# Patient Record
Sex: MALE | Race: WHITE | Employment: OTHER | URBAN - METROPOLITAN AREA
[De-identification: names, ages, dates, MRNs, and addresses within clinical notes are randomized per-mention and may not be internally consistent; named-entity substitution may affect disease eponyms.]

---

## 2022-09-06 ENCOUNTER — HOSPITAL ENCOUNTER (INPATIENT)
Age: 62
LOS: 17 days | Discharge: HOME HEALTH | DRG: 444 | End: 2022-09-23
Attending: STUDENT IN AN ORGANIZED HEALTH CARE EDUCATION/TRAINING PROGRAM | Admitting: HOSPITALIST
Payer: MEDICAID

## 2022-09-06 ENCOUNTER — APPOINTMENT (OUTPATIENT)
Dept: CT IMAGING | Age: 62
DRG: 444 | End: 2022-09-06
Attending: STUDENT IN AN ORGANIZED HEALTH CARE EDUCATION/TRAINING PROGRAM
Payer: MEDICAID

## 2022-09-06 ENCOUNTER — APPOINTMENT (OUTPATIENT)
Dept: GENERAL RADIOLOGY | Age: 62
DRG: 444 | End: 2022-09-06
Attending: RADIOLOGY
Payer: MEDICAID

## 2022-09-06 ENCOUNTER — APPOINTMENT (OUTPATIENT)
Dept: GENERAL RADIOLOGY | Age: 62
DRG: 444 | End: 2022-09-06
Attending: HOSPITALIST
Payer: MEDICAID

## 2022-09-06 ENCOUNTER — APPOINTMENT (OUTPATIENT)
Dept: GENERAL RADIOLOGY | Age: 62
DRG: 444 | End: 2022-09-06
Attending: INTERNAL MEDICINE
Payer: MEDICAID

## 2022-09-06 ENCOUNTER — APPOINTMENT (OUTPATIENT)
Dept: GENERAL RADIOLOGY | Age: 62
DRG: 444 | End: 2022-09-06
Attending: STUDENT IN AN ORGANIZED HEALTH CARE EDUCATION/TRAINING PROGRAM
Payer: MEDICAID

## 2022-09-06 ENCOUNTER — APPOINTMENT (OUTPATIENT)
Dept: INTERVENTIONAL RADIOLOGY/VASCULAR | Age: 62
DRG: 444 | End: 2022-09-06
Attending: INTERNAL MEDICINE
Payer: MEDICAID

## 2022-09-06 DIAGNOSIS — N17.9 ACUTE RENAL FAILURE, UNSPECIFIED ACUTE RENAL FAILURE TYPE (HCC): ICD-10-CM

## 2022-09-06 DIAGNOSIS — A41.9 SEVERE SEPSIS (HCC): Primary | ICD-10-CM

## 2022-09-06 DIAGNOSIS — I48.0 PAF (PAROXYSMAL ATRIAL FIBRILLATION) (HCC): ICD-10-CM

## 2022-09-06 DIAGNOSIS — R56.9 SEIZURE-LIKE ACTIVITY (HCC): ICD-10-CM

## 2022-09-06 DIAGNOSIS — E87.5 ACUTE HYPERKALEMIA: ICD-10-CM

## 2022-09-06 DIAGNOSIS — E11.65 TYPE 2 DIABETES MELLITUS WITH HYPERGLYCEMIA, WITHOUT LONG-TERM CURRENT USE OF INSULIN (HCC): ICD-10-CM

## 2022-09-06 DIAGNOSIS — I48.91 ATRIAL FIBRILLATION, RAPID (HCC): ICD-10-CM

## 2022-09-06 DIAGNOSIS — R65.20 SEVERE SEPSIS (HCC): Primary | ICD-10-CM

## 2022-09-06 PROBLEM — C43.9 MELANOMA (HCC): Status: ACTIVE | Noted: 2022-09-06

## 2022-09-06 PROBLEM — E78.5 HYPERLIPIDEMIA: Status: ACTIVE | Noted: 2022-09-06

## 2022-09-06 PROBLEM — I10 HTN (HYPERTENSION): Status: ACTIVE | Noted: 2022-09-06

## 2022-09-06 PROBLEM — E11.9 DM (DIABETES MELLITUS) (HCC): Status: ACTIVE | Noted: 2022-09-06

## 2022-09-06 LAB
ALBUMIN SERPL-MCNC: 2.8 G/DL (ref 3.5–5)
ALBUMIN SERPL-MCNC: 4.5 G/DL (ref 3.5–5.2)
ALBUMIN/GLOB SERPL: 0.8 {RATIO} (ref 1.1–2.2)
ALBUMIN/GLOB SERPL: 1.4 {RATIO} (ref 1.1–2.2)
ALP SERPL-CCNC: 68 U/L (ref 45–117)
ALP SERPL-CCNC: 85 U/L (ref 40–129)
ALT SERPL-CCNC: 17 U/L (ref 12–78)
ALT SERPL-CCNC: 7 U/L (ref 10–50)
ANION GAP SERPL CALC-SCNC: 46 MMOL/L (ref 5–15)
ANION GAP SERPL CALC-SCNC: ABNORMAL MMOL/L (ref 5–15)
APAP SERPL-MCNC: <5 UG/ML (ref 10–30)
APPEARANCE UR: ABNORMAL
ARTERIAL PATENCY WRIST A: NO
ARTERIAL PATENCY WRIST A: POSITIVE
ARTERIAL PATENCY WRIST A: YES
AST SERPL-CCNC: 13 U/L (ref 10–50)
AST SERPL-CCNC: 18 U/L (ref 15–37)
B-OH-BUTYR SERPL-SCNC: >4.42 MMOL/L
BACTERIA URNS QL MICRO: NEGATIVE /HPF
BASE DEFICIT BLDA-SCNC: 23.9 MMOL/L
BASE DEFICIT BLDA-SCNC: 29.9 MMOL/L
BASOPHILS # BLD: 0 K/UL
BASOPHILS # BLD: 0 K/UL (ref 0–0.1)
BASOPHILS NFR BLD: 0 %
BASOPHILS NFR BLD: 0 % (ref 0–1)
BDY SITE: ABNORMAL
BILIRUB SERPL-MCNC: 0.5 MG/DL (ref 0.2–1)
BILIRUB SERPL-MCNC: 0.6 MG/DL (ref 0.2–1)
BILIRUB UR QL CFM: NEGATIVE
BUN SERPL-MCNC: 105 MG/DL (ref 6–20)
BUN SERPL-MCNC: 105 MG/DL (ref 8–23)
BUN/CREAT SERPL: 8 (ref 12–20)
BUN/CREAT SERPL: 9 (ref 12–20)
CALCIUM SERPL-MCNC: 8.5 MG/DL (ref 8.5–10.1)
CALCIUM SERPL-MCNC: 9.7 MG/DL (ref 8.8–10.2)
CHLORIDE SERPL-SCNC: 89 MMOL/L (ref 98–107)
CHLORIDE SERPL-SCNC: 96 MMOL/L (ref 97–108)
CK SERPL-CCNC: 75 U/L (ref 39–308)
CO2 SERPL-SCNC: 4 MMOL/L (ref 22–29)
CO2 SERPL-SCNC: <5 MMOL/L (ref 21–32)
COLOR UR: ABNORMAL
COMMENT, HOLDF: NORMAL
COVID-19 RAPID TEST, COVR: NOT DETECTED
CREAT SERPL-MCNC: 11.8 MG/DL (ref 0.7–1.3)
CREAT SERPL-MCNC: 12.94 MG/DL (ref 0.7–1.2)
DIFFERENTIAL METHOD BLD: ABNORMAL
DIFFERENTIAL METHOD BLD: ABNORMAL
EOSINOPHIL # BLD: 0 K/UL
EOSINOPHIL # BLD: 0 K/UL (ref 0–0.4)
EOSINOPHIL NFR BLD: 0 %
EOSINOPHIL NFR BLD: 0 % (ref 0–7)
EPITH CASTS URNS QL MICRO: ABNORMAL /LPF
ERYTHROCYTE [DISTWIDTH] IN BLOOD BY AUTOMATED COUNT: 13 % (ref 11.5–14.5)
ERYTHROCYTE [DISTWIDTH] IN BLOOD BY AUTOMATED COUNT: 13 % (ref 11.5–14.5)
ETHANOL SERPL-MCNC: <10 MG/DL (ref 0–10)
FIO2 ON VENT: 50 %
FIO2 ON VENT: 60 %
FLUAV AG NPH QL IA: NEGATIVE
FLUBV AG NOSE QL IA: NEGATIVE
GAS FLOW.O2 O2 DELIVERY SYS: ABNORMAL L/MIN
GAS FLOW.O2 SETTING OXYMISER: 20 L/MIN
GAS FLOW.O2 SETTING OXYMISER: 20 L/MIN
GLOBULIN SER CALC-MCNC: 3.2 G/DL (ref 2–4)
GLOBULIN SER CALC-MCNC: 3.4 G/DL (ref 2–4)
GLUCOSE BLD STRIP.AUTO-MCNC: 140 MG/DL (ref 65–117)
GLUCOSE BLD STRIP.AUTO-MCNC: 149 MG/DL (ref 65–117)
GLUCOSE SERPL-MCNC: 160 MG/DL (ref 65–100)
GLUCOSE SERPL-MCNC: 281 MG/DL (ref 65–100)
GLUCOSE UR STRIP.AUTO-MCNC: NEGATIVE MG/DL
HCO3 BLD-SCNC: 2.1 MMOL/L (ref 22–26)
HCO3 BLDA-SCNC: 5 MMOL/L (ref 22–26)
HCO3 BLDA-SCNC: 8 MMOL/L (ref 22–26)
HCT VFR BLD AUTO: 35.3 % (ref 36.6–50.3)
HCT VFR BLD AUTO: 40.2 % (ref 36.6–50.3)
HGB BLD-MCNC: 10.8 G/DL (ref 12.1–17)
HGB BLD-MCNC: 12.7 G/DL (ref 12.1–17)
HGB UR QL STRIP: ABNORMAL
IMM GRANULOCYTES # BLD AUTO: 0 K/UL
IMM GRANULOCYTES # BLD AUTO: 0 K/UL
IMM GRANULOCYTES NFR BLD AUTO: 0 %
IMM GRANULOCYTES NFR BLD AUTO: 0 %
KETONES UR QL STRIP.AUTO: ABNORMAL MG/DL
LACTATE BLD-SCNC: 11.89 MMOL/L (ref 0.4–2)
LACTATE BLD-SCNC: 14.7 MMOL/L (ref 0.4–2)
LACTATE SERPL-SCNC: 12.6 MMOL/L (ref 0.4–2)
LACTATE SERPL-SCNC: 17.1 MMOL/L (ref 0.4–2)
LACTATE SERPL-SCNC: 18.9 MMOL/L (ref 0.4–2)
LACTATE SERPL-SCNC: 19.3 MMOL/L (ref 0.4–2)
LEUKOCYTE ESTERASE UR QL STRIP.AUTO: NEGATIVE
LYMPHOCYTES # BLD: 2.3 K/UL
LYMPHOCYTES # BLD: 4 K/UL (ref 0.8–3.5)
LYMPHOCYTES NFR BLD: 11 %
LYMPHOCYTES NFR BLD: 19 % (ref 12–49)
MAGNESIUM SERPL-MCNC: 2.7 MG/DL (ref 1.6–2.4)
MCH RBC QN AUTO: 32.5 PG (ref 26–34)
MCH RBC QN AUTO: 32.7 PG (ref 26–34)
MCHC RBC AUTO-ENTMCNC: 30.6 G/DL (ref 30–36.5)
MCHC RBC AUTO-ENTMCNC: 31.6 G/DL (ref 30–36.5)
MCV RBC AUTO: 103.6 FL (ref 80–99)
MCV RBC AUTO: 106.3 FL (ref 80–99)
METAMYELOCYTES NFR BLD MANUAL: 2 %
METAMYELOCYTES NFR BLD MANUAL: 4 %
MONOCYTES # BLD: 0.8 K/UL
MONOCYTES # BLD: 1.3 K/UL (ref 0–1)
MONOCYTES NFR BLD: 4 %
MONOCYTES NFR BLD: 6 % (ref 5–13)
MYELOCYTES NFR BLD MANUAL: 1 %
NEUTS BAND NFR BLD MANUAL: 11 % (ref 0–6)
NEUTS SEG # BLD: 15 K/UL (ref 1.8–8)
NEUTS SEG # BLD: 17.2 K/UL
NEUTS SEG NFR BLD: 60 % (ref 32–75)
NEUTS SEG NFR BLD: 82 %
NITRITE UR QL STRIP.AUTO: NEGATIVE
NRBC # BLD: 0 K/UL (ref 0–0.01)
NRBC # BLD: 0 K/UL (ref 0–0.01)
NRBC BLD-RTO: 0 PER 100 WBC
NRBC BLD-RTO: 0 PER 100 WBC
PCO2 BLD: 14.9 MMHG (ref 35–45)
PCO2 BLDA: 32 MMHG (ref 35–45)
PCO2 BLDA: 37 MMHG (ref 35–45)
PEEP RESPIRATORY: 5 CM[H2O]
PEEP RESPIRATORY: 5 CM[H2O]
PH BLD: 6.77 [PH] (ref 7.35–7.45)
PH BLDA: 6.79 [PH] (ref 7.35–7.45)
PH BLDA: 6.94 [PH] (ref 7.35–7.45)
PH UR STRIP: 5.5 [PH] (ref 5–8)
PLATELET # BLD AUTO: 316 K/UL (ref 150–400)
PLATELET # BLD AUTO: 337 K/UL (ref 150–400)
PLATELET COMMENTS,PCOM: ABNORMAL
PLATELET COMMENTS,PCOM: ABNORMAL
PMV BLD AUTO: 10.7 FL (ref 8.9–12.9)
PMV BLD AUTO: 9.9 FL (ref 8.9–12.9)
PO2 BLD: 146 MMHG (ref 80–100)
PO2 BLDA: 154 MMHG (ref 80–100)
PO2 BLDA: 87 MMHG (ref 80–100)
POTASSIUM SERPL-SCNC: 5.5 MMOL/L (ref 3.5–5.1)
POTASSIUM SERPL-SCNC: 7.6 MMOL/L (ref 3.5–5.1)
PROT SERPL-MCNC: 6.2 G/DL (ref 6.4–8.2)
PROT SERPL-MCNC: 7.7 G/DL (ref 6.4–8.3)
PROT UR STRIP-MCNC: 100 MG/DL
RBC # BLD AUTO: 3.32 M/UL (ref 4.1–5.7)
RBC # BLD AUTO: 3.88 M/UL (ref 4.1–5.7)
RBC #/AREA URNS HPF: ABNORMAL /HPF (ref 0–5)
RBC MORPH BLD: ABNORMAL
RBC MORPH BLD: ABNORMAL
SALICYLATES SERPL-MCNC: 0.4 MG/DL (ref 3–10)
SAMPLES BEING HELD,HOLD: NORMAL
SAO2 % BLD: 89 % (ref 92–97)
SAO2 % BLD: 95.3 % (ref 92–97)
SAO2 % BLD: 97 % (ref 92–97)
SAO2% DEVICE SAO2% SENSOR NAME: ABNORMAL
SAO2% DEVICE SAO2% SENSOR NAME: ABNORMAL
SERVICE CMNT-IMP: ABNORMAL
SODIUM SERPL-SCNC: 139 MMOL/L (ref 136–145)
SODIUM SERPL-SCNC: 139 MMOL/L (ref 136–145)
SOURCE, COVRS: NORMAL
SP GR UR REFRACTOMETRY: 1.02 (ref 1–1.03)
SPECIMEN SITE: ABNORMAL
SPECIMEN SITE: ABNORMAL
SPECIMEN TYPE: ABNORMAL
TROPONIN I BLD-MCNC: <0.04 NG/ML (ref 0–0.08)
UA: UC IF INDICATED,UAUC: ABNORMAL
UROBILINOGEN UR QL STRIP.AUTO: 0.2 EU/DL (ref 0.2–1)
VENTILATION MODE VENT: ABNORMAL
VT SETTING VENT: 400 ML
VT SETTING VENT: 400 ML
WBC # BLD AUTO: 21 K/UL (ref 4.1–11.1)
WBC # BLD AUTO: 21.1 K/UL (ref 4.1–11.1)
WBC URNS QL MICRO: ABNORMAL /HPF (ref 0–4)

## 2022-09-06 PROCEDURE — 74011250636 HC RX REV CODE- 250/636: Performed by: INTERNAL MEDICINE

## 2022-09-06 PROCEDURE — 87040 BLOOD CULTURE FOR BACTERIA: CPT

## 2022-09-06 PROCEDURE — 74011250636 HC RX REV CODE- 250/636: Performed by: STUDENT IN AN ORGANIZED HEALTH CARE EDUCATION/TRAINING PROGRAM

## 2022-09-06 PROCEDURE — 83605 ASSAY OF LACTIC ACID: CPT

## 2022-09-06 PROCEDURE — 74011000250 HC RX REV CODE- 250: Performed by: STUDENT IN AN ORGANIZED HEALTH CARE EDUCATION/TRAINING PROGRAM

## 2022-09-06 PROCEDURE — 74011000258 HC RX REV CODE- 258: Performed by: INTERNAL MEDICINE

## 2022-09-06 PROCEDURE — 3E033XZ INTRODUCTION OF VASOPRESSOR INTO PERIPHERAL VEIN, PERCUTANEOUS APPROACH: ICD-10-PCS | Performed by: HOSPITALIST

## 2022-09-06 PROCEDURE — 71045 X-RAY EXAM CHEST 1 VIEW: CPT

## 2022-09-06 PROCEDURE — 94640 AIRWAY INHALATION TREATMENT: CPT

## 2022-09-06 PROCEDURE — 80143 DRUG ASSAY ACETAMINOPHEN: CPT

## 2022-09-06 PROCEDURE — 74176 CT ABD & PELVIS W/O CONTRAST: CPT

## 2022-09-06 PROCEDURE — 85025 COMPLETE CBC W/AUTO DIFF WBC: CPT

## 2022-09-06 PROCEDURE — 77002 NEEDLE LOCALIZATION BY XRAY: CPT

## 2022-09-06 PROCEDURE — 96365 THER/PROPH/DIAG IV INF INIT: CPT

## 2022-09-06 PROCEDURE — 2709999900 HC NON-CHARGEABLE SUPPLY

## 2022-09-06 PROCEDURE — 82077 ASSAY SPEC XCP UR&BREATH IA: CPT

## 2022-09-06 PROCEDURE — 82010 KETONE BODYS QUAN: CPT

## 2022-09-06 PROCEDURE — 77030040922 HC BLNKT HYPOTHRM STRY -A

## 2022-09-06 PROCEDURE — 81001 URINALYSIS AUTO W/SCOPE: CPT

## 2022-09-06 PROCEDURE — 82803 BLOOD GASES ANY COMBINATION: CPT

## 2022-09-06 PROCEDURE — 36600 WITHDRAWAL OF ARTERIAL BLOOD: CPT

## 2022-09-06 PROCEDURE — 94002 VENT MGMT INPAT INIT DAY: CPT

## 2022-09-06 PROCEDURE — 5A1D90Z PERFORMANCE OF URINARY FILTRATION, CONTINUOUS, GREATER THAN 18 HOURS PER DAY: ICD-10-PCS | Performed by: INTERNAL MEDICINE

## 2022-09-06 PROCEDURE — 71250 CT THORAX DX C-: CPT

## 2022-09-06 PROCEDURE — 80179 DRUG ASSAY SALICYLATE: CPT

## 2022-09-06 PROCEDURE — 31500 INSERT EMERGENCY AIRWAY: CPT

## 2022-09-06 PROCEDURE — 80053 COMPREHEN METABOLIC PANEL: CPT

## 2022-09-06 PROCEDURE — 36415 COLL VENOUS BLD VENIPUNCTURE: CPT

## 2022-09-06 PROCEDURE — 80320 DRUG SCREEN QUANTALCOHOLS: CPT

## 2022-09-06 PROCEDURE — 96374 THER/PROPH/DIAG INJ IV PUSH: CPT

## 2022-09-06 PROCEDURE — 87804 INFLUENZA ASSAY W/OPTIC: CPT

## 2022-09-06 PROCEDURE — 96375 TX/PRO/DX INJ NEW DRUG ADDON: CPT

## 2022-09-06 PROCEDURE — 5A1955Z RESPIRATORY VENTILATION, GREATER THAN 96 CONSECUTIVE HOURS: ICD-10-PCS | Performed by: STUDENT IN AN ORGANIZED HEALTH CARE EDUCATION/TRAINING PROGRAM

## 2022-09-06 PROCEDURE — C1751 CATH, INF, PER/CENT/MIDLINE: HCPCS

## 2022-09-06 PROCEDURE — 74018 RADEX ABDOMEN 1 VIEW: CPT

## 2022-09-06 PROCEDURE — 02HV33Z INSERTION OF INFUSION DEVICE INTO SUPERIOR VENA CAVA, PERCUTANEOUS APPROACH: ICD-10-PCS | Performed by: INTERNAL MEDICINE

## 2022-09-06 PROCEDURE — 0BH17EZ INSERTION OF ENDOTRACHEAL AIRWAY INTO TRACHEA, VIA NATURAL OR ARTIFICIAL OPENING: ICD-10-PCS | Performed by: STUDENT IN AN ORGANIZED HEALTH CARE EDUCATION/TRAINING PROGRAM

## 2022-09-06 PROCEDURE — C1752 CATH,HEMODIALYSIS,SHORT-TERM: HCPCS

## 2022-09-06 PROCEDURE — 74011000250 HC RX REV CODE- 250: Performed by: HOSPITALIST

## 2022-09-06 PROCEDURE — 93005 ELECTROCARDIOGRAM TRACING: CPT

## 2022-09-06 PROCEDURE — 83735 ASSAY OF MAGNESIUM: CPT

## 2022-09-06 PROCEDURE — 74011000250 HC RX REV CODE- 250: Performed by: RADIOLOGY

## 2022-09-06 PROCEDURE — C1894 INTRO/SHEATH, NON-LASER: HCPCS

## 2022-09-06 PROCEDURE — 87635 SARS-COV-2 COVID-19 AMP PRB: CPT

## 2022-09-06 PROCEDURE — 06HY33Z INSERTION OF INFUSION DEVICE INTO LOWER VEIN, PERCUTANEOUS APPROACH: ICD-10-PCS | Performed by: ANESTHESIOLOGY

## 2022-09-06 PROCEDURE — 74011000250 HC RX REV CODE- 250: Performed by: INTERNAL MEDICINE

## 2022-09-06 PROCEDURE — 65610000006 HC RM INTENSIVE CARE

## 2022-09-06 PROCEDURE — 74011250636 HC RX REV CODE- 250/636: Performed by: RADIOLOGY

## 2022-09-06 PROCEDURE — 74011636637 HC RX REV CODE- 636/637: Performed by: STUDENT IN AN ORGANIZED HEALTH CARE EDUCATION/TRAINING PROGRAM

## 2022-09-06 PROCEDURE — 74011250636 HC RX REV CODE- 250/636: Performed by: HOSPITALIST

## 2022-09-06 PROCEDURE — 82962 GLUCOSE BLOOD TEST: CPT

## 2022-09-06 PROCEDURE — 74011000258 HC RX REV CODE- 258: Performed by: EMERGENCY MEDICINE

## 2022-09-06 PROCEDURE — 70450 CT HEAD/BRAIN W/O DYE: CPT

## 2022-09-06 PROCEDURE — 02HV33Z INSERTION OF INFUSION DEVICE INTO SUPERIOR VENA CAVA, PERCUTANEOUS APPROACH: ICD-10-PCS | Performed by: ANESTHESIOLOGY

## 2022-09-06 PROCEDURE — 84484 ASSAY OF TROPONIN QUANT: CPT

## 2022-09-06 PROCEDURE — 99285 EMERGENCY DEPT VISIT HI MDM: CPT

## 2022-09-06 PROCEDURE — 51702 INSERT TEMP BLADDER CATH: CPT

## 2022-09-06 PROCEDURE — 74011250636 HC RX REV CODE- 250/636: Performed by: EMERGENCY MEDICINE

## 2022-09-06 PROCEDURE — 82550 ASSAY OF CK (CPK): CPT

## 2022-09-06 PROCEDURE — 74011000250 HC RX REV CODE- 250

## 2022-09-06 RX ORDER — SODIUM BICARBONATE 1 MEQ/ML
SYRINGE (ML) INTRAVENOUS
Status: COMPLETED
Start: 2022-09-06 | End: 2022-09-06

## 2022-09-06 RX ORDER — LEVOFLOXACIN 5 MG/ML
750 INJECTION, SOLUTION INTRAVENOUS
Status: COMPLETED | OUTPATIENT
Start: 2022-09-06 | End: 2022-09-06

## 2022-09-06 RX ORDER — LEVOFLOXACIN 5 MG/ML
750 INJECTION, SOLUTION INTRAVENOUS EVERY 24 HOURS
Status: DISCONTINUED | OUTPATIENT
Start: 2022-09-06 | End: 2022-09-06

## 2022-09-06 RX ORDER — CALCIUM GLUCONATE 20 MG/ML
1 INJECTION, SOLUTION INTRAVENOUS ONCE
Status: COMPLETED | OUTPATIENT
Start: 2022-09-06 | End: 2022-09-06

## 2022-09-06 RX ORDER — SODIUM BICARBONATE 84 MG/ML
100 INJECTION, SOLUTION INTRAVENOUS
Status: COMPLETED | OUTPATIENT
Start: 2022-09-06 | End: 2022-09-06

## 2022-09-06 RX ORDER — HEPARIN SODIUM 1000 [USP'U]/ML
1000-5000 INJECTION, SOLUTION INTRAVENOUS; SUBCUTANEOUS ONCE
Status: COMPLETED | OUTPATIENT
Start: 2022-09-06 | End: 2022-09-06

## 2022-09-06 RX ORDER — SODIUM BICARBONATE 1 MEQ/ML
150 SYRINGE (ML) INTRAVENOUS ONCE
Status: COMPLETED | OUTPATIENT
Start: 2022-09-06 | End: 2022-09-06

## 2022-09-06 RX ORDER — PROPOFOL 10 MG/ML
200 INJECTION, EMULSION INTRAVENOUS
Status: COMPLETED | OUTPATIENT
Start: 2022-09-06 | End: 2022-09-06

## 2022-09-06 RX ORDER — GABAPENTIN 300 MG/1
300 CAPSULE ORAL SEE ADMIN INSTRUCTIONS
COMMUNITY
End: 2022-09-23

## 2022-09-06 RX ORDER — SODIUM BICARBONATE 1 MEQ/ML
50 SYRINGE (ML) INTRAVENOUS ONCE
Status: COMPLETED | OUTPATIENT
Start: 2022-09-06 | End: 2022-09-06

## 2022-09-06 RX ORDER — ALBUTEROL SULFATE 0.83 MG/ML
10 SOLUTION RESPIRATORY (INHALATION) ONCE
Status: COMPLETED | OUTPATIENT
Start: 2022-09-06 | End: 2022-09-06

## 2022-09-06 RX ORDER — IPRATROPIUM BROMIDE AND ALBUTEROL SULFATE 2.5; .5 MG/3ML; MG/3ML
3 SOLUTION RESPIRATORY (INHALATION)
Status: COMPLETED | OUTPATIENT
Start: 2022-09-06 | End: 2022-09-06

## 2022-09-06 RX ORDER — NOREPINEPHRINE BITARTRATE/D5W 8 MG/250ML
.5-5 PLASTIC BAG, INJECTION (ML) INTRAVENOUS
Status: DISCONTINUED | OUTPATIENT
Start: 2022-09-06 | End: 2022-09-07 | Stop reason: SDUPTHER

## 2022-09-06 RX ORDER — LIDOCAINE HYDROCHLORIDE 10 MG/ML
1-20 INJECTION INFILTRATION; PERINEURAL
Status: DISCONTINUED | OUTPATIENT
Start: 2022-09-06 | End: 2022-09-09

## 2022-09-06 RX ORDER — PROPOFOL 10 MG/ML
0-50 VIAL (ML) INTRAVENOUS
Status: DISCONTINUED | OUTPATIENT
Start: 2022-09-06 | End: 2022-09-09

## 2022-09-06 RX ORDER — ROCURONIUM BROMIDE 10 MG/ML
50 INJECTION, SOLUTION INTRAVENOUS
Status: COMPLETED | OUTPATIENT
Start: 2022-09-06 | End: 2022-09-06

## 2022-09-06 RX ADMIN — SODIUM CHLORIDE, SODIUM LACTATE, POTASSIUM CHLORIDE, AND CALCIUM CHLORIDE 2151 ML: 600; 310; 30; 20 INJECTION, SOLUTION INTRAVENOUS at 10:41

## 2022-09-06 RX ADMIN — SODIUM BICARBONATE 150 MEQ: 84 INJECTION, SOLUTION INTRAVENOUS at 18:56

## 2022-09-06 RX ADMIN — SODIUM BICARBONATE: 84 INJECTION, SOLUTION INTRAVENOUS at 13:59

## 2022-09-06 RX ADMIN — PIPERACILLIN AND TAZOBACTAM 4.5 G: 4; .5 INJECTION, POWDER, LYOPHILIZED, FOR SOLUTION INTRAVENOUS at 11:06

## 2022-09-06 RX ADMIN — LEVOFLOXACIN 750 MG: 5 INJECTION, SOLUTION INTRAVENOUS at 11:10

## 2022-09-06 RX ADMIN — VANCOMYCIN HYDROCHLORIDE 750 MG: 750 INJECTION, POWDER, LYOPHILIZED, FOR SOLUTION INTRAVENOUS at 11:13

## 2022-09-06 RX ADMIN — SODIUM BICARBONATE 150 MEQ: 84 INJECTION INTRAVENOUS at 18:21

## 2022-09-06 RX ADMIN — ALBUTEROL SULFATE 10 MG: 2.5 SOLUTION RESPIRATORY (INHALATION) at 12:10

## 2022-09-06 RX ADMIN — SODIUM CHLORIDE, POTASSIUM CHLORIDE, SODIUM LACTATE AND CALCIUM CHLORIDE 1000 ML: 600; 310; 30; 20 INJECTION, SOLUTION INTRAVENOUS at 14:59

## 2022-09-06 RX ADMIN — IPRATROPIUM BROMIDE AND ALBUTEROL SULFATE 3 ML: .5; 3 SOLUTION RESPIRATORY (INHALATION) at 13:56

## 2022-09-06 RX ADMIN — SODIUM BICARBONATE 150 MEQ: 84 INJECTION, SOLUTION INTRAVENOUS at 14:25

## 2022-09-06 RX ADMIN — CALCIUM GLUCONATE 1000 MG: 20 INJECTION, SOLUTION INTRAVENOUS at 11:57

## 2022-09-06 RX ADMIN — PIPERACILLIN AND TAZOBACTAM 3.38 G: 3; .375 INJECTION, POWDER, FOR SOLUTION INTRAVENOUS at 21:05

## 2022-09-06 RX ADMIN — SODIUM BICARBONATE 100 MEQ: 84 INJECTION, SOLUTION INTRAVENOUS at 10:41

## 2022-09-06 RX ADMIN — Medication 150 MEQ: at 14:25

## 2022-09-06 RX ADMIN — ROCURONIUM BROMIDE 50 MG: 50 INJECTION INTRAVENOUS at 13:49

## 2022-09-06 RX ADMIN — SODIUM CHLORIDE, POTASSIUM CHLORIDE, SODIUM LACTATE AND CALCIUM CHLORIDE 1000 ML: 600; 310; 30; 20 INJECTION, SOLUTION INTRAVENOUS at 14:00

## 2022-09-06 RX ADMIN — PHENYLEPHRINE HYDROCHLORIDE 30 MCG/MIN: 10 INJECTION INTRAVENOUS at 19:05

## 2022-09-06 RX ADMIN — SODIUM BICARBONATE 50 MEQ: 84 INJECTION, SOLUTION INTRAVENOUS at 14:00

## 2022-09-06 RX ADMIN — FENTANYL CITRATE 50 MCG/HR: 50 INJECTION, SOLUTION INTRAMUSCULAR; INTRAVENOUS at 14:42

## 2022-09-06 RX ADMIN — Medication 5 UNITS: at 12:02

## 2022-09-06 RX ADMIN — SODIUM BICARBONATE: 84 INJECTION, SOLUTION INTRAVENOUS at 14:38

## 2022-09-06 RX ADMIN — VANCOMYCIN HYDROCHLORIDE 1000 MG: 1 INJECTION, POWDER, LYOPHILIZED, FOR SOLUTION INTRAVENOUS at 11:16

## 2022-09-06 RX ADMIN — PROPOFOL 200 MG: 10 INJECTION, EMULSION INTRAVENOUS at 13:49

## 2022-09-06 RX ADMIN — PROPOFOL 25 MCG/KG/MIN: 10 INJECTION, EMULSION INTRAVENOUS at 14:20

## 2022-09-06 RX ADMIN — NOREPINEPHRINE BITARTRATE 4 MCG/MIN: 1 SOLUTION INTRAVENOUS at 14:56

## 2022-09-06 RX ADMIN — DEXTROSE MONOHYDRATE 250 ML: 100 INJECTION, SOLUTION INTRAVENOUS at 12:04

## 2022-09-06 RX ADMIN — VASOPRESSIN 0.03 UNITS/MIN: 20 INJECTION INTRAVENOUS at 21:08

## 2022-09-06 RX ADMIN — HEPARIN SODIUM 2600 UNITS: 1000 INJECTION INTRAVENOUS; SUBCUTANEOUS at 15:50

## 2022-09-06 RX ADMIN — LIDOCAINE HYDROCHLORIDE 2 ML: 10 INJECTION, SOLUTION INFILTRATION; PERINEURAL at 15:40

## 2022-09-06 NOTE — CONSULTS
Consult Date: 9/6/2022    Consults Consulted by Dr. Jayna Arriola for assistance with ICU mgmt of LAVINIA, acidosis. Subjective   (Pt intubated at  time of eval.  Minimal hx available at this time). Pt is 57 y/ow ith pmh HLD, HTN, DM presented to outside ER with dizziness, faintness. Reportedly also endorsed N/V and SOB, but no recent fevers and no report of abd pain. Pt intubated on arrival to [facility] due to work of breathing. At time of CCM consult, pt intubated. ABG available at time of consult shows pH of 6.77 and pCO2 of 14.9. Bicarb 2.1. Pt on gtt and had gotten 1 amp. [facility] emergently ordered 3 additional amps of bicarb. HM calling IR for urgent HD access to help manage pH. Non-contrast CT of C/A/P done at outside facility unremarkable for acute findings. Past Medical History:   Diagnosis Date    Hypercholesteremia     Hypertension     Melanoma ([address]     skin    Thyroid activity decreased       Past Surgical History:   Procedure Laterality Date    HX ORTHOPAEDIC       History reviewed. No pertinent family history.    Social History     Tobacco Use    Smoking status: Never    Smokeless tobacco: Never   Substance Use Topics    Alcohol use: Not Currently       Current Facility-Administered Medications   Medication Dose Route Frequency Provider Last Rate Last Admin    dextrose 10 % infusion 125-250 mL  125-250 mL IntraVENous PRN Argentina Mackey DO        sodium bicarbonate (8.4%) 150 mEq in dextrose 5% 1,000 mL infusion   IntraVENous CONTINUOUS John Payne  mL/hr at 09/06/22 1438 New Bag at 09/06/22 1438    piperacillin-tazobactam (ZOSYN) 3.375 g in 0.9% sodium chloride (MBP/ADV) 100 mL MBP  3.375 g IntraVENous Q12H John Prasad MD        propofol (DIPRIVAN) 10 mg/mL infusion  0-50 mcg/kg/min IntraVENous TITRATE Saleem Wilson DO 10.8 mL/hr at 09/06/22 1420 25 mcg/kg/min at 09/06/22 1420    fentaNYL (PF) 1,500 mcg/30 mL (50 mcg/mL) infusion  0-200 mcg/hr IntraVENous TITRATE Celina Sánchez,             Review of Systems  Unable to obtain  Objective   Gen: intubated, paralyzed  HEENT: ETT in place  Chest: symmetrical chest rise  CV: r/r/r  Abd: non-distended  Ext: no c/c/e  Neuro: NMB at time of my eval.  Per report, pt talking and moving on arrival to ER. Vital signs for last 24 hours:  Visit Vitals  /77   Pulse (!) 101   Temp (!) 93.1 °F (33.9 °C)   Resp 20   Ht 5' 6\" (1.676 m)   Wt 71.7 kg (158 lb)   SpO2 100%   BMI 25.50 kg/m²       Intake/Output this shift:  Current Shift: 09/06 0701 - 09/06 1900  In: 1901 [I.V.:1901]  Out: 140 [Urine:140]  Last 3 Shifts: No intake/output data recorded. Data Review:   Recent Results (from the past 24 hour(s))   SAMPLES BEING HELD    Collection Time: 09/06/22 10:10 AM   Result Value Ref Range    SAMPLES BEING HELD 1 BLUE,1 GOLD     COMMENT        Add-on orders for these samples will be processed based on acceptable specimen integrity and analyte stability, which may vary by analyte. COVID-19 RAPID TEST    Collection Time: 09/06/22 10:10 AM   Result Value Ref Range    Specimen source Nasopharyngeal      COVID-19 rapid test Not detected NOTD     INFLUENZA A+B VIRAL AGS    Collection Time: 09/06/22 10:10 AM   Result Value Ref Range    Influenza A Antigen Negative NEG      Influenza B Antigen Negative NEG     CBC WITH AUTOMATED DIFF    Collection Time: 09/06/22 10:10 AM   Result Value Ref Range    WBC 21.0 (H) 4.1 - 11.1 K/uL    RBC 3.88 (L) 4.10 - 5.70 M/uL    HGB 12.7 12.1 - 17.0 g/dL    HCT 40.2 36.6 - 50.3 %    .6 (H) 80.0 - 99.0 FL    MCH 32.7 26.0 - 34.0 PG    MCHC 31.6 30.0 - 36.5 g/dL    RDW 13.0 11.5 - 14.5 %    PLATELET 176 441 - 666 K/uL    MPV 9.9 8.9 - 12.9 FL    NRBC 0.0 0  WBC    ABSOLUTE NRBC 0.00 0.00 - 0.01 K/uL    NEUTROPHILS 82 %    LYMPHOCYTES 11 %    MONOCYTES 4 %    EOSINOPHILS 0 %    BASOPHILS 0 %    METAMYELOCYTES 2 (H) 0 %    MYELOCYTES 1 (H) 0 %    IMMATURE GRANULOCYTES 0 %    ABS. NEUTROPHILS 17.2 K/UL    ABS. LYMPHOCYTES 2.3 K/UL    ABS. MONOCYTES 0.8 K/UL    ABS. EOSINOPHILS 0.0 K/UL    ABS. BASOPHILS 0.0 K/UL    ABS. IMM. GRANS. 0.0 K/UL    DF MANUAL      PLATELET COMMENTS Large Platelets      RBC COMMENTS MACROCYTOSIS  1+       METABOLIC PANEL, COMPREHENSIVE    Collection Time: 09/06/22 10:10 AM   Result Value Ref Range    Sodium 139 136 - 145 mmol/L    Potassium 7.6 (HH) 3.5 - 5.1 mmol/L    Chloride 89 (L) 98 - 107 mmol/L    CO2 4 (LL) 22 - 29 mmol/L    Anion gap 46 (H) 5 - 15 mmol/L    Glucose 160 (H) 65 - 100 mg/dL     (H) 8 - 23 MG/DL    Creatinine 12.94 (H) 0.70 - 1.20 MG/DL    BUN/Creatinine ratio 8 (L) 12 - 20      GFR est AA 5 (L) >60 ml/min/1.73m2    GFR est non-AA 4 (L) >60 ml/min/1.73m2    Calcium 9.7 8.8 - 10.2 MG/DL    Bilirubin, total 0.5 0.2 - 1.0 MG/DL    ALT (SGPT) 7 (L) 10 - 50 U/L    AST (SGOT) 13 10 - 50 U/L    Alk.  phosphatase 85 40 - 129 U/L    Protein, total 7.7 6.4 - 8.3 g/dL    Albumin 4.5 3.5 - 5.2 g/dL    Globulin 3.2 2.0 - 4.0 g/dL    A-G Ratio 1.4 1.1 - 2.2     MAGNESIUM    Collection Time: 09/06/22 10:10 AM   Result Value Ref Range    Magnesium 2.7 (H) 1.6 - 2.4 mg/dL   LACTIC ACID, PLASMA    Collection Time: 09/06/22 10:13 AM   Result Value Ref Range    Lactic acid, plasma 12.60 (HH) 0.4 - 2.0 mmol/L   POC TROPONIN-I    Collection Time: 09/06/22 10:14 AM   Result Value Ref Range    Troponin-I (POC) <0.04 0.00 - 0.08 ng/mL   GLUCOSE, POC    Collection Time: 09/06/22 10:20 AM   Result Value Ref Range    Glucose (POC) 149 (H) 65 - 117 mg/dL    Performed by Aaron YAO    POC LACTIC ACID    Collection Time: 09/06/22 10:23 AM   Result Value Ref Range    Lactic Acid (POC) 11.89 (HH) 0.40 - 2.00 mmol/L   URINALYSIS W/ REFLEX CULTURE    Collection Time: 09/06/22 11:04 AM    Specimen: Miscellaneous sample; Urine    Urine specimen   Result Value Ref Range    Color YELLOW/STRAW      Appearance HAZY (A) CLEAR      Specific gravity 1.020 1.003 - 1.030      pH (UA) 5.5 5.0 - 8.0      Protein 100 (A) NEG mg/dL    Glucose Negative NEG mg/dL    Ketone TRACE (A) NEG mg/dL    Blood TRACE (A) NEG      Urobilinogen 0.2 0.2 - 1.0 EU/dL    Nitrites Negative NEG      Leukocyte Esterase Negative NEG      WBC 0-4 0 - 4 /hpf    RBC 0-5 0 - 5 /hpf    Epithelial cells FEW FEW /lpf    Bacteria Negative NEG /hpf    UA:UC IF INDICATED CULTURE NOT INDICATED BY UA RESULT     BILIRUBIN, CONFIRM    Collection Time: 09/06/22 11:04 AM   Result Value Ref Range    Bilirubin UA, confirm Negative NEG     SALICYLATE    Collection Time: 09/06/22 11:05 AM   Result Value Ref Range    Salicylate level 0.4 (L) 3.0 - 10.0 MG/DL   ETHYL ALCOHOL    Collection Time: 09/06/22 11:05 AM   Result Value Ref Range    ALCOHOL(ETHYL),SERUM <10 0 - 10 MG/DL   ACETAMINOPHEN    Collection Time: 09/06/22 11:05 AM   Result Value Ref Range    Acetaminophen level <5 (L) 10 - 30 ug/mL   LACTIC ACID, PLASMA    Collection Time: 09/06/22 11:52 AM   Result Value Ref Range    Lactic acid, plasma 17.10 (HH) 0.4 - 2.0 mmol/L   POC LACTIC ACID    Collection Time: 09/06/22 12:02 PM   Result Value Ref Range    Lactic Acid (POC) 14.70 (HH) 0.40 - 2.00 mmol/L   GLUCOSE, POC    Collection Time: 09/06/22 12:16 PM   Result Value Ref Range    Glucose (POC) 140 (H) 65 - 117 mg/dL    Performed by Jose Juan Bloodgood    POC G3 - PUL    Collection Time: 09/06/22  1:30 PM   Result Value Ref Range    pH (POC) 6.77 (LL) 7.35 - 7.45      pCO2 (POC) 14.9 (L) 35.0 - 45.0 MMHG    pO2 (POC) 146 (H) 80 - 100 MMHG    HCO3 (POC) 2.1 (L) 22 - 26 MMOL/L    sO2 (POC) 95.3 92 - 97 %    Site LEFT RADIAL      Device: ROOM AIR      Allens test (POC) Positive      Specimen type (POC) ARTERIAL      Critical value read back LIND    CBC WITH AUTOMATED DIFF    Collection Time: 09/06/22  2:16 PM   Result Value Ref Range    WBC 21.1 (H) 4.1 - 11.1 K/uL    RBC 3.32 (L) 4.10 - 5.70 M/uL    HGB 10.8 (L) 12.1 - 17.0 g/dL    HCT 35.3 (L) 36.6 - 50.3 %    .3 (H) 80.0 - 99.0 FL    MCH 32.5 26.0 - 34.0 PG    MCHC 30.6 30.0 - 36.5 g/dL    RDW 13.0 11.5 - 14.5 %    PLATELET 682 173 - 880 K/uL    MPV 10.7 8.9 - 12.9 FL    NRBC 0.0 0  WBC    ABSOLUTE NRBC 0.00 0.00 - 0.01 K/uL    NEUTROPHILS PENDING %    LYMPHOCYTES PENDING %    MONOCYTES PENDING %    EOSINOPHILS PENDING %    BASOPHILS PENDING %    IMMATURE GRANULOCYTES PENDING %    ABS. NEUTROPHILS PENDING K/UL    ABS. LYMPHOCYTES PENDING K/UL    ABS. MONOCYTES PENDING K/UL    ABS. EOSINOPHILS PENDING K/UL    ABS. BASOPHILS PENDING K/UL    ABS. IMM. GRANS. PENDING K/UL    DF PENDING      58 y/o with pmh HLD, HTN, DM admitted after transfer from outside ER for mgmt of dizziness, LAVINIA, acidosis. Acidosis:  -- profound acidosis on initial eval by CCM  -- pCO2 only 14, but pH 6.77 and lactate >15.  -- etiology likely multifactorial with renal failure and elevated lactate. -- pt likely compensated with hyperventilation, but etiology of elevated lactate still unclear  -- no clinical hx that fits toxic alcohols and pt reportedly interactive on arrival to ER, which would be unusual with toxic alcohols. Labs sent however. -- has DM, but blood sugar not all that elevated. Ketones minimal in UA. Beta-hydroxy sent. -- treating with amps of bicarb and is getting set up for dialysis. Hypocapnia:  -- severe hypocapnia  -- concern that this will lead to decreased cerebral blood flow and strokes. -- pH too low to allow to drift up so  getting amps of bicarb in effort to allow pCO2 to get back into 20 range and decrease vasoconstriction. Elevated Lactate:  -- etiology unclear. -- pt normotensive in ER and post intubation  -- no clear source of hypoperfusion  -- will continue abx, rewarming    Acute Renal Failure:  -- appears very pre-renal  -- no indication of infection  -- getting volume  -- nephrology consulted and getting prep'd for dialysis.       Sepsis:  -- suspected but no source identified  -- CT C/A/P unrevealing, but was non-con due to creatinine. -- continue empiric abx. Hypothermia:  -- likely related to low pH, possible sepsis, possible ingestion  -- rewarm  -- anticipate hypotension with rewarming. Acute Resp Failure:  -- intubated for overall clinical decline  -- SBT when meeting criteria. CCM time 70 min. Pt at risk of life threatening deterioration from acidosis. Pt seen and evaluated via tele interaction. Audio and video used for this encounter.

## 2022-09-06 NOTE — PROGRESS NOTES
Pharmacy Dosing Services: Zosyn    Zosyn 3.375 gm IV Q6H automatically adjusted per protocol to Zosyn 3.375 gm IV Q12H (extended-infusion over 4 hours) for CrCl < 20 mL/min, HD, or PD  Patient already received initial loading dose of 4.5 gm IV x 1 at [facility]  Maintenance dose scheduled to begin 6 hours after load for CrCl >/= 20 mL/min and 8 hours after load for CrCl < 20 mL/min    Thank you,  Eyal DICKERSON [facility]

## 2022-09-06 NOTE — ED NOTES
KIAH from Sadia Mcdonald MD to stop LR infusion at this time due to MD concern for fluid overload, ~400ml LR remaining to be infused. LR stopped. Boy Rivers RN to obtain repeat POC lactic acid after return from CT.

## 2022-09-06 NOTE — ED NOTES
SBAR provided to [address] [place] [address] Road Transport Team: Maddison Bishop Torsten RT, [employer].

## 2022-09-06 NOTE — ED NOTES
This RN contacted [facility] and spoke to Carolyn Wayne who will page Dr. Freddie Akhtar in regards to this patient.

## 2022-09-06 NOTE — MANAGEMENT PLAN
Increasing pressor requirements. Have reached out to anesthesiologist to request CVC. If no call back will need to try again and confirm correct person. Repeated ABG and have ordered additional bicarb. Etiology still unclear. Kane added for second pressor. Once we have CVC would add vaso.

## 2022-09-06 NOTE — PROGRESS NOTES
1330: TRANSFER - IN REPORT:    Verbal report received from Kendy Bolanos RN(name) on Andrey Knowles  being received from [place] ED(unit) for routine progression of care      Report consisted of patients Situation, Background, Assessment and   Recommendations(SBAR). Information from the following report(s) SBAR, Intake/Output, MAR, Recent Results, and Cardiac Rhythm NSR  was reviewed with the receiving nurse. Opportunity for questions and clarification was provided. Assessment completed upon patients arrival to unit and care assumed. Patient arrived to unit on room air with increased WOB. ABG obtained and Dr Joe Ignacio at bedside. 1L LR bolus and amp bicarb ordered. pH 6.7 and pCO2 14.9, orders to intubate. 1349: Anesthesia at bedside intubating patient. 1420: Dr Truman Parisi at bedside. Orders for 2nd LR bolus and 2 amps of bicarb. Prop and Fent started. NGT placed. 1452: Labs drawn and sent. Repeat ABG pH 6.79 and pCO2 32.    1515: Dr Doris Aldridge at bedside, orders for CRRT placed. Labs drawn and sent. 1530: IR at bedside placing Otoniel. 1600: Reassessment completed. Endotracheal and mouth care provided. Patient turned and repositioned. Restraints assessed, see flow sheet. 1800: Endotracheal and mouth care provided. Patient turned and repositioned. Restraints assessed, see flow sheet. Labs drawn and sent. BP dropping, Dr Truman Parisi notified for orders for central line. 1900: Bedside and Verbal shift change report given to Anson Arias RN (oncoming nurse) by Kev Palacio RN (offgoing nurse). Report included the following information SBAR, Intake/Output, MAR, Recent Results, and Cardiac Rhythm Sinus Tach .      1905: Kane gtt started at 27

## 2022-09-06 NOTE — ED PROVIDER NOTES
HPI     Date of Service:  9/6/2022    Patient:  Santo Angelucci    Chief Complaint:  Dizziness       HPI:  Santo Angelucci is a 57 y.o.  male with a past medical history of hypertension, hyperlipidemia, non-insulin-dependent diabetes, melanoma s/p resection who presents for evaluation of dizziness. Patient was started on gabapentin on Friday, took 3 doses and started to feel dizzy therefore discontinued the medication. He denies vertigo sensation, states he feels lightheaded and faint. He has not passed out. He does endorse shortness of breath, nausea and vomiting. No known fevers. Past Medical History:   Diagnosis Date    Hypercholesteremia     Hypertension     Melanoma ([address]     skin    Thyroid activity decreased        Past Surgical History:   Procedure Laterality Date    HX ORTHOPAEDIC           History reviewed. No pertinent family history. Social History     Socioeconomic History    Marital status: Not on file     Spouse name: Not on file    Number of children: Not on file    Years of education: Not on file    Highest education level: Not on file   Occupational History    Not on file   Tobacco Use    Smoking status: Never    Smokeless tobacco: Never   Substance and Sexual Activity    Alcohol use: Not Currently    Drug use: Yes     Types: Marijuana    Sexual activity: Not Currently   Other Topics Concern    Not on file   Social History Narrative    Not on file     Social Determinants of Health     Financial Resource Strain: Not on file   Food Insecurity: Not on file   Transportation Needs: Not on file   Physical Activity: Not on file   Stress: Not on file   Social Connections: Not on file   Intimate Partner Violence: Not on file   Housing Stability: Not on file         ALLERGIES: Patient has no known allergies.     Review of Systems   Unable to perform ROS: Mental status change     Vitals:    09/06/22 1002 09/06/22 1013   BP: (!) 152/85    Pulse: 94    Resp: 23    SpO2: 100% 99%   Weight: 71.7 kg (158 lb)    Height: 5' 6\" (1.676 m)             Physical Exam  Vitals and nursing note reviewed. Constitutional:       General: He is in acute distress. Appearance: He is ill-appearing and toxic-appearing. HENT:      Head: Normocephalic and atraumatic. Mouth/Throat:      Mouth: Mucous membranes are dry. Pharynx: No posterior oropharyngeal erythema. Eyes:      General: No scleral icterus. Conjunctiva/sclera: Conjunctivae normal.      Pupils: Pupils are equal, round, and reactive to light. Cardiovascular:      Rate and Rhythm: Normal rate and regular rhythm. Pulses: Normal pulses. Pulmonary:      Effort: Tachypnea present. Breath sounds: Rhonchi present. Abdominal:      General: Abdomen is protuberant. Palpations: Abdomen is soft. Tenderness: There is no abdominal tenderness. There is no guarding or rebound. Musculoskeletal:         General: Normal range of motion. Right lower leg: No edema. Left lower leg: No edema. Skin:     General: Skin is warm and dry. Capillary Refill: Capillary refill takes less than 2 seconds. Neurological:      General: No focal deficit present. Mental Status: He is alert. He is disoriented. Cranial Nerves: No cranial nerve deficit. Sensory: No sensory deficit. Motor: No weakness. MDM  Number of Diagnoses or Management Options  Acute hyperkalemia  Acute renal failure, unspecified acute renal failure type ([address]  Severe sepsis ([address]  Diagnosis management comments:     DECISION MAKING:  Aurea Aiken is a 57 y.o. male who comes in as above. On arrival, patient is Return tachypneic with a respiratory rate up to the 30s. Blood pressure is stable. On examination he does appear ill, is rapidly breathing and kussmaul breathing. A blood sugar was obtained and not significantly elevated. A rectal temperature was subsequently obtained and he was hypothermic at 34.8 Celsius.   Bear hugger applied. Concern for severe sepsis at this time, empiric antibiotics and blood cultures ordered. 30cc/kg IVF bolus started. Patient subsequently found to be very acidotic with pH on VBG of 6.8, lactate of 11. WBC of 21. At this time given the severity of his illness he needs emergent transfer to ICU at [facility]. Perfect Serve Consult for Admission  10:56 AM    ED Room Number: T46/B61  Patient Name and age:  Ashlee Hernandez 57 y.o.  male  Working Diagnosis: Severe sepsis ([address]  (primary encounter diagnosis)    COVID-19 Suspicion:  no  Sepsis present:  yes  Reassessment needed: yes  Code Status:  Full Code  Readmission: no  Isolation Requirements:  no  Recommended Level of Care:  ICU  Department: Sharan Bales [phone]  Other:  57 y.o.  male with a past medical history of hypertension, hyperlipidemia, non-insulin-dependent diabetes, melanoma s/p resection presented for fatigue and lightheadedness. He is alert and oriented x 2 (baseline is x3). He is tachypneic and hypothermic on arrival at 34.8C. BP stable. WBC is 21, lactate is 12. VBG with ph of 6.8. 30cc/kg IVF bolus going and empiric antibiotics started. Bicarb IVP ordered. Work-up still in process (UA, CMP, CT chest/abd/pelvis), but wanted to get the ball rolling getting him transferred via critical care to the ICU       Metabolic panel with new kidney failure,  , creat 12.94, K 7.6. On review of labs, creat was 1.44 in April 2022. Hyperkalemic orders placed with calcium gluconate, sodium bicarb 50meq x 2, albuterol, insulin and dextrose. I spoke with nephrology, Dr. Omayra Vizcarra, who recommends CT for r/o obstruction as patient does not have known significant CKD. Given last renal failure I am concerned that 30cc/kg IVF bolus may cause fluid overload, therefore fluids were held (received approximately 1700cc). On multiple re-evaluations, patient remained ill and in guarded condition, BP stable but remained tachypneic.  He is pale but with 2+ peripheral pulses and warm to touch with samantha hugger in place. Repeat lactate uptrending, but feel further fluid administration is not appropriate given his kidney failure and will need dialysis. Critical care transport subsequently arrived for transport. Procedures    Total critical care time (not including time spent performing separately reportable procedures): 75 minutes. Critical care time involved in lab review, consultations with specialist, family decision-making, and documentation. During this entire length of time I was immediately available to the patient. Critical Care: The reason for providing this level of medical care for this critically ill patient was due a critical illness that impaired one or more vital organ systems such that there was a high probability of imminent or life threatening deterioration in the patients condition. This care involved high complexity decision making to assess, manipulate, and support vital system functions, to treat this degreee vital organ system failure and to prevent further life threatening deterioration of the patients condition. EKG at 1002: Interpreted by me  Normal sinus rhythm, rate 95 beats minute. Normal axis. Normal intervals. No ST elevations. LABS:  Recent Results (from the past 6 hour(s))   SAMPLES BEING HELD    Collection Time: 09/06/22 10:10 AM   Result Value Ref Range    SAMPLES BEING HELD 1 BLUE,1 GOLD     COMMENT        Add-on orders for these samples will be processed based on acceptable specimen integrity and analyte stability, which may vary by analyte.    COVID-19 RAPID TEST    Collection Time: 09/06/22 10:10 AM   Result Value Ref Range    Specimen source Nasopharyngeal      COVID-19 rapid test Not detected NOTD     INFLUENZA A+B VIRAL AGS    Collection Time: 09/06/22 10:10 AM   Result Value Ref Range    Influenza A Antigen Negative NEG      Influenza B Antigen Negative NEG     CBC WITH AUTOMATED DIFF Collection Time: 09/06/22 10:10 AM   Result Value Ref Range    WBC 21.0 (H) 4.1 - 11.1 K/uL    RBC 3.88 (L) 4.10 - 5.70 M/uL    HGB 12.7 12.1 - 17.0 g/dL    HCT 40.2 36.6 - 50.3 %    .6 (H) 80.0 - 99.0 FL    MCH 32.7 26.0 - 34.0 PG    MCHC 31.6 30.0 - 36.5 g/dL    RDW 13.0 11.5 - 14.5 %    PLATELET 319 359 - 878 K/uL    MPV 9.9 8.9 - 12.9 FL    NRBC 0.0 0  WBC    ABSOLUTE NRBC 0.00 0.00 - 0.01 K/uL    NEUTROPHILS 82 %    LYMPHOCYTES 11 %    MONOCYTES 4 %    EOSINOPHILS 0 %    BASOPHILS 0 %    METAMYELOCYTES 2 (H) 0 %    MYELOCYTES 1 (H) 0 %    IMMATURE GRANULOCYTES 0 %    ABS. NEUTROPHILS 17.2 K/UL    ABS. LYMPHOCYTES 2.3 K/UL    ABS. MONOCYTES 0.8 K/UL    ABS. EOSINOPHILS 0.0 K/UL    ABS. BASOPHILS 0.0 K/UL    ABS. IMM. GRANS. 0.0 K/UL    DF MANUAL      PLATELET COMMENTS Large Platelets      RBC COMMENTS MACROCYTOSIS  1+       METABOLIC PANEL, COMPREHENSIVE    Collection Time: 09/06/22 10:10 AM   Result Value Ref Range    Sodium 139 136 - 145 mmol/L    Potassium 7.6 (HH) 3.5 - 5.1 mmol/L    Chloride 89 (L) 98 - 107 mmol/L    CO2 4 (LL) 22 - 29 mmol/L    Anion gap 46 (H) 5 - 15 mmol/L    Glucose 160 (H) 65 - 100 mg/dL     (H) 8 - 23 MG/DL    Creatinine 12.94 (H) 0.70 - 1.20 MG/DL    BUN/Creatinine ratio 8 (L) 12 - 20      GFR est AA 5 (L) >60 ml/min/1.73m2    GFR est non-AA 4 (L) >60 ml/min/1.73m2    Calcium 9.7 8.8 - 10.2 MG/DL    Bilirubin, total 0.5 0.2 - 1.0 MG/DL    ALT (SGPT) 7 (L) 10 - 50 U/L    AST (SGOT) 13 10 - 50 U/L    Alk.  phosphatase 85 40 - 129 U/L    Protein, total 7.7 6.4 - 8.3 g/dL    Albumin 4.5 3.5 - 5.2 g/dL    Globulin 3.2 2.0 - 4.0 g/dL    A-G Ratio 1.4 1.1 - 2.2     MAGNESIUM    Collection Time: 09/06/22 10:10 AM   Result Value Ref Range    Magnesium 2.7 (H) 1.6 - 2.4 mg/dL   LACTIC ACID, PLASMA    Collection Time: 09/06/22 10:13 AM   Result Value Ref Range    Lactic acid, plasma 12.60 (HH) 0.4 - 2.0 mmol/L   POC TROPONIN-I    Collection Time: 09/06/22 10:14 AM   Result Value Ref Range    Troponin-I (POC) <0.04 0.00 - 0.08 ng/mL   GLUCOSE, POC    Collection Time: 09/06/22 10:20 AM   Result Value Ref Range    Glucose (POC) 149 (H) 65 - 117 mg/dL    Performed by Emily YAO    POC LACTIC ACID    Collection Time: 09/06/22 10:23 AM   Result Value Ref Range    Lactic Acid (POC) 11.89 (HH) 0.40 - 2.00 mmol/L   URINALYSIS W/ REFLEX CULTURE    Collection Time: 09/06/22 11:04 AM    Specimen: Miscellaneous sample; Urine    Urine specimen   Result Value Ref Range    Color YELLOW/STRAW      Appearance HAZY (A) CLEAR      Specific gravity 1.020 1.003 - 1.030      pH (UA) 5.5 5.0 - 8.0      Protein 100 (A) NEG mg/dL    Glucose Negative NEG mg/dL    Ketone TRACE (A) NEG mg/dL    Blood TRACE (A) NEG      Urobilinogen 0.2 0.2 - 1.0 EU/dL    Nitrites Negative NEG      Leukocyte Esterase Negative NEG      WBC 0-4 0 - 4 /hpf    RBC 0-5 0 - 5 /hpf    Epithelial cells FEW FEW /lpf    Bacteria Negative NEG /hpf    UA:UC IF INDICATED CULTURE NOT INDICATED BY UA RESULT     BILIRUBIN, CONFIRM    Collection Time: 09/06/22 11:04 AM   Result Value Ref Range    Bilirubin UA, confirm Negative NEG     SALICYLATE    Collection Time: 09/06/22 11:05 AM   Result Value Ref Range    Salicylate level 0.4 (L) 3.0 - 10.0 MG/DL   ETHYL ALCOHOL    Collection Time: 09/06/22 11:05 AM   Result Value Ref Range    ALCOHOL(ETHYL),SERUM <10 0 - 10 MG/DL   ACETAMINOPHEN    Collection Time: 09/06/22 11:05 AM   Result Value Ref Range    Acetaminophen level <5 (L) 10 - 30 ug/mL   LACTIC ACID, PLASMA    Collection Time: 09/06/22 11:52 AM   Result Value Ref Range    Lactic acid, plasma 17.10 (HH) 0.4 - 2.0 mmol/L   POC LACTIC ACID    Collection Time: 09/06/22 12:02 PM   Result Value Ref Range    Lactic Acid (POC) 14.70 (HH) 0.40 - 2.00 mmol/L   GLUCOSE, POC    Collection Time: 09/06/22 12:16 PM   Result Value Ref Range    Glucose (POC) 140 (H) 65 - 117 mg/dL    Performed by Jaime Gay    POC G3 - PUL    Collection Time: 09/06/22 1:30 PM   Result Value Ref Range    pH (POC) 6.77 (LL) 7.35 - 7.45      pCO2 (POC) 14.9 (L) 35.0 - 45.0 MMHG    pO2 (POC) 146 (H) 80 - 100 MMHG    HCO3 (POC) 2.1 (L) 22 - 26 MMOL/L    sO2 (POC) 95.3 92 - 97 %    Site LEFT RADIAL      Device: ROOM AIR      Allens test (POC) Positive      Specimen type (POC) ARTERIAL      Critical value read back [place]         IMAGING:  XR CHEST PORT   Final Result   1. Diffuse mild interstitial opacities may represent pulmonary edema. CT HEAD WO CONT   Final Result   1. No acute intracranial abnormality. 2.  Right sphenoid sinus fluid, correlate for acute sinusitis. CT CHEST WO CONT   Final Result   No acute pathology in the chest, abdomen, or pelvis. Incidental   findings as above         CT ABD PELV WO CONT   Final Result   No acute pathology in the chest, abdomen, or pelvis.  Incidental   findings as above               Medications During Visit:  Medications   dextrose 10 % infusion 125-250 mL (has no administration in time range)   sodium bicarbonate (8.4%) 150 mEq in dextrose 5% 1,000 mL infusion ( IntraVENous Continued On External Transport 9/6/22 1236)   albuterol-ipratropium (DUO-NEB) 2.5 MG-0.5 MG/3 ML (has no administration in time range)   sodium bicarbonate 8.4 % (1 mEq/mL) injection (has no administration in time range)   lactated ringers bolus infusion 2,151 mL (0 mL/kg × 71.7 kg IntraVENous IV Completed 9/6/22 1127)   sodium bicarbonate (8.4%) injection 100 mEq (100 mEq IntraVENous Given 9/6/22 1041)   levoFLOXacin (LEVAQUIN) 750 mg in D5W IVPB (750 mg IntraVENous New Bag 9/6/22 1110)   vancomycin (VANCOCIN) 750 mg in 0.9% sodium chloride 250 mL (VIAL-MATE) (750 mg IntraVENous New Bag 9/6/22 1113)     And   vancomycin (VANCOCIN) 1,000 mg in 0.9% sodium chloride 250 mL (VIAL-MATE) (1,000 mg IntraVENous New Bag 9/6/22 1116)   piperacillin-tazobactam (ZOSYN) 4.5 g in 0.9% sodium chloride (MBP/ADV) 100 mL MBP (0 g IntraVENous IV Completed 9/6/22 8317) calcium gluconate 1 gram in sodium chloride (ISO-OSM) 50 mL infusion (0 g IntraVENous IV Completed 9/6/22 1224)   dextrose 10 % infusion 250 mL (250 mL IntraVENous New Bag 9/6/22 1204)   insulin regular (NOVOLIN R, HUMULIN R) injection 5 Units (5 Units IntraVENous Given 9/6/22 1202)   albuterol (PROVENTIL VENTOLIN) nebulizer solution 10 mg (10 mg Nebulization Given 9/6/22 1210)         IMPRESSION:  1. Severe sepsis ([address]    2. Acute renal failure, unspecified acute renal failure type ([address]    3.  Acute hyperkalemia        DISPOSITION:  Admitted

## 2022-09-06 NOTE — ED TRIAGE NOTES
Pt arrived on stretched via EMS with cc of starting Gabapentin on Friday and has been feeling dizziness since starting. Reports dizziness is all the time and some photosensitivity. Reports gabapentin scheduled for three times a day but only taking 3 capsules in total since Friday. Pt is alert and oriented times three but slow to follow commands and slow to answer questions. Patient has audible  breathing heard that EMS reports family reported this was patients normal breathing. Rhonchi heard throughout lungs. 100% on RA. No numbness or tingling reported. No facial droop or neuro deficits. Michela Livingston

## 2022-09-06 NOTE — ED NOTES
Verbal report given to Ori Loco RN (name) on Hanna Oviedo being transferred to [facility] ICU (unit) for urgent transfer    Report consisted of patient's Situation, Background, Assessment and Recommendations (SBAR)    Information from the following report(s)  SBAR, ED Summary, Intake/Output, MAR, Recent Results and Cardiac Rhythm NSR was reviewed with the receiving nurse. Opportunity for questions and clarification was provided.     Patient transported with:  Monitor, [address] [place] [address] Road Transport    Last Filed Values:  Temp: (!) 93.8 °F (34.3 °C) (09/06/22 1126)  Pulse (Heart Rate): 87 (09/06/22 1154)  Resp Rate: 26 (09/06/22 1154)  O2 Sat (%): 100 % (09/06/22 1154)  BP: 111/65 (09/06/22 1149)  MAP (Monitor): 80 (09/06/22 1149)  MAP (Calculated): 80 (09/06/22 1149)  Level of Consciousness: Alert (0) (09/06/22 1002)      Lab Results   Component Value Date/Time    WBC 21.0 (H) 09/06/2022 10:10 AM       Repeat LA:  Time Due IN PROCESS AT TIME OF TRANSPORT    Blood Cultures Drawn:  yes    Fluid Resuscitation:  Total needed 2157, Status completed, amount 1751    All Antibiotics Started:  yes, Dose Due N/A    VS x 2 post-fluid resuscitation:   yes    Vasopressor Infusion:  no     Provider Reassessment needed and notified: , PENDING TRANSPORT TO [facility]

## 2022-09-06 NOTE — ED NOTES
TRANSFER - OUT REPORT:    Verbal report given to [address] and Shyam RN orientee (name) on Marilee Cheatham  being transferred to [facility] ICU (unit) for urgent transfer       Report consisted of patients Situation, Background, Assessment and   Recommendations(SBAR). Information from the following report(s) SBAR, ED Summary, Intake/Output, MAR, Recent Results and Cardiac Rhythm NSR was reviewed with the receiving nurse. Lines:   Peripheral IV 09/06/22 Right; Anterior Forearm (Active)   Site Assessment Clean, dry, & intact 09/06/22 1011   Phlebitis Assessment 0 09/06/22 1011   Infiltration Assessment 0 09/06/22 1011   Dressing Status Clean, dry, & intact 09/06/22 1011   Dressing Type Tape;Transparent 09/06/22 1011   Hub Color/Line Status Pink 09/06/22 1011       Peripheral IV 09/06/22 Left Antecubital (Active)   Site Assessment Clean, dry, & intact 09/06/22 1104   Phlebitis Assessment 0 09/06/22 1104   Infiltration Assessment 0 09/06/22 1104   Dressing Status Clean, dry, & intact 09/06/22 1104   Hub Color/Line Status Blue 09/06/22 1104   Action Taken Blood drawn 09/06/22 1104        Opportunity for questions and clarification was provided.       Patient transported with:   Monitor, [address] ALS Transport

## 2022-09-06 NOTE — CONSULTS
[address]     Name:  Josh Rodriguez  MR#:  [id number]  :  1960  ACCOUNT #:  [de-identified]  DATE OF SERVICE:  2022      NEPHROLOGY CONSULTATION    REQUESTING PHYSICIAN:  Dr. Jean Lopez. CHIEF COMPLAINT:  Acidosis and elevated creatinine. HISTORY OF PRESENT ILLNESS:  The patient is 75-year-old white male, who has a history of diabetes and limited records available at this institution. In 2022, his creatinine was 1.54. He has a history of malignant melanoma followed at [facility]. He was on metformin for his diabetes. He came to [facility] ER complaining of dizziness and was found to have severe renal failure and acidosis. The pH is 6.8 with a bicarbonate of less than 5 and lactic acid is reportedly markedly elevated. He has required intubation on mechanical ventilation. He is receiving pressors for hypotension. A Vega catheter is in place in place with minimal urine output. PAST MEDICAL HISTORY:  1. Type 2 diabetes mellitus. 2.  Hypotension. 3.  Malignant melanoma. 4.  Hyperlipidemia. SOCIAL HISTORY:  Per the available records, he is a former smoker and is retired. FAMILY HISTORY:  No relevant family history of renal disease. REVIEW OF SYSTEMS:  Unable to obtain due to patient condition. PHYSICAL EXAMINATION:  GENERAL:  Critically ill white male, who is intubated and sedated. VITAL SIGNS:  Temperature 93.1, pulse 100, blood pressure 93/55. EYES:  Anicteric. ENMT:  There is an ET tube in place. NECK:  Nontender without JVD. RESPIRATORY:  There are ventilated breath sounds bilaterally with symmetrical expansion. HEART:  Tachycardic. There is trace edema. GI:  Abdomen is soft with no guarding or rebound. SKIN:  Cool without rash. :  There is a Vega catheter in place with minimal urine. LABORATORY DATA:  Sodium 139, potassium 5.5, chloride 96, bicarb less than 5, , creatinine 11.8.   White count 21.1, hemoglobin 10.8, platelets 853. Urinalysis shows 100 mg/dL trace blood. [place] microscopic exam.  ABG; pH 6.8, pCO2 of 32, pO2 of 154, bicarbonate of 5. ASSESSMENT:  1. Severe anuric acute kidney injury in the setting of shock. 2.  Shock, etiology uncertain. 3.  Severe lactic acidosis perhaps in part related to metformin, question other causes. 4.  Respiratory failure. PLAN:  1. Continue volume expansion, pressors, and empiric antibiotics. 2.  A line has been placed for emergent dialysis. 3.  We will proceed with CRT. I have discussed with the family and recommended initiation of dialysis immediately. It is not clear to me whether he would survive this hospitalization, but in my judgment there is very little chance of survival without dialysis. Discussed the risks of dialysis with emphasis on bleeding, infection, and hypotension. Both sisters and mother gave consent for dialysis on behalf of the patient. 4.  Avoid nephrotoxins if possible. 5.  We will follow with you to assist in his management during this hospitalization.       Александр Frankel MD      [id number]  D:  09/06/2022 15:48  T:  09/06/2022 16:32  JOB #:  [id number]

## 2022-09-06 NOTE — ED NOTES
Patient remains in [place] ER at this time, [address] Team at bedside preparing patient for transport. Bicarb drip prepared by Xiomara Soni RN and this RN. All necessary paperwork provided to team by this RN. 12:46 PM  Pt transported out of ER by Critical Care Team via stretcher with cardiac monitor.

## 2022-09-06 NOTE — PROCEDURES
Intubation Note    Name:  Nohemy Messer  Age:  57 y.o. MRN:  [id number]  CSN:  [id number]  :  1960    Referring physician: Alan Anna, *     Called to bedside secondary to  impending respiratory failure. Patient pre-oxygenated with 100% oxygen. Smooth RSI with Propofol 200 mg + Rocuronium 50 mg IV. Glidescope 3x Blade    6.5 ETT taped and secured at 22 cm at the teeth.    + Bilateral BS, + Chest rise, + ETCO2    VSS. CXR pending.     Care turned over to covering Attending MD.    Ailyn Garcia DO

## 2022-09-06 NOTE — CONSULTS
Consult dict    Severe anuric óscar  Severe lactic acidosis  Resp failure  Shock    Rec:  Place line  Start CRRT ASAP  D/w family. Uncertain if he will survive even with dialysis but no chance of survival without dialysis in my opinion.

## 2022-09-06 NOTE — PROGRESS NOTES
TRANSFER - IN REPORT:    Verbal report received from Primary RN(name) on Angelica Johnson  being received from [address](unit) for ordered procedure      Report consisted of patients Situation, Background, Assessment and   Recommendations(SBAR). Information from the following report(s) SBAR was reviewed with the receiving nurse. Opportunity for questions and clarification was provided. Assessment completed upon patients arrival to unit and care assumed.

## 2022-09-06 NOTE — PROGRESS NOTES
Reason for Admission:  sepsis,dizziness                     RUR Score:     12%                Plan for utilizing home health:      to be determined    PCP: First and Last name:  None     Name of Practice:    Are you a current patient: Yes/No:    Approximate date of last visit:    Can you participate in a virtual visit with your PCP:                     Current Advanced Directive/Advance Care Plan: Full Code      Healthcare Decision Maker:   Pt is     Son-Wil Whitlock is emergency contact @ [phone]                             Transition of Care Plan:                      Pt is critically ill. Per discussion with charge nurse,pt was intubated and will be starting emergent dialysis. I will meet with family tomorrow.     Ila Guzman

## 2022-09-06 NOTE — ED NOTES
Code sepsis called for the following criteria:    Criteria 1: infection of unknown source    Criteria 2:tachypneic     Criteria 3:hypothermic/heart rate greater than 90    At this time, initial lactic acid has been drawn. Paired blood cultures to be drawn. This RN to speak with MD about fluids and ABX. Pt to be started on [employer]. Son and sister at bedside.

## 2022-09-06 NOTE — PROGRESS NOTES
Bedside and Verbal shift change report given to [address] [place] [address] (oncoming nurse) by Panchito Giang (offgoing nurse). Report included the following information SBAR, Intake/Output, MAR, Recent Results, Cardiac Rhythm: NSR and Alarm Parameters . Primary Nurse Kye Sanderson, KVNG and KVNG [place] performed a dual skin assessment on this patient No impairment noted  David score is see flowsheets    See flowsheets for all assessments, see MAR for all medication administrations. 2330: Yoshi CALDERON yet to arrive, called Yoshi to get an ETA, Yoshi CALDERON stated they were unaware that this pt needed to be set up. Jose F RN placed orders and stated that Yoshi had been notified. Yoshi RN en route to [facility] now. Bedside and Verbal shift change report given to Narayan CALDERON (oncoming nurse) by Kathleen Ramesh RN (offgoing nurse). Report included the following information SBAR, Intake/Output, MAR, Recent Results, Cardiac Rhythm: and Alarm Parameters .

## 2022-09-06 NOTE — PROGRESS NOTES
TRANSFER - OUT REPORT:    Verbal report given to Catarina(jeffrey) on Ashlee Hernandez being transferred to bedside procedure(unit) for routine progression of care       Report consisted of patient's Situation, Background, Assessment and   Recommendations(SBAR). Information from the following report(s) Procedure Summary was reviewed with the receiving nurse. Opportunity for questions and clarification was provided.

## 2022-09-06 NOTE — PROGRESS NOTES
[address] [address] RX Pharmacy Progress Note: Antimicrobial Stewardship    Consult for antibiotic dosing of vancomycin by Dr. Jazz Carmen  Indication: sepsis  Day of Therapy: 1    Plan:  Vancomycin therapy:  Start with loading dose of vancomycin 1750 mg IV (25 mg/kg, max 2.5 gm) x 1 (given 9/6 ~1100 at [facility])  Pharmacy to dose by level d/t apparent LAVINIA. SCr 12.94 (SCr was 1.44 April 2022 per ED progress notes). It does not appear that patient is on any sort of dialysis however history is limited at this time. Nephrology has been consulted. Dose calculated to approximate a   Target AUC/JANNET of 400-600  Trough of 15-20 mcg/mL. Plan:  Random level ordered ~24 hours post-dose to evaluate clearance. Re-dose with 15 mg/kg once level is anticipated to be <15 mcg/mL. Daily SCr ordered for now given apparent LAVINIA. Pharmacy to follow daily and will make changes to dose and/or frequency based on clinical status. Other Antimicrobial  (not dosed by pharmacist)   Zosyn 3.375 gm IV Q12H (adjusted per protocol)  LVQ x 1 at [facility] 9/6   Cultures     9/6 Blood x 2 - (pending)   Serum Creatinine     Lab Results   Component Value Date/Time    Creatinine 12.94 (H) 09/06/2022 10:10 AM       Creatinine Clearance Estimated Creatinine Clearance: 5.2 mL/min (A) (based on SCr of 12.94 mg/dL (H)). Procalcitonin  No results found for: PCT     Temp   (!) 93.1 °F (33.9 °C)    WBC   Lab Results   Component Value Date/Time    WBC 21.1 (H) 09/06/2022 02:16 PM       For Antifungals, Metronidazole and Nafcillin: Lab Results   Component Value Date/Time    ALT (SGPT) 7 (L) 09/06/2022 10:10 AM    AST (SGOT) 13 09/06/2022 10:10 AM    Alk.  phosphatase 85 09/06/2022 10:10 AM    Bilirubin, total 0.5 09/06/2022 10:10 AM         Pharmacist: Signed Eyal Wang

## 2022-09-06 NOTE — H&P
[facility]  [address] [facility] [address]  [phone]    Hospitalist Admission Note      NAME:  Nohemy Messer   :   1960   MRN:  [id number]     PCP:  None     Date/Time of service:  2022 11:44 AM          Subjective:     CHIEF COMPLAINT: dizziness     HISTORY OF PRESENT ILLNESS:     Mr. Sebastian Brock is a 57 y.o.  male who presented to the Emergency Department complaining of dizziness, hypothermia, decreased appetite and photosensitivity. Patient is currently intubated and on ventilator. History was given by the ER physician and the sister. Patient has a history of melanoma and he had a melanoma surgery done on his back in May. Since that time he has been complaining of some tightness in his back. He was not started on any chemotherapy yet. Patient was thought to have a neuropathy and was started on gabapentin on Friday and he started feeling dizzy over the weekend. He took almost 3 capsules of gabapentin since Friday. He was having some respiratory issues yesterday as per the sister and he got really worse today and came to the ER in [facility]. He was found to be in acute renal failure, hyperkalemia, hypothermia and elevated lactic acid levels. He did not complain of any abdominal pain. He did complain of some nausea and vomiting though. He denied any fever or chills. No chest pain. His pulse was 94 respiratory rate was 23 pulse ox was 100% in the chest or ER. His white blood cell count was 21.0, potassium of 7.6, bicarb of 4, glucose of 160, creatinine of 12.9, lactic acid of 12.6. He was transferred to [facility] quickly and when he arrived to ICU he was not responding and was in respiratory distress. He was intubated quickly.       Past Medical History:   Diagnosis Date    Hypercholesteremia     Hypertension     Melanoma ([address]     skin    Thyroid activity decreased         Past Surgical History:   Procedure Laterality Date    HX ORTHOPAEDIC         Social History     Tobacco Use    Smoking status: Never    Smokeless tobacco: Never   Substance Use Topics    Alcohol use: Not Currently        History reviewed. No pertinent family history. Family hx cannot be fully assessed, since the patient cannot provide information    No Known Allergies     Prior to Admission medications    Medication Sig Start Date End Date Taking? Authorizing Provider   gabapentin (NEURONTIN) 300 mg capsule Take 300 mg by mouth three (3) times daily. Yes Other, MD Deandre       Review of Systems:  A comprehensive review of system cannot be done because patient is intubated on ventilator. Objective:      VITALS:    Vital signs reviewed; most recent are:    Visit Vitals  BP (!) 152/95   Pulse 94   Temp (!) 93.8 °F (34.3 °C)   Resp (!) 31   Ht 5' 6\" (1.676 m)   Wt 71.7 kg (158 lb)   SpO2 100%   BMI 25.50 kg/m²     SpO2 Readings from Last 6 Encounters:   09/06/22 100%          Intake/Output Summary (Last 24 hours) at 9/6/2022 1144  Last data filed at 9/6/2022 1126  Gross per 24 hour   Intake --   Output 140 ml   Net -140 ml        Exam:     Physical Exam:    Gen:  Well-developed, well-nourished, in acute distress  HEENT:  Pink conjunctivae, PERRL, hearing intact to voice, moist mucous membranes  Neck:  Supple, without masses, thyroid non-tender  Resp:  No accessory muscle use, clear breath sounds without wheezes rales or rhonchi  Card:  No murmurs, normal S1, S2 without thrills, bruits or peripheral edema  Abd:  Soft, non-tender, non-distended, normoactive bowel sounds are present, no mass  Lymph:  No cervical or inguinal adenopathy  Musc:  No cyanosis or clubbing  Skin:  No rashes or ulcers, skin turgor is good  Neuro: Patient is intubated on vent. Psych: Cannot be assessed.      Labs:    Recent Labs     09/06/22  1010   WBC 21.0*   HGB 12.7   HCT 40.2        Recent Labs     09/06/22  1010      K 7.6*   CL 89*   CO2 4*   *   * CREA 12.94*   CA 9.7   MG 2.7*   ALB 4.5   TBILI 0.5   ALT 7*     Lab Results   Component Value Date/Time    Glucose (POC) 149 (H) 09/06/2022 10:20 AM     No results for input(s): PH, PCO2, PO2, HCO3, FIO2 in the last 72 hours. No results for input(s): INR, INREXT in the last 72 hours. All Micro Results       Procedure Component Value Units Date/Time    COVID-19 RAPID TEST [[id number]] Collected: 09/06/22 1010    Order Status: Completed Specimen: Nasopharyngeal Updated: 09/06/22 1043     Specimen source Nasopharyngeal        COVID-19 rapid test Not detected        Comment: Rapid Abbott ID Now       Rapid NAAT:  The specimen is NEGATIVE for SARS-CoV-2, the novel coronavirus associated with COVID-19. Negative results should be treated as presumptive and, if inconsistent with clinical signs and symptoms or necessary for patient management, should be tested with an alternative molecular assay. Negative results do not preclude SARS-CoV-2 infection and should not be used as the sole basis for patient management decisions. This test has been authorized by the FDA under an Emergency Use Authorization (EUA) for use by authorized laboratories.    Fact sheet for Healthcare Providers: ConventionUpdate.co.nz  Fact sheet for Patients: ConventionUpdate.co.nz       Methodology: Isothermal Nucleic Acid Amplification         CULTURE, BLOOD [[id number]] Collected: 09/06/22 1036    Order Status: Sent Specimen: Blood Updated: 09/06/22 1042    CULTURE, BLOOD [[id number]] Collected: 09/06/22 1032    Order Status: Sent Specimen: Blood Updated: 09/06/22 1041            I have reviewed previous records       Assessment and Plan:      Principal Problem:    Severe sepsis ([facility] (9/6/2022)    Active Problems:    Acute renal failure (ARF) ([facility] (9/6/2022)      Hyperkalemia (9/6/2022)      Melanoma ([facility] (9/6/2022)      HTN (hypertension) (9/6/2022)      DM (diabetes mellitus) ([facility] (9/6/2022) Hyperlipidemia (9/6/2022)       Plan:    1. Severe sepsis-patient has a severe sepsis from unknown source. He will be started empirically on IV vancomycin and Zosyn. I will call pharmacy consult for antibiotics dosing to his renal function. We will measure lactic acid levels frequently. 2.  Acute renal failure with hyperkalemia and metabolic acidosis metabolic acidosis-patient was given multiple amps of bicarb and he is started on bicarb drip at this time. Nephrology is consulted and we are planning doing urgent dialysis for this patient. Interventional radiology is consulted for placing a temporary dialysis catheter. I discussed with the sister and she will give the consent. 3.  Hyperkalemia-due to metabolic acidosis and renal failure. Patient was given insulin dextrose and calcium gluconate. Patient is on bicarb drip at this time. We will watch potassium levels closely. 3.  Acute respiratory failure with hypoxia-patient is currently intubated on ventilator. Intensivist on board. 4.  Malignant melanoma of upper back-patient had a back surgery done in May but he did not get any chemo done at this point. 5.  Hypothyroidism-patient takes Synthroid 100 mcg at home. 6.  History of hypertension-on atenolol, chlorthalidone, clonidine, enalapril at home. I will hold all these medications at this time. 7.  Diabetes-patient takes metformin at home. Obviously I am going to hold it as well. 8.  Hyperlipidemia-patient on pravastatin 10 mg at home. 9.  DVT prophylaxis-heparin subcu. I have discussed the CODE STATUS with the patient's sister and he will be a full code at this time. She was explained about the critical condition and poor prognosis of the patient and she understands that.           Telemetry reviewed:   normal sinus rhythm    Risk of deterioration: high      Total time spent with patient: 79 Minutes I personally reviewed chart, notes, data and current medications in the medical record. I have personally examined and treated the patient at bedside during this period. To assist coordination of care and communication with nursing and staff, this note may be preliminary early in the day, but finalized by end of the day.                  Care Plan discussed with: Family    Discussed:  Code Status and Care Plan       ___________________________________________________    Attending Physician: Feli Bains MD

## 2022-09-07 LAB
ACETONE,ACETX: 20 MG/L
ADMINISTERED INITIALS, ADMINIT: NORMAL
ALBUMIN SERPL-MCNC: 2.9 G/DL (ref 3.5–5)
ALBUMIN/GLOB SERPL: 1.1 {RATIO} (ref 1.1–2.2)
ALP SERPL-CCNC: 60 U/L (ref 45–117)
ALT SERPL-CCNC: 17 U/L (ref 12–78)
ANION GAP SERPL CALC-SCNC: 19 MMOL/L (ref 5–15)
ANION GAP SERPL CALC-SCNC: 20 MMOL/L (ref 5–15)
ANION GAP SERPL CALC-SCNC: 24 MMOL/L (ref 5–15)
ANION GAP SERPL CALC-SCNC: 31 MMOL/L (ref 5–15)
ANION GAP SERPL CALC-SCNC: 35 MMOL/L (ref 5–15)
ARTERIAL PATENCY WRIST A: POSITIVE
AST SERPL-CCNC: 42 U/L (ref 15–37)
B-OH-BUTYR SERPL-SCNC: >4.42 MMOL/L
BASE DEFICIT BLD-SCNC: 10.4 MMOL/L
BASOPHILS # BLD: 0 K/UL (ref 0–0.1)
BASOPHILS NFR BLD: 0 % (ref 0–1)
BDY SITE: ABNORMAL
BILIRUB SERPL-MCNC: 0.6 MG/DL (ref 0.2–1)
BUN SERPL-MCNC: 56 MG/DL (ref 6–20)
BUN SERPL-MCNC: 64 MG/DL (ref 6–20)
BUN SERPL-MCNC: 71 MG/DL (ref 6–20)
BUN SERPL-MCNC: 81 MG/DL (ref 6–20)
BUN SERPL-MCNC: 93 MG/DL (ref 6–20)
BUN/CREAT SERPL: 10 (ref 12–20)
BUN/CREAT SERPL: 9 (ref 12–20)
BUN/CREAT SERPL: 9 (ref 12–20)
CALCIUM SERPL-MCNC: 7.5 MG/DL (ref 8.5–10.1)
CALCIUM SERPL-MCNC: 7.7 MG/DL (ref 8.5–10.1)
CALCIUM SERPL-MCNC: 7.7 MG/DL (ref 8.5–10.1)
CALCIUM SERPL-MCNC: 7.8 MG/DL (ref 8.5–10.1)
CALCIUM SERPL-MCNC: 8 MG/DL (ref 8.5–10.1)
CHAIN OF CUSTODY,CHC: NO
CHLORIDE SERPL-SCNC: 93 MMOL/L (ref 97–108)
CHLORIDE SERPL-SCNC: 94 MMOL/L (ref 97–108)
CHLORIDE SERPL-SCNC: 97 MMOL/L (ref 97–108)
CHLORIDE SERPL-SCNC: 98 MMOL/L (ref 97–108)
CHLORIDE SERPL-SCNC: 99 MMOL/L (ref 97–108)
CO2 SERPL-SCNC: 13 MMOL/L (ref 21–32)
CO2 SERPL-SCNC: 15 MMOL/L (ref 21–32)
CO2 SERPL-SCNC: 19 MMOL/L (ref 21–32)
CO2 SERPL-SCNC: 21 MMOL/L (ref 21–32)
CO2 SERPL-SCNC: 22 MMOL/L (ref 21–32)
CREAT SERPL-MCNC: 5.68 MG/DL (ref 0.7–1.3)
CREAT SERPL-MCNC: 6.54 MG/DL (ref 0.7–1.3)
CREAT SERPL-MCNC: 7.57 MG/DL (ref 0.7–1.3)
CREAT SERPL-MCNC: 8.6 MG/DL (ref 0.7–1.3)
CREAT SERPL-MCNC: 9.78 MG/DL (ref 0.7–1.3)
D50 ADMINISTERED, D50ADM: 0 ML
D50 ORDER, D50ORD: 0 ML
DIFFERENTIAL METHOD BLD: ABNORMAL
EOSINOPHIL # BLD: 0.3 K/UL (ref 0–0.4)
EOSINOPHIL NFR BLD: 2 % (ref 0–7)
ERYTHROCYTE [DISTWIDTH] IN BLOOD BY AUTOMATED COUNT: 13.1 % (ref 11.5–14.5)
EST. AVERAGE GLUCOSE BLD GHB EST-MCNC: 143 MG/DL
ETHANOL,ETHX: <10 MG/L
ETHYLENE GLYCOL,XEGLYT: ABNORMAL MG/L
GAS FLOW.O2 O2 DELIVERY SYS: ABNORMAL L/MIN
GAS FLOW.O2 SETTING OXYMISER: 20 BPM
GLOBULIN SER CALC-MCNC: 2.7 G/DL (ref 2–4)
GLUCOSE BLD STRIP.AUTO-MCNC: 101 MG/DL (ref 65–117)
GLUCOSE BLD STRIP.AUTO-MCNC: 118 MG/DL (ref 65–117)
GLUCOSE BLD STRIP.AUTO-MCNC: 119 MG/DL (ref 65–117)
GLUCOSE BLD STRIP.AUTO-MCNC: 125 MG/DL (ref 65–117)
GLUCOSE BLD STRIP.AUTO-MCNC: 135 MG/DL (ref 65–117)
GLUCOSE BLD STRIP.AUTO-MCNC: 151 MG/DL (ref 65–117)
GLUCOSE BLD STRIP.AUTO-MCNC: 161 MG/DL (ref 65–117)
GLUCOSE BLD STRIP.AUTO-MCNC: 177 MG/DL (ref 65–117)
GLUCOSE BLD STRIP.AUTO-MCNC: 184 MG/DL (ref 65–117)
GLUCOSE BLD STRIP.AUTO-MCNC: 184 MG/DL (ref 65–117)
GLUCOSE BLD STRIP.AUTO-MCNC: 197 MG/DL (ref 65–117)
GLUCOSE BLD STRIP.AUTO-MCNC: 200 MG/DL (ref 65–117)
GLUCOSE BLD STRIP.AUTO-MCNC: 219 MG/DL (ref 65–117)
GLUCOSE BLD STRIP.AUTO-MCNC: 219 MG/DL (ref 65–117)
GLUCOSE BLD STRIP.AUTO-MCNC: 228 MG/DL (ref 65–117)
GLUCOSE BLD STRIP.AUTO-MCNC: 241 MG/DL (ref 65–117)
GLUCOSE BLD STRIP.AUTO-MCNC: 242 MG/DL (ref 65–117)
GLUCOSE SERPL-MCNC: 140 MG/DL (ref 65–100)
GLUCOSE SERPL-MCNC: 178 MG/DL (ref 65–100)
GLUCOSE SERPL-MCNC: 212 MG/DL (ref 65–100)
GLUCOSE SERPL-MCNC: 262 MG/DL (ref 65–100)
GLUCOSE SERPL-MCNC: 322 MG/DL (ref 65–100)
GLUCOSE, GLC: 101 MG/DL
GLUCOSE, GLC: 118 MG/DL
GLUCOSE, GLC: 119 MG/DL
GLUCOSE, GLC: 125 MG/DL
GLUCOSE, GLC: 135 MG/DL
GLUCOSE, GLC: 151 MG/DL
GLUCOSE, GLC: 161 MG/DL
GLUCOSE, GLC: 184 MG/DL
GLUCOSE, GLC: 184 MG/DL
GLUCOSE, GLC: 197 MG/DL
GLUCOSE, GLC: 200 MG/DL
GLUCOSE, GLC: 219 MG/DL
GLUCOSE, GLC: 219 MG/DL
GLUCOSE, GLC: 228 MG/DL
GLUCOSE, GLC: 241 MG/DL
HBA1C MFR BLD: 6.6 % (ref 4–5.6)
HCO3 BLD-SCNC: 14.9 MMOL/L (ref 22–26)
HCT VFR BLD AUTO: 31.9 % (ref 36.6–50.3)
HGB BLD-MCNC: 10.9 G/DL (ref 12.1–17)
HIGH TARGET, HITG: 200 MG/DL
IMM GRANULOCYTES # BLD AUTO: 0.3 K/UL (ref 0–0.04)
IMM GRANULOCYTES NFR BLD AUTO: 2 % (ref 0–0.5)
INSULIN ADMINSTERED, INSADM: 0 UNITS/HOUR
INSULIN ADMINSTERED, INSADM: 0.1 UNITS/HOUR
INSULIN ADMINSTERED, INSADM: 0.4 UNITS/HOUR
INSULIN ADMINSTERED, INSADM: 1.5 UNITS/HOUR
INSULIN ADMINSTERED, INSADM: 1.6 UNITS/HOUR
INSULIN ADMINSTERED, INSADM: 2.7 UNITS/HOUR
INSULIN ADMINSTERED, INSADM: 3 UNITS/HOUR
INSULIN ADMINSTERED, INSADM: 3.2 UNITS/HOUR
INSULIN ADMINSTERED, INSADM: 3.6 UNITS/HOUR
INSULIN ADMINSTERED, INSADM: 3.7 UNITS/HOUR
INSULIN ADMINSTERED, INSADM: 5 UNITS/HOUR
INSULIN ORDER, INSORD: 0 UNITS/HOUR
INSULIN ORDER, INSORD: 0.1 UNITS/HOUR
INSULIN ORDER, INSORD: 0.4 UNITS/HOUR
INSULIN ORDER, INSORD: 1.5 UNITS/HOUR
INSULIN ORDER, INSORD: 1.6 UNITS/HOUR
INSULIN ORDER, INSORD: 2.7 UNITS/HOUR
INSULIN ORDER, INSORD: 3 UNITS/HOUR
INSULIN ORDER, INSORD: 3.2 UNITS/HOUR
INSULIN ORDER, INSORD: 3.6 UNITS/HOUR
INSULIN ORDER, INSORD: 3.7 UNITS/HOUR
INSULIN ORDER, INSORD: 5 UNITS/HOUR
ISOPROPANOL,ISOPX: NEGATIVE MG/L
LACTATE SERPL-SCNC: 11.4 MMOL/L (ref 0.4–2)
LACTATE SERPL-SCNC: 14.1 MMOL/L (ref 0.4–2)
LACTATE SERPL-SCNC: 15.9 MMOL/L (ref 0.4–2)
LACTATE SERPL-SCNC: 18.3 MMOL/L (ref 0.4–2)
LACTATE SERPL-SCNC: 19 MMOL/L (ref 0.4–2)
LOW TARGET, LOT: 150 MG/DL
LYMPHOCYTES # BLD: 0.9 K/UL (ref 0.8–3.5)
LYMPHOCYTES NFR BLD: 6 % (ref 12–49)
MAGNESIUM SERPL-MCNC: 1.9 MG/DL (ref 1.6–2.4)
MAGNESIUM SERPL-MCNC: 1.9 MG/DL (ref 1.6–2.4)
MAGNESIUM SERPL-MCNC: 2 MG/DL (ref 1.6–2.4)
MAGNESIUM SERPL-MCNC: 2.1 MG/DL (ref 1.6–2.4)
MCH RBC QN AUTO: 32.7 PG (ref 26–34)
MCHC RBC AUTO-ENTMCNC: 34.2 G/DL (ref 30–36.5)
MCV RBC AUTO: 95.8 FL (ref 80–99)
METHANOL,METHX: NEGATIVE MG/L
MINUTES UNTIL NEXT BG, NBG: 60 MIN
MONOCYTES # BLD: 2.5 K/UL (ref 0–1)
MONOCYTES NFR BLD: 17 % (ref 5–13)
MULTIPLIER, MUL: 0
MULTIPLIER, MUL: 0.01
MULTIPLIER, MUL: 0.01
MULTIPLIER, MUL: 0.02
MULTIPLIER, MUL: 0.03
NEUTS SEG # BLD: 10.5 K/UL (ref 1.8–8)
NEUTS SEG NFR BLD: 73 % (ref 32–75)
NRBC # BLD: 0.03 K/UL (ref 0–0.01)
NRBC BLD-RTO: 0.2 PER 100 WBC
O2/TOTAL GAS SETTING VFR VENT: 50 %
ORDER INITIALS, ORDINIT: NORMAL
PCO2 BLD: 30.3 MMHG (ref 35–45)
PEEP RESPIRATORY: 5 CMH2O
PH BLD: 7.3 [PH] (ref 7.35–7.45)
PHOSPHATE SERPL-MCNC: 6.9 MG/DL (ref 2.6–4.7)
PLATELET # BLD AUTO: 290 K/UL (ref 150–400)
PMV BLD AUTO: 10 FL (ref 8.9–12.9)
PO2 BLD: 92 MMHG (ref 80–100)
POTASSIUM SERPL-SCNC: 3.9 MMOL/L (ref 3.5–5.1)
POTASSIUM SERPL-SCNC: 4.2 MMOL/L (ref 3.5–5.1)
POTASSIUM SERPL-SCNC: 4.2 MMOL/L (ref 3.5–5.1)
POTASSIUM SERPL-SCNC: 4.3 MMOL/L (ref 3.5–5.1)
POTASSIUM SERPL-SCNC: 4.4 MMOL/L (ref 3.5–5.1)
PROT SERPL-MCNC: 5.6 G/DL (ref 6.4–8.2)
RBC # BLD AUTO: 3.33 M/UL (ref 4.1–5.7)
RBC MORPH BLD: ABNORMAL
SAO2 % BLD: 96.4 % (ref 92–97)
SERVICE CMNT-IMP: ABNORMAL
SERVICE CMNT-IMP: NORMAL
SODIUM SERPL-SCNC: 139 MMOL/L (ref 136–145)
SODIUM SERPL-SCNC: 140 MMOL/L (ref 136–145)
SODIUM SERPL-SCNC: 141 MMOL/L (ref 136–145)
SPECIMEN SOURCE: ABNORMAL
SPECIMEN TYPE: ABNORMAL
TSH SERPL DL<=0.05 MIU/L-ACNC: 0.14 UIU/ML (ref 0.36–3.74)
VENTILATION MODE VENT: ABNORMAL
VT SETTING VENT: 400 ML
WBC # BLD AUTO: 14.5 K/UL (ref 4.1–11.1)

## 2022-09-07 PROCEDURE — 80053 COMPREHEN METABOLIC PANEL: CPT

## 2022-09-07 PROCEDURE — 74011250636 HC RX REV CODE- 250/636: Performed by: INTERNAL MEDICINE

## 2022-09-07 PROCEDURE — 90945 DIALYSIS ONE EVALUATION: CPT

## 2022-09-07 PROCEDURE — 84443 ASSAY THYROID STIM HORMONE: CPT

## 2022-09-07 PROCEDURE — 95816 EEG AWAKE AND DROWSY: CPT | Performed by: INTERNAL MEDICINE

## 2022-09-07 PROCEDURE — 83036 HEMOGLOBIN GLYCOSYLATED A1C: CPT

## 2022-09-07 PROCEDURE — 74011000250 HC RX REV CODE- 250: Performed by: INTERNAL MEDICINE

## 2022-09-07 PROCEDURE — 80048 BASIC METABOLIC PNL TOTAL CA: CPT

## 2022-09-07 PROCEDURE — 83605 ASSAY OF LACTIC ACID: CPT

## 2022-09-07 PROCEDURE — 82803 BLOOD GASES ANY COMBINATION: CPT

## 2022-09-07 PROCEDURE — 84100 ASSAY OF PHOSPHORUS: CPT

## 2022-09-07 PROCEDURE — 74011000258 HC RX REV CODE- 258: Performed by: INTERNAL MEDICINE

## 2022-09-07 PROCEDURE — 83735 ASSAY OF MAGNESIUM: CPT

## 2022-09-07 PROCEDURE — 36415 COLL VENOUS BLD VENIPUNCTURE: CPT

## 2022-09-07 PROCEDURE — 74011250636 HC RX REV CODE- 250/636

## 2022-09-07 PROCEDURE — 94003 VENT MGMT INPAT SUBQ DAY: CPT

## 2022-09-07 PROCEDURE — 74011000250 HC RX REV CODE- 250: Performed by: HOSPITALIST

## 2022-09-07 PROCEDURE — 36600 WITHDRAWAL OF ARTERIAL BLOOD: CPT

## 2022-09-07 PROCEDURE — 74011250636 HC RX REV CODE- 250/636: Performed by: HOSPITALIST

## 2022-09-07 PROCEDURE — C9113 INJ PANTOPRAZOLE SODIUM, VIA: HCPCS | Performed by: INTERNAL MEDICINE

## 2022-09-07 PROCEDURE — 65610000006 HC RM INTENSIVE CARE

## 2022-09-07 PROCEDURE — 95816 EEG AWAKE AND DROWSY: CPT | Performed by: PSYCHIATRY & NEUROLOGY

## 2022-09-07 PROCEDURE — 85025 COMPLETE CBC W/AUTO DIFF WBC: CPT

## 2022-09-07 PROCEDURE — P9047 ALBUMIN (HUMAN), 25%, 50ML: HCPCS | Performed by: INTERNAL MEDICINE

## 2022-09-07 PROCEDURE — 82010 KETONE BODYS QUAN: CPT

## 2022-09-07 PROCEDURE — 82962 GLUCOSE BLOOD TEST: CPT

## 2022-09-07 PROCEDURE — 74011636637 HC RX REV CODE- 636/637: Performed by: INTERNAL MEDICINE

## 2022-09-07 RX ORDER — DEXTROSE MONOHYDRATE 100 MG/ML
0-250 INJECTION, SOLUTION INTRAVENOUS AS NEEDED
Status: DISCONTINUED | OUTPATIENT
Start: 2022-09-07 | End: 2022-09-09 | Stop reason: SDUPTHER

## 2022-09-07 RX ORDER — ENALAPRIL MALEATE 10 MG/1
5 TABLET ORAL DAILY
COMMUNITY
End: 2022-09-23

## 2022-09-07 RX ORDER — HEPARIN SODIUM 5000 [USP'U]/ML
5000 INJECTION, SOLUTION INTRAVENOUS; SUBCUTANEOUS EVERY 12 HOURS
Status: DISPENSED | OUTPATIENT
Start: 2022-09-07 | End: 2022-09-22

## 2022-09-07 RX ORDER — INSULIN LISPRO 100 [IU]/ML
INJECTION, SOLUTION INTRAVENOUS; SUBCUTANEOUS EVERY 6 HOURS
Status: DISCONTINUED | OUTPATIENT
Start: 2022-09-07 | End: 2022-09-07

## 2022-09-07 RX ORDER — CLONIDINE HYDROCHLORIDE 0.1 MG/1
0.1 TABLET ORAL 2 TIMES DAILY
COMMUNITY
End: 2022-09-23

## 2022-09-07 RX ORDER — CHLORTHALIDONE 25 MG/1
25 TABLET ORAL DAILY
COMMUNITY
End: 2022-09-23

## 2022-09-07 RX ORDER — LEVOTHYROXINE SODIUM 100 UG/1
100 TABLET ORAL
Status: ON HOLD | COMMUNITY
End: 2022-10-10

## 2022-09-07 RX ORDER — MAGNESIUM SULFATE 100 %
4 CRYSTALS MISCELLANEOUS AS NEEDED
Status: DISCONTINUED | OUTPATIENT
Start: 2022-09-07 | End: 2022-09-23 | Stop reason: HOSPADM

## 2022-09-07 RX ORDER — ATENOLOL 50 MG/1
25 TABLET ORAL DAILY
COMMUNITY
End: 2022-09-23

## 2022-09-07 RX ORDER — METFORMIN HYDROCHLORIDE 500 MG/1
1000 TABLET, FILM COATED, EXTENDED RELEASE ORAL 2 TIMES DAILY
COMMUNITY
End: 2022-09-23

## 2022-09-07 RX ORDER — CYCLOBENZAPRINE HCL 10 MG
10 TABLET ORAL
COMMUNITY
End: 2022-09-23

## 2022-09-07 RX ORDER — LORAZEPAM 2 MG/ML
INJECTION INTRAMUSCULAR
Status: COMPLETED
Start: 2022-09-07 | End: 2022-09-07

## 2022-09-07 RX ORDER — PRAVASTATIN SODIUM 10 MG/1
10 TABLET ORAL DAILY
COMMUNITY

## 2022-09-07 RX ORDER — DEXTROSE MONOHYDRATE 100 MG/ML
0-250 INJECTION, SOLUTION INTRAVENOUS AS NEEDED
Status: DISCONTINUED | OUTPATIENT
Start: 2022-09-07 | End: 2022-09-09

## 2022-09-07 RX ORDER — ALBUMIN HUMAN 250 G/1000ML
25 SOLUTION INTRAVENOUS ONCE
Status: COMPLETED | OUTPATIENT
Start: 2022-09-07 | End: 2022-09-07

## 2022-09-07 RX ORDER — INSULIN GLARGINE 100 [IU]/ML
0.2 INJECTION, SOLUTION SUBCUTANEOUS DAILY
Status: DISCONTINUED | OUTPATIENT
Start: 2022-09-07 | End: 2022-09-07

## 2022-09-07 RX ADMIN — CALCIUM CHLORIDE, MAGNESIUM CHLORIDE, DEXTROSE MONOHYDRATE, LACTIC ACID, SODIUM CHLORIDE, SODIUM BICARBONATE AND POTASSIUM CHLORIDE: 5.15; 2.03; 22; 5.4; 6.46; 3.09; .157 INJECTION INTRAVENOUS at 06:49

## 2022-09-07 RX ADMIN — NOREPINEPHRINE BITARTRATE 30 MCG/MIN: 1 SOLUTION INTRAVENOUS at 01:22

## 2022-09-07 RX ADMIN — SODIUM CHLORIDE 3.6 UNITS/HR: 9 INJECTION, SOLUTION INTRAVENOUS at 09:06

## 2022-09-07 RX ADMIN — SODIUM BICARBONATE: 84 INJECTION, SOLUTION INTRAVENOUS at 23:04

## 2022-09-07 RX ADMIN — ALBUMIN (HUMAN) 25 G: 0.25 INJECTION, SOLUTION INTRAVENOUS at 16:58

## 2022-09-07 RX ADMIN — CALCIUM CHLORIDE, MAGNESIUM CHLORIDE, DEXTROSE MONOHYDRATE, LACTIC ACID, SODIUM CHLORIDE, SODIUM BICARBONATE AND POTASSIUM CHLORIDE: 5.15; 2.03; 22; 5.4; 6.46; 3.09; .157 INJECTION INTRAVENOUS at 00:48

## 2022-09-07 RX ADMIN — LORAZEPAM 2 MG: 2 INJECTION INTRAMUSCULAR; INTRAVENOUS at 15:46

## 2022-09-07 RX ADMIN — CALCIUM CHLORIDE, MAGNESIUM CHLORIDE, DEXTROSE MONOHYDRATE, LACTIC ACID, SODIUM CHLORIDE, SODIUM BICARBONATE AND POTASSIUM CHLORIDE: 3.68; 3.05; 22; 5.4; 6.46; 3.09; .314 INJECTION INTRAVENOUS at 12:22

## 2022-09-07 RX ADMIN — PROPOFOL 50 MCG/KG/MIN: 10 INJECTION, EMULSION INTRAVENOUS at 10:59

## 2022-09-07 RX ADMIN — PIPERACILLIN AND TAZOBACTAM 3.38 G: 3; .375 INJECTION, POWDER, FOR SOLUTION INTRAVENOUS at 11:13

## 2022-09-07 RX ADMIN — NOREPINEPHRINE BITARTRATE 27 MCG/MIN: 1 SOLUTION INTRAVENOUS at 08:55

## 2022-09-07 RX ADMIN — NOREPINEPHRINE BITARTRATE 27 MCG/MIN: 1 SOLUTION INTRAVENOUS at 07:18

## 2022-09-07 RX ADMIN — CALCIUM CHLORIDE, MAGNESIUM CHLORIDE, DEXTROSE MONOHYDRATE, LACTIC ACID, SODIUM CHLORIDE, SODIUM BICARBONATE AND POTASSIUM CHLORIDE: 3.68; 3.05; 22; 5.4; 6.46; 3.09; .314 INJECTION INTRAVENOUS at 12:23

## 2022-09-07 RX ADMIN — PROPOFOL 45 MCG/KG/MIN: 10 INJECTION, EMULSION INTRAVENOUS at 02:01

## 2022-09-07 RX ADMIN — PROPOFOL 50 MCG/KG/MIN: 10 INJECTION, EMULSION INTRAVENOUS at 21:00

## 2022-09-07 RX ADMIN — VASOPRESSIN 0.03 UNITS/MIN: 20 INJECTION INTRAVENOUS at 05:39

## 2022-09-07 RX ADMIN — SODIUM BICARBONATE: 84 INJECTION, SOLUTION INTRAVENOUS at 02:01

## 2022-09-07 RX ADMIN — SODIUM BICARBONATE: 84 INJECTION, SOLUTION INTRAVENOUS at 12:07

## 2022-09-07 RX ADMIN — HEPARIN SODIUM 5000 UNITS: 5000 INJECTION INTRAVENOUS; SUBCUTANEOUS at 20:08

## 2022-09-07 RX ADMIN — NOREPINEPHRINE BITARTRATE 27 MCG/MIN: 1 SOLUTION INTRAVENOUS at 07:00

## 2022-09-07 RX ADMIN — SODIUM CHLORIDE 5 UNITS/HR: 9 INJECTION, SOLUTION INTRAVENOUS at 10:05

## 2022-09-07 RX ADMIN — LORAZEPAM 2 MG: 2 INJECTION INTRAMUSCULAR; INTRAVENOUS at 15:49

## 2022-09-07 RX ADMIN — FENTANYL CITRATE 75 MCG/HR: 50 INJECTION, SOLUTION INTRAMUSCULAR; INTRAVENOUS at 10:14

## 2022-09-07 RX ADMIN — PIPERACILLIN AND TAZOBACTAM 3.38 G: 3; .375 INJECTION, POWDER, FOR SOLUTION INTRAVENOUS at 04:36

## 2022-09-07 RX ADMIN — SODIUM CHLORIDE 3 UNITS/HR: 9 INJECTION, SOLUTION INTRAVENOUS at 12:03

## 2022-09-07 RX ADMIN — CALCIUM CHLORIDE, MAGNESIUM CHLORIDE, DEXTROSE MONOHYDRATE, LACTIC ACID, SODIUM CHLORIDE, SODIUM BICARBONATE AND POTASSIUM CHLORIDE: 3.68; 3.05; 22; 5.4; 6.46; 3.09; .314 INJECTION INTRAVENOUS at 12:21

## 2022-09-07 RX ADMIN — CALCIUM CHLORIDE, MAGNESIUM CHLORIDE, DEXTROSE MONOHYDRATE, LACTIC ACID, SODIUM CHLORIDE, SODIUM BICARBONATE AND POTASSIUM CHLORIDE: 5.15; 2.03; 22; 5.4; 6.46; 3.09; .157 INJECTION INTRAVENOUS at 00:49

## 2022-09-07 RX ADMIN — PROPOFOL 50 MCG/KG/MIN: 10 INJECTION, EMULSION INTRAVENOUS at 15:42

## 2022-09-07 RX ADMIN — CALCIUM CHLORIDE, MAGNESIUM CHLORIDE, DEXTROSE MONOHYDRATE, LACTIC ACID, SODIUM CHLORIDE, SODIUM BICARBONATE AND POTASSIUM CHLORIDE: 3.68; 3.05; 22; 5.4; 6.46; 3.09; .314 INJECTION INTRAVENOUS at 17:59

## 2022-09-07 RX ADMIN — SODIUM CHLORIDE 40 MG: 9 INJECTION, SOLUTION INTRAMUSCULAR; INTRAVENOUS; SUBCUTANEOUS at 12:22

## 2022-09-07 RX ADMIN — CALCIUM CHLORIDE, MAGNESIUM CHLORIDE, DEXTROSE MONOHYDRATE, LACTIC ACID, SODIUM CHLORIDE, SODIUM BICARBONATE AND POTASSIUM CHLORIDE: 5.15; 2.03; 22; 5.4; 6.46; 3.09; .157 INJECTION INTRAVENOUS at 00:47

## 2022-09-07 RX ADMIN — HEPARIN SODIUM 5000 UNITS: 5000 INJECTION INTRAVENOUS; SUBCUTANEOUS at 12:22

## 2022-09-07 RX ADMIN — PIPERACILLIN AND TAZOBACTAM 3.38 G: 3; .375 INJECTION, POWDER, FOR SOLUTION INTRAVENOUS at 20:08

## 2022-09-07 RX ADMIN — VASOPRESSIN 0.03 UNITS/MIN: 20 INJECTION INTRAVENOUS at 05:55

## 2022-09-07 RX ADMIN — VANCOMYCIN HYDROCHLORIDE 1000 MG: 1 INJECTION, POWDER, LYOPHILIZED, FOR SOLUTION INTRAVENOUS at 11:14

## 2022-09-07 NOTE — PROGRESS NOTES
[address] [address] RX Pharmacy Progress Note: Antimicrobial Stewardship    Consult for antibiotic dosing of vancomycin by Dr. Mellissa Mcneal  Indication: sepsis  Day of Therapy: 2    Plan:  Vancomycin therapy:  Loading dose of vancomycin 1750 mg IV (25 mg/kg, max 2.5 gm) x 1 (given 9/6 ~1100 at [facility])   Maintenance dose: 1000mg IV every 24 hours to start 9/7 at 1200  (CVVH started 9/6)    Dose calculated to approximate a   Target AUC/JANNET of: N/A  Target trough of: 15mg/L   Assess/Plan: Leukocytosis improving (WBC 21.1-->14.5 today); Temp curve improving; Blood cultures pending; Obtain trough level prior to 3rd total dose; Ordered 9/8 at 1100. Pharmacy to follow daily and will make changes to dose and/or frequency based on clinical status. Date Dose & Interval Measured (mcg/mL) Extrapolated (mcg/mL)   ?9/6 ?1750 mg IV x 1     (given 9/6 ~1100), followed by 1g IV Q24hr ? ?   ? ? ? ?   ? ? ? ? Other Antimicrobial  (not dosed by pharmacist)   Zosyn 3.375 gm IV Q12H --> Changed to 3.375g IV Q8H (CVVH dosing)  LVQ x 1 at [facility] 9/6   Cultures     9/6 Blood x 2 - (pending)   Serum Creatinine     Lab Results   Component Value Date/Time    Creatinine 8.60 (H) 09/07/2022 08:11 AM       Creatinine Clearance Estimated Creatinine Clearance: 7.8 mL/min (A) (based on SCr of 8.6 mg/dL (H)). Procalcitonin  No results found for: PCT     Temp   98.1 °F (36.7 °C)    WBC   Lab Results   Component Value Date/Time    WBC 14.5 (H) 09/07/2022 04:36 AM       For Antifungals, Metronidazole and Nafcillin: Lab Results   Component Value Date/Time    ALT (SGPT) 17 09/07/2022 04:36 AM    AST (SGOT) 42 (H) 09/07/2022 04:36 AM    Alk.  phosphatase 60 09/07/2022 04:36 AM    Bilirubin, total 0.6 09/07/2022 04:36 AM         Pharmacist: Vijay Lang

## 2022-09-07 NOTE — PROCEDURES
[facility] [address]  EEG    Name:  Regi Quevedo  MR#:  [id number]  :  1960  ACCOUNT #:  [de-identified]  DATE OF SERVICE:  2022      REQUESTING PROVIDER:  Nadia Tipton DO    CLINICAL HISTORY:  An EEG is requested on this 13-year-old man to evaluate for epileptiform abnormality. MEDICATIONS:  Erby Viktoria to include propofol. EEG REPORT:  This tracing is obtained portably in the intensive care unit. The patient is noted to be intubated and on propofol. During this state, the background consists of diffuse low-voltage delta activity. Activating procedures are not performed. Normal sleep architecture is not seen. INTERPRETATION:  This EEG recorded portably while the patient is noted to be intubated and sedated is abnormal secondary to diffuse slowing and disorganization of the background rhythms indicative of an encephalopathy which may have contributions from toxic, metabolic, diffuse structural, and/or pharmacologic effects and clinical correlation is recommended. No epileptiform abnormalities are seen. No evidence for subclinical ictus.       Kelsey Hernandez MD      [id number]  D:  2022 14:57  T:  2022 18:13  JOB #:  [id number]

## 2022-09-07 NOTE — PROGRESS NOTES
CRRT / [phone]    Orders   Mode: CVVH restarted @ 1240  11 hr runtime   Blood Flow Rate: 200ml/min   Prismasol Dose: 25ml/kg/hr X 71.6ke=2907rt (rounded to 1800ml)  Pre: 900ml/hr  Repl: 900ml/hr   Prismasol Concentrate: 4K/2.5Ca   Blood Warmer Temp: 37*C   Net Fluid Removal: 0ml/hr     Metrics   BP: 87/55   HR: 92   Access Pressure: -37   Filter Pressure: 100   Return Pressure: 60   TMP: 49   Pressure Drop: 32     Access   Type & Location: RI CVC non tunneled CVC CDI dated 09/06/22   Comments:  Lines disinfected as per policy, +aspiration/flush X2. Labs   HBsAg (Antigen) / date:        Pending                                       HBsAb (Antibody) / date: Pending   Source: Epic     Safety:   Time Out Done:   (Time) 1200   Consent obtained/signed: Verified   Education: Unable/ pt intubated/sedated   Primary Nurse Rpt Pre: M. Aleatha Closs, RN   Primary Nurse Rpt Post: PIOTR Robles RN     Comments / Plan:      Pt orders, notes, labs, code status and consent reviewed. Time out complete. CVVH restarted due to increased pd, all possible blood returned. [id number] primed and tested. Lines are visible and secure with blood warmer attached to return line and set at 37*C. Education pre/post with primary RNS.

## 2022-09-07 NOTE — PROGRESS NOTES
Comprehensive Nutrition Assessment    Type and Reason for Visit: Initial (new vent)    Nutrition Recommendations/Plan:   Continue NPO -on three pressors and NGT to suction     Malnutrition Assessment:  Malnutrition Status:  Insufficient data (22 0955)    Context:  Acute illness     Findings of the 6 clinical characteristics of malnutrition:   Energy Intake:  Unable to assess  Weight Loss:  Unable to assess     Body Fat Loss:  No significant body fat loss,     Muscle Mass Loss:  No significant muscle mass loss,    Fluid Accumulation:  No significant fluid accumulation,     Strength:  Not performed     Nutrition Assessment:     Pt is a 57year old male admitted with Severe sepsis ([facility] [A41.9, R65.20]. He  has a past medical history of Hypercholesteremia, Hypertension, Melanoma ([facility] and Thyroid activity decreased. . Newly intubated. NGT to suction. Propofol at 50 mcg, providing 576 kcal/day. Plan for EEG per IDR discussion. ABD has been improving today. Currently on levo, vaso, and unruly with plans to titrate unruly and vaso. NKFA. On insulin drip at this time. No plans to begin TF yet. Lack of wt history, no family at bedside - unable to determine whether patient has had clinically significant weight changes. Patient on CRRT. Wt Readings from Last 10 Encounters:   22 71.7 kg (158 lb)     Vent: 8.4 l/min    Temp (24hrs), Av.4 °F (35.8 °C), Min:93.1 °F (33.9 °C), Max:100.2 °F (37.9 °C)      Nutrition Related Findings:       Abdominal Assessment: Intact, Soft  Appetite: NPO  Bowel Sounds: Hypoactive   Edema:No data recorded    Nutr.  Labs:    Lab Results   Component Value Date/Time    GFR est AA 8 (L) 2022 08:11 AM    GFR est non-AA 6 (L) 2022 08:11 AM    Creatinine 8.60 (H) 2022 08:11 AM    BUN 81 (H) 2022 08:11 AM    Sodium 140 2022 08:11 AM    Potassium 4.2 2022 08:11 AM    Chloride 94 (L) 2022 08:11 AM    CO2 15 (LL) 2022 08:11 AM       Lab Results Component Value Date/Time    Glucose 262 (H) 09/07/2022 08:11 AM    Glucose (POC) 228 (H) 09/07/2022 09:58 AM       No results found for: HBA1C, JAB1GBKG, SJE0NEBH, WLI5BEPG      Lab Results   Component Value Date/Time    Calcium 8.0 (L) 09/07/2022 08:11 AM    Phosphorus 6.9 (H) 09/07/2022 08:11 AM     Magnesium   Date Value Ref Range Status   09/07/2022 1.9 1.6 - 2.4 mg/dL Final   09/06/2022 2.7 (H) 1.6 - 2.4 mg/dL Final       Nutr. Meds:  Humulin R, levophed*, neosyn*, zosyn, propofol*, vasopressin*    Current Nutrition Intake & Therapies:  Average Meal Intake: NPO  Average Supplement Intake: NPO  DIET NPO    Anthropometric Measures:  Height: 5' 6\" (167.6 cm)  Ideal Body Weight (IBW): 142 lbs (65 kg)     Current Body Wt:  71.7 kg (158 lb 1.1 oz), 111.3 % IBW. Stated  Current BMI (kg/m2): 25.5        Weight Adjustment: No adjustment                 BMI Category: Overweight (BMI 25.0-29. 9)    Estimated Daily Nutrient Needs:  Energy Requirements Based On: Formula  Weight Used for Energy Requirements: Current  Energy (kcal/day): 4702 ([employer] 2010)  Weight Used for Protein Requirements: Current  Protein (g/day): 104-143 (1.5-2.0 g/kg, CRRT)  Method Used for Fluid Requirements: 1 ml/kcal  Fluid (ml/day): 1449    Nutrition Diagnosis:   Inadequate oral intake related to impaired respiratory function as evidenced by NPO or clear liquid status due to medical condition, intubation  Increased nutrient needs related to renal dysfunction as evidenced by CRRT    Nutrition Interventions:   Food and/or Nutrient Delivery: Continue NPO (while NGT to suction)  Nutrition Education/Counseling: No recommendations at this time  Coordination of Nutrition Care: Continue to monitor while inpatient, Interdisciplinary rounds  Plan of Care discussed with: IDR team    Goals:     Goals: Initiate nutrition support, within 2 days       Nutrition Monitoring and Evaluation:   Behavioral-Environmental Outcomes: None identified  Food/Nutrient Intake Outcomes: Enteral nutrition intake/tolerance  Physical Signs/Symptoms Outcomes: Biochemical data, Weight, Hemodynamic status    Discharge Planning:     Too soon to determine    Bessie Manning MS, RD  Contact: Ext: [phone], or via [employer]

## 2022-09-07 NOTE — PROGRESS NOTES
Central Line Procedure Note    Name:  Guillermo Zamarripa  Age:  57 y.o. MRN:  [id number]  CSN:  [id number]  :  1960    Referring physician: Jess Mason, *     Indication: Need for vasopressors    Patient critically ill, on vent, sedated, family gives consent. Patient positioned in Trendelenburg. 7-Step Sterility Protocol followed. (cap, mask sterile gown, sterile gloves, large sterile sheet, hand hygiene, 2% chlorhexidine for cutaneous antisepsis)       Left internal jugular cannulated x 2 attempt(s) using ultrasound guidance. Wire placement encountered resistance on each attempt approximately 16 cm, catheter inserted 15 cm, all ports aspirate heme and flush easily. Catheter secured & Biopatch applied. Sterile Tegaderm placed. CXR pending.       Norah Bennett MD

## 2022-09-07 NOTE — PROGRESS NOTES
Spiritual Care Assessment/Progress Note  [address] [place] [address] Dr      NAME: Wes Tineo      MRN: [id number]  AGE: 57 y.o. SEX: male  Church Affiliation: No preference   Language: English     9/7/2022     Total Time (in minutes): 19     Spiritual Assessment begun in [facility] INTENSIVE CARE through conversation with:         []Patient        [] Family    [] Friend(s)        Reason for Consult: Initial/Spiritual assessment, critical care, No future visits requested     Spiritual beliefs: (Please include comment if needed)     [] Identifies with a sowmya tradition:         [] Supported by a sowmya community:            [] Claims no spiritual orientation:           [] Seeking spiritual identity:                [] Adheres to an individual form of spirituality:           [x] Not able to assess:                           Identified resources for coping:      [] Prayer                               [] Music                  [] Guided Imagery     [] Family/friends                 [] Pet visits     [] Devotional reading                         [] Unknown     [] Other:                                               Interventions offered during this visit: (See comments for more details)          Family/Friend(s): Initial Assessment     Plan of Care:     [] Support spiritual and/or cultural needs    [] Support AMD and/or advance care planning process      [] Support grieving process   [] Coordinate Rites and/or Rituals    [] Coordination with community clergy   [] No spiritual needs identified at this time   [] Detailed Plan of Care below (See Comments)  [] Make referral to Music Therapy  [] Make referral to Pet Therapy     [] Make referral to Addiction services  [] Make referral to [facility]  [] Make referral to Spiritual Care Partner  [x] No future visits requested        [] Contact Spiritual Care for further referrals     Comments: Spiritual assessment conducted with sister Kristofer Xavier via phone.  Yessenia Russ is intubated and unable to communicate at this time. Wendy Lambert shared they are doing fine and declined spiritual care services at this time. Chart reviewed. Provided empathic listening. Visited by: Mariam Driver.  Sarah Wooten, [address] [place] [address] paging Service [phone] ([phone])

## 2022-09-07 NOTE — PROGRESS NOTES
[id number]  YOB: 1960          Assessment & Plan:     Severe oliguric LAVINIA on CVVH  Severe lactic acidosis  Ketoacidosis  Resp failure  Shock on multiple pressors  DM2    Rec:  Continue CVVH, factor 0  Wean pressors  Empiric abx  Change to 4 K RF  Avoid nephrotoxins if possible  ICU support       Subjective:   CC: LAVINIA  HPI: Oliguric, on CVVH. Factor 0, 2K RF. Labs better. Persistent severe lactic acidosis. On vent and multiple pressors.     ROS: unable to obtain due to pt condition  Current Facility-Administered Medications   Medication Dose Route Frequency    glucose chewable tablet 16 g  4 Tablet Oral PRN    glucagon (GLUCAGEN) injection 1 mg  1 mg IntraMUSCular PRN    dextrose 10% infusion 0-250 mL  0-250 mL IntraVENous PRN    insulin regular (NOVOLIN R, HUMULIN R) 100 Units in 0.9% sodium chloride 100 mL infusion  0-50 Units/hr IntraVENous TITRATE    dextrose 10% infusion 0-250 mL  0-250 mL IntraVENous PRN    NOREPINephrine (LEVOPHED) 32,000 mcg in dextrose 5% 250 mL (128 mcg/mL) infusion  1-50 mcg/min IntraVENous TITRATE    bicarbonate dialysis (PRISMASOL) BG K 4/Ca 2.5 5000 ml solution   Extracorporeal DIALYSIS CONTINUOUS    sodium bicarbonate (8.4%) 150 mEq in dextrose 5% 1,000 mL infusion   IntraVENous CONTINUOUS    propofol (DIPRIVAN) 10 mg/mL infusion  0-50 mcg/kg/min IntraVENous TITRATE    fentaNYL (PF) 1,500 mcg/30 mL (50 mcg/mL) infusion  0-200 mcg/hr IntraVENous TITRATE    bicarbonate dialysis (PRISMASOL) BG K 2/Ca 3.5 5000 ml solution   Extracorporeal DIALYSIS CONTINUOUS    piperacillin-tazobactam (ZOSYN) 3.375 g in 0.9% sodium chloride (MBP/ADV) 100 mL MBP  3.375 g IntraVENous Q8H    lidocaine (XYLOCAINE) 10 mg/mL (1 %) injection 1-20 mL  1-20 mL SubCUTAneous Multiple    vancomycin (VANCOCIN) 1,000 mg in 0.9% sodium chloride 250 mL ([id number])  1,000 mg IntraVENous Q24H    PHENYLephrine (SINAN-SYNEPHRINE) 30 mg in 0.9% sodium chloride 250 mL infusion   mcg/min IntraVENous TITRATE    vasopressin (VASOSTRICT) 20 Units in 0.9% sodium chloride 100 mL infusion  0-0.03 Units/min IntraVENous TITRATE    EPINEPHrine (ADRENALIN) 5 mg in 0.9% sodium chloride 250 mL infusion  0-10 mcg/min IntraVENous TITRATE          Objective:     Vitals:  Blood pressure 133/73, pulse 87, temperature 98.1 °F (36.7 °C), resp. rate 20, height 5' 6\" (1.676 m), weight 71.7 kg (158 lb), SpO2 97 %. Temp (24hrs), Av.4 °F (35.8 °C), Min:93.1 °F (33.9 °C), Max:100.2 °F (37.9 °C)      Intake and Output:  701 - 1900  In: 493.6 [I.V.:493.6]  Out: 479 [Urine:17]  1901 -  0700  In: 7776.1 [I.V.:7776.1]  Out: 6379 [Urine:302]    Physical Exam:               GENERAL ASSESSMENT: Sedated on vent  HEENT: ETT  CHEST: Vent BS  HEART: reg  ABDOMEN: Soft,NT  : +Vega:   EXTREMITY: no EDEMA        ECG/rhythm:    Data Review      Recent Labs     22  1014   TNIPOC <0.04      Recent Labs     22  1521   CPK 75     Recent Labs     22  0436 22  1416 22  1010    139 139   K 4.3 5.5* 7.6*   CL 93* 96* 89*   CO2 13* <5* 4*   BUN 93* 105* 105*   CREA 9.78* 11.80* 12.94*   * 281* 160*   MG  --   --  2.7*   CA 7.7* 8.5 9.7   ALB 2.9* 2.8* 4.5   WBC 14.5* 21.1* 21.0*   HGB 10.9* 10.8* 12.7   HCT 31.9* 35.3* 40.2    316 337      No results for input(s): INR, PTP, APTT, INREXT in the last 72 hours. Needs: urine analysis, urine sodium, protein and creatinine  No results found for: AMINA ZAMARRIPA        : Alia Quiroz MD  2022        [facility]:  [email or web address]  Bebeto Pena office:  [address] [place] [address], [address] [place] [address],[address]  [place], [address] [place] [address]  Phone: [phone]  Fax :     [phone]    [place] office:  [address] [facility], [address] [place] [address]  Phone - [phone]  Fax - [phone]

## 2022-09-07 NOTE — PROGRESS NOTES
Central Line Procedure Note    Name:  Angleica Correa  Age:  57 y.o. MRN:  [id number]  CSN:  [id number]  :  1960    Referring physician: Neela Larsen, *     Indication: Need for vasopressor    Patient had prior LIJ line placement with cephalad position. Nursing staff informed that new access needed to be placed. Given difficulty of LIJ placement, and in situ RIJ HD catheter,decided to proceed with femoral access. 7-Step Sterility Protocol followed. (cap, mask sterile gown, sterile gloves, large sterile sheet, hand hygiene, 2% chlorhexidine for cutaneous antisepsis)       Right femoral cannulated x 2 attempt(s) using ultrasound guidance. Wire removed, all ports aspirate and flush easily. Catheter secured & Biopatch applied. Sterile Tegaderm placed. CXR pending.       Velma Junior MD

## 2022-09-07 NOTE — PROGRESS NOTES
*Patient resting comfortably no changes made at this time. ETT marquez and suction dated for 09/11/2022. Will change once that date approaches. 09/07/22 1556   Patient Observations   Pulse (Heart Rate) 93   Resp Rate 22   O2 Sat (%) 98 %   Airway - Endotracheal Tube 09/06/22 Oral   Placement Date/Time: 09/06/22 1346   Number of Attempts: 1  Inserted By: Nat Louise  Present on Admission/Arrival: No  Location: Oral  Placement Verified: Auscultation;Fiberoptic;Placement not verified (comment);BBS;Chest x-ray;EtCO2  Airway Types: Endotra. .. Insertion Depth (cm) 21 cm   Line Jesse Lips   Side Secured Centered;Device   Cuff Pressure 35 cmH20   Site Assessment Clean, dry, & intact   Respiratory   Respiratory (WDL) X   Patient on Vent Yes - If patient is on vent, add Doc Flowsheet Ventilator ().    Respiratory Pattern Regular   Upper Airway Sounds Other (comment)  (No abnormalites)   Chest/Tracheal Assessment Chest expansion, symmetrical   Breath Sounds Bilateral Clear;Diminished   Breath Sounds Left Clear;Diminished   Breath Sounds Right Clear;Diminished   Cough Cough with suction   Airway Clearance   Suction ET Tube   Suction Device Inline suction catheter   Sputum Method Obtained Endotracheal   Sputum Amount Small   Sputum Color/Odor Clear   Sputum Consistency Thin   Ventilator Initiate/Discontinue   Bio-Med ID # [id number]   Vent Settings   FIO2 (%) 50 %   SpO2/FIO2 Ratio 196   CMV Rate Set 20   Back-Up Rate 20   Vt Set (ml) 400 ml   PEEP/VENT (cm H2O) 5 cm H20   I:E Ratio 1:3.2   Insp Flow (l/min) 60 l/min   Flow Trigger 3   Ventilator Measurements   Resp Rate Observed 22   Vt Exhaled (Machine Breath) (ml) 420 ml   Ve Observed (l/min) 9.94 l/min   PIP Observed (cm H2O) 21 cm H2O   Plateau Pressure (cm H2O) 14 cm H2O   Driving Pressure 9 FVS2L   MAP (cm H2O) 9.7   I:E Ratio Actual 1:3.2   Auto PEEP Observed (cm H2O) 0.1 cm H2O   Dynamic Compliance (ml/cm H20) 83 ml/cm H20   Static Compliance (ml/cm H20) 47 ml/cm H20   Raw (cmH2O/L/s) 15 (cmH2O/L/s)   Safety & Alarms   Circuit Temperature 100 °F (37.8 °C)   Backup Mode Checked/Apnea Yes   Pressure Max 50 cm H2O   Ve Min 2   Ve Max 22   Vt Min 200 ml   Vt Max 800 ml   RR Min 30   RR Max 45   Ambu Bag Yes   Ambu Mask Yes   ABCDEF Bundle   SBT Safety Screen Passed No   SBT Screen Reason for Failure FiO2 > 50%;PEEP > 7.5   Weaning Parameters   Spontaneous Breathing Trial Complete No (Comments)   Age Specific Ventilator Associated Pneumonia Bundle   Patient Age Group Adult   Oxygen Therapy   Skin Assessment Clean, dry, & intact   Skin Protection for O2 Device Yes   Orientation Bilateral   Location Cheek   Interventions Skin Barrier   Vent Method/Mode   Ventilation Method Conventional   Ventilator Mode Assist control   Ventilator Mode ID [id number]   $$ Ventilator Subsequent Subsequent Vent Day   Pulmonary Toilet   Pulmonary Toilet H. O.B elevated

## 2022-09-07 NOTE — PROGRESS NOTES
Admission Medication Reconciliation:     Information obtained from:  3 medication lists (two hand written lists one typed lists). Lists were given to the unit sec to place on the paper chart. Pill bottles. The nurse will ask family to take pill bottles home on next entry into the room. If family does not arrive she will call security to lock up the medications. RxQuery data available¹:  No    Comments/Recommendations: The patient is intubated and sedated. It is reported that the patient manages his own medications at home. Pharmacist reviewed each medication list and all pill bottles. Enalapril and atenolol are listed as a dosage of one half tablet on the typed medication lists. The pill bottles states one pill daily for each. The PTA list was updated for a dosage of one half tablet for each medication as the pharmacist could see half tablets of these medications in the pill bottles. Gabapentin was a new medication started 9/1. It is noted from ER notes prior to intubation the patient took a total of three doses prior to admission. ¹RxQuery pharmacy benefit data reflects medications filled and processed through the patient's insurance, however   this data does NOT capture whether the medication was picked up or is currently being taken by the patient. Prior to Admission Medications   Prescriptions Last Dose Informant Taking?   atenoloL (TENORMIN) 50 mg tablet  Other Yes   Sig: Take 25 mg by mouth daily. List from family states one half tablet   chlorthalidone (HYGROTON) 25 mg tablet  Other Yes   Sig: Take 25 mg by mouth daily. cloNIDine HCL (CATAPRES) 0.1 mg tablet  Other Yes   Sig: Take 0.1 mg by mouth two (2) times a day. cyclobenzaprine (FLEXERIL) 10 mg tablet  Other Yes   Sig: Take 10 mg by mouth nightly as needed for Muscle Spasm(s). enalapril (VASOTEC) 10 mg tablet  Other Yes   Sig: Take 5 mg by mouth daily.  List provided by family [facility] one half tablet   gabapentin (NEURONTIN) 300 mg capsule 9/5/2022 Other Yes   Sig: Take 300 mg by mouth See Admin Instructions. New medication filled 9/1/22 for 45 pills - Directions on the bottle: Start with one pill at bedtime then one pill twice daily then one pill three times daily. levothyroxine (SYNTHROID) 100 mcg tablet  Other Yes   Sig: Take 100 mcg by mouth Daily (before breakfast). metFORMIN (GLUMETZA ER) 500 mg TG24 24 hour tablet  Other Yes   Sig: Take 1,000 mg by mouth two (2) times a day. pravastatin (PRAVACHOL) 10 mg tablet  Other Yes   Sig: Take 10 mg by mouth daily. Facility-Administered Medications: None         Please contact the main inpatient pharmacy with any questions or concerns at [phone] and we will direct you to the clinical pharmacist covering this patient's care while in-house.    Patricia Childers, RaduD, BCPS

## 2022-09-07 NOTE — PROGRESS NOTES
0700 Bedside and Verbal shift change report given to Kanwal Colon RN (oncoming nurse) by Paula Andrea RN (offgoing nurse). Report included the following information SBAR, Kardex, ED Summary, Procedure Summary, Intake/Output, MAR, Recent Results, Med Rec Status, Cardiac Rhythm Nsr sinus tach, Alarm Parameters , Quality Measures, and Dual Neuro Assessment. Primary Nurse Randy Toussaint RN and Paula Andrea RN performed a dual skin assessment on this patient No impairment noted  David score, see flowsheet. 0800 Dr. Chuck Reyes and Dr Hiwot Khan at bedside speaking with Pts Sister. Physicians and family updated regarding pt assessment, labs, VS, medications and I&Os. Per Dr. Zulay Gay wean vasopressor in order of: kane, vasso, levo. Currently weaning Kane - See MAR for titrations and all medication administration. 0830 Dr. Jc Bell at bedside. Updated regarding pt assessment, labs, VS, medications and I&Os. Order to change dialysate bags to 4K when run dry. 0930 IDR. GI and DVT prophylaxis added. Notified Of tremor assessed with weaning of propofol isolated to mouth and shoulder. EEG ordered to check for seizure activity and instructed to wean prop to inflict tremor when EEG is started. 46 Notified Dr. Chuck Reyes of BG less than 200. Pt currently on a bicarb drip with D5 in it. Clarification needed regarding additional fluids or if we are okay with the Bicarb drip alone. 200 Notified Dr. Zulay Gay of BG less than 200. Pt currently on a bicarb drip with D5 in it. Clarification needed regarding additional fluids or if we are okay with the Bicarb drip alone. 1137 Per Dr. Zulay Gay and pharmacy, bicarb drip has enough glucose in it for the insulin drip. Samy Landa, Dialysis RN at bedside changing CRRT set. 1252 EEG tech at bedside setting up EEG. 26 Notified Dr. Dylan Vences of completed EEG.   5321 Notified Dr. Chuck Reyes of increased in tremors present throughout whole body. Verbal orders for ceribell.    1525 Due to tremors, automatic BP incorrect reading SBP greater than 200. BP 98/62 with manual BP cuff  1546 Dr. Huan Kauffman at bedside. Pt tremorous throughout entire body. Verbal orders to override 2mg IV ativan. Given. 1547 Less tremor  noted. 1548 Increase in tremor . R Angela Livea 23 Dr. Huan Kauffman at bedside. Verbal orders to override 2mg IV ativan. Given. [address] Dr. Huan Kauffman and Paul Corcoran Neurology NP at bedside to assess patient. Dossie Faith still monitoring patient. Paul Corcoran down to speak with Dr. Gracie Price about results and whether to order 24Hr EEG. 1600 Dr. Huan Kauffman made aware of 0 urine output the past three hours. Will order albumin. 3533 Per Dr. Huan Kauffman, no seizure activity (MD spoke with neurology). Instructed to increase fentanyl and give PRN ativan pushes for agitation/tremors. [address] Per Dr. Huan Kauffman okay to remove ceribell.   65 Notified Dr. Cayla Ventura Lactic 11.4 - okay to stop trending. Will draw another in the morning if pt condition changed. 1900 Bedside and Verbal shift change report given to Ines Covarrubias RN (oncoming nurse) by Radha Bran RN (offgoing nurse). Report included the following information SBAR, Kardex, ED Summary, Procedure Summary, Intake/Output, MAR, Recent Results, Med Rec Status, Cardiac Rhythm NSR, Alarm Parameters , Quality Measures, and Dual Neuro Assessment.

## 2022-09-07 NOTE — PROGRESS NOTES
Progress Note  Date:2022       Room:[address] [place] [address]  Patient Chuck Santoyo     YOB: 1960     Age:62 y.o. Subjective    CC: intubated and unable to obtain    24 HOUR: remains on vent 70% and 5 of PEEP. NE 27.  Knae 70 and vaso 0.03. Started on CRRT. Objective         Vitals Last 24 Hours:  TEMPERATURE:  Temp  Av.6 °F (35.9 °C)  Min: 93.1 °F (33.9 °C)  Max: 100.2 °F (37.9 °C)  RESPIRATIONS RANGE: Resp  Av.6  Min: 15  Max: 36  PULSE OXIMETRY RANGE: SpO2  Av.2 %  Min: 78 %  Max: 100 %  PULSE RANGE: Pulse  Av.3  Min: 87  Max: 114  BLOOD PRESSURE RANGE: Systolic (82HUL), INK:967 , Min:65 , BUTLER:332   ; Diastolic (79UWM), ZTA:76, Min:43, Max:86    I/O (24Hr): Intake/Output Summary (Last 24 hours) at 2022 1106  Last data filed at 2022 1000  Gross per 24 hour   Intake 8658.41 ml   Output 2192 ml   Net 6466.41 ml     Objective  Gen: intubated, sedated  HEENT: ETT in place. Chest: symmetrical chest rise  CV: r/r/r  Abd: non-distended  Ext: no c/c/e  Neuro: not moving spontaneously or following commands at time of my eval.    Labs/Imaging/Diagnostics    Labs:  CBC:  Recent Labs     22  04322  14122  1010   WBC 14.5* 21.1* 21.0*   RBC 3.33* 3.32* 3.88*   HGB 10.9* 10.8* 12.7   HCT 31.9* 35.3* 40.2   MCV 95.8 106.3* 103.6*   RDW 13.1 13.0 13.0    316 337     CHEMISTRIES:  Recent Labs     22  0811 22  0436 22  1416 22  1010    141 139 139   K 4.2 4.3 5.5* 7.6*   CL 94* 93* 96* 89*   CO2 15* 13* <5* 4*   BUN 81* 93* 105* 105*   CA 8.0* 7.7* 8.5 9.7   PHOS 6.9*  --   --   --    MG 1.9  --   --  2.7*   PT/INR:No results for input(s): INR, INREXT in the last 72 hours. No lab exists for component: PROTIME  APTT:No results for input(s): APTT in the last 72 hours.   LIVER PROFILE:  Recent Labs     22  0436 22  1416 22  1010   AST 42* 18 13   ALT 17 17 7*     Lab Results Component Value Date/Time    ALT (SGPT) 17 09/07/2022 04:36 AM    AST (SGOT) 42 (H) 09/07/2022 04:36 AM    Alk. phosphatase 60 09/07/2022 04:36 AM    Bilirubin, total 0.6 09/07/2022 04:36 AM       Imaging Last 24 Hours:  CT HEAD WO CONT    Result Date: 9/6/2022  EXAM: CT HEAD WO CONT INDICATION: AMS COMPARISON: None. CONTRAST: None. TECHNIQUE: Unenhanced CT of the head was performed using 5 mm images. Brain and bone windows were generated. Coronal and sagittal reformats. CT dose reduction was achieved through use of a standardized protocol tailored for this examination and automatic exposure control for dose modulation. FINDINGS: Generalized volume loss. Several scattered white matter hypodensities may reflect chronic microangiopathic change. There is no intracranial hemorrhage, extra-axial collection, or mass effect. The basilar cisterns are open. No CT evidence of acute infarct. The bone windows demonstrate no abnormalities. Right maxillary sinus mucous retention cyst. Right sphenoid sinus mucosal thickening and fluid. Mastoid air cells are clear. 1.  No acute intracranial abnormality. 2.  Right sphenoid sinus fluid, correlate for acute sinusitis. CT CHEST WO CONT    Result Date: 9/6/2022  EXAM: CT CHEST WO CONT, CT ABD PELV WO CONT INDICATION: Shortness of breath COMPARISON: None. TECHNIQUE: Helical CT of the chest, abdomen, and pelvis were obtainied without contrast. Oral contrast was not utilized. Coronal and sagittal reformats were generated. CT dose reduction was achieved through use of a standardized protocol tailored for this examination and automatic exposure control for dose modulation. FINDINGS: Clips in the left axilla. No axillary or supraclavicular adenopathy. THYROID: No nodule. MEDIASTINUM: No mass or lymphadenopathy. DESTINEY: No mass or lymphadenopathy. THORACIC AORTA: No aneurysm. MAIN PULMONARY ARTERY: Normal in caliber.  HEART: Coronary atherosclerotic disease ESOPHAGUS: No wall thickening or dilatation. TRACHEA/BRONCHI: Patent. PLEURA: No effusion or pneumothorax. LUNGS: No nodule, mass, or airspace disease. Liver: No mass or biliary dilatation. Gallbladder: Unremarkable. Spleen: No mass. Pancreas: No mass or ductal dilatation. Adrenals: Bilateral adrenal adenomas. Kidneys: Perinephric stranding around the bilateral kidneys. No evidence of hydronephrosis. No stones Stomach: Unremarkable. Small bowel: No dilatation or wall thickening. Colon: No dilatation or wall thickening. Appendix: Unremarkable. Peritoneum: No ascites or pneumoperitoneum. Retroperitoneum: Atherosclerotic disease. No evidence of aneurysm or lymphadenopathy. Reproductive organs: Unremarkable Urinary bladder: Urinary bladder is decompressed with Vega catheter. Bones: Degenerative changes in the lumbar spine. Degenerative changes in the thoracic spine. Additional comments: N/A     No acute pathology in the chest, abdomen, or pelvis. Incidental findings as above     CT ABD PELV WO CONT    Result Date: 9/6/2022  EXAM: CT CHEST WO CONT, CT ABD PELV WO CONT INDICATION: Shortness of breath COMPARISON: None. TECHNIQUE: Helical CT of the chest, abdomen, and pelvis were obtainied without contrast. Oral contrast was not utilized. Coronal and sagittal reformats were generated. CT dose reduction was achieved through use of a standardized protocol tailored for this examination and automatic exposure control for dose modulation. FINDINGS: Clips in the left axilla. No axillary or supraclavicular adenopathy. THYROID: No nodule. MEDIASTINUM: No mass or lymphadenopathy. DESTINEY: No mass or lymphadenopathy. THORACIC AORTA: No aneurysm. MAIN PULMONARY ARTERY: Normal in caliber. HEART: Coronary atherosclerotic disease ESOPHAGUS: No wall thickening or dilatation. TRACHEA/BRONCHI: Patent. PLEURA: No effusion or pneumothorax. LUNGS: No nodule, mass, or airspace disease. Liver: No mass or biliary dilatation. Gallbladder: Unremarkable.  Spleen: No mass. Pancreas: No mass or ductal dilatation. Adrenals: Bilateral adrenal adenomas. Kidneys: Perinephric stranding around the bilateral kidneys. No evidence of hydronephrosis. No stones Stomach: Unremarkable. Small bowel: No dilatation or wall thickening. Colon: No dilatation or wall thickening. Appendix: Unremarkable. Peritoneum: No ascites or pneumoperitoneum. Retroperitoneum: Atherosclerotic disease. No evidence of aneurysm or lymphadenopathy. Reproductive organs: Unremarkable Urinary bladder: Urinary bladder is decompressed with Vega catheter. Bones: Degenerative changes in the lumbar spine. Degenerative changes in the thoracic spine. Additional comments: N/A     No acute pathology in the chest, abdomen, or pelvis. Incidental findings as above     XR CHEST PORT    Addendum Date: 9/6/2022    Addendum: Addendum: The left IJ line is visualized and appears to be directed in a cephalad direction. Result Date: 9/6/2022  EXAM: XR CHEST PORT INDICATION: central line placement verification COMPARISON: 9/6/2022 FINDINGS: A portable AP radiograph of the chest was obtained at 2022 hours. The patient is on a cardiac monitor. The endotracheal tube terminates at the thoracic inlet. NG tube extends below the hemidiaphragm. The right central line terminates at the caval atrial junction. There is a tiny left pleural effusion. . The cardiac and mediastinal contours and pulmonary vascularity are normal.  The bones and soft tissues are grossly within normal limits. Right central line tip in right atrium. No pneumothorax. XR CHEST PORT    Result Date: 9/6/2022  INDICATION:  New HD line Placement EXAM: Chest single view. COMPARISON: Earlier same day. FINDINGS: A single frontal view of the chest at 1623 hours shows double lumen catheter from the right with its tip projecting over the right atrium, new since the prior study with no pneumothorax. ET tube stable. Gastric tube stable.  Stable interstitial prominence with patchy infiltrate or atelectasis in the left lung base. .  The heart, mediastinum and pulmonary vasculature are stable . The bony thorax is unremarkable for age. Clenton Reas No pneumothorax status post new double lumen catheter on the right. .  .     XR CHEST PORT    Result Date: 9/6/2022  EXAM:  XR CHEST PORT INDICATION:   eval ett COMPARISON: Chest radiograph 9/6/2022. FINDINGS: AP radiograph of the chest was obtained. Endotracheal tube terminates 5.0 cm above the kian. Gastric tube extends into the abdomen out of field of view. There is mild bibasilar atelectasis. No significant pleural effusion. No pneumothorax. Heart size is within normal limits. Surgical clips in the left axilla. No acute osseous abnormality. 1. Satisfactory positioning of endotracheal tube. No pneumothorax. Mild bibasilar atelectasis. XR CHEST PORT    Result Date: 9/6/2022  EXAM:  XR CHEST PORT INDICATION: SOB COMPARISON: Same day chest CT. TECHNIQUE: Single frontal view of the chest. FINDINGS: Lungs are hypoventilatory. Mild interstitial opacities may represent edema. No lobar consolidation, pleural effusion, or pneumothorax. The cardiomediastinal silhouette is not enlarged. No acute or aggressive osseous lesion. 1.  Diffuse mild interstitial opacities may represent pulmonary edema. XR ABD PORT  1 V    Result Date: 9/6/2022  EXAM:  XR ABD PORT  1 V INDICATION:   eval OGT COMPARISON: CT chest abdomen pelvis 9/6/2022. FINDINGS: Single view the abdomen was obtained. Gastric tube terminates in the body of the stomach with proximal sidehole in the stomach. Visualized bowel gas pattern is nonobstructive. Mild degenerative changes in the lumbar spine. 1. Gastric tube terminates in the body of the stomach.      IR INSERT NON TUNL CVC OVER 5 YRS    Result Date: 9/6/2022  PROCEDURE: Temporary dialysis catheter placement PRE PROCEDURE DIAGNOSIS: Hemodialysis POST PROCEDURE DIAGNOSIS: SAME OPERATING PHYSICIAN: SAURABH Burgess BLOOD LOSS: None SPECIMENS REMOVED: None FLUOROSCOPY DOSE (air kerma): 0 mGy COMPLICATIONS: None immediate. Procedure and findings: After informed consent was obtained, The right neck was then prepped and draped in utilizing maximal sterile barrier technique. Local anesthesia was obtained with 1% lidocaine injected subcutaneously. Sonographic images of the right  neck demonstrated a patent and compressible right  internal jugular vein. The right internal jugular vein was accessed under direct sonographic guidance using a 21 gauge micropuncture set. A 0.018 wire was advanced into the right  internal jugular vein and a 4 Czech sheath was placed. An ultrasonographic image was saved in the record. A 0.035 J wire was then advanced through the 4 Czech sheath into the superior vena cava. The tract was then serially dilated to 14 [place] Kim. A 20 cm non tunneled catheter was then advanced over the wire. The wire was removed, and each port flushed with heparin solution. The catheter was secured to the patient's skin using 2-0 silk suture. A dry sterile dressing was applied. The catheter works well and is ready for immediate use. Successful placement of right internal jugular 14 Czech, 20 cm non tunneled dialysis catheter. Catheter is ready for immediate use. Assessment//Plan     56 y/o with pmh HLD, HTN, DM admitted after transfer from outside ER for mgmt of dizziness, LAVINIA, acidosis. Acidosis:  -- profound acidosis on initial eval by [facility]  ---- etiology likely multifactorial with renal failure and elevated lactate. -- pt likely compensated with hyperventilation, but etiology of elevated lactate still unclear  -- toxic alcohols neg  -- has DM, but blood sugar not all that elevated. Ketones minimal in UA. Beta-hydroxy elevated and started on insulin gtt. Might be HHS, but per report, blood sugars were all below 200.   Would be unusually low for HHS and also unusual to have such high lactate in the absence of low BP and hypoperfusion. Euglycemic DKA considered, but uncommon and not on SGL2. However in retrospect, this is seeming a more likely dx. -- got vol resuscitation and ultimately started on insulin. Shaking:  -- per RN, pt with shaking when propofol decreased  -- unlikely seizures, but had very low pCO2 and at risk for cerebral complications  -- will get EEG     Hypocapnia:  -- severe hypocapnia  -- concern that this led decreased cerebral blood flow  -- improved with bicarb as allowed pCO2 to climb while still maintaining decent pH. Elevated Lactate:  -- etiology unclear. -- pt normotensive in ER and post intubation  -- no clear source of hypoperfusion  -- will continue abx     Acute Renal Failure:  -- appears very pre-renal  -- no indication of infection  -- getting volume  -- nephrology consulted and getting prep'd for dialysis. -- unusual to have such profound pre-renal picture without some hypotension. +6L currently. Sepsis:  -- suspected but no source identified  -- CT C/A/P unrevealing, but was non-con due to creatinine. -- continue empiric abx. Hypothermia:  -- likely related to low pH, possible sepsis,   -- rewarm     Acute Resp Failure:  -- intubated for overall clinical decline  -- SBT when meeting criteria. CCM time 35 min. Pt at risk of life threatening deterioration from acidosis. Pt seen and evaluated via tele interaction. Audio and video used for this encounter.               Electronically signed by Enoch Freeman DO on 9/7/2022 at 11:06 AM

## 2022-09-07 NOTE — PROGRESS NOTES
Problem: Ventilator Management  Goal: *Adequate oxygenation and ventilation  Outcome: Progressing Towards Goal  Goal: *Patient maintains clear airway/free of aspiration  Outcome: Progressing Towards Goal  Goal: *Absence of infection signs and symptoms  Outcome: Progressing Towards Goal  Goal: *Normal spontaneous ventilation  Outcome: Progressing Towards Goal     Problem: Patient Education: Go to Patient Education Activity  Goal: Patient/Family Education  Outcome: Progressing Towards Goal     Problem: Non-Violent Restraints  Goal: Removal from restraints as soon as assessed to be safe  Outcome: Progressing Towards Goal  Goal: No harm/injury to patient while restraints in use  Outcome: Progressing Towards Goal  Goal: Patient's dignity will be maintained  Outcome: Progressing Towards Goal  Goal: Patient Interventions  Outcome: Progressing Towards Goal     Problem: Falls - Risk of  Goal: *Absence of Falls  Description: Document Yassine Worthy Fall Risk and appropriate interventions in the flowsheet.   Outcome: Progressing Towards Goal  Note: Fall Risk Interventions:            Medication Interventions: Bed/chair exit alarm    Elimination Interventions: Bed/chair exit alarm, Call light in reach              Problem: Patient Education: Go to Patient Education Activity  Goal: Patient/Family Education  Outcome: Progressing Towards Goal

## 2022-09-07 NOTE — DIABETES MGMT
This is a 49-year-old gentleman with a history of type 2 diabetes mellitus on metformin therapy with an A1c of 6.6%, chronic kidney disease with a baseline creatinine of 1.44 April of 2022, recent diagnosis of melanoma not on chemotherapy, diabetic neuropathy treated with gabapentin who presented on September 6, 2022 with dizziness after taking several doses of gabapentin. He was found to have altered mental status, hypothermia, and a venous pH of 6.8 with a lactate of 11. He was transferred to [facility] where he was intubated. Admission labs were remarkable for glucose of 160, creatinine 12.94, sodium 139, potassium 7.6, bicarb 4, lactate 18.9, anion gap 46, beta hydroxybutyrate greater than 4.4. Arterial blood gas remarkable for a pH of 6.77 PCO2 14.9, Bicarb 2.1. He is currently on an insulin drip via glucostabilizer, pressors and sedation, bicarb drip and receiving CRRT. Examination this is a obtunded male  Blood pressure 124/75  Pulse 93  Afebrile  98% on 50% FiO2    Laboratory data  CO2 19  Anion gap 24  Glucose 178  Creatinine 7.57  Calcium 7.8  Lactate 14.1   Toxic screen negative    Impression  1. Profound metabolic acidosis, predominantly lactic acidosis but with ketoacidosis and renal failure contributing  2. History of type 2 diabetes mellitus on metformin which may be contributing to the persistent lactic acidosis  3. Acute renal failure of unclear etiology    Recommendation  1. We will continue to follow peripherally with you and will comment further as appropriate    Total time 35 minutes, more than 50% of which was in direct patient care and/or care coordination. Please note that this dictation was completed with [employer], the [employer] voice recognition software. Quite often unanticipated grammatical, syntax, homophones, and other interpretive errors are inadvertently transcribed by the computer software. Please disregard these errors.   Please excuse any errors that have escaped final proofreading.

## 2022-09-07 NOTE — PROCEDURES
EEG reviewed  Diffuse low voltage delta in keeping with an encephalopathy which may have contributions from toxic, metabolic, diffuse structural, and or pharmacologic effects. No epileptiform abnormalities are seen. No suggestion of subclinical ictus.     Pearson Peabody, MD

## 2022-09-07 NOTE — PROGRESS NOTES
3897- Received critical Lactic Acid 15.9- endorsed to primary RN Marianna Holloway RN who immediately made Dr. Missy Cooney aware of results. RN to follow protocol to redraw. 1700- Primary RN alerted that Lactic Acid was 11.4- Yamile Hernandez. RN to alert appropriate physician and continue trending lactic.

## 2022-09-07 NOTE — PROGRESS NOTES
Hospitalist Progress Note      NAME: Miguel Ángel Centeno   :  1960  MRM:  [id number]    Date/Time: 2022  6:57 AM           Assessment / Plan:     #Refractory Acidemia / June Charla / lactic acidosis: Likely due to renal failure, though query DAK, role of metformin. No obvious infection or ischemic event, though high risk for that. ABG this morning improved to 7.3, pCO2 30   - Bicarb gtt, CRRT, renal and intensivist following   - Start insulin gtt to ICU goals; bOHb up but seems less likely given initial Bgs not significantly elevated. SGLT2 could cause a similar presentation    - med rec    - Insulin gtt    #Acute Renal Failure: Likely 2/2 profound volume depletion in s/o DKA vs viral GI illness. No source of infection yet identified including CT C/A/P   - Renal following for CRRT    #Shock, potentially Septic: No clear source of infx including CT C/A/P. Anuric. No diarrhea. Doubt menignitis/encephalitis. - Broadly antibiosed, no clear source elucidated as yet    #Respiratory Failure: Intubated for airway protection, severe met acidosis. Presented with very low pCO2, unable to maintain. Presently on 6/60% satting well. Met acidosis has improved significantly so may actually be able to sufficiently compensate if extubated   - Wean sedation, consider SBT later vs tmr    #DM2:    - As above, start insulin gtt     #Metabolic Encephalopathy: 2/2 profound metabolic acidosis   - Tx as above        Total CC time exclusive of procedures: 45 min                 Care Plan discussed with: Family, Care Manager, Nursing Staff, and Consultant/Specialist    Discussed:  Care Plan    Prophylaxis:  Hep SQ    Disposition:  SNF/LTC           ___________________________________________________    Attending Physician: Sadia Wetzel MD        Subjective:     Chief Complaint:  No acute events overnight. Pressor requirements improving. Still no UOP. Acidosis improving.  Sister at bedside, who notes that he had been at the family [place] over the weekend and had nausea, vomiting, dizziness, poor po intake. No EtOH. Hx Dm but not on insulin    ROS:  Intubated, sedated          Objective:       Vitals:          Last 24hrs VS reviewed since prior progress note. Most recent are:    Visit Vitals  /63   Pulse 88   Temp 97.4 °F (36.3 °C)   Resp 17   Ht 5' 6\" (1.676 m)   Wt 71.7 kg (158 lb)   SpO2 100%   BMI 25.50 kg/m²     SpO2 Readings from Last 6 Encounters:   09/07/22 100%    O2 Flow Rate (L/min): 60 l/min     Intake/Output Summary (Last 24 hours) at 9/7/2022 0657  Last data filed at 9/7/2022 0600  Gross per 24 hour   Intake 7776.1 ml   Output 1356 ml   Net 6420.1 ml          Exam:     Physical Exam:    Gen:  Intubated, sedated  HEENT:  Pink conjunctivae, PERRL, moist mucous membranes  Neck:  Supple, without masses, thyroid non-tender  Resp:  No accessory muscle use, clear breath sounds without wheezes rales or rhonchi  Card:  No murmurs, normal S1, S2 without thrills, bruits or peripheral edema  Abd:  Soft, non-tender, non-distended, normoactive bowel sounds are present  Musc:  No cyanosis or clubbing  Skin:  No rashes or ulcers, skin turgor is good  Neuro:  Intubated, sedated  Psych:   Intubated, sedated       Medications Reviewed: (see below)    Lab Data Reviewed: (see below)    ______________________________________________________________________    Medications:     Current Facility-Administered Medications   Medication Dose Route Frequency    dextrose 10 % infusion 125-250 mL  125-250 mL IntraVENous PRN    sodium bicarbonate (8.4%) 150 mEq in dextrose 5% 1,000 mL infusion   IntraVENous CONTINUOUS    propofol (DIPRIVAN) 10 mg/mL infusion  0-50 mcg/kg/min IntraVENous TITRATE    fentaNYL (PF) 1,500 mcg/30 mL (50 mcg/mL) infusion  0-200 mcg/hr IntraVENous TITRATE    NOREPINephrine (LEVOPHED) 8 mg in 5% dextrose 250mL (32 mcg/mL) infusion  0.5-50 mcg/min IntraVENous TITRATE    bicarbonate dialysis (PRISMASOL) BG K 2/Ca 3.5 5000 ml solution   Extracorporeal DIALYSIS CONTINUOUS    piperacillin-tazobactam (ZOSYN) 3.375 g in 0.9% sodium chloride (MBP/ADV) 100 mL MBP  3.375 g IntraVENous Q8H    lidocaine (XYLOCAINE) 10 mg/mL (1 %) injection 1-20 mL  1-20 mL SubCUTAneous Multiple    vancomycin (VANCOCIN) 1,000 mg in 0.9% sodium chloride 250 mL ([id number])  1,000 mg IntraVENous Q24H    PHENYLephrine (SINAN-SYNEPHRINE) 30 mg in 0.9% sodium chloride 250 mL infusion   mcg/min IntraVENous TITRATE    vasopressin (VASOSTRICT) 20 Units in 0.9% sodium chloride 100 mL infusion  0-0.03 Units/min IntraVENous TITRATE    EPINEPHrine (ADRENALIN) 5 mg in 0.9% sodium chloride 250 mL infusion  0-10 mcg/min IntraVENous TITRATE            Lab Review:     Recent Labs     09/07/22  0436 09/06/22  1416 09/06/22  1010   WBC 14.5* 21.1* 21.0*   HGB 10.9* 10.8* 12.7   HCT 31.9* 35.3* 40.2    316 337     Recent Labs     09/07/22  0436 09/06/22  1416 09/06/22  1010    139 139   K 4.3 5.5* 7.6*   CL 93* 96* 89*   CO2 13* <5* 4*   * 281* 160*   BUN 93* 105* 105*   CREA 9.78* 11.80* 12.94*   CA 7.7* 8.5 9.7   MG  --   --  2.7*   ALB 2.9* 2.8* 4.5   ALT 17 17 7*     No components found for: [place]

## 2022-09-08 LAB
ADMINISTERED INITIALS, ADMINIT: AC
ADMINISTERED INITIALS, ADMINIT: NORMAL
ANION GAP SERPL CALC-SCNC: 12 MMOL/L (ref 5–15)
ANION GAP SERPL CALC-SCNC: 12 MMOL/L (ref 5–15)
ANION GAP SERPL CALC-SCNC: 16 MMOL/L (ref 5–15)
ATRIAL RATE: 95 BPM
BUN SERPL-MCNC: 41 MG/DL (ref 6–20)
BUN SERPL-MCNC: 46 MG/DL (ref 6–20)
BUN SERPL-MCNC: 51 MG/DL (ref 6–20)
BUN/CREAT SERPL: 10 (ref 12–20)
BUN/CREAT SERPL: 10 (ref 12–20)
BUN/CREAT SERPL: 9 (ref 12–20)
CALCIUM SERPL-MCNC: 7.3 MG/DL (ref 8.5–10.1)
CALCULATED P AXIS, ECG09: 80 DEGREES
CALCULATED R AXIS, ECG10: 59 DEGREES
CALCULATED T AXIS, ECG11: 137 DEGREES
CHLORIDE SERPL-SCNC: 100 MMOL/L (ref 97–108)
CHLORIDE SERPL-SCNC: 100 MMOL/L (ref 97–108)
CHLORIDE SERPL-SCNC: 98 MMOL/L (ref 97–108)
CO2 SERPL-SCNC: 24 MMOL/L (ref 21–32)
CO2 SERPL-SCNC: 26 MMOL/L (ref 21–32)
CO2 SERPL-SCNC: 27 MMOL/L (ref 21–32)
COMMENT, HOLDF: NORMAL
CREAT SERPL-MCNC: 4.3 MG/DL (ref 0.7–1.3)
CREAT SERPL-MCNC: 4.78 MG/DL (ref 0.7–1.3)
CREAT SERPL-MCNC: 5.39 MG/DL (ref 0.7–1.3)
D50 ADMINISTERED, D50ADM: 0 ML
D50 ORDER, D50ORD: 0 ML
DATE LAST DOSE: ABNORMAL
DIAGNOSIS, 93000: NORMAL
ERYTHROCYTE [DISTWIDTH] IN BLOOD BY AUTOMATED COUNT: 13 % (ref 11.5–14.5)
GLUCOSE BLD STRIP.AUTO-MCNC: 141 MG/DL (ref 65–117)
GLUCOSE BLD STRIP.AUTO-MCNC: 145 MG/DL (ref 65–117)
GLUCOSE BLD STRIP.AUTO-MCNC: 163 MG/DL (ref 65–117)
GLUCOSE BLD STRIP.AUTO-MCNC: 167 MG/DL (ref 65–117)
GLUCOSE BLD STRIP.AUTO-MCNC: 169 MG/DL (ref 65–117)
GLUCOSE BLD STRIP.AUTO-MCNC: 170 MG/DL (ref 65–117)
GLUCOSE BLD STRIP.AUTO-MCNC: 171 MG/DL (ref 65–117)
GLUCOSE BLD STRIP.AUTO-MCNC: 172 MG/DL (ref 65–117)
GLUCOSE BLD STRIP.AUTO-MCNC: 174 MG/DL (ref 65–117)
GLUCOSE BLD STRIP.AUTO-MCNC: 179 MG/DL (ref 65–117)
GLUCOSE BLD STRIP.AUTO-MCNC: 181 MG/DL (ref 65–117)
GLUCOSE BLD STRIP.AUTO-MCNC: 188 MG/DL (ref 65–117)
GLUCOSE BLD STRIP.AUTO-MCNC: 191 MG/DL (ref 65–117)
GLUCOSE SERPL-MCNC: 161 MG/DL (ref 65–100)
GLUCOSE SERPL-MCNC: 188 MG/DL (ref 65–100)
GLUCOSE SERPL-MCNC: 199 MG/DL (ref 65–100)
GLUCOSE, GLC: 141 MG/DL
GLUCOSE, GLC: 145 MG/DL
GLUCOSE, GLC: 163 MG/DL
GLUCOSE, GLC: 167 MG/DL
GLUCOSE, GLC: 169 MG/DL
GLUCOSE, GLC: 171 MG/DL
GLUCOSE, GLC: 172 MG/DL
GLUCOSE, GLC: 174 MG/DL
GLUCOSE, GLC: 177 MG/DL
GLUCOSE, GLC: 179 MG/DL
GLUCOSE, GLC: 181 MG/DL
GLUCOSE, GLC: 191 MG/DL
HBV SURFACE AB SER QL: NONREACTIVE
HBV SURFACE AB SER-ACNC: <3.1 MIU/ML
HBV SURFACE AG SER QL: <0.1 INDEX
HBV SURFACE AG SER QL: NEGATIVE
HCT VFR BLD AUTO: 26.6 % (ref 36.6–50.3)
HGB BLD-MCNC: 9.6 G/DL (ref 12.1–17)
HIGH TARGET, HITG: 200 MG/DL
INSULIN ADMINSTERED, INSADM: 0 UNITS/HOUR
INSULIN ADMINSTERED, INSADM: 0.1 UNITS/HOUR
INSULIN ADMINSTERED, INSADM: 0.9 UNITS/HOUR
INSULIN ADMINSTERED, INSADM: 2.1 UNITS/HOUR
INSULIN ADMINSTERED, INSADM: 2.2 UNITS/HOUR
INSULIN ADMINSTERED, INSADM: 2.3 UNITS/HOUR
INSULIN ADMINSTERED, INSADM: 2.3 UNITS/HOUR
INSULIN ORDER, INSORD: 0 UNITS/HOUR
INSULIN ORDER, INSORD: 0.1 UNITS/HOUR
INSULIN ORDER, INSORD: 0.9 UNITS/HOUR
INSULIN ORDER, INSORD: 2.1 UNITS/HOUR
INSULIN ORDER, INSORD: 2.2 UNITS/HOUR
INSULIN ORDER, INSORD: 2.3 UNITS/HOUR
INSULIN ORDER, INSORD: 2.3 UNITS/HOUR
LOW TARGET, LOT: 150 MG/DL
MAGNESIUM SERPL-MCNC: 1.9 MG/DL (ref 1.6–2.4)
MCH RBC QN AUTO: 32.4 PG (ref 26–34)
MCHC RBC AUTO-ENTMCNC: 36.1 G/DL (ref 30–36.5)
MCV RBC AUTO: 89.9 FL (ref 80–99)
MINUTES UNTIL NEXT BG, NBG: 120 MIN
MINUTES UNTIL NEXT BG, NBG: 120 MIN
MINUTES UNTIL NEXT BG, NBG: 60 MIN
MULTIPLIER, MUL: 0
MULTIPLIER, MUL: 0.01
MULTIPLIER, MUL: 0.02
NRBC # BLD: 0.02 K/UL (ref 0–0.01)
NRBC BLD-RTO: 0.3 PER 100 WBC
ORDER INITIALS, ORDINIT: AC
ORDER INITIALS, ORDINIT: NORMAL
P-R INTERVAL, ECG05: 194 MS
PLATELET # BLD AUTO: 171 K/UL (ref 150–400)
PMV BLD AUTO: 9.8 FL (ref 8.9–12.9)
POTASSIUM SERPL-SCNC: 4.1 MMOL/L (ref 3.5–5.1)
POTASSIUM SERPL-SCNC: 4.2 MMOL/L (ref 3.5–5.1)
POTASSIUM SERPL-SCNC: 4.2 MMOL/L (ref 3.5–5.1)
Q-T INTERVAL, ECG07: 374 MS
QRS DURATION, ECG06: 82 MS
QTC CALCULATION (BEZET), ECG08: 469 MS
RBC # BLD AUTO: 2.96 M/UL (ref 4.1–5.7)
REPORTED DOSE,DOSE: ABNORMAL UNITS
REPORTED DOSE/TIME,TMG: ABNORMAL
SAMPLES BEING HELD,HOLD: NORMAL
SERVICE CMNT-IMP: ABNORMAL
SODIUM SERPL-SCNC: 138 MMOL/L (ref 136–145)
SODIUM SERPL-SCNC: 138 MMOL/L (ref 136–145)
SODIUM SERPL-SCNC: 139 MMOL/L (ref 136–145)
VANCOMYCIN TROUGH SERPL-MCNC: 15.6 UG/ML (ref 5–10)
VENTRICULAR RATE, ECG03: 95 BPM
WBC # BLD AUTO: 7.4 K/UL (ref 4.1–11.1)

## 2022-09-08 PROCEDURE — 2709999900 HC NON-CHARGEABLE SUPPLY

## 2022-09-08 PROCEDURE — 36415 COLL VENOUS BLD VENIPUNCTURE: CPT

## 2022-09-08 PROCEDURE — 77030037877 HC DRSG MEPILEX >48IN BORD MOLN -A

## 2022-09-08 PROCEDURE — 74011250636 HC RX REV CODE- 250/636: Performed by: INTERNAL MEDICINE

## 2022-09-08 PROCEDURE — 83735 ASSAY OF MAGNESIUM: CPT

## 2022-09-08 PROCEDURE — 85027 COMPLETE CBC AUTOMATED: CPT

## 2022-09-08 PROCEDURE — 90945 DIALYSIS ONE EVALUATION: CPT

## 2022-09-08 PROCEDURE — 74011000258 HC RX REV CODE- 258: Performed by: INTERNAL MEDICINE

## 2022-09-08 PROCEDURE — 82962 GLUCOSE BLOOD TEST: CPT

## 2022-09-08 PROCEDURE — 94003 VENT MGMT INPAT SUBQ DAY: CPT

## 2022-09-08 PROCEDURE — 74011000250 HC RX REV CODE- 250: Performed by: INTERNAL MEDICINE

## 2022-09-08 PROCEDURE — 65610000006 HC RM INTENSIVE CARE

## 2022-09-08 PROCEDURE — 74011636637 HC RX REV CODE- 636/637: Performed by: INTERNAL MEDICINE

## 2022-09-08 PROCEDURE — 80048 BASIC METABOLIC PNL TOTAL CA: CPT

## 2022-09-08 PROCEDURE — 86706 HEP B SURFACE ANTIBODY: CPT

## 2022-09-08 PROCEDURE — 99233 SBSQ HOSP IP/OBS HIGH 50: CPT | Performed by: CLINICAL NURSE SPECIALIST

## 2022-09-08 PROCEDURE — 77030018798 HC PMP KT ENTRL FED COVD -A

## 2022-09-08 PROCEDURE — 74011250636 HC RX REV CODE- 250/636: Performed by: HOSPITALIST

## 2022-09-08 PROCEDURE — 80202 ASSAY OF VANCOMYCIN: CPT

## 2022-09-08 PROCEDURE — C9113 INJ PANTOPRAZOLE SODIUM, VIA: HCPCS | Performed by: INTERNAL MEDICINE

## 2022-09-08 PROCEDURE — 77030040361 HC SLV COMPR DVT MDII -B

## 2022-09-08 PROCEDURE — 87340 HEPATITIS B SURFACE AG IA: CPT

## 2022-09-08 RX ORDER — FENTANYL CITRATE 50 UG/ML
100 INJECTION, SOLUTION INTRAMUSCULAR; INTRAVENOUS
Status: DISCONTINUED | OUTPATIENT
Start: 2022-09-08 | End: 2022-09-12

## 2022-09-08 RX ORDER — DEXMEDETOMIDINE HYDROCHLORIDE 4 UG/ML
.1-1.5 INJECTION, SOLUTION INTRAVENOUS
Status: DISCONTINUED | OUTPATIENT
Start: 2022-09-08 | End: 2022-09-17

## 2022-09-08 RX ORDER — NOREPINEPHRINE BITARTRATE/D5W 8 MG/250ML
.5-5 PLASTIC BAG, INJECTION (ML) INTRAVENOUS
Status: DISCONTINUED | OUTPATIENT
Start: 2022-09-08 | End: 2022-09-09

## 2022-09-08 RX ORDER — INSULIN LISPRO 100 [IU]/ML
INJECTION, SOLUTION INTRAVENOUS; SUBCUTANEOUS EVERY 6 HOURS
Status: DISCONTINUED | OUTPATIENT
Start: 2022-09-08 | End: 2022-09-17

## 2022-09-08 RX ORDER — INSULIN GLARGINE 100 [IU]/ML
10 INJECTION, SOLUTION SUBCUTANEOUS DAILY
Status: DISCONTINUED | OUTPATIENT
Start: 2022-09-08 | End: 2022-09-08

## 2022-09-08 RX ORDER — DEXTROSE MONOHYDRATE 100 MG/ML
0-250 INJECTION, SOLUTION INTRAVENOUS AS NEEDED
Status: DISCONTINUED | OUTPATIENT
Start: 2022-09-08 | End: 2022-09-23 | Stop reason: HOSPADM

## 2022-09-08 RX ORDER — MAGNESIUM SULFATE 100 %
4 CRYSTALS MISCELLANEOUS AS NEEDED
Status: DISCONTINUED | OUTPATIENT
Start: 2022-09-08 | End: 2022-09-09 | Stop reason: SDUPTHER

## 2022-09-08 RX ORDER — LEVOTHYROXINE SODIUM 100 UG/1
100 TABLET ORAL
Status: DISCONTINUED | OUTPATIENT
Start: 2022-09-09 | End: 2022-09-23 | Stop reason: HOSPADM

## 2022-09-08 RX ORDER — INSULIN GLARGINE 100 [IU]/ML
14 INJECTION, SOLUTION SUBCUTANEOUS DAILY
Status: DISCONTINUED | OUTPATIENT
Start: 2022-09-09 | End: 2022-09-11

## 2022-09-08 RX ADMIN — INSULIN GLARGINE 10 UNITS: 100 INJECTION, SOLUTION SUBCUTANEOUS at 09:18

## 2022-09-08 RX ADMIN — CALCIUM CHLORIDE, MAGNESIUM CHLORIDE, DEXTROSE MONOHYDRATE, LACTIC ACID, SODIUM CHLORIDE, SODIUM BICARBONATE AND POTASSIUM CHLORIDE: 3.68; 3.05; 22; 5.4; 6.46; 3.09; .314 INJECTION INTRAVENOUS at 11:50

## 2022-09-08 RX ADMIN — NOREPINEPHRINE BITARTRATE 11 MCG/MIN: 1 SOLUTION INTRAVENOUS at 21:00

## 2022-09-08 RX ADMIN — CALCIUM CHLORIDE, MAGNESIUM CHLORIDE, DEXTROSE MONOHYDRATE, LACTIC ACID, SODIUM CHLORIDE, SODIUM BICARBONATE AND POTASSIUM CHLORIDE: 3.68; 3.05; 22; 5.4; 6.46; 3.09; .314 INJECTION INTRAVENOUS at 11:41

## 2022-09-08 RX ADMIN — PROPOFOL 50 MCG/KG/MIN: 10 INJECTION, EMULSION INTRAVENOUS at 04:59

## 2022-09-08 RX ADMIN — NOREPINEPHRINE BITARTRATE 16 MCG/MIN: 1 SOLUTION INTRAVENOUS at 09:16

## 2022-09-08 RX ADMIN — CALCIUM CHLORIDE, MAGNESIUM CHLORIDE, DEXTROSE MONOHYDRATE, LACTIC ACID, SODIUM CHLORIDE, SODIUM BICARBONATE AND POTASSIUM CHLORIDE: 3.68; 3.05; 22; 5.4; 6.46; 3.09; .314 INJECTION INTRAVENOUS at 23:01

## 2022-09-08 RX ADMIN — PROPOFOL 40 MCG/KG/MIN: 10 INJECTION, EMULSION INTRAVENOUS at 10:52

## 2022-09-08 RX ADMIN — CALCIUM CHLORIDE, MAGNESIUM CHLORIDE, DEXTROSE MONOHYDRATE, LACTIC ACID, SODIUM CHLORIDE, SODIUM BICARBONATE AND POTASSIUM CHLORIDE: 3.68; 3.05; 22; 5.4; 6.46; 3.09; .314 INJECTION INTRAVENOUS at 23:00

## 2022-09-08 RX ADMIN — PIPERACILLIN AND TAZOBACTAM 3.38 G: 3; .375 INJECTION, POWDER, FOR SOLUTION INTRAVENOUS at 04:12

## 2022-09-08 RX ADMIN — DEXMEDETOMIDINE HYDROCHLORIDE 0.4 MCG/KG/HR: 400 INJECTION INTRAVENOUS at 21:09

## 2022-09-08 RX ADMIN — CALCIUM CHLORIDE, MAGNESIUM CHLORIDE, DEXTROSE MONOHYDRATE, LACTIC ACID, SODIUM CHLORIDE, SODIUM BICARBONATE AND POTASSIUM CHLORIDE: 3.68; 3.05; 22; 5.4; 6.46; 3.09; .314 INJECTION INTRAVENOUS at 22:58

## 2022-09-08 RX ADMIN — CALCIUM CHLORIDE, MAGNESIUM CHLORIDE, DEXTROSE MONOHYDRATE, LACTIC ACID, SODIUM CHLORIDE, SODIUM BICARBONATE AND POTASSIUM CHLORIDE: 3.68; 3.05; 22; 5.4; 6.46; 3.09; .314 INJECTION INTRAVENOUS at 17:30

## 2022-09-08 RX ADMIN — HEPARIN SODIUM 5000 UNITS: 5000 INJECTION INTRAVENOUS; SUBCUTANEOUS at 08:19

## 2022-09-08 RX ADMIN — CALCIUM CHLORIDE, MAGNESIUM CHLORIDE, DEXTROSE MONOHYDRATE, LACTIC ACID, SODIUM CHLORIDE, SODIUM BICARBONATE AND POTASSIUM CHLORIDE: 3.68; 3.05; 22; 5.4; 6.46; 3.09; .314 INJECTION INTRAVENOUS at 05:57

## 2022-09-08 RX ADMIN — FENTANYL CITRATE 100 MCG/HR: 50 INJECTION, SOLUTION INTRAMUSCULAR; INTRAVENOUS at 16:25

## 2022-09-08 RX ADMIN — FENTANYL CITRATE 100 MCG: 50 INJECTION INTRAMUSCULAR; INTRAVENOUS at 09:34

## 2022-09-08 RX ADMIN — PROPOFOL 50 MCG/KG/MIN: 10 INJECTION, EMULSION INTRAVENOUS at 00:36

## 2022-09-08 RX ADMIN — CALCIUM CHLORIDE, MAGNESIUM CHLORIDE, DEXTROSE MONOHYDRATE, LACTIC ACID, SODIUM CHLORIDE, SODIUM BICARBONATE AND POTASSIUM CHLORIDE: 3.68; 3.05; 22; 5.4; 6.46; 3.09; .314 INJECTION INTRAVENOUS at 11:46

## 2022-09-08 RX ADMIN — VANCOMYCIN HYDROCHLORIDE 1000 MG: 1 INJECTION, POWDER, LYOPHILIZED, FOR SOLUTION INTRAVENOUS at 11:17

## 2022-09-08 RX ADMIN — DEXMEDETOMIDINE HYDROCHLORIDE 0.4 MCG/KG/HR: 400 INJECTION INTRAVENOUS at 09:34

## 2022-09-08 RX ADMIN — INSULIN LISPRO 2 UNITS: 100 INJECTION, SOLUTION INTRAVENOUS; SUBCUTANEOUS at 13:08

## 2022-09-08 RX ADMIN — PIPERACILLIN AND TAZOBACTAM 3.38 G: 3; .375 INJECTION, POWDER, FOR SOLUTION INTRAVENOUS at 19:57

## 2022-09-08 RX ADMIN — FENTANYL CITRATE 100 MCG/HR: 50 INJECTION, SOLUTION INTRAMUSCULAR; INTRAVENOUS at 01:29

## 2022-09-08 RX ADMIN — SODIUM CHLORIDE 40 MG: 9 INJECTION, SOLUTION INTRAMUSCULAR; INTRAVENOUS; SUBCUTANEOUS at 08:19

## 2022-09-08 RX ADMIN — PIPERACILLIN AND TAZOBACTAM 3.38 G: 3; .375 INJECTION, POWDER, FOR SOLUTION INTRAVENOUS at 11:16

## 2022-09-08 NOTE — PROGRESS NOTES
Bedside and Verbal shift change report given to [address] [place] [address] (oncoming nurse) by Narayan  RN (offgoing nurse). Report included the following information SBAR, Intake/Output, MAR, Recent Results, Cardiac Rhythm: NSR and Alarm Parameters . Primary Nurse Tanesha Pendleton, [facility] and [facility], RN performed a dual skin assessment on this patient No impairment noted  David score is see flowsheets    See flowsheets for all assessments, see MAR for all medication administrations. 2000: , no change to insulin rate at this time. Bedside and Verbal shift change report given to RN (oncoming nurse) by Virgilio Kamara RN (offgoing nurse). Report included the following information SBAR, Intake/Output, MAR, Recent Results, Cardiac Rhythm: and Alarm Parameters .

## 2022-09-08 NOTE — PROGRESS NOTES
Transition of care note:    RUR 12%    Per IDR discussion:  Continuation of CVVH  Gap is closed  Weaning sedation  Intubated (Peep 5,Fio2 40%)  SBT  Sawyer nair]start TFs today  Septic shock  Acute respiratory failure with hypoxia    Alma Pod

## 2022-09-08 NOTE — PROGRESS NOTES
Hospitalist Progress Note      NAME: Gardenia Pabon   :  1960  MRM:  [id number]    Date/Time: 2022         Assessment / Plan:       62M hx DM w/ CKD3 p/w dyspnea, nausea, vomiting. #Refractory Acidemia / Kothari Panning / lactic acidosis: Presented with pH 6.77, profoundly metabolic w/ lactate > 18. Euglycemic but bOHb quite elevated so seems more likely this was an atypical presentation of DKA confounded by acute renal failure and possible metformin toxicity. As yet no infection identified, no ischemia identified. Has improved with insulin gtt, CRRT, bicarb gtt. Gap 46 on arrival, now closed this morning. Lactate downtrending   - dc bicarb gtt today   - Cont insulin gtt for now with plan to transition to basal/bolus soon    #Acute Renal Failure: Presented with Cr 12.94 (recently 1.3), now improved to 4.78 today but on CRRT. Likely 2/2 profound volume depletion in s/o DKA vs viral GI illness. No source of infection yet identified including CT C/A/P. Minimal UOP   - Renal following for CRRT   - Anticipate he will need iHD     #Shock, potentially Septic: No clear source of infx including CT C/A/P. Presented with leukocytosis, bandemia. Anuric. No diarrhea. Doubt menignitis/encephalitis. - Broadly antibiosed, no clear source elucidated as yet   - Fu Bcx, can potentially de-escalate at 48 hrs   - NE @ 20, hopeful to wean today if can wean sedation    #Respiratory Failure: Intubated for airway protection, severe met acidosis. Presented with very low pCO2, unable to maintain. Presently on 5/50% satting well. Met acidosis has improved significantly so may actually be able to sufficiently compensate if extubated   - Wean sedation, consider SBT today    #DM2: A1c only 6.6%   - As above, cont insulin gtt     #Metabolic Encephalopathy: 2/2 profound metabolic acidosis   - Tx as above   - SAT today; can use precedex    #Seizure like activity: Noted on exam yesterday but EEG negative.  Potentially insufficient analgosedation, pain   - Continue to monitor   - Hopeful to wean sedation today          Total CC time exclusive of procedures: 35 min                 Care Plan discussed with: Family, Care Manager, Nursing Staff, and Consultant/Specialist    Discussed:  Care Plan    Prophylaxis:  Hep SQ    Disposition:  SNF/LTC           ___________________________________________________    Attending Physician: Polo Dennison MD        Subjective:     Chief Complaint:  No acute events overnight. Pressor requirements improving. Gap closed. No further shaking movements, synching on vent    ROS:  Intubated, sedated          Objective:       Vitals:          Last 24hrs VS reviewed since prior progress note. Most recent are:    Visit Vitals  /68 (BP 1 Location: Right upper arm, BP Patient Position: At rest)   Pulse 91   Temp 97.6 °F (36.4 °C)   Resp 20   Ht 5' 6\" (1.676 m)   Wt 71.7 kg (158 lb)   SpO2 100%   BMI 25.50 kg/m²     SpO2 Readings from Last 6 Encounters:   09/08/22 100%    O2 Flow Rate (L/min): 60 l/min     Intake/Output Summary (Last 24 hours) at 9/8/2022 0800  Last data filed at 9/8/2022 0730  Gross per 24 hour   Intake 3983.47 ml   Output 3174 ml   Net 809.47 ml            Exam:     Physical Exam:    Gen:  Intubated, sedated  HEENT:  Pink conjunctivae, PERRL, moist mucous membranes  Neck:  Supple, without masses, thyroid non-tender  Resp:  No accessory muscle use, clear breath sounds without wheezes rales or rhonchi  Card:  No murmurs, normal S1, S2 without thrills, bruits or peripheral edema  Abd:  Soft, non-tender, non-distended, normoactive bowel sounds are present  Musc:  No cyanosis or clubbing  Skin:  No rashes or ulcers, skin turgor is good  Neuro:  Intubated, sedated  Psych:   Intubated, sedated       Medications Reviewed: (see below)    Lab Data Reviewed: (see below)    ______________________________________________________________________    Medications:     Current Facility-Administered Medications Medication Dose Route Frequency    glucose chewable tablet 16 g  4 Tablet Oral PRN    glucagon (GLUCAGEN) injection 1 mg  1 mg IntraMUSCular PRN    dextrose 10% infusion 0-250 mL  0-250 mL IntraVENous PRN    insulin regular (NOVOLIN R, HUMULIN R) 100 Units in 0.9% sodium chloride 100 mL infusion  0-50 Units/hr IntraVENous TITRATE    dextrose 10% infusion 0-250 mL  0-250 mL IntraVENous PRN    NOREPINephrine (LEVOPHED) 32,000 mcg in dextrose 5% 250 mL (128 mcg/mL) infusion  1-50 mcg/min IntraVENous TITRATE    bicarbonate dialysis (PRISMASOL) BG K 4/Ca 2.5 5000 ml solution   Extracorporeal DIALYSIS CONTINUOUS    Vancomycin - Please draw TROUGH LEVEL at 1100 (9/8).  Thx, Pharmacy    Other ONCE    pantoprazole (PROTONIX) 40 mg in 0.9% sodium chloride 10 mL injection  40 mg IntraVENous DAILY    heparin (porcine) injection 5,000 Units  5,000 Units SubCUTAneous Q12H    sodium bicarbonate (8.4%) 150 mEq in dextrose 5% 1,000 mL infusion   IntraVENous CONTINUOUS    propofol (DIPRIVAN) 10 mg/mL infusion  0-50 mcg/kg/min IntraVENous TITRATE    fentaNYL (PF) 1,500 mcg/30 mL (50 mcg/mL) infusion  0-200 mcg/hr IntraVENous TITRATE    piperacillin-tazobactam (ZOSYN) 3.375 g in 0.9% sodium chloride (MBP/ADV) 100 mL MBP  3.375 g IntraVENous Q8H    lidocaine (XYLOCAINE) 10 mg/mL (1 %) injection 1-20 mL  1-20 mL SubCUTAneous Multiple    vancomycin (VANCOCIN) 1,000 mg in 0.9% sodium chloride 250 mL ([id number])  1,000 mg IntraVENous Q24H    PHENYLephrine (SINAN-SYNEPHRINE) 30 mg in 0.9% sodium chloride 250 mL infusion   mcg/min IntraVENous TITRATE    vasopressin (VASOSTRICT) 20 Units in 0.9% sodium chloride 100 mL infusion  0-0.03 Units/min IntraVENous TITRATE    EPINEPHrine (ADRENALIN) 5 mg in 0.9% sodium chloride 250 mL infusion  0-10 mcg/min IntraVENous TITRATE            Lab Review:     Recent Labs     09/08/22  0010 09/07/22  0436 09/06/22  1416   WBC 7.4 14.5* 21.1*   HGB 9.6* 10.9* 10.8*   HCT 26.6* 31.9* 35.3*    712 674 Recent Labs     09/08/22  0423 09/08/22  0010 09/07/22  2019 09/07/22  1601 09/07/22  1153 09/07/22  0811 09/07/22  0436 09/06/22  1416 09/06/22  1010    138 139 140   < > 140 141 139 139   K 4.1 4.2 4.4 4.2   < > 4.2 4.3 5.5* 7.6*    98 99 98   < > 94* 93* 96* 89*   CO2 27 24 21 22   < > 15* 13* <5* 4*   * 188* 212* 140*   < > 262* 322* 281* 160*   BUN 46* 51* 56* 64*   < > 81* 93* 105* 105*   CREA 4.78* 5.39* 5.68* 6.54*   < > 8.60* 9.78* 11.80* 12.94*   CA 7.3* 7.3* 7.5* 7.7*   < > 8.0* 7.7* 8.5 9.7   MG  --  1.9 2.1 2.0   < > 1.9  --   --  2.7*   PHOS  --   --   --   --   --  6.9*  --   --   --    ALB  --   --   --   --   --   --  2.9* 2.8* 4.5   ALT  --   --   --   --   --   --  17 17 7*    < > = values in this interval not displayed.        No components found for: [place]

## 2022-09-08 NOTE — CONSULTS
Brief ICU Nutrition Assessment    Type and Reason for Visit: Reassess    Nutrition Recommendations/Plan:   Brief follow up. Consulted for TF orders and management. Per IDR, plan to stop NGT suction and start trickle feeds. Patient only one one pressor now, and weaning levo. Stopping insulin drip in 2 hours. Propofol running at 45 mcg/hour providing 506 kcal/day. Trickle Feeds Nepro @ 20 mL/hour  Provide 7 Prosource/day, flush with 15 mL before and after   mL q 2 hours    Trickle feeds at goal providing 778 kcal (+ Propofol, + Prosource, 1564 kcal, 98% needs); 71 g carbs; 35 g protein (+7 Prosource, 112g, 100% needs). TF + FWF provides 1519 mL H2O/day.     Vent: 8.15 l/min    Temp (24hrs), Av.4 °F (36.3 °C), Min:96.9 °F (36.1 °C), Max:97.7 °F (36.5 °C)    Lab Results   Component Value Date/Time    GFR est AA 17 (L) 2022 08:31 AM    GFR est non-AA 14 (L) 2022 08:31 AM    Creatinine 4.30 (H) 2022 08:31 AM    BUN 41 (H) 2022 08:31 AM    Sodium 138 2022 08:31 AM    Potassium 4.2 2022 08:31 AM    Chloride 100 2022 08:31 AM    CO2 26 2022 08:31 AM     Magnesium   Date Value Ref Range Status   2022 1.9 1.6 - 2.4 mg/dL Final   2022 2.1 1.6 - 2.4 mg/dL Final   2022 2.0 1.6 - 2.4 mg/dL Final   2022 1.9 1.6 - 2.4 mg/dL Final   2022 1.9 1.6 - 2.4 mg/dL Final     Lab Results   Component Value Date/Time    Calcium 7.3 (L) 2022 08:31 AM    Phosphorus 6.9 (H) 2022 08:11 AM       Estimated Nutrition Needs:   Energy: 4206 ([employer] )  Wt used: Current  Protein: 104-143 (1.5-2.0 g/kg CRRT)  Wt used: Current   Fluid: 1602       Electronically signed by Mitch Venegas MS, RD   Contact: [phone] or via [employer]

## 2022-09-08 NOTE — PROCEDURES
ELECTROENCEPHALOGRAM REPORT    Test Date: 9/7/2022    History: An EEG is requested in this 25-year-old man with abnormal movements suspicious for seizure to evaluate for ictus/status epilepticus. Medications: Not listed    Patient consent: Correct patient identified    Description of procedure: This EEG was obtained using a 10 lead, 8 channel system positioned circumferentially without any parasagittal coverage (rapid EEG). Computer selected EEG is reviewed as well as background features and all clinically significant events. Clarity algorithm utilized and implemented to provide analysis of underlying activity and seizure detection used to facilitate reading. Description of recording: This tracing demonstrates diffuse mixed frequency delta range activity. There is a good amount of muscle artifact intermittently throughout the tracing. Impression: This EEG is abnormal secondary to diffuse slowing and disorganization of the background rhythms indicative of a severe degree of bihemispheric dysfunction as is commonly seen with an encephalopathy which may have contributions from toxic, metabolic, diffuse structural, and or pharmacologic effect and clinical correlation is recommended. No epileptiform abnormalities are seen. No evidence for subclinical ictus or status epilepticus. Comment: This EEG does not rule out the diagnosis of a seizure disorder; clinical  correlation is advised. If there is still persistent suspicion for continued seizure-like  activity, would advise obtaining an electroencephalogram with the 10-20 international  system for improved spatial resolution and parasagittal coverage.       Yudith Jasso MD

## 2022-09-08 NOTE — PROGRESS NOTES
[facility] Progress Note: Antimicrobial Stewardship    Consult for antibiotic dosing of vancomycin by Dr. Jazz Carmen  Indication: sepsis  Day of Therapy: 2    Plan:  Vancomycin therapy:  Loading dose of vancomycin 1750 mg IV (25 mg/kg, max 2.5 gm) x 1 (given 9/6 ~1100 at [facility])  Maintenance dose: 1000mg IV every 24 hours starting 9/7 at 1200  (CVVH started 9/6)   Dose calculated to approximate a   Target AUC/JANNET of: N/A  Target trough of: 15mg/L   Assess/Plan: Leukocytosis improving (WBC 14.5-->7.4 today); Afeb; Blood cultures pending; Continuing CVVH; No reports of clotted lines; Trough level prior to 3rd total dose today = 15.6 mcg/ml; At target goal of ~15mcg/ml. Continue current dose. Pharmacy to follow daily and will make changes to dose and/or frequency based on clinical status. Date Dose & Interval Measured (mcg/mL) Extrapolated (mcg/mL)   ?9/6 ?1750 mg IV x 1     (given 9/6 ~1100), followed by 1g IV Q24hr ? ?   ? ? ? ?   ? ? ? ? Other Antimicrobial  (not dosed by pharmacist)   Zosyn 3.375 gm IV Q12H --> Changed to 3.375g IV Q8H (CVVH dosing)  LVQ x 1 at [facility] 9/6   Cultures     9/6 Blood x 2 - (pending)   Serum Creatinine     Lab Results   Component Value Date/Time    Creatinine 4.78 (H) 09/08/2022 04:23 AM       Creatinine Clearance Estimated Creatinine Clearance: 14.1 mL/min (A) (based on SCr of 4.78 mg/dL (H)). Procalcitonin  No results found for: PCT     Temp   97.6 °F (36.4 °C)    WBC   Lab Results   Component Value Date/Time    WBC 7.4 09/08/2022 12:10 AM       For Antifungals, Metronidazole and Nafcillin: Lab Results   Component Value Date/Time    ALT (SGPT) 17 09/07/2022 04:36 AM    AST (SGOT) 42 (H) 09/07/2022 04:36 AM    Alk.  phosphatase 60 09/07/2022 04:36 AM    Bilirubin, total 0.6 09/07/2022 04:36 AM         Pharmacist: Vijay Ely

## 2022-09-08 NOTE — DIABETES MGMT
[facility]    CLINICAL NURSE SPECIALIST CONSULT     Initial Presentation   Sharon Bradley is a 57 y.o. male admitted 9/6/22 from the ER after experiencing dizziness. Started taking gabapentin X3 doses prior to symptom. Hypothermic. Tachypneic. Hypertensive. O2 sats 100%. ER exam:    LAB: WBC 21. K 7.6. /AG 46. Creatinine 12.94. GFR 4. Alcohol <10. Lactic acid 17.1. CXR: Diffuse mild interstitial opacities may represent pulmonary edema. CT: No acute pathology    HX:   Past Medical History:   Diagnosis Date    Hypercholesteremia     Hypertension     Melanoma ([facility] [address]     skin    Thyroid activity decreased       INITIAL DX:   Severe sepsis ([facility] [address] [A41.9, R65.20]     Current Treatment     TX: Dialysis. Abx. Insulin. Albumin    Consulted by Provider for advanced diabetes nursing assessment and care for:   [] Transitioning off Kasie Saris   [x] Inpatient management strategy  [] Home management assessment  [] Survival skill education    Hospital Course   Clinical progress has been complicated by need for ICU level of care. 9/6/22 Nephrology: Severe anuric óscar => CRRT  9/7/22 EEG: Diffuse low voltage delta in keeping with an encephalopathy which may have contributions from toxic, metabolic, diffuse structural, and or pharmacologic effects. No epileptiform abnormalities are seen. No suggestion of subclinical ictus. 9/8/22 Intubated & sedated on Propofol & Precedex. C vent support FiO2 40%/Peep 5. Levo for BP management. Fentanyl for pain. CRRT. Diabetes History   Type 2 diabetes on metformin therapy  A1c 6.6%    Diabetes-related Medical History  Microvascular disease  Nephropathy    Diabetes Medication History  Key Antihyperglycemic Medications               metFORMIN (GLUMETZA ER) 500 mg TG24 24 hour tablet (Taking) Take 1,000 mg by mouth two (2) times a day.            Diabetes self-management practices: Deferred  Overall evaluation:    [x] Achieving A1c target with drug therapy & self-care practices    Subjective   NA     Objective   Physical exam  General Normal weight male who is ill-appearing  Neuro  Sedated  Vital Signs Visit Vitals  BP (!) 89/53   Pulse 79   Temp (!) 96.4 °F (35.8 °C)   Resp 20   Ht 5' 6\" (1.676 m)   Wt 71.7 kg (158 lb)   SpO2 99%   BMI 25.50 kg/m²     Laboratory  Recent Labs     09/08/22  0831 09/08/22  0423 09/08/22  0010 09/07/22  0811 09/07/22  0436 09/06/22  1416 09/06/22  1010   * 161* 188*   < > 322* 281* 160*   AGAP 12 12 16*   < > 35* Cannot be calculated 46*   WBC  --   --  7.4  --  14.5* 21.1* 21.0*   CREA 4.30* 4.78* 5.39*   < > 9.78* 11.80* 12.94*   GFRNA 14* 12* 11*   < > 5* 4* 4*   AST  --   --   --   --  42* 18 13   ALT  --   --   --   --  17 17 7*    < > = values in this interval not displayed. Factors impacting BG management  Factor Dose Comments   Nutrition:  NPO       Drugs:  Vasopressor load   Levo infusion   Affects insulin delivery   Pain Fentanyl infusion    Infection Zosyn Q8 hrs  Vanc Q24 hrs Hypothermic on [place]. WBC normalized   Other:   Kidney function  Liver function   LAVINIA  AST 42      Blood glucose pattern      Significant diabetes-related events over the past 24-72 hours  Admission . A1c 6.6%  Glucostabilizer until 9am today when transitioned off with Lantus insulin     Assessment and Plan   Nursing Diagnosis Risk for unstable blood glucose pattern   Nursing Intervention Domain 7198 Decision-making Support   Nursing Interventions Examined current inpatient diabetes/blood glucose control   Explored factors facilitating and impeding inpatient management     Evaluation   This unfortunate 57year old  male was admitted in sepsis. Bgs easily controlled on GS. Using less than 3 units/hr. Based on his weight & BMI (and insulin naivete), may require 14 units of Lantus to maintain BG control.     Recommendations     [x] Use of Subcutaneous Insulin Order set (3479)  Insulin Dosing Specific recommendation Basal                                      (Based on weight, BMI & GFR) [x]        0.2 units/kg/D  [] 0.3 units/kg/D  [] 0.4 units/kg/D Lantus insulin 14 units D   Nutritional                                      (Based on CHO/dextrose load) [] Normal sensitivity  [] Insulin-resistant sensitivity    Corrective                                       (Useful in adjusting insulin dosing) [] Normal sensitivity  [x] HIGH sensitivity  [] Insulin-resistant sensitivity      Billing Code(s)   [x] 23987 IP subsequent hospital care - 35 minutes [] 32917 Prolonged Services - 65 minutes [] 23792 Prolonged Services - 110 minutes  [] 82737 IP subsequent hospital care - 25 minutes [] 39684 Prolonged Services - 55 minutes [] 80210 Prolonged Services - 100 minutes  [] 07749 IP subsequent hospital care - 15 minutes [] 55985 Prolonged Services - 45 minutes [] 99787 Prolonged Services - 90 minutes    Before making these care recommendations, I personally reviewed the hospitalization record, including notes, laboratory & diagnostic data and current medications, and examined the patient at the bedside (circumstances permitting) before making care recommendations. More than fifty (50) percent of the time was spent in patient counseling and/or care coordination.   Total minutes: Leopoldo Heaton, CNS  Diabetes Clinical Nurse Specialist  [facility]  Access via [address] [place] [address]

## 2022-09-08 NOTE — PROGRESS NOTES
Problem: Ventilator Management  Goal: *Adequate oxygenation and ventilation  9/8/2022 0041 by Isaiah Ruby RN  Outcome: Progressing Towards Goal  9/8/2022 0041 by Isaiah Ruby RN  Outcome: Progressing Towards Goal  Goal: *Patient maintains clear airway/free of aspiration  9/8/2022 0041 by Isaiah Ruby RN  Outcome: Progressing Towards Goal  9/8/2022 0041 by Isaiah Ruby RN  Outcome: Progressing Towards Goal  Goal: *Absence of infection signs and symptoms  9/8/2022 0041 by Isaiah Ruby RN  Outcome: Progressing Towards Goal  9/8/2022 0041 by Isaiah Ruby RN  Outcome: Progressing Towards Goal  Goal: *Normal spontaneous ventilation  9/8/2022 0041 by Isaiah Ruby RN  Outcome: Progressing Towards Goal  9/8/2022 0041 by Isaiah Ruby RN  Outcome: Progressing Towards Goal     Problem: Patient Education: Go to Patient Education Activity  Goal: Patient/Family Education  9/8/2022 0041 by Isaiah Ruby RN  Outcome: Progressing Towards Goal  9/8/2022 0041 by Isaiah Ruby RN  Outcome: Progressing Towards Goal     Problem: Non-Violent Restraints  Goal: Removal from restraints as soon as assessed to be safe  9/8/2022 0041 by Isaiah Ruby RN  Outcome: Progressing Towards Goal  9/8/2022 0041 by Isaiah Ruby RN  Outcome: Progressing Towards Goal  Goal: No harm/injury to patient while restraints in use  9/8/2022 0041 by Isaiah Ruby RN  Outcome: Progressing Towards Goal  9/8/2022 0041 by Isaiah Ruby RN  Outcome: Progressing Towards Goal  Goal: Patient's dignity will be maintained  9/8/2022 0041 by Isaiah Ruby RN  Outcome: Progressing Towards Goal  9/8/2022 0041 by Isaiah Ruby RN  Outcome: Progressing Towards Goal  Goal: Patient Interventions  9/8/2022 0041 by Isaiah Ruby RN  Outcome: Progressing Towards Goal  9/8/2022 0041 by Isaiah Ruby RN  Outcome: Progressing Towards Goal     Problem: Falls - Risk of  Goal: *Absence of Falls  Description: Document Eleuterio Sol Fall Risk and appropriate interventions in the flowsheet.   9/8/2022 0041 by Jorge Muniz RN  Outcome: Progressing Towards Goal  Note: Fall Risk Interventions:            Medication Interventions: Evaluate medications/consider consulting pharmacy, Bed/chair exit alarm, Patient to call before getting OOB, Teach patient to arise slowly    Elimination Interventions: Bed/chair exit alarm, Call light in reach, Toileting schedule/hourly rounds           9/8/2022 0041 by Jorge Muniz RN  Outcome: Progressing Towards Goal  Note: Fall Risk Interventions:            Medication Interventions: Evaluate medications/consider consulting pharmacy, Bed/chair exit alarm, Patient to call before getting OOB, Teach patient to arise slowly    Elimination Interventions: Bed/chair exit alarm, Call light in reach, Toileting schedule/hourly rounds              Problem: Patient Education: Go to Patient Education Activity  Goal: Patient/Family Education  9/8/2022 0041 by Jorge Muniz RN  Outcome: Progressing Towards Goal  9/8/2022 0041 by Jorge Muniz RN  Outcome: Progressing Towards Goal     Problem: Pressure Injury - Risk of  Goal: *Prevention of pressure injury  Description: Document David Scale and appropriate interventions in the flowsheet. 9/8/2022 0041 by Jorge Muniz RN  Outcome: Progressing Towards Goal  Note: Pressure Injury Interventions:  Sensory Interventions: Assess changes in LOC, Avoid rigorous massage over bony prominences, Check visual cues for pain, Discuss PT/OT consult with provider, Float heels, Keep linens dry and wrinkle-free, Maintain/enhance activity level, Minimize linen layers, Monitor skin under medical devices, Pad between skin to skin, Pressure redistribution bed/mattress (bed type), Turn and reposition approx.  every two hours (pillows and wedges if needed), Suspension boots         Activity Interventions: Increase time out of bed, Pressure redistribution bed/mattress(bed type), PT/OT evaluation    Mobility Interventions: Float heels, HOB 30 degrees or less, Pressure redistribution bed/mattress (bed type), Suspension boots, Turn and reposition approx. every two hours(pillow and wedges)    Nutrition Interventions: Document food/fluid/supplement intake, Discuss nutritional consult with provider    Friction and Shear Interventions: Feet elevated on foot rest, Foam dressings/transparent film/skin sealants, HOB 30 degrees or less, Lift sheet, Lift team/patient mobility team, Minimize layers, Transferring/repositioning devices             9/8/2022 0041 by Charlotte Rodgers RN  Outcome: Progressing Towards Goal  Note: Pressure Injury Interventions:  Sensory Interventions: Assess changes in LOC, Avoid rigorous massage over bony prominences, Check visual cues for pain, Discuss PT/OT consult with provider, Float heels, Keep linens dry and wrinkle-free, Maintain/enhance activity level, Minimize linen layers, Monitor skin under medical devices, Pad between skin to skin, Pressure redistribution bed/mattress (bed type), Turn and reposition approx. every two hours (pillows and wedges if needed), Suspension boots         Activity Interventions: Increase time out of bed, Pressure redistribution bed/mattress(bed type), PT/OT evaluation    Mobility Interventions: Float heels, HOB 30 degrees or less, Pressure redistribution bed/mattress (bed type), Suspension boots, Turn and reposition approx.  every two hours(pillow and wedges)    Nutrition Interventions: Document food/fluid/supplement intake, Discuss nutritional consult with provider    Friction and Shear Interventions: Feet elevated on foot rest, Foam dressings/transparent film/skin sealants, HOB 30 degrees or less, Lift sheet, Lift team/patient mobility team, Minimize layers, Transferring/repositioning devices                Problem: Patient Education: Go to Patient Education Activity  Goal: Patient/Family Education  9/8/2022 0041 by Charlotte Rodgers RN  Outcome: Progressing Towards Goal  9/8/2022 0041 by Charlotte Rodgers RN  Outcome: Progressing Towards Goal     Problem: Nutrition Deficit  Goal: *Optimize nutritional status  9/8/2022 0041 by Jorge Muniz RN  Outcome: Progressing Towards Goal  9/8/2022 0041 by Jorge Muniz RN  Outcome: Progressing Towards Goal     Problem: Diabetes Self-Management  Goal: *Disease process and treatment process  Description: Define diabetes and identify own type of diabetes; list 3 options for treating diabetes. 9/8/2022 0041 by Jorge Muniz RN  Outcome: Progressing Towards Goal  9/8/2022 0041 by Jorge Muniz RN  Outcome: Progressing Towards Goal  Goal: *Incorporating nutritional management into lifestyle  Description: Describe effect of type, amount and timing of food on blood glucose; list 3 methods for planning meals. 9/8/2022 0041 by Jorge Muniz RN  Outcome: Progressing Towards Goal  9/8/2022 0041 by Jorge Muniz RN  Outcome: Progressing Towards Goal  Goal: *Incorporating physical activity into lifestyle  Description: State effect of exercise on blood glucose levels. 9/8/2022 0041 by Jorge Muniz RN  Outcome: Progressing Towards Goal  9/8/2022 0041 by Jorge Muniz RN  Outcome: Progressing Towards Goal  Goal: *Developing strategies to promote health/change behavior  Description: Define the ABC's of diabetes; identify appropriate screenings, schedule and personal plan for screenings. 9/8/2022 0041 by Jorge Muniz RN  Outcome: Progressing Towards Goal  9/8/2022 0041 by Jorge Muniz RN  Outcome: Progressing Towards Goal  Goal: *Using medications safely  Description: State effect of diabetes medications on diabetes; name diabetes medication taking, action and side effects. 9/8/2022 0041 by Jorge Muniz RN  Outcome: Progressing Towards Goal  9/8/2022 0041 by Jorge Muniz RN  Outcome: Progressing Towards Goal  Goal: *Monitoring blood glucose, interpreting and using results  Description: Identify recommended blood glucose targets  and personal targets.   9/8/2022 0041 by Jorge Muniz RN  Outcome: Progressing Towards Goal  9/8/2022 0041 by Jorge Muniz RN  Outcome: Progressing Towards Goal  Goal: *Prevention, detection, treatment of acute complications  Description: List symptoms of hyper- and hypoglycemia; describe how to treat low blood sugar and actions for lowering  high blood glucose level. 9/8/2022 0041 by Rain Williamson RN  Outcome: Progressing Towards Goal  9/8/2022 0041 by Rain Williamson RN  Outcome: Progressing Towards Goal  Goal: *Prevention, detection and treatment of chronic complications  Description: Define the natural course of diabetes and describe the relationship of blood glucose levels to long term complications of diabetes.   9/8/2022 0041 by Rain Williamson RN  Outcome: Progressing Towards Goal  9/8/2022 0041 by Rain Williamson RN  Outcome: Progressing Towards Goal  Goal: *Developing strategies to address psychosocial issues  Description: Describe feelings about living with diabetes; identify support needed and support network  9/8/2022 0041 by Rain Williamson RN  Outcome: Progressing Towards Goal  9/8/2022 0041 by Rain Williamson RN  Outcome: Progressing Towards Goal  Goal: *Insulin pump training  9/8/2022 0041 by Rain Williamson RN  Outcome: Progressing Towards Goal  9/8/2022 0041 by Rain Williamson RN  Outcome: Progressing Towards Goal  Goal: *Sick day guidelines  9/8/2022 0041 by Rain Williamson RN  Outcome: Progressing Towards Goal  9/8/2022 0041 by Rain Wililamson RN  Outcome: Progressing Towards Goal  Goal: *Patient Specific Goal (EDIT GOAL, INSERT TEXT)  9/8/2022 0041 by Rain Williamson RN  Outcome: Progressing Towards Goal  9/8/2022 0041 by Rain Williamson RN  Outcome: Progressing Towards Goal     Problem: Patient Education: Go to Patient Education Activity  Goal: Patient/Family Education  9/8/2022 0041 by Rain Williamson RN  Outcome: Progressing Towards Goal  9/8/2022 0041 by Rain Williamson RN  Outcome: Progressing Towards Goal

## 2022-09-08 NOTE — PROGRESS NOTES
[address]  YOB: 1960          Assessment & Plan:     Severe oliguric LAVINIA on CVVH  Severe lactic acidosis  Ketoacidosis  Resp failure  Shock on multiple pressors  DM2    Rec:  Continue CVVH, increase factor 25 ml/hr  Wean pressors  Empiric abx  Avoid nephrotoxins if possible  ICU support       Subjective:   CC: LAVINIA  HPI: Oliguric, on CVVH. Factor 0, 4K RF. Labs better. Persistent severe lactic acidosis. On vent and one pressor    ROS: unable to obtain due to pt condition  Current Facility-Administered Medications   Medication Dose Route Frequency    NOREPINephrine (LEVOPHED) 8 mg in 5% dextrose 250mL (32 mcg/mL) infusion  0.5-50 mcg/min IntraVENous TITRATE    dexmedeTOMidine in 0.9 % NaCl (PRECEDEX) 400 mcg/100 mL (4 mcg/mL) infusion soln  0.1-1.5 mcg/kg/hr IntraVENous TITRATE    insulin glargine (LANTUS) injection 10 Units  10 Units SubCUTAneous DAILY    fentaNYL citrate (PF) injection 100 mcg  100 mcg IntraVENous Q4H PRN    glucose chewable tablet 16 g  4 Tablet Oral PRN    glucagon (GLUCAGEN) injection 1 mg  1 mg IntraMUSCular PRN    dextrose 10% infusion 0-250 mL  0-250 mL IntraVENous PRN    insulin regular (NOVOLIN R, HUMULIN R) 100 Units in 0.9% sodium chloride 100 mL infusion  0-50 Units/hr IntraVENous TITRATE    dextrose 10% infusion 0-250 mL  0-250 mL IntraVENous PRN    bicarbonate dialysis (PRISMASOL) BG K 4/Ca 2.5 5000 ml solution   Extracorporeal DIALYSIS CONTINUOUS    Vancomycin - Please draw TROUGH LEVEL at 1100 (9/8).  Thx, Pharmacy    Other ONCE    pantoprazole (PROTONIX) 40 mg in 0.9% sodium chloride 10 mL injection  40 mg IntraVENous DAILY    [Held by provider] heparin (porcine) injection 5,000 Units  5,000 Units SubCUTAneous Q12H    propofol (DIPRIVAN) 10 mg/mL infusion  0-50 mcg/kg/min IntraVENous TITRATE    fentaNYL (PF) 1,500 mcg/30 mL (50 mcg/mL) infusion  0-200 mcg/hr IntraVENous TITRATE piperacillin-tazobactam (ZOSYN) 3.375 g in 0.9% sodium chloride (MBP/ADV) 100 mL MBP  3.375 g IntraVENous Q8H    lidocaine (XYLOCAINE) 10 mg/mL (1 %) injection 1-20 mL  1-20 mL SubCUTAneous Multiple    vancomycin (VANCOCIN) 1,000 mg in 0.9% sodium chloride 250 mL ([id number])  1,000 mg IntraVENous Q24H          Objective:     Vitals:  Blood pressure 116/76, pulse 76, temperature 97.6 °F (36.4 °C), resp. rate 20, height 5' 6\" (1.676 m), weight 71.7 kg (158 lb), SpO2 99 %.   Temp (24hrs), Av.4 °F (36.3 °C), Min:96.9 °F (36.1 °C), Max:97.7 °F (36.5 °C)      Intake and Output:   07 -  1900  In: 395.7 [I.V.:395.7]  Out: 289    190 -  0700  In: 6893.3 [I.V.:6893.3]  Out: 3916 [Urine:331]    Physical Exam:               GENERAL ASSESSMENT: Sedated on vent  HEENT: ETT  CHEST: Vent BS  HEART: reg  ABDOMEN: Soft,NT  : +Vega:   EXTREMITY: no EDEMA        ECG/rhythm:    Data Review      Recent Labs     22  1014   TNIPOC <0.04      Recent Labs     22  1521   CPK 75     Recent Labs     22  0831 22  0423 22  0010 22  2019 22  1601 22  1153 22  0811 22  0436 22  1416 22  1010    139 138 139 140   < > 140 141 139 139   K 4.2 4.1 4.2 4.4 4.2   < > 4.2 4.3 5.5* 7.6*    100 98 99 98   < > 94* 93* 96* 89*   CO2 26 27 24 21 22   < > 15* 13* <5* 4*   BUN 41* 46* 51* 56* 64*   < > 81* 93* 105* 105*   CREA 4.30* 4.78* 5.39* 5.68* 6.54*   < > 8.60* 9.78* 11.80* 12.94*   * 161* 188* 212* 140*   < > 262* 322* 281* 160*   PHOS  --   --   --   --   --   --  6.9*  --   --   --    MG  --   --  1.9 2.1 2.0   < > 1.9  --   --  2.7*   CA 7.3* 7.3* 7.3* 7.5* 7.7*   < > 8.0* 7.7* 8.5 9.7   ALB  --   --   --   --   --   --   --  2.9* 2.8* 4.5   WBC  --   --  7.4  --   --   --   --  14.5* 21.1* 21.0*   HGB  --   --  9.6*  --   --   --   --  10.9* 10.8* 12.7   HCT  --   --  26.6*  --   --   --   --  31.9* 35.3* 40.2   PLT  --   --  171  -- --   --   --  290 316 337    < > = values in this interval not displayed. No results for input(s): INR, PTP, APTT, INREXT, INREXT in the last 72 hours. Needs: urine analysis, urine sodium, protein and creatinine  No results found for: Wonda Meredith        : Tyrone Sánchez MD  9/8/2022        [facility]:  [email or web address]  [place] office:  [address] [address],[address]  [address]  Phone: [phone]  Fax :     [phone]    [place] office:  [address]  Phone - [phone]  Fax - [phone]

## 2022-09-08 NOTE — PROGRESS NOTES
0700: Bedside, Verbal, and Written shift change report given to Catarina CALDERON and Wandy CALDERON (oncoming nurse) by Fatou Gallegos RN (offgoing nurse). Report included the following information Intake/Output, MAR, Recent Results, Cardiac Rhythm NSR, Alarm Parameters , and Quality Measures. Primary Nurse Modesto Mittal RN and [facility]more performed a dual skin assessment on this patient No impairment noted  David score is see flow sheet    0730 Assessment completed. Endotracheal and mouth care completed and pt also turned. There were no pressure injuries present but there was a dime sized blister on the sacrum/coccyx area. I put a foam dressing on the sacrum/coccyx to prevent any further irritation. Washed patients hair. 0900 Reassessed pt completed. Pt has non-pitting edema in both hands. Dr Bess Bang and Dr. Ashley Roach at bedside. Updated about patient and overnight status. Decreased FiO2 from 50% and 40%. Titrated propofol from 50 to 45. Patient began to have tremors again. Dr. Bess Bang notified orders for fentanyl push and precedex drip placed. 1200 reassessed     1400 Performed skin assessment under restraints, repositioned, turned on right, completed CHF mouth care. Scant amount of bleeding while performing mouth care from gums. Patient's son and sister visiting now at beside. [address] dressing changed. Site is now clean, dry, and intact    1600 reassessed patient. No changes from previous assessment. Repositioned pt on left side, completed mouth care, and rechecked restraints    1700 Changed full effluent bag and documented hourly numbers for dialysis. Changed rate to 10 because patient blood pressure dropped at the rate of 9.    1749 Titrated Levo from 10 to 11 because patient blood pressure is still low 76/52 with a map of 61    1728 Propofol gtt stopped     1800 completed mouth care, repositioned to supine, checked restraints. No abnormalities.      1900 Bedside and Verbal shift change report given to [facility] RN (oncoming nurse) by Raymond Joy RN (offgoing nurse). Report included the following information SBAR, Intake/Output, MAR, Recent Results, Cardiac Rhythm NSR, and Quality Measures.

## 2022-09-08 NOTE — PROGRESS NOTES
[facility]    ICU TEAM Progress Note    Name: Guillermo Zamarripa   : 1960   MRN: [id number]   Date: 2022           ICU Assessment     Septic shock  Acute respiratory failure with hypoxia  ARF on CRRT  DM2         ICU Comprehensive Plan of Care:   -D/C insulin gtt and start lantus 10U qday and SSI  -SAT/SBT  -Change propofol to precedex  -Decrease FiO2 to 40%  -MRSA cx  -Continue empiric antibiotics and follow cultures  -nutrition consult for TF    Discussed Care Plan with Bedside RN    Documentation of Current Medications    ICU Issues:  D- Delirium assessement CAM-ICU: Assessments ordered  E- Early Mobility/ PT: Will order when appropriate  F- Feeding: NPO  Peptic Ulcer Disease Prophylaxis: PPI  DVT Prophylaxis: [facility]  Glycemic Control (140-180 mg/dL): insulin gtt  Catheter Discontinuation (CVC, arterial, urinary, gastric, rectal): Keep all  Antibiotics: vanco and zosyn  Steroids: None  MAR Review (pain, anxiety, constipation . Tee Meneses ): Completed  Code Status: FULL CODE    Subjective:   Progress Note: 2022      Reason for ICU Admission: shock     HPI:   57 y/ow ith pmh HLD, HTN, DM presented to outside ER with dizziness, faintness. Reportedly also endorsed N/V and SOB, but no recent fevers and no report of abd pain. Pt intubated on arrival to [facility] due to work of breathing. At time of [facility] consult, pt intubated. ABG available at time of consult shows pH of 6.77 and pCO2 of 14.9. Bicarb 2.1. Pt on gtt and had gotten 1 amp. [facility] emergently ordered 3 additional amps of bicarb. HM calling IR for urgent HD access to help manage pH. Overnight Events:   2022  GLEN overnight. Has tremors when propofol is decreased. [facility] EEG overnight negative for seizures. On fentanyl, propofol, and levophed. CRRT ongoing.     POD:  * No surgery found *    S/P:       Active Problem List:     Problem List  Date Reviewed: 2022            Codes Class    * (Principal) Severe sepsis ([facility] [address] ICD-10-CM: A41.9, R65.20  ICD-9-CM: 038.9, 995.92         Acute renal failure (ARF) (HCC) ICD-10-CM: N17.9  ICD-9-CM: 584.9         Hyperkalemia ICD-10-CM: E87.5  ICD-9-CM: 276.7         Melanoma ([facility] ICD-10-CM: C43.9  ICD-9-CM: 172.9         HTN (hypertension) ICD-10-CM: I10  ICD-9-CM: 401.9         DM (diabetes mellitus) ([facility] ICD-10-CM: E11.9  ICD-9-CM: 250.00         Hyperlipidemia ICD-10-CM: E78.5  ICD-9-CM: 272.4            Past Medical History:      has a past medical history of Hypercholesteremia, Hypertension, Melanoma ([facility] and Thyroid activity decreased. Past Surgical History:      has a past surgical history that includes hx orthopaedic and ir insert non tunl cvc over 5 yrs (9/6/2022). Home Medications:     Prior to Admission medications    Medication Sig Start Date End Date Taking? Authorizing Provider   pravastatin (PRAVACHOL) 10 mg tablet Take 10 mg by mouth daily. Yes Provider, Historical   metFORMIN (GLUMETZA ER) 500 mg TG24 24 hour tablet Take 1,000 mg by mouth two (2) times a day. Yes Provider, Historical   enalapril (VASOTEC) 10 mg tablet Take 5 mg by mouth daily. List provided by family states one half tablet   Yes Provider, Historical   chlorthalidone (HYGROTON) 25 mg tablet Take 25 mg by mouth daily. Yes Provider, Historical   cloNIDine HCL (CATAPRES) 0.1 mg tablet Take 0.1 mg by mouth two (2) times a day. Yes Provider, Historical   atenoloL (TENORMIN) 50 mg tablet Take 25 mg by mouth daily. List from [facility] one half tablet   Yes Provider, Historical   cyclobenzaprine (FLEXERIL) 10 mg tablet Take 10 mg by mouth nightly as needed for Muscle Spasm(s). Yes Provider, Historical   levothyroxine (SYNTHROID) 100 mcg tablet Take 100 mcg by mouth Daily (before breakfast). Yes Provider, Historical   gabapentin (NEURONTIN) 300 mg capsule Take 300 mg by mouth See Admin Instructions.  New medication filled 9/1/22 for 45 pills - Directions on the bottle: Start with one pill at bedtime then one pill twice daily then one pill three times daily. Yes Other, MD Deandre       Allergies/Social/Family History: Allergies   Allergen Reactions    Enalapril Maleate Other (comments)     Acute renal failure when combined with dehydration    Niacin Other (comments)      Social History     Tobacco Use    Smoking status: Never    Smokeless tobacco: Never   Substance Use Topics    Alcohol use: Not Currently      History reviewed. No pertinent family history. Review of Systems:     ROS is unobtainable as the patient is intubated. Objective:   Vital Signs:  Visit Vitals  /75 (BP 1 Location: Right upper arm, BP Patient Position: At rest)   Pulse 79   Temp (!) 96.4 °F (35.8 °C) Comment: beir hugger on   Resp 20   Ht 5' 6\" (1.676 m)   Wt 71.7 kg (158 lb)   SpO2 99%   BMI 25.50 kg/m²    O2 Flow Rate (L/min): 60 l/min O2 Device: Endotracheal tube Temp (24hrs), Av.3 °F (36.3 °C), Min:96.4 °F (35.8 °C), Max:97.7 °F (36.5 °C)           Intake/Output:     Intake/Output Summary (Last 24 hours) at 2022 1225  Last data filed at 2022 1100  Gross per 24 hour   Intake 3053.48 ml   Output 3215 ml   Net -161.52 ml       Physical Exam:  Performed via video assessment.   Gen: Patient is intubated, sedated, no acute distress  HEENT: ETT and OGT present  Chest: Chest movement is equal bilaterally  Cardiac: Cardiac monitor reveals SR  Extremities: Extremities appear well perfused with no obvious edema  Neuro: Patient is sedated    LABS AND  DATA: Personally reviewed  Recent Labs     22  0010 22  0436   WBC 7.4 14.5*   HGB 9.6* 10.9*   HCT 26.6* 31.9*    290     Recent Labs     22  0831 22  0423 22  0010 22  2019 22  1153 22  0811    139 138 139   < > 140   K 4.2 4.1 4.2 4.4   < > 4.2    100 98 99   < > 94*   CO2 26 27 24 21   < > 15*   BUN 41* 46* 51* 56*   < > 81*   CREA 4.30* 4.78* 5.39* 5.68*   < > 8.60*   * 161* 188* 212*   < > 262*   CA 7.3* 7.3* 7.3* 7.5*   < > 8.0*   MG  --   --  1.9 2.1   < > 1.9   PHOS  --   --   --   --   --  6.9*    < > = values in this interval not displayed. Recent Labs     09/07/22  0436 09/06/22  1416   AP 60 68   TP 5.6* 6.2*   ALB 2.9* 2.8*   GLOB 2.7 3.4     No results for input(s): INR, PTP, APTT, INREXT in the last 72 hours. Recent Labs     09/07/22  0835 09/06/22  1330   PHI 7.30* 6.77*   PCO2I 30.3* 14.9*   PO2I 92 146*   FIO2I 50  --      Recent Labs     09/06/22  1521   CPK 75       Hemodynamics:   PAP:   CO:     Wedge:   CI:     CVP:    SVR:       PVR:       Ventilator Settings:  Mode Rate Tidal Volume Pressure FiO2 PEEP   Assist control   400 ml    40 % 5 cm H20     Peak airway pressure: 26 cm H2O    Minute ventilation: 8.16 l/min        MEDS: Reviewed    Chest X-Ray:  CXR Results  (Last 48 hours)                 09/06/22 1627  XR CHEST PORT Final result    Impression:  No pneumothorax status post new double lumen catheter on the right. .  . Narrative:  INDICATION:  New HD line Placement        EXAM: Chest single view. COMPARISON: Earlier same day. FINDINGS: A single frontal view of the chest at 1623 hours shows double lumen   catheter from the right with its tip projecting over the right atrium, new since   the prior study with no pneumothorax. ET tube stable. Gastric tube stable. Stable interstitial prominence with patchy infiltrate or atelectasis in the left   lung base. .  The heart, mediastinum and pulmonary vasculature are stable . The   bony thorax is unremarkable for age. Steven Dimas 09/06/22 1512  XR CHEST PORT Final result    Impression:  1. Satisfactory positioning of endotracheal tube. No pneumothorax. Mild   bibasilar atelectasis. Narrative:  EXAM:  XR CHEST PORT       INDICATION:   eval ett       COMPARISON: Chest radiograph 9/6/2022. FINDINGS: AP radiograph of the chest was obtained.        Endotracheal tube terminates 5.0 cm above the kian. Gastric tube extends into   the abdomen out of field of view. There is mild bibasilar atelectasis. No   significant pleural effusion. No pneumothorax. Heart size is within normal   limits. Surgical clips in the left axilla. No acute osseous abnormality. Multidisciplinary Rounds Completed: Yes    ABCDEF Bundle/Checklist Completed:  Yes    DISPOSITION  ICU    Critical Care Time  The patient is critically ill with septic shock. If I do not acutely intervene upon these illnesses, the patient's life is in danger. These illnesses have required me to: (1) perform high complexity decision making for assessment and support; (2) assess the patient via video; (3) personally review the medical record and laboratory and diagnostic imaging results; (4) actively titrate high-alert medications; (5) manage the ventilator and actively titrate oxygen; (6) discuss this patient's case management with other healthcare providers; and (7) apply and interpret advanced monitoring techniques. As a result of this, I personally spent 35 minutes providing critical care services exclusively to this patient. This was in exclusion of the time spent performing procedures or teaching.     Jamie Figuereod DO, [address] [facility] Critical Care  9/8/2022

## 2022-09-09 ENCOUNTER — APPOINTMENT (OUTPATIENT)
Dept: CT IMAGING | Age: 62
DRG: 444 | End: 2022-09-09
Attending: INTERNAL MEDICINE
Payer: MEDICAID

## 2022-09-09 ENCOUNTER — APPOINTMENT (OUTPATIENT)
Dept: NON INVASIVE DIAGNOSTICS | Age: 62
DRG: 444 | End: 2022-09-09
Attending: INTERNAL MEDICINE
Payer: MEDICAID

## 2022-09-09 ENCOUNTER — APPOINTMENT (OUTPATIENT)
Dept: GENERAL RADIOLOGY | Age: 62
DRG: 444 | End: 2022-09-09
Attending: NURSE PRACTITIONER
Payer: MEDICAID

## 2022-09-09 LAB
ALBUMIN SERPL-MCNC: 2 G/DL (ref 3.5–5)
ALBUMIN/GLOB SERPL: 0.7 {RATIO} (ref 1.1–2.2)
ALP SERPL-CCNC: 63 U/L (ref 45–117)
ALT SERPL-CCNC: 12 U/L (ref 12–78)
ANION GAP SERPL CALC-SCNC: 10 MMOL/L (ref 5–15)
ANION GAP SERPL CALC-SCNC: 6 MMOL/L (ref 5–15)
ANION GAP SERPL CALC-SCNC: 8 MMOL/L (ref 5–15)
ARTERIAL PATENCY WRIST A: YES
AST SERPL-CCNC: 27 U/L (ref 15–37)
ATRIAL RATE: 104 BPM
ATRIAL RATE: 127 BPM
BACTERIA SPEC CULT: NORMAL
BACTERIA SPEC CULT: NORMAL
BASE DEFICIT BLDA-SCNC: 4.2 MMOL/L
BDY SITE: ABNORMAL
BILIRUB DIRECT SERPL-MCNC: 0.3 MG/DL (ref 0–0.2)
BILIRUB SERPL-MCNC: 0.6 MG/DL (ref 0.2–1)
BUN SERPL-MCNC: 24 MG/DL (ref 6–20)
BUN SERPL-MCNC: 26 MG/DL (ref 6–20)
BUN SERPL-MCNC: 27 MG/DL (ref 6–20)
BUN/CREAT SERPL: 10 (ref 12–20)
BUN/CREAT SERPL: 9 (ref 12–20)
BUN/CREAT SERPL: 9 (ref 12–20)
CALCIUM SERPL-MCNC: 7.5 MG/DL (ref 8.5–10.1)
CALCIUM SERPL-MCNC: 7.5 MG/DL (ref 8.5–10.1)
CALCIUM SERPL-MCNC: 7.6 MG/DL (ref 8.5–10.1)
CALCULATED P AXIS, ECG09: 61 DEGREES
CALCULATED R AXIS, ECG10: 33 DEGREES
CALCULATED R AXIS, ECG10: 49 DEGREES
CALCULATED T AXIS, ECG11: 102 DEGREES
CALCULATED T AXIS, ECG11: 171 DEGREES
CHLORIDE SERPL-SCNC: 102 MMOL/L (ref 97–108)
CHLORIDE SERPL-SCNC: 104 MMOL/L (ref 97–108)
CHLORIDE SERPL-SCNC: 106 MMOL/L (ref 97–108)
CO2 SERPL-SCNC: 24 MMOL/L (ref 21–32)
CO2 SERPL-SCNC: 25 MMOL/L (ref 21–32)
CO2 SERPL-SCNC: 26 MMOL/L (ref 21–32)
CREAT SERPL-MCNC: 2.42 MG/DL (ref 0.7–1.3)
CREAT SERPL-MCNC: 2.87 MG/DL (ref 0.7–1.3)
CREAT SERPL-MCNC: 3.09 MG/DL (ref 0.7–1.3)
DIAGNOSIS, 93000: NORMAL
DIAGNOSIS, 93000: NORMAL
ECHO AO ARCH DIAM: 2.3 CM
ECHO AO ASC DIAM: 3.1 CM
ECHO AO ASCENDING AORTA INDEX: 1.58 CM/M2
ECHO AV AREA PEAK VELOCITY: 1.8 CM2
ECHO AV AREA VTI: 2.2 CM2
ECHO AV AREA/BSA PEAK VELOCITY: 0.9 CM2/M2
ECHO AV AREA/BSA VTI: 1.1 CM2/M2
ECHO AV MEAN GRADIENT: 13 MMHG
ECHO AV MEAN VELOCITY: 1.6 M/S
ECHO AV PEAK GRADIENT: 26 MMHG
ECHO AV PEAK VELOCITY: 2.5 M/S
ECHO AV VELOCITY RATIO: 0.6
ECHO AV VTI: 35 CM
ECHO LA DIAMETER INDEX: 1.48 CM/M2
ECHO LA DIAMETER: 2.9 CM
ECHO LA VOL 2C: 31 ML (ref 18–58)
ECHO LA VOL 4C: 39 ML (ref 18–58)
ECHO LA VOL BP: 35 ML (ref 18–58)
ECHO LA VOL/BSA BIPLANE: 18 ML/M2 (ref 16–34)
ECHO LA VOLUME AREA LENGTH: 38 ML
ECHO LA VOLUME INDEX A2C: 16 ML/M2 (ref 16–34)
ECHO LA VOLUME INDEX A4C: 20 ML/M2 (ref 16–34)
ECHO LA VOLUME INDEX AREA LENGTH: 19 ML/M2 (ref 16–34)
ECHO LV E' LATERAL VELOCITY: 7 CM/S
ECHO LV E' SEPTAL VELOCITY: 5 CM/S
ECHO LV FRACTIONAL SHORTENING: 27 % (ref 28–44)
ECHO LV INTERNAL DIMENSION DIASTOLE INDEX: 2.3 CM/M2
ECHO LV INTERNAL DIMENSION DIASTOLIC: 4.5 CM (ref 4.2–5.9)
ECHO LV INTERNAL DIMENSION SYSTOLIC INDEX: 1.68 CM/M2
ECHO LV INTERNAL DIMENSION SYSTOLIC: 3.3 CM
ECHO LV IVSD: 0.8 CM (ref 0.6–1)
ECHO LV MASS 2D: 113.6 G (ref 88–224)
ECHO LV MASS INDEX 2D: 58 G/M2 (ref 49–115)
ECHO LV POSTERIOR WALL DIASTOLIC: 0.8 CM (ref 0.6–1)
ECHO LV RELATIVE WALL THICKNESS RATIO: 0.36
ECHO LVOT AREA: 3.1 CM2
ECHO LVOT AV VTI INDEX: 0.72
ECHO LVOT DIAM: 2 CM
ECHO LVOT MEAN GRADIENT: 6 MMHG
ECHO LVOT PEAK GRADIENT: 11 MMHG
ECHO LVOT PEAK VELOCITY: 1.5 M/S
ECHO LVOT STROKE VOLUME INDEX: 40.5 ML/M2
ECHO LVOT SV: 79.4 ML
ECHO LVOT VTI: 25.3 CM
ECHO MV A VELOCITY: 0.79 M/S
ECHO MV E DECELERATION TIME (DT): 194.4 MS
ECHO MV E VELOCITY: 0.59 M/S
ECHO MV E/A RATIO: 0.75
ECHO MV E/E' LATERAL: 8.43
ECHO MV E/E' RATIO (AVERAGED): 10.11
ECHO MV E/E' SEPTAL: 11.8
ECHO PULMONARY ARTERY END DIASTOLIC PRESSURE: 4 MMHG
ECHO PV MAX VELOCITY: 1.4 M/S
ECHO PV PEAK GRADIENT: 8 MMHG
ECHO PV REGURGITANT MAX VELOCITY: 0.9 M/S
ECHO RV INTERNAL DIMENSION: 3.4 CM
ECHO RV TAPSE: 1.8 CM (ref 1.7–?)
ECHO TV REGURGITANT MAX VELOCITY: 1.67 M/S
ECHO TV REGURGITANT PEAK GRADIENT: 14 MMHG
ERYTHROCYTE [DISTWIDTH] IN BLOOD BY AUTOMATED COUNT: 13 % (ref 11.5–14.5)
FIO2 ON VENT: 40 %
GAS FLOW.O2 SETTING OXYMISER: 20 L/MIN
GLOBULIN SER CALC-MCNC: 3 G/DL (ref 2–4)
GLUCOSE BLD STRIP.AUTO-MCNC: 121 MG/DL (ref 65–117)
GLUCOSE BLD STRIP.AUTO-MCNC: 182 MG/DL (ref 65–117)
GLUCOSE BLD-MCNC: 168 MG/DL (ref 65–100)
GLUCOSE SERPL-MCNC: 164 MG/DL (ref 65–100)
GLUCOSE SERPL-MCNC: 197 MG/DL (ref 65–100)
GLUCOSE SERPL-MCNC: 214 MG/DL (ref 65–100)
HCO3 BLDA-SCNC: 19 MMOL/L (ref 22–26)
HCT VFR BLD AUTO: 27.7 % (ref 36.6–50.3)
HGB BLD-MCNC: 9.4 G/DL (ref 12.1–17)
MAGNESIUM SERPL-MCNC: 2.2 MG/DL (ref 1.6–2.4)
MAGNESIUM SERPL-MCNC: 2.4 MG/DL (ref 1.6–2.4)
MAGNESIUM SERPL-MCNC: 2.4 MG/DL (ref 1.6–2.4)
MCH RBC QN AUTO: 31.8 PG (ref 26–34)
MCHC RBC AUTO-ENTMCNC: 33.9 G/DL (ref 30–36.5)
MCV RBC AUTO: 93.6 FL (ref 80–99)
NRBC # BLD: 0 K/UL (ref 0–0.01)
NRBC BLD-RTO: 0 PER 100 WBC
P-R INTERVAL, ECG05: 124 MS
PCO2 BLDA: 31 MMHG (ref 35–45)
PEEP RESPIRATORY: 5 CM[H2O]
PH BLDA: 7.41 [PH] (ref 7.35–7.45)
PHOSPHATE SERPL-MCNC: 2.9 MG/DL (ref 2.6–4.7)
PLATELET # BLD AUTO: 124 K/UL (ref 150–400)
PMV BLD AUTO: 9.6 FL (ref 8.9–12.9)
PO2 BLDA: 97 MMHG (ref 80–100)
POTASSIUM SERPL-SCNC: 3.9 MMOL/L (ref 3.5–5.1)
POTASSIUM SERPL-SCNC: 4.3 MMOL/L (ref 3.5–5.1)
POTASSIUM SERPL-SCNC: 4.5 MMOL/L (ref 3.5–5.1)
PROT SERPL-MCNC: 5 G/DL (ref 6.4–8.2)
Q-T INTERVAL, ECG07: 350 MS
Q-T INTERVAL, ECG07: 352 MS
QRS DURATION, ECG06: 60 MS
QRS DURATION, ECG06: 76 MS
QTC CALCULATION (BEZET), ECG08: 460 MS
QTC CALCULATION (BEZET), ECG08: 543 MS
RBC # BLD AUTO: 2.96 M/UL (ref 4.1–5.7)
SAO2 % BLD: 98 % (ref 92–97)
SAO2% DEVICE SAO2% SENSOR NAME: ABNORMAL
SERVICE CMNT-IMP: ABNORMAL
SERVICE CMNT-IMP: NORMAL
SODIUM SERPL-SCNC: 136 MMOL/L (ref 136–145)
SODIUM SERPL-SCNC: 137 MMOL/L (ref 136–145)
SODIUM SERPL-SCNC: 138 MMOL/L (ref 136–145)
SPECIMEN SITE: ABNORMAL
VENTRICULAR RATE, ECG03: 104 BPM
VENTRICULAR RATE, ECG03: 143 BPM
VT SETTING VENT: 400 ML
WBC # BLD AUTO: 5.8 K/UL (ref 4.1–11.1)

## 2022-09-09 PROCEDURE — 93306 TTE W/DOPPLER COMPLETE: CPT

## 2022-09-09 PROCEDURE — 94664 DEMO&/EVAL PT USE INHALER: CPT

## 2022-09-09 PROCEDURE — 94762 N-INVAS EAR/PLS OXIMTRY CONT: CPT

## 2022-09-09 PROCEDURE — 74011000250 HC RX REV CODE- 250: Performed by: NURSE PRACTITIONER

## 2022-09-09 PROCEDURE — 80048 BASIC METABOLIC PNL TOTAL CA: CPT

## 2022-09-09 PROCEDURE — 74011000250 HC RX REV CODE- 250: Performed by: INTERNAL MEDICINE

## 2022-09-09 PROCEDURE — 65610000006 HC RM INTENSIVE CARE

## 2022-09-09 PROCEDURE — 03HY32Z INSERTION OF MONITORING DEVICE INTO UPPER ARTERY, PERCUTANEOUS APPROACH: ICD-10-PCS | Performed by: INTERNAL MEDICINE

## 2022-09-09 PROCEDURE — 2709999900 HC NON-CHARGEABLE SUPPLY

## 2022-09-09 PROCEDURE — 80076 HEPATIC FUNCTION PANEL: CPT

## 2022-09-09 PROCEDURE — 77030029065 HC DRSG HEMO QCLOT ZMED -B

## 2022-09-09 PROCEDURE — 74011636637 HC RX REV CODE- 636/637: Performed by: INTERNAL MEDICINE

## 2022-09-09 PROCEDURE — 94640 AIRWAY INHALATION TREATMENT: CPT

## 2022-09-09 PROCEDURE — 82803 BLOOD GASES ANY COMBINATION: CPT

## 2022-09-09 PROCEDURE — 77030013797 HC KT TRNSDUC PRSSR EDWD -A

## 2022-09-09 PROCEDURE — 82962 GLUCOSE BLOOD TEST: CPT

## 2022-09-09 PROCEDURE — 90945 DIALYSIS ONE EVALUATION: CPT

## 2022-09-09 PROCEDURE — 74011250636 HC RX REV CODE- 250/636: Performed by: INTERNAL MEDICINE

## 2022-09-09 PROCEDURE — 93306 TTE W/DOPPLER COMPLETE: CPT | Performed by: STUDENT IN AN ORGANIZED HEALTH CARE EDUCATION/TRAINING PROGRAM

## 2022-09-09 PROCEDURE — 82542 COL CHROMOTOGRAPHY QUAL/QUAN: CPT

## 2022-09-09 PROCEDURE — 83735 ASSAY OF MAGNESIUM: CPT

## 2022-09-09 PROCEDURE — 84100 ASSAY OF PHOSPHORUS: CPT

## 2022-09-09 PROCEDURE — 77030018798 HC PMP KT ENTRL FED COVD -A

## 2022-09-09 PROCEDURE — C1751 CATH, INF, PER/CENT/MIDLINE: HCPCS

## 2022-09-09 PROCEDURE — C9113 INJ PANTOPRAZOLE SODIUM, VIA: HCPCS | Performed by: INTERNAL MEDICINE

## 2022-09-09 PROCEDURE — 94003 VENT MGMT INPAT SUBQ DAY: CPT

## 2022-09-09 PROCEDURE — 74011000258 HC RX REV CODE- 258: Performed by: INTERNAL MEDICINE

## 2022-09-09 PROCEDURE — 74011250637 HC RX REV CODE- 250/637: Performed by: INTERNAL MEDICINE

## 2022-09-09 PROCEDURE — 77010033678 HC OXYGEN DAILY

## 2022-09-09 PROCEDURE — 74011250636 HC RX REV CODE- 250/636: Performed by: NURSE PRACTITIONER

## 2022-09-09 PROCEDURE — 71045 X-RAY EXAM CHEST 1 VIEW: CPT

## 2022-09-09 PROCEDURE — 93005 ELECTROCARDIOGRAM TRACING: CPT

## 2022-09-09 PROCEDURE — 70450 CT HEAD/BRAIN W/O DYE: CPT

## 2022-09-09 PROCEDURE — 74011000258 HC RX REV CODE- 258: Performed by: NURSE PRACTITIONER

## 2022-09-09 PROCEDURE — 36600 WITHDRAWAL OF ARTERIAL BLOOD: CPT

## 2022-09-09 PROCEDURE — 85027 COMPLETE CBC AUTOMATED: CPT

## 2022-09-09 PROCEDURE — 74011250636 HC RX REV CODE- 250/636

## 2022-09-09 PROCEDURE — 36415 COLL VENOUS BLD VENIPUNCTURE: CPT

## 2022-09-09 RX ORDER — IPRATROPIUM BROMIDE AND ALBUTEROL SULFATE 2.5; .5 MG/3ML; MG/3ML
3 SOLUTION RESPIRATORY (INHALATION)
Status: DISCONTINUED | OUTPATIENT
Start: 2022-09-09 | End: 2022-09-13

## 2022-09-09 RX ORDER — MIDAZOLAM HYDROCHLORIDE 1 MG/ML
5 INJECTION, SOLUTION INTRAMUSCULAR; INTRAVENOUS ONCE
Status: COMPLETED | OUTPATIENT
Start: 2022-09-09 | End: 2022-09-09

## 2022-09-09 RX ORDER — MIDAZOLAM HYDROCHLORIDE 1 MG/ML
2 INJECTION, SOLUTION INTRAMUSCULAR; INTRAVENOUS ONCE
Status: COMPLETED | OUTPATIENT
Start: 2022-09-09 | End: 2022-09-09

## 2022-09-09 RX ORDER — NOREPINEPHRINE BITARTRATE/D5W 8 MG/250ML
.5-5 PLASTIC BAG, INJECTION (ML) INTRAVENOUS
Status: DISCONTINUED | OUTPATIENT
Start: 2022-09-09 | End: 2022-09-13

## 2022-09-09 RX ORDER — QUETIAPINE FUMARATE 25 MG/1
25 TABLET, FILM COATED ORAL 2 TIMES DAILY
Status: DISCONTINUED | OUTPATIENT
Start: 2022-09-09 | End: 2022-09-11

## 2022-09-09 RX ORDER — MIDAZOLAM HYDROCHLORIDE 1 MG/ML
INJECTION, SOLUTION INTRAMUSCULAR; INTRAVENOUS
Status: COMPLETED
Start: 2022-09-09 | End: 2022-09-09

## 2022-09-09 RX ADMIN — IPRATROPIUM BROMIDE AND ALBUTEROL SULFATE 3 ML: .5; 3 SOLUTION RESPIRATORY (INHALATION) at 13:42

## 2022-09-09 RX ADMIN — CALCIUM CHLORIDE, MAGNESIUM CHLORIDE, DEXTROSE MONOHYDRATE, LACTIC ACID, SODIUM CHLORIDE, SODIUM BICARBONATE AND POTASSIUM CHLORIDE: 3.68; 3.05; 22; 5.4; 6.46; 3.09; .314 INJECTION INTRAVENOUS at 22:00

## 2022-09-09 RX ADMIN — DEXMEDETOMIDINE HYDROCHLORIDE 1 MCG/KG/HR: 400 INJECTION INTRAVENOUS at 20:30

## 2022-09-09 RX ADMIN — LEVOTHYROXINE SODIUM 100 MCG: 0.1 TABLET ORAL at 08:15

## 2022-09-09 RX ADMIN — CALCIUM CHLORIDE, MAGNESIUM CHLORIDE, DEXTROSE MONOHYDRATE, LACTIC ACID, SODIUM CHLORIDE, SODIUM BICARBONATE AND POTASSIUM CHLORIDE: 3.68; 3.05; 22; 5.4; 6.46; 3.09; .314 INJECTION INTRAVENOUS at 04:56

## 2022-09-09 RX ADMIN — QUETIAPINE FUMARATE 25 MG: 25 TABLET ORAL at 18:14

## 2022-09-09 RX ADMIN — SODIUM CHLORIDE, POTASSIUM CHLORIDE, SODIUM LACTATE AND CALCIUM CHLORIDE 1000 ML: 600; 310; 30; 20 INJECTION, SOLUTION INTRAVENOUS at 17:55

## 2022-09-09 RX ADMIN — CALCIUM CHLORIDE, MAGNESIUM CHLORIDE, DEXTROSE MONOHYDRATE, LACTIC ACID, SODIUM CHLORIDE, SODIUM BICARBONATE AND POTASSIUM CHLORIDE: 3.68; 3.05; 22; 5.4; 6.46; 3.09; .314 INJECTION INTRAVENOUS at 19:20

## 2022-09-09 RX ADMIN — DEXMEDETOMIDINE HYDROCHLORIDE 1 MCG/KG/HR: 400 INJECTION INTRAVENOUS at 22:40

## 2022-09-09 RX ADMIN — CALCIUM CHLORIDE, MAGNESIUM CHLORIDE, DEXTROSE MONOHYDRATE, LACTIC ACID, SODIUM CHLORIDE, SODIUM BICARBONATE AND POTASSIUM CHLORIDE: 3.68; 3.05; 22; 5.4; 6.46; 3.09; .314 INJECTION INTRAVENOUS at 11:42

## 2022-09-09 RX ADMIN — MIDAZOLAM HYDROCHLORIDE 5 MG: 1 INJECTION, SOLUTION INTRAMUSCULAR; INTRAVENOUS at 13:42

## 2022-09-09 RX ADMIN — IPRATROPIUM BROMIDE AND ALBUTEROL SULFATE 3 ML: .5; 3 SOLUTION RESPIRATORY (INHALATION) at 08:09

## 2022-09-09 RX ADMIN — PIPERACILLIN AND TAZOBACTAM 3.38 G: 3; .375 INJECTION, POWDER, FOR SOLUTION INTRAVENOUS at 04:18

## 2022-09-09 RX ADMIN — NOREPINEPHRINE BITARTRATE 6 MCG/MIN: 1 SOLUTION INTRAVENOUS at 16:25

## 2022-09-09 RX ADMIN — NOREPINEPHRINE BITARTRATE 5 MCG/MIN: 1 SOLUTION INTRAVENOUS at 11:38

## 2022-09-09 RX ADMIN — IPRATROPIUM BROMIDE AND ALBUTEROL SULFATE 3 ML: .5; 3 SOLUTION RESPIRATORY (INHALATION) at 20:39

## 2022-09-09 RX ADMIN — SODIUM CHLORIDE 40 MG: 9 INJECTION, SOLUTION INTRAMUSCULAR; INTRAVENOUS; SUBCUTANEOUS at 08:15

## 2022-09-09 RX ADMIN — CALCIUM CHLORIDE, MAGNESIUM CHLORIDE, DEXTROSE MONOHYDRATE, LACTIC ACID, SODIUM CHLORIDE, SODIUM BICARBONATE AND POTASSIUM CHLORIDE: 3.68; 3.05; 22; 5.4; 6.46; 3.09; .314 INJECTION INTRAVENOUS at 19:17

## 2022-09-09 RX ADMIN — NOREPINEPHRINE BITARTRATE 4 MCG/MIN: 1 SOLUTION INTRAVENOUS at 13:13

## 2022-09-09 RX ADMIN — MIDAZOLAM HYDROCHLORIDE 2 MG: 1 INJECTION, SOLUTION INTRAMUSCULAR; INTRAVENOUS at 17:01

## 2022-09-09 RX ADMIN — CALCIUM CHLORIDE, MAGNESIUM CHLORIDE, DEXTROSE MONOHYDRATE, LACTIC ACID, SODIUM CHLORIDE, SODIUM BICARBONATE AND POTASSIUM CHLORIDE: 3.68; 3.05; 22; 5.4; 6.46; 3.09; .314 INJECTION INTRAVENOUS at 10:56

## 2022-09-09 RX ADMIN — INSULIN GLARGINE 14 UNITS: 100 INJECTION, SOLUTION SUBCUTANEOUS at 08:15

## 2022-09-09 RX ADMIN — SODIUM CHLORIDE, POTASSIUM CHLORIDE, SODIUM LACTATE AND CALCIUM CHLORIDE 1000 ML: 600; 310; 30; 20 INJECTION, SOLUTION INTRAVENOUS at 10:21

## 2022-09-09 RX ADMIN — FENTANYL CITRATE 100 MCG/HR: 50 INJECTION, SOLUTION INTRAMUSCULAR; INTRAVENOUS at 07:21

## 2022-09-09 RX ADMIN — CALCIUM CHLORIDE, MAGNESIUM CHLORIDE, DEXTROSE MONOHYDRATE, LACTIC ACID, SODIUM CHLORIDE, SODIUM BICARBONATE AND POTASSIUM CHLORIDE: 3.68; 3.05; 22; 5.4; 6.46; 3.09; .314 INJECTION INTRAVENOUS at 11:40

## 2022-09-09 RX ADMIN — DEXMEDETOMIDINE HYDROCHLORIDE 0.4 MCG/KG/HR: 400 INJECTION INTRAVENOUS at 07:22

## 2022-09-09 NOTE — DIALYSIS
CRRT / [phone]           Orders   Mode: CVVH restarted @ 2120   Blood Flow Rate: 200 ml/min   Prismasol Dose: 25 ml/kg/hr  PBP: 900 ml/hr  Replacement: 900 ml/hr   Prismasol Concentrate: 4K / 2.5Ca   Blood Warmer Temp: 37*C   Net Fluid Removal: 25 ml/hr            Metrics   BP: 118/77   HR: 92   Access Pressure: -36   Filter Pressure: 103   Return Pressure: 64   TMP: 45   Pressure Drop: 10            Access   Type & Location: RIJ non-tunneled CVC: tegaderm dressing with CHG patch C/D/I, dated 9/6/22. Each catheter limb disinfected for 60 seconds per limb with alcohol swabs and hubs scrubbed with alcohol for 30 sec seconds, followed by 5 second dry time per Hospital P&P. +asp/+flush x 2 ports. Labs   HBsAg (Antigen) / date: Negative 09/08/22        HBsAb (Antibody) / date: Susceptible 09/08/22   Source: [facility]            Safety:   Time Out Done:   2115   Consent obtained/signed: Verified   Education: Intubated/sedated   Primary Nurse Rpt: Nieves Barlow RN      Comments / Plan:   Code status, labs, notes and orders reviewed. In at bedside to restart CVVH due to [facility] test failure\" likely related to large jump in filter pressures & clotted filter, primary RN unable to return blood  mls. Old set discarded in biohazard bin. New HF-1000 filter set up, primed with 1L NS, tested and running well. Lines visible and connections secure with blood warmer supported on return line & set at 37*C. Education & pre/post to primary RN.

## 2022-09-09 NOTE — PROGRESS NOTES
Bedside and Verbal shift change report given to Bhupinder Sanchez, KVNG and Anastasia Dandy, KVNG (oncoming nurse) by Candelario Moya RN (offgoing nurse). Report included the following information SBAR, Kardex, Intake/Output, MAR, Cardiac Rhythm NSR, and Alarm Parameters . 0800 - Assessment, mouth care, turn, assess restraints. CRRT set for factor of 25.    0839 - Karrie Tellez NP assessment. See Mar, stop precedex, fent, and gave her update on femoral line. 0900 - CRRT set for factor of 25. Labs drawn and sent. 3473 - Dr. Sonal Nelson at bedside. Updated on status, he gave orders for BMP/Mag/Phos Q12H, no factor removal, and to not include tube feed or FWF in intake. 1000 - Mouth care, turn, assess restraints. CRRT set for factor of 0.    1012 - IDR - order to update dosing change of levo to 1-5mcg/min    1100 - CRRT set for factor of 0.    1200 - Reassessment, mouth care, turn, assess restraints. CRRT set for factor of 0.    1300 - CRRT set for factor of 0.    1304 - Increase in tremors reported to Karrie Tellez NP. ABG results reviewd. Restarted sedation, Fent at 50 Precedex at 0.2.     1330 -  sustained. Tremors increased. Informed Dr. Jo Luz, performed 12 lead EKG and gave one time push of versed 5mg.    1352 - tremors decrease. Repeat EKG, Karrie Tellez D/C'd the Amio. 1400 - Mouth care, turn, assess restraints. CRRT set for factor of 0.    79 Nelli Manley RN changed filter on CRRT machine. 1510 - CRRT back up and running. 1600 - Reassessment, mouth care, turn, assess restraints. CRRT set for factor of 0.    1700 - CRRT set for factor of 0.    1730 - Karrie Tellez. NP placed LIJTLC and L ART line. 1800 - Mouth care, turn, assess restraints. CRRT set for factor of Ludivina Dole - Dr. Jo Luz updated. CT of head needs to be completed tonight. Shon Aquino updated about CT. We will return blood to go down for CT. Bedside and Verbal shift change report given to [employer], RN (oncoming nurse) by Bhupinder Sanchez RN and Anastasia Dandy, RN (offgoing nurse).  Report included the following information SBAR, Kardex, Intake/Output, MAR, Cardiac Rhythm NSR and ST, and Alarm Parameters .

## 2022-09-09 NOTE — PROGRESS NOTES
[facility]    ICU TEAM Progress Note    Name: Mitzi Ricardo   : 1960   MRN: [id number]   Date: 2022      Subjective:   Progress Note: 2022      Mr Braden Cordoba is a 58 yo male with a PMH of DM, HLD, melanoma (not yet started chemotherapy) who presented on  with dizziness and photosensitivity. He had reported that he newly started taking gabapentin . He was found to have LAVINIA with hyperkalemia requiring CRRT. He also demonstrated profound lactic acidosis. K 7.6, bicarb 4. LA 12.6. He was intubated for airway protection. EEG obtained out of c/f possible seizure activity on , but this was negative for seizure. He has demonstrated hypotension requiring vasopressors and is being treated empirically for septic shock. He initially demonstrated a leukocytosis to 21. CT chest, abdomen, pelvis without acute finding. U/A unrevealing. BLC NGTD. Hepatic panel unremarkable. He did not pass SAT/SBT today due to acute agitation compounded by acute hemodynamic instability during SAT/SBT. Active Problem List:     Problem List  Date Reviewed: 2022            Codes Class    * (Principal) Severe sepsis (HCC) ICD-10-CM: A41.9, R65.20  ICD-9-CM: 038.9, 995.92         Acute renal failure (ARF) (HCC) ICD-10-CM: N17.9  ICD-9-CM: 584.9         Hyperkalemia ICD-10-CM: E87.5  ICD-9-CM: 276.7         Melanoma ([facility] [address] ICD-10-CM: C43.9  ICD-9-CM: 172.9         HTN (hypertension) ICD-10-CM: I10  ICD-9-CM: 401.9         DM (diabetes mellitus) ([facility] [address] ICD-10-CM: E11.9  ICD-9-CM: 250.00         Hyperlipidemia ICD-10-CM: E78.5  ICD-9-CM: 272.4            Past Medical History:      has a past medical history of Hypercholesteremia, Hypertension, Melanoma ([facility] [address] and Thyroid activity decreased. Past Surgical History:      has a past surgical history that includes hx orthopaedic and ir insert non tunl cvc over 5 yrs (2022).     Home Medications:     Prior to Admission medications    Medication Sig Start Date End Date Taking? Authorizing Provider   pravastatin (PRAVACHOL) 10 mg tablet Take 10 mg by mouth daily. Yes Provider, Historical   metFORMIN (GLUMETZA ER) 500 mg TG24 24 hour tablet Take 1,000 mg by mouth two (2) times a day. Yes Provider, Historical   enalapril (VASOTEC) 10 mg tablet Take 5 mg by mouth daily. List provided by family states one half tablet   Yes Provider, Historical   chlorthalidone (HYGROTON) 25 mg tablet Take 25 mg by mouth daily. Yes Provider, Historical   cloNIDine HCL (CATAPRES) 0.1 mg tablet Take 0.1 mg by mouth two (2) times a day. Yes Provider, Historical   atenoloL (TENORMIN) 50 mg tablet Take 25 mg by mouth daily. List from [facility] one half tablet   Yes Provider, Historical   cyclobenzaprine (FLEXERIL) 10 mg tablet Take 10 mg by mouth nightly as needed for Muscle Spasm(s). Yes Provider, Historical   levothyroxine (SYNTHROID) 100 mcg tablet Take 100 mcg by mouth Daily (before breakfast). Yes Provider, Historical   gabapentin (NEURONTIN) 300 mg capsule Take 300 mg by mouth See Admin Instructions. New medication filled 22 for 45 pills - Directions on the bottle: Start with one pill at bedtime then one pill twice daily then one pill three times daily. Yes Other, MD Deandre       Allergies/Social/Family History: Allergies   Allergen Reactions    Enalapril Maleate Other (comments)     Acute renal failure when combined with dehydration    Niacin Other (comments)      Social History     Tobacco Use    Smoking status: Never    Smokeless tobacco: Never   Substance Use Topics    Alcohol use: Not Currently      History reviewed. No pertinent family history.     Review of Systems:     Unable to obtain    Objective:   Vital Signs:  Visit Vitals  BP 99/84   Pulse 99   Temp 98.3 °F (36.8 °C)   Resp 23   Ht 5' 6\" (1.676 m)   Wt 86 kg (189 lb 9.5 oz)   SpO2 100%   BMI 30.60 kg/m²    O2 Flow Rate (L/min): 60 l/min O2 Device: Endotracheal tube, Ventilator Temp (24hrs), Av.9 °F (36.6 °C), Min:97.3 °F (36.3 °C), Max:98.3 °F (36.8 °C)           Intake/Output:     Intake/Output Summary (Last 24 hours) at 9/9/2022 1650  Last data filed at 9/9/2022 1600  Gross per 24 hour   Intake 2675.42 ml   Output 2316 ml   Net 359.42 ml       Physical Exam:    General: Sedated/intubated  Eye:  PERRLA  Neurologic:  RASS -3; intubated/sedated; withdraws to noxious stimuli; + cough/gag; does not follow commands  Neck:Supple; no JVD  Lungs:  CTAB  Heart:  RRR, 2+ pulses, no edema  Abdomen:  Soft, nontender, nondistended  Skin:  c/d/i    LABS AND  DATA: Personally reviewed  Recent Labs     09/09/22  0437 09/08/22  0010   WBC 5.8 7.4   HGB 9.4* 9.6*   HCT 27.7* 26.6*   * 171     Recent Labs     09/09/22  0859 09/09/22  0437 09/07/22  1153 09/07/22  0811    138   < > 140   K 4.3 4.5   < > 4.2    104   < > 94*   CO2 24 26   < > 15*   BUN 26* 27*   < > 81*   CREA 2.87* 3.09*   < > 8.60*   * 197*   < > 262*   CA 7.6* 7.5*   < > 8.0*   MG 2.4 2.2   < > 1.9   PHOS  --   --   --  6.9*    < > = values in this interval not displayed. Recent Labs     09/09/22  1539 09/07/22  0436   AP 63 60   TP 5.0* 5.6*   ALB 2.0* 2.9*   GLOB 3.0 2.7     No results for input(s): INR, PTP, APTT, INREXT in the last 72 hours. Recent Labs     09/07/22  0835   PHI 7.30*   PCO2I 30.3*   PO2I 92   FIO2I 50     No results for input(s): CPK, CKMB, TROIQ, BNPP in the last 72 hours. Ventilator Settings:  Mode Rate Tidal Volume Pressure FiO2 PEEP   Assist control   400 ml    40 % 5 cm H20     Peak airway pressure: 28 cm H2O    Minute ventilation: 8.2 l/min        MEDS: Reviewed    Chest X-Ray: personally reviewed and report checked    Assessment and Plan:     Shock: Possibly septic, favor hypovolemic. No clear etiology of infection. CT chest/abdomen/pelvis without acute finding. U/A unrevealing. Cultures NGTD.   - Added am cortisol for 9/10  - Primary team stopped abx; agree w plan  - Continue to follow cultures  - Responsive to volume; appears clinically dry on exam; IJ collapsable on ultrasound  - Placed arterial line as NBP giving unreliable readings  - LR bolus x2 L - will attempt to wean vasopressor  - TTE ordered -> EF 65-70% with hyperdynamic appearance, normal diastolic function    LAVINIA/Metabolic acidosis: On CRRT  - Continue CRRT running net even  - Renal following, appreciate recs    Mechanical ventilation: Remains intubated for airway protection  - Repeat SAT/SBT tomorrow given he did not pass today - see below for encephalopathy mgmt    Acute toxic metabolic encephalopathy/Delirium  - CT head ordered  - EEG negative  - Increase precdex  - Avoid benzo  - Wean fentanyl    Afib with RVR: Continue amio    DM2: Lantus 14 units/day + SSI lispro    Hypothyroidism: Synthroid    Deconditioning: Unable to participate with PT/OT    Thrombocytopenia: Not likely to be HIT, but ordered POOJA as heparin on hold    CRITICAL CARE CONSULTANT NOTE  I had a face to face encounter with the patient, reviewed and interpreted patient data including clinical events, labs, images, vital signs, I/O's, and examined patient. I have discussed the case and the plan and management of the patient's care with the consulting services, the bedside nurses and the respiratory therapist.      NOTE OF PERSONAL INVOLVEMENT IN CARE   This patient has a high probability of imminent, clinically significant deterioration, which requires the highest level of preparedness to intervene urgently. I participated in the decision-making and personally managed or directed the management of the following life and organ supporting interventions that required my frequent assessment to treat or prevent imminent deterioration. I personally spent 70 minutes of critical care time. This is time spent at this critically ill patient's bedside actively involved in patient care as well as the coordination of care and discussions with the patient's family.   This does not include any procedural time which has been billed separately.     Alex Kay NP  [facility]  9/9/2022

## 2022-09-09 NOTE — PROGRESS NOTES
[address]  YOB: 1960          Assessment & Plan:     Severe oliguric LAVINIA on CVVH  Severe lactic acidosis  Ketoacidosis  Resp failure  Shock on pressors  DM2    Rec:  Continue CVVH, reduce factor to 0  Avoid nephrotoxins if possible  ICU support       Subjective:   CC: LAVINIA  HPI: Anuric, on CVVH. Factor 25 but more unstable and increasing pressors. Remains on vent for resp failure.     ROS: unable to obtain due to pt condition  Current Facility-Administered Medications   Medication Dose Route Frequency    albuterol-ipratropium (DUO-NEB) 2.5 MG-0.5 MG/3 ML  3 mL Nebulization Q6H RT    NOREPINephrine (LEVOPHED) 8 mg in 5% dextrose 250mL (32 mcg/mL) infusion  0.5-50 mcg/min IntraVENous TITRATE    dexmedeTOMidine in 0.9 % NaCl (PRECEDEX) 400 mcg/100 mL (4 mcg/mL) infusion soln  0.1-1.5 mcg/kg/hr IntraVENous TITRATE    fentaNYL citrate (PF) injection 100 mcg  100 mcg IntraVENous Q4H PRN    glucose chewable tablet 16 g  4 Tablet Oral PRN    glucagon (GLUCAGEN) injection 1 mg  1 mg IntraMUSCular PRN    dextrose 10% infusion 0-250 mL  0-250 mL IntraVENous PRN    insulin lispro (HUMALOG) injection   SubCUTAneous Q6H    insulin glargine (LANTUS) injection 14 Units  14 Units SubCUTAneous DAILY    levothyroxine (SYNTHROID) tablet 100 mcg  100 mcg Oral ACB    glucose chewable tablet 16 g  4 Tablet Oral PRN    glucagon (GLUCAGEN) injection 1 mg  1 mg IntraMUSCular PRN    dextrose 10% infusion 0-250 mL  0-250 mL IntraVENous PRN    dextrose 10% infusion 0-250 mL  0-250 mL IntraVENous PRN    bicarbonate dialysis (PRISMASOL) BG K 4/Ca 2.5 5000 ml solution   Extracorporeal DIALYSIS CONTINUOUS    pantoprazole (PROTONIX) 40 mg in 0.9% sodium chloride 10 mL injection  40 mg IntraVENous DAILY    [Held by provider] heparin (porcine) injection 5,000 Units  5,000 Units SubCUTAneous Q12H    propofol (DIPRIVAN) 10 mg/mL infusion  0-50 mcg/kg/min IntraVENous TITRATE    fentaNYL (PF) 1,500 mcg/30 mL (50 mcg/mL) infusion  0-200 mcg/hr IntraVENous TITRATE    lidocaine (XYLOCAINE) 10 mg/mL (1 %) injection 1-20 mL  1-20 mL SubCUTAneous Multiple          Objective:     Vitals:  Blood pressure (!) 72/46, pulse 80, temperature 97.3 °F (36.3 °C), resp. rate 20, height 5' 6\" (1.676 m), weight 86 kg (189 lb 9.5 oz), SpO2 100 %. Temp (24hrs), Av.3 °F (36.3 °C), Min:96.4 °F (35.8 °C), Max:98.2 °F (36.8 °C)      Intake and Output:   07 -  1900  In: 60   Out: -   1901 -  07  In: 5522.6 [I.V.:3282.6]  Out: 8561 [Urine:245]    Physical Exam:               GENERAL ASSESSMENT: Sedated on vent  HEENT: ETT  CHEST: Vent BS  HEART: reg  ABDOMEN: Soft,NT  : +Vega:   EXTREMITY: no EDEMA        ECG/rhythm:    Data Review      Recent Labs     22  1014   TNIPOC <0.04        Recent Labs     22  1521   CPK 75       Recent Labs     22  0859 22  0437 22  0831 22  0423 22  0010 22  1153 22  0811 22  0436 22  1416 22  1010    138 138   < > 138   < > 140 141 139 139   K 4.3 4.5 4.2   < > 4.2   < > 4.2 4.3 5.5* 7.6*    104 100   < > 98   < > 94* 93* 96* 89*   CO2 24 26 26   < > 24   < > 15* 13* <5* 4*   BUN 26* 27* 41*   < > 51*   < > 81* 93* 105* 105*   CREA 2.87* 3.09* 4.30*   < > 5.39*   < > 8.60* 9.78* 11.80* 12.94*   * 197* 199*   < > 188*   < > 262* 322* 281* 160*   PHOS  --   --   --   --   --   --  6.9*  --   --   --    MG 2.4 2.2  --   --  1.9   < > 1.9  --   --  2.7*   CA 7.6* 7.5* 7.3*   < > 7.3*   < > 8.0* 7.7* 8.5 9.7   ALB  --   --   --   --   --   --   --  2.9* 2.8* 4.5   WBC  --  5.8  --   --  7.4  --   --  14.5* 21.1* 21.0*   HGB  --  9.4*  --   --  9.6*  --   --  10.9* 10.8* 12.7   HCT  --  27.7*  --   --  26.6*  --   --  31.9* 35.3* 40.2   PLT  --  124*  --   --  171  --   --  290 316 337    < > = values in this interval not displayed.         No results for input(s): INR, PTP, APTT, INREXT, INREXT in the last 72 hours. Needs: urine analysis, urine sodium, protein and creatinine  No results found for: AMINA ZAMARRIPA        : Martina Bryan MD  9/9/2022        [facility]:  [email or web address]. [employer]  [place] office:  [address] [address]  [place], [address] [place] [address]  Phone: [phone]  Fax :     [phone]    [place] office:  [address] [facility], [address]  Phone - [phone]  Fax - [phone]

## 2022-09-09 NOTE — PROGRESS NOTES
0715 Bedside shift change report given to [facility] .  Report included the following information SBAR, Kardex, ED Summary, Intake/Output, MAR, Accordion, Recent Results, Med Rec Status, and Cardiac Rhythm SR .

## 2022-09-09 NOTE — ROUTINE PROCESS
56- Patient with orders to go for head CT. Both lumens of Otoniel aspirated for clots, negative for clots, and flushed. Pt rinsed back with 167 mL blood from CRRT, no clots visible in tubing or filter. Both lumens capped.

## 2022-09-09 NOTE — PROGRESS NOTES
Brief ICU Nutrition Assessment    Type and Reason for Visit: Reassess    Nutrition Recommendations/Plan:   Brief follow up. Remains intubated, with no plans for extubation today. Rechecking ABGs and hepatic panel. Sedation is off - plan for SAT. Okay to advance TF per intensivist NP. Recheck phos    Trickle Feeds Nepro @ 45 mL/hour  Advance by 10 mL q 4 hours until goal reached  Provide 2 prosource/day, can give 2 simultaneously,   FWF 90 mL q 2 hours     Tube feeds at goal 45 mL/hour provide 1770 kcal (+ Prosource, 1850 kcal, 98% needs); 160g  carbs; 81 g protein (+2 Prosource, 103 g, 99% needs). TF + FWF provides 1807 mL H2O/day.     Vent: 8.18 l/min    Temp (24hrs), Av.5 °F (36.4 °C), Min:96.6 °F (35.9 °C), Max:98.2 °F (36.8 °C)      Lab Results   Component Value Date/Time    GFR est AA 27 (L) 2022 08:59 AM    GFR est non-AA 22 (L) 2022 08:59 AM    Creatinine 2.87 (H) 2022 08:59 AM    BUN 26 (H) 2022 08:59 AM    Sodium 136 2022 08:59 AM    Potassium 4.3 2022 08:59 AM    Chloride 102 2022 08:59 AM    CO2 24 2022 08:59 AM     Lab Results   Component Value Date/Time    Glucose 214 (H) 2022 08:59 AM    Glucose (POC) 168 (H) 2022 01:08 AM    Glucose (POC) 170 (H) 2022 05:49 PM     Magnesium   Date Value Ref Range Status   2022 2.4 1.6 - 2.4 mg/dL Final   2022 2.2 1.6 - 2.4 mg/dL Final   2022 1.9 1.6 - 2.4 mg/dL Final   2022 2.1 1.6 - 2.4 mg/dL Final   2022 2.0 1.6 - 2.4 mg/dL Final     Lab Results   Component Value Date/Time    Calcium 7.6 (L) 2022 08:59 AM    Phosphorus 6.9 (H) 2022 08:11 AM         Estimated Nutrition Needs:   Energy: 9714 ([employer] )  Wt used: Current  Protein: 104-143 (1.5-2.0 g/kg CRRT)  Wt used: Current   Fluid: 1620       Electronically signed by Majo Mendoza MS, RD   Contact: [phone] or via [employer]

## 2022-09-09 NOTE — DIALYSIS
RT / [phone]    Orders   Mode: CVVH - rounding no indication for change at this time   Blood Flow Rate: 200   Prismasol Dose: 1800   Prismasol Concentrate: 4K 2.5 Ca   Blood Warmer Temp: 37.0   Net Fluid Removal: 0     Metrics   BP: 66/53   HR: 80   Access Pressure: -99   Filter Pressure: 183   Return Pressure: 50   TMP: 99   Pressure Drop:      Access   Type & Location: R CVC   Comments:                                        Labs   HBsAg (Antigen) / date:        Negative 9/8/22                                       HBsAb (Antibody) / date: Susceptible 9/8/22   Source: epic     Safety:   Time Out Done:   (Time) 07:20   Consent obtained/signed: yes   Education: Pt sedated   Primary Nurse Rpt Pre: John Mandujano   Primary Nurse Rpt Post: John Mandujano     Comments / Plan:     Orders, labs and code status reviewed,  Rounding for CVVH with no indications for change at this time.

## 2022-09-09 NOTE — PROGRESS NOTES
Hospitalist Progress Note      NAME: Guillermo Zamarripa   :  1960  MRM:  [id number]    Date/Time: 2022         Assessment / Plan:       62M hx DM w/ CKD3 p/w dyspnea, nausea, vomiting. #Refractory Acidemia / Kerri Fercho / lactic acidosis: Presented with pH 6.77, profoundly metabolic w/ lactate > 18. Euglycemic but bOHb quite elevated so seems more likely this was an atypical presentation of DKA confounded by acute renal failure and possible metformin toxicity. As yet no infection identified, no ischemia identified. Has improved with insulin gtt, CRRT, bicarb gtt. Gap 46 on arrival, now closed and lactate cleared   - Transitioned to basal bolus insulin and tolerating    #Acute Renal Failure: Presented with Cr 12.94 (recently 1.3), now improved to 3.09 today but on CRRT. Likely 2/2 profound volume depletion in s/o DKA vs viral GI illness. No source of infection yet identified including CT C/A/P. Minimal UOP, but slowly improving   - Renal following for CRRT   - Anticipate he will need iHD     #Shock, potentially Septic: No clear source of infx including CT C/A/P. Presented with leukocytosis, bandemia. Anuric. No diarrhea. Doubt menignitis/encephalitis. - Broadly antibiosed, no clear source elucidated as yet so will dc abx     - NE down to 6, hopeful to wean today as well   - Might consider midodrine, but thus far weaning well    #Respiratory Failure: Intubated for airway protection, severe met acidosis. Presented with very low pCO2, unable to maintain. Presently on 5/40% satting well. Wheezes today. Met acidosis has improved significantly so may actually be able to sufficiently compensate if extubated   - Wean sedation, consider SBT today   - Add q6 nebs   - Start seroquel as has been hard to wean sedation    #AFwRVR: New this afternoon.    - Amio   - Echo    #DM2:  A1c only 6.6%   - Glar 15u, SSI     #Metabolic Encephalopathy: 2/2 profound metabolic acidosis   - Tx as above   - SAT today; use precedex    #Seizure like activity: Noted on exam yesterday but EEG negative. Potentially insufficient analgosedation, pain   - Continue to monitor   - Hopeful to wean sedation today    #Thrombocytopenia: Several days after hep. Less likely HIT, but will hold [facility] for now          Total CC time exclusive of procedures: 40 min                 Care Plan discussed with: Family, Care Manager, Nursing Staff, and Consultant/Specialist    Discussed:  Care Plan    Prophylaxis:  SCD    Disposition:  SNF/LTC           ___________________________________________________    Attending Physician: Danyel Lugo MD        Subjective:     Chief Complaint:  No acute events overnight. Pressor requirements improving. Minimal vent settings but wheezes today appreciated. UP up to 10 cc/hr    ROS:  Intubated, sedated          Objective:       Vitals:          Last 24hrs VS reviewed since prior progress note. Most recent are:    Visit Vitals  BP (!) 72/46   Pulse 80   Temp 97.3 °F (36.3 °C)   Resp 20   Ht 5' 6\" (1.676 m)   Wt 86 kg (189 lb 9.5 oz)   SpO2 100%   BMI 30.60 kg/m²     SpO2 Readings from Last 6 Encounters:   09/09/22 100%    O2 Flow Rate (L/min): 60 l/min     Intake/Output Summary (Last 24 hours) at 9/9/2022 0811  Last data filed at 9/9/2022 0700  Gross per 24 hour   Intake 3617.61 ml   Output 2997 ml   Net 620.61 ml            Exam:     Physical Exam:    Gen:  Intubated, sedated  HEENT:  Pink conjunctivae, PERRL, moist mucous membranes  Neck:  Supple, without masses, thyroid non-tender  Resp:  No accessory muscle use, diffuse exp wheezes  Card:  No murmurs, normal S1, S2 without thrills, bruits or peripheral edema  Abd:  Soft, non-tender, non-distended, normoactive bowel sounds are present  Musc:  No cyanosis or clubbing  Skin:  No rashes or ulcers, skin turgor is good  Neuro:  Intubated, sedated  Psych:   Intubated, sedated       Medications Reviewed: (see below)    Lab Data Reviewed: (see below)    ______________________________________________________________________    Medications:     Current Facility-Administered Medications   Medication Dose Route Frequency    albuterol-ipratropium (DUO-NEB) 2.5 MG-0.5 MG/3 ML  3 mL Nebulization Q6H RT    NOREPINephrine (LEVOPHED) 8 mg in 5% dextrose 250mL (32 mcg/mL) infusion  0.5-50 mcg/min IntraVENous TITRATE    dexmedeTOMidine in 0.9 % NaCl (PRECEDEX) 400 mcg/100 mL (4 mcg/mL) infusion soln  0.1-1.5 mcg/kg/hr IntraVENous TITRATE    fentaNYL citrate (PF) injection 100 mcg  100 mcg IntraVENous Q4H PRN    glucose chewable tablet 16 g  4 Tablet Oral PRN    glucagon (GLUCAGEN) injection 1 mg  1 mg IntraMUSCular PRN    dextrose 10% infusion 0-250 mL  0-250 mL IntraVENous PRN    insulin lispro (HUMALOG) injection   SubCUTAneous Q6H    insulin glargine (LANTUS) injection 14 Units  14 Units SubCUTAneous DAILY    levothyroxine (SYNTHROID) tablet 100 mcg  100 mcg Oral ACB    glucose chewable tablet 16 g  4 Tablet Oral PRN    glucagon (GLUCAGEN) injection 1 mg  1 mg IntraMUSCular PRN    dextrose 10% infusion 0-250 mL  0-250 mL IntraVENous PRN    dextrose 10% infusion 0-250 mL  0-250 mL IntraVENous PRN    bicarbonate dialysis (PRISMASOL) BG K 4/Ca 2.5 5000 ml solution   Extracorporeal DIALYSIS CONTINUOUS    pantoprazole (PROTONIX) 40 mg in 0.9% sodium chloride 10 mL injection  40 mg IntraVENous DAILY    [Held by provider] heparin (porcine) injection 5,000 Units  5,000 Units SubCUTAneous Q12H    propofol (DIPRIVAN) 10 mg/mL infusion  0-50 mcg/kg/min IntraVENous TITRATE    fentaNYL (PF) 1,500 mcg/30 mL (50 mcg/mL) infusion  0-200 mcg/hr IntraVENous TITRATE    piperacillin-tazobactam (ZOSYN) 3.375 g in 0.9% sodium chloride (MBP/ADV) 100 mL MBP  3.375 g IntraVENous Q8H    lidocaine (XYLOCAINE) 10 mg/mL (1 %) injection 1-20 mL  1-20 mL SubCUTAneous Multiple    vancomycin (VANCOCIN) 1,000 mg in 0.9% sodium chloride 250 mL ([id number])  1,000 mg IntraVENous Q24H            Lab Review:     Recent Labs     09/09/22 0437 09/08/22  0010 09/07/22  0436   WBC 5.8 7.4 14.5*   HGB 9.4* 9.6* 10.9*   HCT 27.7* 26.6* 31.9*   * 171 290       Recent Labs     09/09/22  0437 09/08/22  0831 09/08/22  0423 09/08/22  0010 09/07/22 2019 09/07/22  1153 09/07/22  0811 09/07/22  0436 09/06/22  1416 09/06/22  1010    138 139 138 139   < > 140 141 139 139   K 4.5 4.2 4.1 4.2 4.4   < > 4.2 4.3 5.5* 7.6*    100 100 98 99   < > 94* 93* 96* 89*   CO2 26 26 27 24 21   < > 15* 13* <5* 4*   * 199* 161* 188* 212*   < > 262* 322* 281* 160*   BUN 27* 41* 46* 51* 56*   < > 81* 93* 105* 105*   CREA 3.09* 4.30* 4.78* 5.39* 5.68*   < > 8.60* 9.78* 11.80* 12.94*   CA 7.5* 7.3* 7.3* 7.3* 7.5*   < > 8.0* 7.7* 8.5 9.7   MG 2.2  --   --  1.9 2.1   < > 1.9  --   --  2.7*   PHOS  --   --   --   --   --   --  6.9*  --   --   --    ALB  --   --   --   --   --   --   --  2.9* 2.8* 4.5   ALT  --   --   --   --   --   --   --  17 17 7*    < > = values in this interval not displayed.        No components found for: [place]

## 2022-09-09 NOTE — PROCEDURES
[facility]      Procedure Note - Arterial Access:   Performed by Shawna Guerra NP. Immediately prior to the procedure, the patient was reevaluated and found suitable for the planned procedure and any planned medications. Immediately prior to the procedure a time out was called to verify the correct patient, procedure, equipment, staff, and marking as appropriate. Central line Bundle:  Full sterile barrier precautions used. 5 mL 1% Lidocaine placed at insertion site. Shock    Procedure Location:  ICU  Condition: Emergency. Consent:  YES. Method: Seldinger technique. Site Prep: ChloraPrep. Procedure: Arterial Catheter Insertion in Left, Radial Artery   Catheter inserted into a new site. Number of Attempts:  1 Indication: Monitoring. There was bright red, pulsatile blood return. Femoral Site? no. If Yes, reason femoral site was chosen: na  Catheter secured. Biopatch in place? no. Sterile Bio-occlusive dressing placed. Complication None. The procedure was tolerated well. Shawna Guerra NP   Critical Care Medicine  [facility]  .

## 2022-09-09 NOTE — DIALYSIS
CRRT / [phone]    Orders   Mode: CVVH restart   Blood Flow Rate: 200   Prismasol Dose: 25 ml/kg/hr  PBP: 900 ml/hr  Replacement: 900 ml/hr   Prismasol Concentrate: 4K 2.5 CA   Blood Warmer Temp: 37   Net Fluid Removal: 25 ml/hr     Metrics   BP: 104/67   HR: 94   Access Pressure: -37   Filter Pressure: 77   Return Pressure: 43   TMP: 37   Pressure Drop: 9     Access   Type & Location: Right neck arslan   Comments:                                        Labs   HBsAg (Antigen) / date:         Negative 9/8/22                                      HBsAb (Antibody) / date: Susceptible 9/8/22   Source: epic     Safety:   Time Out Done:   (Time) 15:05   Consent obtained/signed: yes   Education: Pt sedated, family educated   Primary Nurse Rpt Pre: Omar Talavera RN   Primary Nurse Rpt Post: Omar Talavera RN     Comments / Plan:  Code status, labs, notes and orders reviewed. In at bedside to restart CVVH due to jump in pressures and clotting. Able to rinse back all of his blood. Old set discarded in biohazard bin. New HF-1000 filter set up, primed with 1L NS, tested and running well. Lines visible and connections secure with blood warmer supported on return line & set at 37*C. Education & pre/post to primary RN.

## 2022-09-10 ENCOUNTER — APPOINTMENT (OUTPATIENT)
Dept: GENERAL RADIOLOGY | Age: 62
DRG: 444 | End: 2022-09-10
Attending: INTERNAL MEDICINE
Payer: MEDICAID

## 2022-09-10 LAB
ANION GAP SERPL CALC-SCNC: 9 MMOL/L (ref 5–15)
BUN SERPL-MCNC: 22 MG/DL (ref 6–20)
BUN/CREAT SERPL: 10 (ref 12–20)
CALCIUM SERPL-MCNC: 7.4 MG/DL (ref 8.5–10.1)
CHLORIDE SERPL-SCNC: 105 MMOL/L (ref 97–108)
CO2 SERPL-SCNC: 23 MMOL/L (ref 21–32)
CORTIS SERPL-MCNC: 14.1 UG/DL
CREAT SERPL-MCNC: 2.14 MG/DL (ref 0.7–1.3)
ERYTHROCYTE [DISTWIDTH] IN BLOOD BY AUTOMATED COUNT: 12.8 % (ref 11.5–14.5)
FERRITIN SERPL-MCNC: 394 NG/ML (ref 26–388)
FOLATE SERPL-MCNC: 3.1 NG/ML (ref 5–21)
GLUCOSE BLD STRIP.AUTO-MCNC: 151 MG/DL (ref 65–117)
GLUCOSE BLD STRIP.AUTO-MCNC: 153 MG/DL (ref 65–117)
GLUCOSE BLD STRIP.AUTO-MCNC: 155 MG/DL (ref 65–117)
GLUCOSE BLD STRIP.AUTO-MCNC: 161 MG/DL (ref 65–117)
GLUCOSE BLD STRIP.AUTO-MCNC: 223 MG/DL (ref 65–117)
GLUCOSE BLD STRIP.AUTO-MCNC: 242 MG/DL (ref 65–117)
GLUCOSE SERPL-MCNC: 209 MG/DL (ref 65–100)
HCT VFR BLD AUTO: 24 % (ref 36.6–50.3)
HGB BLD-MCNC: 8.2 G/DL (ref 12.1–17)
IRON SATN MFR SERPL: 38 % (ref 20–50)
IRON SERPL-MCNC: 54 UG/DL (ref 35–150)
MAGNESIUM SERPL-MCNC: 2.7 MG/DL (ref 1.6–2.4)
MCH RBC QN AUTO: 32 PG (ref 26–34)
MCHC RBC AUTO-ENTMCNC: 34.2 G/DL (ref 30–36.5)
MCV RBC AUTO: 93.8 FL (ref 80–99)
NRBC # BLD: 0 K/UL (ref 0–0.01)
NRBC BLD-RTO: 0 PER 100 WBC
PHOSPHATE SERPL-MCNC: 2.8 MG/DL (ref 2.6–4.7)
PLATELET # BLD AUTO: 95 K/UL (ref 150–400)
PMV BLD AUTO: 9.7 FL (ref 8.9–12.9)
POTASSIUM SERPL-SCNC: 4.2 MMOL/L (ref 3.5–5.1)
RBC # BLD AUTO: 2.56 M/UL (ref 4.1–5.7)
SERVICE CMNT-IMP: ABNORMAL
SODIUM SERPL-SCNC: 137 MMOL/L (ref 136–145)
TIBC SERPL-MCNC: 144 UG/DL (ref 250–450)
VIT B12 SERPL-MCNC: 119 PG/ML (ref 193–986)
WBC # BLD AUTO: 4.4 K/UL (ref 4.1–11.1)

## 2022-09-10 PROCEDURE — 74011000250 HC RX REV CODE- 250: Performed by: INTERNAL MEDICINE

## 2022-09-10 PROCEDURE — 85027 COMPLETE CBC AUTOMATED: CPT

## 2022-09-10 PROCEDURE — 36415 COLL VENOUS BLD VENIPUNCTURE: CPT

## 2022-09-10 PROCEDURE — 77010033678 HC OXYGEN DAILY

## 2022-09-10 PROCEDURE — 83540 ASSAY OF IRON: CPT

## 2022-09-10 PROCEDURE — 74011636637 HC RX REV CODE- 636/637: Performed by: INTERNAL MEDICINE

## 2022-09-10 PROCEDURE — 82746 ASSAY OF FOLIC ACID SERUM: CPT

## 2022-09-10 PROCEDURE — 82607 VITAMIN B-12: CPT

## 2022-09-10 PROCEDURE — 94003 VENT MGMT INPAT SUBQ DAY: CPT

## 2022-09-10 PROCEDURE — 84100 ASSAY OF PHOSPHORUS: CPT

## 2022-09-10 PROCEDURE — 82728 ASSAY OF FERRITIN: CPT

## 2022-09-10 PROCEDURE — 0BH17EZ INSERTION OF ENDOTRACHEAL AIRWAY INTO TRACHEA, VIA NATURAL OR ARTIFICIAL OPENING: ICD-10-PCS | Performed by: ANESTHESIOLOGY

## 2022-09-10 PROCEDURE — 94640 AIRWAY INHALATION TREATMENT: CPT

## 2022-09-10 PROCEDURE — 94762 N-INVAS EAR/PLS OXIMTRY CONT: CPT

## 2022-09-10 PROCEDURE — 31500 INSERT EMERGENCY AIRWAY: CPT

## 2022-09-10 PROCEDURE — 71045 X-RAY EXAM CHEST 1 VIEW: CPT

## 2022-09-10 PROCEDURE — 80048 BASIC METABOLIC PNL TOTAL CA: CPT

## 2022-09-10 PROCEDURE — 2709999900 HC NON-CHARGEABLE SUPPLY

## 2022-09-10 PROCEDURE — 74011250637 HC RX REV CODE- 250/637: Performed by: INTERNAL MEDICINE

## 2022-09-10 PROCEDURE — 82533 TOTAL CORTISOL: CPT

## 2022-09-10 PROCEDURE — 92950 HEART/LUNG RESUSCITATION CPR: CPT

## 2022-09-10 PROCEDURE — 94664 DEMO&/EVAL PT USE INHALER: CPT

## 2022-09-10 PROCEDURE — 77030029065 HC DRSG HEMO QCLOT ZMED -B

## 2022-09-10 PROCEDURE — C9113 INJ PANTOPRAZOLE SODIUM, VIA: HCPCS | Performed by: INTERNAL MEDICINE

## 2022-09-10 PROCEDURE — 65610000006 HC RM INTENSIVE CARE

## 2022-09-10 PROCEDURE — 90945 DIALYSIS ONE EVALUATION: CPT

## 2022-09-10 PROCEDURE — 74011250636 HC RX REV CODE- 250/636: Performed by: INTERNAL MEDICINE

## 2022-09-10 PROCEDURE — 82962 GLUCOSE BLOOD TEST: CPT

## 2022-09-10 PROCEDURE — 77030040922 HC BLNKT HYPOTHRM STRY -A

## 2022-09-10 PROCEDURE — 83735 ASSAY OF MAGNESIUM: CPT

## 2022-09-10 RX ADMIN — CALCIUM CHLORIDE, MAGNESIUM CHLORIDE, DEXTROSE MONOHYDRATE, LACTIC ACID, SODIUM CHLORIDE, SODIUM BICARBONATE AND POTASSIUM CHLORIDE: 3.68; 3.05; 22; 5.4; 6.46; 3.09; .314 INJECTION INTRAVENOUS at 02:20

## 2022-09-10 RX ADMIN — NOREPINEPHRINE BITARTRATE 2.5 MCG/MIN: 1 SOLUTION INTRAVENOUS at 12:13

## 2022-09-10 RX ADMIN — NOREPINEPHRINE BITARTRATE 2 MCG/MIN: 1 SOLUTION INTRAVENOUS at 13:05

## 2022-09-10 RX ADMIN — CALCIUM CHLORIDE, MAGNESIUM CHLORIDE, DEXTROSE MONOHYDRATE, LACTIC ACID, SODIUM CHLORIDE, SODIUM BICARBONATE AND POTASSIUM CHLORIDE: 3.68; 3.05; 22; 5.4; 6.46; 3.09; .314 INJECTION INTRAVENOUS at 19:46

## 2022-09-10 RX ADMIN — QUETIAPINE FUMARATE 25 MG: 25 TABLET ORAL at 18:27

## 2022-09-10 RX ADMIN — INSULIN LISPRO 2 UNITS: 100 INJECTION, SOLUTION INTRAVENOUS; SUBCUTANEOUS at 18:26

## 2022-09-10 RX ADMIN — SODIUM CHLORIDE 40 MG: 9 INJECTION, SOLUTION INTRAMUSCULAR; INTRAVENOUS; SUBCUTANEOUS at 09:36

## 2022-09-10 RX ADMIN — FENTANYL CITRATE 100 MCG/HR: 50 INJECTION, SOLUTION INTRAMUSCULAR; INTRAVENOUS at 03:15

## 2022-09-10 RX ADMIN — CALCIUM CHLORIDE, MAGNESIUM CHLORIDE, DEXTROSE MONOHYDRATE, LACTIC ACID, SODIUM CHLORIDE, SODIUM BICARBONATE AND POTASSIUM CHLORIDE: 3.68; 3.05; 22; 5.4; 6.46; 3.09; .314 INJECTION INTRAVENOUS at 12:10

## 2022-09-10 RX ADMIN — QUETIAPINE FUMARATE 25 MG: 25 TABLET ORAL at 09:36

## 2022-09-10 RX ADMIN — CALCIUM CHLORIDE, MAGNESIUM CHLORIDE, DEXTROSE MONOHYDRATE, LACTIC ACID, SODIUM CHLORIDE, SODIUM BICARBONATE AND POTASSIUM CHLORIDE: 3.68; 3.05; 22; 5.4; 6.46; 3.09; .314 INJECTION INTRAVENOUS at 08:01

## 2022-09-10 RX ADMIN — IPRATROPIUM BROMIDE AND ALBUTEROL SULFATE 3 ML: .5; 3 SOLUTION RESPIRATORY (INHALATION) at 08:52

## 2022-09-10 RX ADMIN — DEXMEDETOMIDINE HYDROCHLORIDE 1 MCG/KG/HR: 400 INJECTION INTRAVENOUS at 14:28

## 2022-09-10 RX ADMIN — LEVOTHYROXINE SODIUM 100 MCG: 0.1 TABLET ORAL at 09:37

## 2022-09-10 RX ADMIN — IPRATROPIUM BROMIDE AND ALBUTEROL SULFATE 3 ML: .5; 3 SOLUTION RESPIRATORY (INHALATION) at 20:32

## 2022-09-10 RX ADMIN — DEXMEDETOMIDINE HYDROCHLORIDE 1 MCG/KG/HR: 400 INJECTION INTRAVENOUS at 08:01

## 2022-09-10 RX ADMIN — CALCIUM CHLORIDE, MAGNESIUM CHLORIDE, DEXTROSE MONOHYDRATE, LACTIC ACID, SODIUM CHLORIDE, SODIUM BICARBONATE AND POTASSIUM CHLORIDE: 3.68; 3.05; 22; 5.4; 6.46; 3.09; .314 INJECTION INTRAVENOUS at 08:00

## 2022-09-10 RX ADMIN — CALCIUM CHLORIDE, MAGNESIUM CHLORIDE, DEXTROSE MONOHYDRATE, LACTIC ACID, SODIUM CHLORIDE, SODIUM BICARBONATE AND POTASSIUM CHLORIDE 2000 ML/HR: 3.68; 3.05; 22; 5.4; 6.46; 3.09; .314 INJECTION INTRAVENOUS at 12:54

## 2022-09-10 RX ADMIN — DEXMEDETOMIDINE HYDROCHLORIDE 1 MCG/KG/HR: 400 INJECTION INTRAVENOUS at 03:25

## 2022-09-10 RX ADMIN — INSULIN LISPRO 2 UNITS: 100 INJECTION, SOLUTION INTRAVENOUS; SUBCUTANEOUS at 12:15

## 2022-09-10 RX ADMIN — IPRATROPIUM BROMIDE AND ALBUTEROL SULFATE 3 ML: .5; 3 SOLUTION RESPIRATORY (INHALATION) at 13:57

## 2022-09-10 RX ADMIN — CALCIUM CHLORIDE, MAGNESIUM CHLORIDE, DEXTROSE MONOHYDRATE, LACTIC ACID, SODIUM CHLORIDE, SODIUM BICARBONATE AND POTASSIUM CHLORIDE: 3.68; 3.05; 22; 5.4; 6.46; 3.09; .314 INJECTION INTRAVENOUS at 23:59

## 2022-09-10 RX ADMIN — IPRATROPIUM BROMIDE AND ALBUTEROL SULFATE 3 ML: .5; 3 SOLUTION RESPIRATORY (INHALATION) at 01:35

## 2022-09-10 RX ADMIN — CALCIUM CHLORIDE, MAGNESIUM CHLORIDE, DEXTROSE MONOHYDRATE, LACTIC ACID, SODIUM CHLORIDE, SODIUM BICARBONATE AND POTASSIUM CHLORIDE: 3.68; 3.05; 22; 5.4; 6.46; 3.09; .314 INJECTION INTRAVENOUS at 18:42

## 2022-09-10 RX ADMIN — INSULIN GLARGINE 14 UNITS: 100 INJECTION, SOLUTION SUBCUTANEOUS at 09:37

## 2022-09-10 RX ADMIN — FENTANYL CITRATE 100 MCG/HR: 50 INJECTION, SOLUTION INTRAMUSCULAR; INTRAVENOUS at 16:54

## 2022-09-10 NOTE — DIALYSIS
CRRT / [phone]           Orders   Mode: CVVH restarted @ 2100   Blood Flow Rate: 200 ml/min   Prismasol Dose: 25 ml/kg/hr  PBP: 900 ml/hr  Replacement: 900 ml/hr   Prismasol Concentrate: 4K / 2.5Ca   Blood Warmer Temp: 37*C   Net Fluid Removal: 0 ml/hr            Metrics   BP: 162/80   HR: 81   Access Pressure: -57   Filter Pressure: 125   Return Pressure: 61   TMP: 44   Pressure Drop: 33            Access   Type & Location: RIJ non-tunneled CVC: tegaderm dressing with CHG patch C/D/I, dated 9/6/22. Each catheter limb disinfected for 60 seconds per limb with alcohol swabs and hubs scrubbed with alcohol for 30 sec seconds, followed by 5 second dry time per Hospital P&P. +asp/+flush x 2 ports. Labs   HBsAg (Antigen) / date: Negative 09/08/22        HBsAb (Antibody) / date: Susceptible 09/08/22   Source: [employer]            Safety:   Time Out Done:   2055   Consent obtained/signed: Verified   Education: Intubated/sedated   Primary Nurse Rpt: THEO Le RN      Comments / Plan:   Code status, labs, notes and orders reviewed. In at bedside to restart CVVH due to \"stat CT\", primary RN returned all possible blood 165 mls. Old set discarded in biohazard bin. New HF-1000 filter set up, primed with 1L NS, tested and running well. Lines visible and connections secure with blood warmer supported on return line & set at 37*C. Education & pre/post to primary RN.

## 2022-09-10 NOTE — PROGRESS NOTES
Bedside and Verbal shift change report given to [address] [facility] (oncoming nurse) by Mele CALDERON (offgoing nurse). Report included the following information SBAR, Intake/Output, MAR, Recent Results, Cardiac Rhythm: NSR and Alarm Parameters . Primary Nurse Lidya Antunez RN and Waldron Hamman, RN performed a dual skin assessment on this patient impairment noted, see flowsheet  David score is see flowsheets    See flowsheets for all assessments, see MAR for all medication administrations. 0200: CRRT showing error, per Dnaay CALDERON, return blood to pt and she will assess when she arrives. Bedside and Verbal shift change report given to Abhishek (oncoming nurse) by Chele Rojas RN (offgoing nurse). Report included the following information SBAR, Intake/Output, MAR, Recent Results, Cardiac Rhythm: and Alarm Parameters .

## 2022-09-10 NOTE — PROGRESS NOTES
1900 Bedside shift change report given to Toni Jones RN (oncoming nurse) by Kenyetta Mcghee RN (offgoing nurse). Report included the following information SBAR, Kardex, ED Summary, Procedure Summary, Intake/Output, MAR, Recent Results, Med Rec Status, Cardiac Rhythm SR, Alarm Parameters , and Quality Measures. Primary Nurse Musa Ayers RN and Kenyetta Mcghee RN performed a dual skin assessment on this patient Impairment noted - see wound doc flow sheet. David score is; see flow sheet. 9740 Patient suctioned via inline suction catheter; strong cough present. Gurgling/passing air sounds heard. Respiratory called. ETT out several cm from what it was at last check. Attempts made by respiratory therapist to advance tube/inflate cuff unsuccessful. Anesthesia called. Patient desatted; extubated and manually ventilated via ambu bag. Patient reintubated by anesthesia. O2 sats 100%, breath sounds present. STAT chest x-ray ordered. 0700 Bedside shift change report given to Tim Nava RN (oncoming nurse) by Toni Jones RN (offgoing nurse). Report included the following information SBAR, Kardex, ED Summary, Procedure Summary, Intake/Output, MAR, Recent Results, Med Rec Status, Cardiac Rhythm SR, Alarm Parameters , and Quality Measures.

## 2022-09-10 NOTE — PROGRESS NOTES
Hospitalist Progress Note      NAME: Hanna Oviedo   :  1960  MRM:  [id number]    Date/Time: 9/10/2022         Assessment / Plan:     62M hx DM w/ CKD3 p/w dyspnea, nausea, vomiting. Refractory Acidemia / [place] / lactic acidosis: Presented with pH 6.77, profoundly metabolic w/ lactate > 18. Euglycemic but bOHb quite elevated so seems more likely this was an atypical presentation of DKA confounded by acute renal failure and possible metformin toxicity. Has improved with insulin gtt, CRRT, bicarb gtt. Gap 46 on arrival, now closed and lactate cleared. Transitioned to basal bolus insulin and tolerating    Acute Renal Failure: Presented with Cr 12.94, now improved w/ CRRT. Likely 2/2 profound volume depletion in s/o DKA vs viral GI illness. CT C/A/P no acute concerns. Minimal UOP, but slowly improving. Renal following for CRRT. Anticipate he will need iHD     Shock, potentially Septic: No clear source of infx including CT C/A/P. Presented with leukocytosis, bandemia. Anuric. No diarrhea. Doubt menignitis/encephalitis. CX 9/10 with possible PNA but this was following unintentional extubation 9/10; query aspiration ? Wean NE as able. Respiratory Failure: Intubated for airway protection, severe met acidosis. Fi02 reuirements have been low and mentation reportedly slightly  improved. unintentional extubation 9/10; now re-intubated. Wean sedation, SBTs per intensivist. Continue seroquel to help with wean. Ct head 9/10 no acute concerns. AFwRVR: New. Resolved. Echo unremarkable     DM2: A1c only 6.6%. Glar 68B, SSI     Metabolic Encephalopathy: 2/2 profound metabolic acidosis. Wean sedation as able     Seizure like activity: Noted on exam yesterday but EEG negative. wean sedation today    Thrombocytopenia: Several days after hep. Less likely HIT, but will hold [facility] for now. Obtain folate and smear.  Monitor                  Care Plan discussed with: Family, Care Manager, Nursing Staff, and Consultant/Specialist    Discussed:  Care Plan    Prophylaxis:  SCD    Disposition:  SNF/LTC           ___________________________________________________    Attending Physician: Tahira Serna DO        Subjective:     Chief Complaint:  FU acidemia. unintentional extubation this am; now re-intubated. Mental status thou supposedly better than prior     ROS:  Unable to obtain due to intubation, sedation          Objective:       Vitals:          Last 24hrs VS reviewed since prior progress note. Most recent are:    Visit Vitals  /72   Pulse 81   Temp 97.3 °F (36.3 °C)   Resp 20   Ht 5' 6\" (1.676 m)   Wt 86 kg (189 lb 9.5 oz)   SpO2 100%   BMI 30.60 kg/m²     SpO2 Readings from Last 6 Encounters:   09/10/22 100%    O2 Flow Rate (L/min): 60 l/min     Intake/Output Summary (Last 24 hours) at 9/10/2022 1305  Last data filed at 9/10/2022 1200  Gross per 24 hour   Intake 3444.79 ml   Output 598 ml   Net 2846.79 ml            Exam:     Physical Exam:    Gen:  Intubated, sedated  HEENT:  Pink conjunctivae, PERRL, moist mucous membranes  Neck:  Supple, without masses, thyroid non-tender  Resp:  No accessory muscle use  Card:  No murmurs, normal S1, S2 without thrills, bruits or peripheral edema  Abd:  Soft, non-tender, non-distended, normoactive bowel sounds are present  Musc:  No cyanosis or clubbing  Skin:  No rashes or ulcers, skin turgor is good  Neuro:  Intubated, sedated  Psych:   Intubated, sedated       Medications Reviewed: (see below)    Lab Data Reviewed: (see below)    ______________________________________________________________________    Medications:     Current Facility-Administered Medications   Medication Dose Route Frequency    albuterol-ipratropium (DUO-NEB) 2.5 MG-0.5 MG/3 ML  3 mL Nebulization Q6H RT    NOREPINephrine (LEVOPHED) 8 mg in 5% dextrose 250mL (32 mcg/mL) infusion  0.5-50 mcg/min IntraVENous TITRATE    QUEtiapine (SEROquel) tablet 25 mg  25 mg Oral BID    dexmedeTOMidine in 0.9 % NaCl (PRECEDEX) 400 mcg/100 mL (4 mcg/mL) infusion soln  0.1-1.5 mcg/kg/hr IntraVENous TITRATE    fentaNYL citrate (PF) injection 100 mcg  100 mcg IntraVENous Q4H PRN    dextrose 10% infusion 0-250 mL  0-250 mL IntraVENous PRN    insulin lispro (HUMALOG) injection   SubCUTAneous Q6H    insulin glargine (LANTUS) injection 14 Units  14 Units SubCUTAneous DAILY    levothyroxine (SYNTHROID) tablet 100 mcg  100 mcg Oral ACB    glucose chewable tablet 16 g  4 Tablet Oral PRN    glucagon (GLUCAGEN) injection 1 mg  1 mg IntraMUSCular PRN    bicarbonate dialysis (PRISMASOL) BG K 4/Ca 2.5 5000 ml solution   Extracorporeal DIALYSIS CONTINUOUS    pantoprazole (PROTONIX) 40 mg in 0.9% sodium chloride 10 mL injection  40 mg IntraVENous DAILY    [Held by provider] heparin (porcine) injection 5,000 Units  5,000 Units SubCUTAneous Q12H    fentaNYL (PF) 1,500 mcg/30 mL (50 mcg/mL) infusion  0-200 mcg/hr IntraVENous TITRATE            Lab Review:     Recent Labs     09/10/22  0321 09/09/22  0437 09/08/22  0010   WBC 4.4 5.8 7.4   HGB 8.2* 9.4* 9.6*   HCT 24.0* 27.7* 26.6*   PLT 95* 124* 171       Recent Labs     09/10/22  1059 09/09/22  2137 09/09/22  1539 09/09/22  0859 09/09/22  0437   NA  --  137  --  136 138   K  --  3.9  --  4.3 4.5   CL  --  106  --  102 104   CO2  --  25  --  24 26   GLU  --  164*  --  214* 197*   BUN  --  24*  --  26* 27*   CREA  --  2.42*  --  2.87* 3.09*   CA  --  7.5*  --  7.6* 7.5*   MG  --  2.4  --  2.4 2.2   PHOS 2.8 2.9  --   --   --    ALB  --   --  2.0*  --   --    ALT  --   --  12  --   --        No components found for: [place]

## 2022-09-10 NOTE — PROGRESS NOTES
Intubation Note    Name:  Mitzi Ricardo  Age:  57 y.o. MRN:  [id number]  CSN:  [id number]  :  1960    Referring physician: Marshia Blizzard, *     Called to bedside secondary to inadvertent ETT removal. Patient being bagged by respiratory. CMAC x 1. Small amount of gastric contents seen in posterior oropharynx but did not visualize anything go into cords (a bit anterior). 7.0 ETT taped and secured at 21 cm at the teeth.    + Bilateral BS, + Chest rise, + ETCO2    VSS. CXR pending.     Care turned over to covering Attending MD.    Cole Mcarthur MD

## 2022-09-10 NOTE — PROGRESS NOTES
Rt called to bedside due to a leaky ETT, after deep suctioning ETT. RT arrived to find ETT markings much farther out than earlier on in shift 20 vs 15. Rt attempted to reposition ETT without  success. Rt fully extubated partially extubated ETT. Anesthesia called to reintubate. RT bagged with ambu until anesthesia arrived to reintubate with 7.0 ETT (from 6.5 ETT), secured at 21 at teeth with manufactured tube marquez. Pt Spo2 back to 100, looking more comfortable on vent    ETT tube marquez and in line suction catheter  new 9/10/2022, set to be changed by RT 9/15/2022.  Dayami unavailable for tube marquez documentation

## 2022-09-10 NOTE — DIALYSIS
CRRT / [phone]    Orders   Mode: CVVH rounding   Blood Flow Rate: 200   Prismasol Dose: 1800   Prismasol Concentrate: 4K 2.5 Ca   Blood Warmer Temp: 37   Net Fluid Removal: 0     Metrics   BP: 85/58   HR: 84   Access Pressure: -93   Filter Pressure: 190   Return Pressure: 52   TMP: 116   Pressure Drop: 132     Access   Type & Location: Right neck arslan   Comments:                                        Labs   HBsAg (Antigen) / date:           Negative 9/8/22                                    HBsAb (Antibody) / date: Susceptible 9/8/22   Source:      Safety:   Time Out Done:   (Time) 06:50   Consent obtained/signed: yes   Education: Pt sedated   Primary Nurse Rpt Pre: Yeni Mandujano RN   Primary Nurse Rpt Post: Yeni Mandujano RN     Comments / Plan:  Code status, labs, notes and orders reviewed. In at bedside to round. Lines visible and connections secure. Education & pre/post to primary RN. Running well at this time.

## 2022-09-10 NOTE — PROGRESS NOTES
Progress Note  Date:9/10/2022       Room:[address] [place] [address]  Patient José Locke     YOB: 1960     Age:62 y.o. Subjective    Subjective remains intubated, sedated, on CRRT, and vasopressors  Review of Systems KUSHAL given AMS  Objective         Vitals Last 24 Hours:  TEMPERATURE:  Temp  Av.7 °F (36.5 °C)  Min: 97.4 °F (36.3 °C)  Max: 98.3 °F (36.8 °C)  RESPIRATIONS RANGE: Resp  Av  Min: 15  Max: 24  PULSE OXIMETRY RANGE: SpO2  Av.6 %  Min: 96 %  Max: 100 %  PULSE RANGE: Pulse  Av.3  Min: 79  Max: 156  BLOOD PRESSURE RANGE: Systolic (55FOF), WCO:664 , Min:70 , TRACY:834   ; Diastolic (24VWN), GILBERTO:48, Min:45, Max:136    I/O (24Hr):     Intake/Output Summary (Last 24 hours) at 9/10/2022 0914  Last data filed at 9/10/2022 0800  Gross per 24 hour   Intake 4370.32 ml   Output 506 ml   Net 3864.32 ml     Objective  Exam facilitated by use of telemedicine platform and with assistance from in house team  Gen - intubated and sedated; nad; ill appearing  Head - nc/at  Eyes - anicteric sclera  Ent - normal external anatomy; ett without secretions or hemorrhage  Cvs - sinus rhythm without external evidence of hypoperfusion  Pulm - equal movement; no autopeep by flow analysis  Gi-no evidence of tenderness with passive movement  Ext - no c/c; +edema  Neuro - intubated, sedated; responds to noxious stimuli but does not follow commands or orient to mild stimuli; no tremor  Labs/Imaging/Diagnostics    Labs:  CBC:  Recent Labs     09/10/22  0321 22  0437 22  0010   WBC 4.4 5.8 7.4   RBC 2.56* 2.96* 2.96*   HGB 8.2* 9.4* 9.6*   HCT 24.0* 27.7* 26.6*   MCV 93.8 93.6 89.9   RDW 12.8 13.0 13.0   PLT 95* 124* 171     CHEMISTRIES:  Recent Labs     22  2137 22  0859 22  0437    136 138   K 3.9 4.3 4.5    102 104   CO2 25 24 26   BUN 24* 26* 27*   CA 7.5* 7.6* 7.5*   PHOS 2.9  --   --    MG 2.4 2.4 2.2   PT/INR:No results for input(s): INR, INREXT in the last 72 hours. No lab exists for component: PROTIME  APTT:No results for input(s): APTT in the last 72 hours. LIVER PROFILE:  Recent Labs     09/09/22  1539   AST 27   ALT 12     Lab Results   Component Value Date/Time    ALT (SGPT) 12 09/09/2022 03:39 PM    AST (SGOT) 27 09/09/2022 03:39 PM    Alk. phosphatase 63 09/09/2022 03:39 PM    Bilirubin, direct 0.3 (H) 09/09/2022 03:39 PM    Bilirubin, total 0.6 09/09/2022 03:39 PM       Imaging Last 24 Hours:  CT HEAD WO CONT    Result Date: 9/9/2022  CLINICAL HISTORY: encephalopathy, eval edema INDICATION: encephalopathy, eval edema COMPARISON: 9/6/2022. CT dose reduction was achieved through use of a standardized protocol tailored for this examination and automatic exposure control for dose modulation. TECHNIQUE: Serial axial images with a collimation of 5 mm were obtained from the skull base through the vertex  FINDINGS: The sulci and ventricles are within normal limits for patient age. There is no evidence of an acute infarction, hemorrhage, or mass-effect. There is no evidence of midline shift or hydrocephalus. Posterior fossa structures are unremarkable. No extra-axial collections are seen. Mastoid air cells are well pneumatized and clear. Right sphenoid sinus disease. There is an ET tube and NG tube in place. No acute intracranial process. XR CHEST PORT    Result Date: 9/10/2022  INDICATION:    verify ETT placement EXAMINATION:  AP CHEST, PORTABLE COMPARISON: 9/9/2022 FINDINGS: Single AP portable view of the chest at 840 hours demonstrates no change in position of the lines and tubes. The cardiomediastinal silhouette is stable. There is increasing bibasilar opacification. Tubes and lines are stable. There is increased bibasilar opacification, which may represent atelectasis or pneumonia.      XR CHEST PORT    Result Date: 9/9/2022  EXAM: XR CHEST PORT INDICATION: L IJ CVC placement COMPARISON: 9.6.2022 FINDINGS: A portable AP radiograph of the chest was obtained at 1752 hours. The patient is on a cardiac monitor. The endotracheal tube terminates at the thoracic inlet. The NG tube extends below the hemidiaphragm. The left IJ line terminates at the caval atrial junction. A dual lumen catheter terminates in the right atrium. There is a small left pleural effusion. The cardiac and mediastinal contours and pulmonary vascularity are normal.  The bones and soft tissues are grossly within normal limits. No pneumothorax following line placement. Tiny left pleural effusion    ECHO ADULT COMPLETE    Result Date: 9/9/2022  Formatting of this result is different from the original.   Left Ventricle: Hyperdynamic left ventricular systolic function with a visually estimated EF of 65 - 70%. Left ventricle size is normal. Normal wall thickness. Normal wall motion. Normal diastolic function. Right Ventricle: Not well visualized. Right ventricle size is normal. Normal wall thickness. Aortic Valve: Mild sclerosis of the aortic valve cusp.     Assessment//Plan   Principal Problem:    Severe sepsis ([address] (9/6/2022)    Active Problems:    Acute renal failure (ARF) ([address] (9/6/2022)      Hyperkalemia (9/6/2022)      Melanoma ([address] (9/6/2022)      HTN (hypertension) (9/6/2022)      DM (diabetes mellitus) ([address] (9/6/2022)      Hyperlipidemia (9/6/2022)      Assessment & Plan  Neuro- Remains intubated and sedated; initial metabolic encephalopathy subsequently complicated by delirium for which we will continue Precedex and fentanyl while trying to avoid benzodiazepine given his prior response; may benefit from additional neuropsych medication  -Possible seizure-like activity noted earlier but the EEG was negative for epileptic activity  -Notes indicate deconditioning but patient is still unable to participate with PT  - CT brain demonstrated no acute pathology    Cvs-Likely mixed etiology for shock with hypovolemia in the setting of dehydration and DKA and, although less likely, septic shock but there was no clear source of infection and negative imaging/cultures thus far  -Meeting goal mean arterial pressures with the assistance of IV fluid resuscitation and vasopressors  -No evidence of ongoing hypoperfusion or ischemia  -Suspect that atrial fibrillation with RVR was triggered secondary to above but this does increase his long-term risk of A. fib; rate better controlled with medical management; defer to cardiology re duration of AC  -Echo demonstrated ejection fraction 65-70% with hyperdynamic LV and no evidence of diastolic dysfunction      Pulm-remains intubated for airway protection - that said, failing SAT/SBT in prior days and required reintubation this morning following unintentional extubation as mental status poor and low TVs  - His IBW is 64kg and 8,7,6ml/kg translates to 001,723,[phone]    Gi-cont TORI proph  -CT imaging unrevealing    Gu-oliguric acute kidney injury with significant anion gap metabolic acidosis/lactic acidosis; indicis have improved with supportive care and initiation of renal replacement therapy-defer to nephrology regarding dose/duration of CRRT    Heme-thrombocytopenia persists - doubt HIT and will follow on panel  -diagnosed with melanoma and will require follow-up post hospitalization re: treatment    id - on empiric abx coverage but cultures unrevealing     Endo - DKA resolved and on sub cut regimen; follow BS and adjust regimen accordingly  -A1c 6.6% and no reported blood transfusions - ?triggered event  - h/o hypothyroidism and on synthroid      CCT 55minutes excluding teaching and procedures    I performed all aspects of the physical examination via Telemedicine associated with two way audio and video communication and with the on-site assistance of the bedside nurse. I am located in [place] and the patient is located in [place] at [address] [place] [address]   The patient is critically ill in the ICU.    I  personally reviewed the pertinent medical records, laboratory/ pathology data and radiographic images. The decision making regarding this patient is as documented above, which was generated  following  discussion  with the multidisciplinary ICU team and creation of a treatment plan for  the patient. We discussed the patient's interval history and future coordination of care and  plans. The patient's medications  were reviewed and changes made as stipulated above. Due to  critical illness impairing one or more vital organs of this patient resulting in life threatening clinical situation  I have provided direct, frequent personal  assessment and manipulation in management plan.   Electronically signed by Natalie Tomlinson MD on 9/10/2022 at 9:14 AM

## 2022-09-10 NOTE — PROGRESS NOTES
[id number]  YOB: 1960          Assessment & Plan:     Severe oliguric LAVINIA on CVVH  Severe lactic acidosis  Ketoacidosis  Resp failure  Shock on pressors  DM2    Rec:  Continue CVVH, increase factor to 25 if irving  Avoid nephrotoxins   ICU support       Subjective:   CC: LAVINIA  HPI: Minimal uop. On CRRT, factor 0. On levo but have not been able to wean overnight. Remains on vent. ROS: unable to obtain due to pt condition  Current Facility-Administered Medications   Medication Dose Route Frequency    albuterol-ipratropium (DUO-NEB) 2.5 MG-0.5 MG/3 ML  3 mL Nebulization Q6H RT    NOREPINephrine (LEVOPHED) 8 mg in 5% dextrose 250mL (32 mcg/mL) infusion  0.5-50 mcg/min IntraVENous TITRATE    QUEtiapine (SEROquel) tablet 25 mg  25 mg Oral BID    dexmedeTOMidine in 0.9 % NaCl (PRECEDEX) 400 mcg/100 mL (4 mcg/mL) infusion soln  0.1-1.5 mcg/kg/hr IntraVENous TITRATE    fentaNYL citrate (PF) injection 100 mcg  100 mcg IntraVENous Q4H PRN    dextrose 10% infusion 0-250 mL  0-250 mL IntraVENous PRN    insulin lispro (HUMALOG) injection   SubCUTAneous Q6H    insulin glargine (LANTUS) injection 14 Units  14 Units SubCUTAneous DAILY    levothyroxine (SYNTHROID) tablet 100 mcg  100 mcg Oral ACB    glucose chewable tablet 16 g  4 Tablet Oral PRN    glucagon (GLUCAGEN) injection 1 mg  1 mg IntraMUSCular PRN    bicarbonate dialysis (PRISMASOL) BG K 4/Ca 2.5 5000 ml solution   Extracorporeal DIALYSIS CONTINUOUS    pantoprazole (PROTONIX) 40 mg in 0.9% sodium chloride 10 mL injection  40 mg IntraVENous DAILY    [Held by provider] heparin (porcine) injection 5,000 Units  5,000 Units SubCUTAneous Q12H    fentaNYL (PF) 1,500 mcg/30 mL (50 mcg/mL) infusion  0-200 mcg/hr IntraVENous TITRATE          Objective:     Vitals:  Blood pressure 103/72, pulse 84, temperature 97.4 °F (36.3 °C), resp.  rate 20, height 5' 6\" (1.676 m), weight 86 kg (189 lb 9.5 oz), SpO2 100 %. Temp (24hrs), Av.7 °F (36.5 °C), Min:97.4 °F (36.3 °C), Max:98.3 °F (36.8 °C)      Intake and Output:  09/10 0701 - 09/10 1900  In: -   Out: 26   1901 - 09/10 0700  In: 6814.9 [I.V.:.9]  Out: 18 [Urine:117]    Physical Exam:               GENERAL ASSESSMENT: Sedated on vent  HEENT: ETT  CHEST: Vent BS  HEART: S1S2  ABDOMEN: Soft,NT  : +Vega:   EXTREMITY: no EDEMA        ECG/rhythm:    Data Review      No results for input(s): TNIPOC in the last 72 hours. No lab exists for component: ITNL     No results for input(s): CPK, CKMB, TROIQ in the last 72 hours. Recent Labs     09/10/22  0321 22  2137 22  1539 22  0859 22  0437 22  0423 22  0010 22  1153 22  0811   NA  --  137  --  136 138   < > 138   < > 140   K  --  3.9  --  4.3 4.5   < > 4.2   < > 4.2   CL  --  106  --  102 104   < > 98   < > 94*   CO2  --  25  --  24 26   < > 24   < > 15*   BUN  --  24*  --  26* 27*   < > 51*   < > 81*   CREA  --  2.42*  --  2.87* 3.09*   < > 5.39*   < > 8.60*   GLU  --  164*  --  214* 197*   < > 188*   < > 262*   PHOS  --  2.9  --   --   --   --   --   --  6.9*   MG  --  2.4  --  2.4 2.2  --  1.9   < > 1.9   CA  --  7.5*  --  7.6* 7.5*   < > 7.3*   < > 8.0*   ALB  --   --  2.0*  --   --   --   --   --   --    WBC 4.4  --   --   --  5.8  --  7.4  --   --    HGB 8.2*  --   --   --  9.4*  --  9.6*  --   --    HCT 24.0*  --   --   --  27.7*  --  26.6*  --   --    PLT 95*  --   --   --  124*  --  171  --   --     < > = values in this interval not displayed. No results for input(s): INR, PTP, APTT, INREXT, INREXT in the last 72 hours. Needs: urine analysis, urine sodium, protein and creatinine  No results found for: AMINA ZAMARRIPA        : Isatu Leslie MD  9/10/2022        [address] [facility]:  [email or web address]  Jil Doe office:  [place] [address] [place] [address],[address] [place] [address], [address] [facility]  Phone: [phone]  Fax :     [phone]    [facility] office:  [address] [facility], [facility]  Phone - [phone]  Fax - [phone]

## 2022-09-10 NOTE — DIALYSIS
Orders   Mode: CVVH restarted @ 13:19   Blood Flow Rate: 200 ml/min   Prismasol Dose: 25 ml/kg/hr  PBP: 900 ml/hr  Replacement: 900 ml/hr   Prismasol Concentrate: 4K / 2.5Ca   Blood Warmer Temp: 37*C   Net Fluid Removal: 0 ml/hr            Metrics   BP: 159/101   HR: 77   Access Pressure: -36   Filter Pressure: 83   Return Pressure: 48   TMP: 30   Pressure Drop: 22            Access   Type & Location: RIJ non-tunneled CVC: tegaderm dressing with CHG patch C/D/I, dated 9/6/22. Each catheter limb disinfected for 60 seconds per limb with alcohol swabs and hubs scrubbed with alcohol for 30 sec seconds, followed by 5 second dry time per Hospital P&P. +asp/+flush x 2 ports. Labs   HBsAg (Antigen) / date: Negative 09/08/22        HBsAb (Antibody) / date: Susceptible 09/08/22   Source: "[employer]            Safety:   Time Out Done:   2055   Consent obtained/signed: Verified   Education: Intubated/sedated   Primary Nurse Rpt: Kathie Aguilar RN      Comments / Plan:   Code status, labs, notes and orders reviewed. In at bedside to restart CVVH due to clotting, returned all possible blood 165 mls. Old set discarded in biohazard bin. New HF-1000 filter set up, primed with 1L NS, tested and running well. Lines visible and connections secure with blood warmer supported on return line & set at 37*C. Education & pre/post to primary RN.

## 2022-09-11 LAB
ANION GAP SERPL CALC-SCNC: 5 MMOL/L (ref 5–15)
ANION GAP SERPL CALC-SCNC: 6 MMOL/L (ref 5–15)
BACTERIA SPEC CULT: NORMAL
BACTERIA SPEC CULT: NORMAL
BASOPHILS # BLD: 0 K/UL (ref 0–0.1)
BASOPHILS NFR BLD: 0 % (ref 0–1)
BUN SERPL-MCNC: 21 MG/DL (ref 6–20)
BUN SERPL-MCNC: 24 MG/DL (ref 6–20)
BUN/CREAT SERPL: 11 (ref 12–20)
BUN/CREAT SERPL: 12 (ref 12–20)
CALCIUM SERPL-MCNC: 7.5 MG/DL (ref 8.5–10.1)
CALCIUM SERPL-MCNC: 7.6 MG/DL (ref 8.5–10.1)
CHLORIDE SERPL-SCNC: 105 MMOL/L (ref 97–108)
CHLORIDE SERPL-SCNC: 105 MMOL/L (ref 97–108)
CO2 SERPL-SCNC: 27 MMOL/L (ref 21–32)
CO2 SERPL-SCNC: 27 MMOL/L (ref 21–32)
CREAT SERPL-MCNC: 1.95 MG/DL (ref 0.7–1.3)
CREAT SERPL-MCNC: 2.02 MG/DL (ref 0.7–1.3)
DIFFERENTIAL METHOD BLD: ABNORMAL
EOSINOPHIL # BLD: 0.1 K/UL (ref 0–0.4)
EOSINOPHIL NFR BLD: 1 % (ref 0–7)
ERYTHROCYTE [DISTWIDTH] IN BLOOD BY AUTOMATED COUNT: 12.5 % (ref 11.5–14.5)
GLUCOSE BLD STRIP.AUTO-MCNC: 128 MG/DL (ref 65–117)
GLUCOSE BLD STRIP.AUTO-MCNC: 160 MG/DL (ref 65–117)
GLUCOSE BLD STRIP.AUTO-MCNC: 182 MG/DL (ref 65–117)
GLUCOSE BLD STRIP.AUTO-MCNC: 191 MG/DL (ref 65–117)
GLUCOSE SERPL-MCNC: 152 MG/DL (ref 65–100)
GLUCOSE SERPL-MCNC: 167 MG/DL (ref 65–100)
HCT VFR BLD AUTO: 24.2 % (ref 36.6–50.3)
HGB BLD-MCNC: 8.2 G/DL (ref 12.1–17)
IMM GRANULOCYTES # BLD AUTO: 0 K/UL (ref 0–0.04)
IMM GRANULOCYTES NFR BLD AUTO: 1 % (ref 0–0.5)
LYMPHOCYTES # BLD: 0.5 K/UL (ref 0.8–3.5)
LYMPHOCYTES NFR BLD: 10 % (ref 12–49)
MAGNESIUM SERPL-MCNC: 2.4 MG/DL (ref 1.6–2.4)
MAGNESIUM SERPL-MCNC: 2.6 MG/DL (ref 1.6–2.4)
MCH RBC QN AUTO: 31.5 PG (ref 26–34)
MCHC RBC AUTO-ENTMCNC: 33.9 G/DL (ref 30–36.5)
MCV RBC AUTO: 93.1 FL (ref 80–99)
MONOCYTES # BLD: 0.3 K/UL (ref 0–1)
MONOCYTES NFR BLD: 7 % (ref 5–13)
NEUTS SEG # BLD: 4 K/UL (ref 1.8–8)
NEUTS SEG NFR BLD: 81 % (ref 32–75)
NRBC # BLD: 0 K/UL (ref 0–0.01)
NRBC BLD-RTO: 0 PER 100 WBC
PERIPHERAL SMEAR,PSM: NORMAL
PHOSPHATE SERPL-MCNC: 2.4 MG/DL (ref 2.6–4.7)
PHOSPHATE SERPL-MCNC: 3.9 MG/DL (ref 2.6–4.7)
PLATELET # BLD AUTO: 94 K/UL (ref 150–400)
PMV BLD AUTO: 9.4 FL (ref 8.9–12.9)
POTASSIUM SERPL-SCNC: 4.1 MMOL/L (ref 3.5–5.1)
POTASSIUM SERPL-SCNC: 4.1 MMOL/L (ref 3.5–5.1)
RBC # BLD AUTO: 2.6 M/UL (ref 4.1–5.7)
SERVICE CMNT-IMP: ABNORMAL
SERVICE CMNT-IMP: NORMAL
SERVICE CMNT-IMP: NORMAL
SODIUM SERPL-SCNC: 137 MMOL/L (ref 136–145)
SODIUM SERPL-SCNC: 138 MMOL/L (ref 136–145)
WBC # BLD AUTO: 5 K/UL (ref 4.1–11.1)

## 2022-09-11 PROCEDURE — 77010033711 HC HIGH FLOW OXYGEN

## 2022-09-11 PROCEDURE — 82962 GLUCOSE BLOOD TEST: CPT

## 2022-09-11 PROCEDURE — 74011000250 HC RX REV CODE- 250: Performed by: INTERNAL MEDICINE

## 2022-09-11 PROCEDURE — 83735 ASSAY OF MAGNESIUM: CPT

## 2022-09-11 PROCEDURE — 74011636637 HC RX REV CODE- 636/637: Performed by: INTERNAL MEDICINE

## 2022-09-11 PROCEDURE — 74011250637 HC RX REV CODE- 250/637: Performed by: INTERNAL MEDICINE

## 2022-09-11 PROCEDURE — 36415 COLL VENOUS BLD VENIPUNCTURE: CPT

## 2022-09-11 PROCEDURE — 74011250636 HC RX REV CODE- 250/636: Performed by: INTERNAL MEDICINE

## 2022-09-11 PROCEDURE — 74011250636 HC RX REV CODE- 250/636: Performed by: HOSPITALIST

## 2022-09-11 PROCEDURE — 77010033678 HC OXYGEN DAILY

## 2022-09-11 PROCEDURE — 65610000006 HC RM INTENSIVE CARE

## 2022-09-11 PROCEDURE — 94761 N-INVAS EAR/PLS OXIMETRY MLT: CPT

## 2022-09-11 PROCEDURE — 85025 COMPLETE CBC W/AUTO DIFF WBC: CPT

## 2022-09-11 PROCEDURE — 84100 ASSAY OF PHOSPHORUS: CPT

## 2022-09-11 PROCEDURE — 94640 AIRWAY INHALATION TREATMENT: CPT

## 2022-09-11 PROCEDURE — 80048 BASIC METABOLIC PNL TOTAL CA: CPT

## 2022-09-11 PROCEDURE — C9113 INJ PANTOPRAZOLE SODIUM, VIA: HCPCS | Performed by: INTERNAL MEDICINE

## 2022-09-11 PROCEDURE — 94003 VENT MGMT INPAT SUBQ DAY: CPT

## 2022-09-11 PROCEDURE — 94664 DEMO&/EVAL PT USE INHALER: CPT

## 2022-09-11 PROCEDURE — 90945 DIALYSIS ONE EVALUATION: CPT

## 2022-09-11 RX ORDER — FOLIC ACID 1 MG/1
1 TABLET ORAL DAILY
Status: DISCONTINUED | OUTPATIENT
Start: 2022-09-11 | End: 2022-09-23 | Stop reason: HOSPADM

## 2022-09-11 RX ORDER — INSULIN GLARGINE 100 [IU]/ML
16 INJECTION, SOLUTION SUBCUTANEOUS DAILY
Status: DISCONTINUED | OUTPATIENT
Start: 2022-09-12 | End: 2022-09-16

## 2022-09-11 RX ORDER — LANOLIN ALCOHOL/MO/W.PET/CERES
500 CREAM (GRAM) TOPICAL DAILY
Status: DISCONTINUED | OUTPATIENT
Start: 2022-09-11 | End: 2022-09-21

## 2022-09-11 RX ORDER — LORAZEPAM 2 MG/ML
1 INJECTION INTRAMUSCULAR
Status: DISCONTINUED | OUTPATIENT
Start: 2022-09-11 | End: 2022-09-12

## 2022-09-11 RX ORDER — QUETIAPINE FUMARATE 25 MG/1
50 TABLET, FILM COATED ORAL 2 TIMES DAILY
Status: DISCONTINUED | OUTPATIENT
Start: 2022-09-11 | End: 2022-09-16

## 2022-09-11 RX ADMIN — CALCIUM CHLORIDE, MAGNESIUM CHLORIDE, DEXTROSE MONOHYDRATE, LACTIC ACID, SODIUM CHLORIDE, SODIUM BICARBONATE AND POTASSIUM CHLORIDE: 3.68; 3.05; 22; 5.4; 6.46; 3.09; .314 INJECTION INTRAVENOUS at 00:28

## 2022-09-11 RX ADMIN — CALCIUM CHLORIDE, MAGNESIUM CHLORIDE, DEXTROSE MONOHYDRATE, LACTIC ACID, SODIUM CHLORIDE, SODIUM BICARBONATE AND POTASSIUM CHLORIDE: 3.68; 3.05; 22; 5.4; 6.46; 3.09; .314 INJECTION INTRAVENOUS at 12:45

## 2022-09-11 RX ADMIN — CALCIUM CHLORIDE, MAGNESIUM CHLORIDE, DEXTROSE MONOHYDRATE, LACTIC ACID, SODIUM CHLORIDE, SODIUM BICARBONATE AND POTASSIUM CHLORIDE: 3.68; 3.05; 22; 5.4; 6.46; 3.09; .314 INJECTION INTRAVENOUS at 07:13

## 2022-09-11 RX ADMIN — LORAZEPAM 1 MG: 2 INJECTION INTRAMUSCULAR; INTRAVENOUS at 20:39

## 2022-09-11 RX ADMIN — CALCIUM CHLORIDE, MAGNESIUM CHLORIDE, DEXTROSE MONOHYDRATE, LACTIC ACID, SODIUM CHLORIDE, SODIUM BICARBONATE AND POTASSIUM CHLORIDE: 3.68; 3.05; 22; 5.4; 6.46; 3.09; .314 INJECTION INTRAVENOUS at 23:49

## 2022-09-11 RX ADMIN — METHYLPREDNISOLONE SODIUM SUCCINATE 40 MG: 40 INJECTION, POWDER, FOR SOLUTION INTRAMUSCULAR; INTRAVENOUS at 15:15

## 2022-09-11 RX ADMIN — DEXMEDETOMIDINE HYDROCHLORIDE 1 MCG/KG/HR: 400 INJECTION INTRAVENOUS at 00:28

## 2022-09-11 RX ADMIN — METHYLPREDNISOLONE SODIUM SUCCINATE 40 MG: 40 INJECTION, POWDER, FOR SOLUTION INTRAMUSCULAR; INTRAVENOUS at 23:45

## 2022-09-11 RX ADMIN — FOLIC ACID 1 MG: 1 TABLET ORAL at 12:11

## 2022-09-11 RX ADMIN — FENTANYL CITRATE 75 MCG/HR: 50 INJECTION, SOLUTION INTRAMUSCULAR; INTRAVENOUS at 08:23

## 2022-09-11 RX ADMIN — CALCIUM CHLORIDE, MAGNESIUM CHLORIDE, DEXTROSE MONOHYDRATE, LACTIC ACID, SODIUM CHLORIDE, SODIUM BICARBONATE AND POTASSIUM CHLORIDE: 3.68; 3.05; 22; 5.4; 6.46; 3.09; .314 INJECTION INTRAVENOUS at 08:22

## 2022-09-11 RX ADMIN — CALCIUM CHLORIDE, MAGNESIUM CHLORIDE, DEXTROSE MONOHYDRATE, LACTIC ACID, SODIUM CHLORIDE, SODIUM BICARBONATE AND POTASSIUM CHLORIDE: 3.68; 3.05; 22; 5.4; 6.46; 3.09; .314 INJECTION INTRAVENOUS at 18:22

## 2022-09-11 RX ADMIN — IPRATROPIUM BROMIDE AND ALBUTEROL SULFATE 3 ML: .5; 3 SOLUTION RESPIRATORY (INHALATION) at 00:55

## 2022-09-11 RX ADMIN — FENTANYL CITRATE 100 MCG/HR: 50 INJECTION, SOLUTION INTRAMUSCULAR; INTRAVENOUS at 08:08

## 2022-09-11 RX ADMIN — IPRATROPIUM BROMIDE AND ALBUTEROL SULFATE 3 ML: .5; 3 SOLUTION RESPIRATORY (INHALATION) at 07:34

## 2022-09-11 RX ADMIN — DEXMEDETOMIDINE HYDROCHLORIDE 1.1 MCG/KG/HR: 400 INJECTION INTRAVENOUS at 17:34

## 2022-09-11 RX ADMIN — DEXMEDETOMIDINE HYDROCHLORIDE 1 MCG/KG/HR: 400 INJECTION INTRAVENOUS at 06:39

## 2022-09-11 RX ADMIN — CALCIUM CHLORIDE, MAGNESIUM CHLORIDE, DEXTROSE MONOHYDRATE, LACTIC ACID, SODIUM CHLORIDE, SODIUM BICARBONATE AND POTASSIUM CHLORIDE: 3.68; 3.05; 22; 5.4; 6.46; 3.09; .314 INJECTION INTRAVENOUS at 12:28

## 2022-09-11 RX ADMIN — DEXMEDETOMIDINE HYDROCHLORIDE 1.1 MCG/KG/HR: 400 INJECTION INTRAVENOUS at 12:10

## 2022-09-11 RX ADMIN — SODIUM PHOSPHATE, MONOBASIC, MONOHYDRATE: 276; 142 INJECTION, SOLUTION INTRAVENOUS at 09:32

## 2022-09-11 RX ADMIN — QUETIAPINE FUMARATE 25 MG: 25 TABLET ORAL at 08:35

## 2022-09-11 RX ADMIN — CYANOCOBALAMIN TAB 500 MCG 500 MCG: 500 TAB at 12:11

## 2022-09-11 RX ADMIN — SODIUM CHLORIDE 40 MG: 9 INJECTION, SOLUTION INTRAMUSCULAR; INTRAVENOUS; SUBCUTANEOUS at 08:35

## 2022-09-11 RX ADMIN — LEVOTHYROXINE SODIUM 100 MCG: 0.1 TABLET ORAL at 06:35

## 2022-09-11 RX ADMIN — CALCIUM CHLORIDE, MAGNESIUM CHLORIDE, DEXTROSE MONOHYDRATE, LACTIC ACID, SODIUM CHLORIDE, SODIUM BICARBONATE AND POTASSIUM CHLORIDE: 3.68; 3.05; 22; 5.4; 6.46; 3.09; .314 INJECTION INTRAVENOUS at 19:38

## 2022-09-11 RX ADMIN — IPRATROPIUM BROMIDE AND ALBUTEROL SULFATE 3 ML: .5; 3 SOLUTION RESPIRATORY (INHALATION) at 15:18

## 2022-09-11 RX ADMIN — INSULIN GLARGINE 14 UNITS: 100 INJECTION, SOLUTION SUBCUTANEOUS at 08:35

## 2022-09-11 RX ADMIN — QUETIAPINE FUMARATE 50 MG: 25 TABLET ORAL at 17:09

## 2022-09-11 RX ADMIN — RACEPINEPHRINE HYDROCHLORIDE 0.5 ML: 11.25 SOLUTION RESPIRATORY (INHALATION) at 15:41

## 2022-09-11 RX ADMIN — IPRATROPIUM BROMIDE AND ALBUTEROL SULFATE 3 ML: .5; 3 SOLUTION RESPIRATORY (INHALATION) at 20:11

## 2022-09-11 NOTE — PROGRESS NOTES
Hospitalist Progress Note      NAME: Ashlee Hernandez   :  1960  MRM:  [id number]    Date/Time: 2022         Assessment / Plan:     62M hx DM w/ CKD3 p/w dyspnea, nausea, vomiting. Refractory Acidemia / Ane Poncho / lactic acidosis: Presented with pH 6.77, profoundly metabolic w/ lactate > 18. Euglycemic but bOHb quite elevated so seems more likely this was an atypical presentation of DKA confounded by acute renal failure and possible metformin toxicity. Has improved s/p insulin gtt, CRRT. Gap 46 on arrival, now closed and lactate cleared. Transitioned to basal bolus insulin and tolerating    Acute Renal Failure: Presented with Cr 12.94, now improved w/ CRRT. Likely 2/2 profound volume depletion in s/o DKA vs viral GI illness. CT C/A/P no acute concerns. Minimal UOP, but slowly improving. Renal following for CRRT. Anticipate he will need iHD     Shock, potentially Septic: No clear source of infx including CT C/A/P. Presented with leukocytosis, bandemia. Anuric. No diarrhea. Doubt menignitis/encephalitis. CX 9/10 with possible PNA but this was following unintentional extubation 9/10; query aspiration but low suspicion at this point. Wean NE as able. Respiratory Failure: Intubated for airway protection, severe met acidosis. Fi02 reuirements have been low and mentation reportedly slightly  improved. unintentional extubation 9/10; now re-intubated. Wean sedation, SBTs per intensivist. Continue seroquel to help with wean. Ct head 9/10 no acute concerns. Thrombocytopenia: Several days after hep. Less likely HIT, but will holding SQH for now. Follow smear. Folate low so will start supplement. Monitor     Anemia: b12 and folate low; start supplements     AFwRVR: New. Resolved. Echo unremarkable      Metabolic Encephalopathy: 2/2 profound metabolic acidosis. Wean sedation as able     Seizure like activity:  EEG negative. Monitor     DM2: A1c only 6.6%.  titrate lantus prn, SSI                 Care Plan discussed with: Family, Care Manager, Nursing Staff, and Consultant/Specialist    Discussed:  Care Plan    Prophylaxis:  SCD    Disposition:  SNF/LTC           ___________________________________________________    Attending Physician: Eve Newton DO        Subjective:     Chief Complaint:  FU acidemia. unintentional extubation 9/10; then re-intubated shortly afterwards. SBT this am     ROS:  Unable to obtain due to intubation, sedation          Objective:       Vitals:          Last 24hrs VS reviewed since prior progress note. Most recent are:    Visit Vitals  BP (!) 82/56   Pulse 91   Temp 98.2 °F (36.8 °C)   Resp 15   Ht 5' 6\" (1.676 m)   Wt 86 kg (189 lb 9.5 oz)   SpO2 99%   BMI 30.60 kg/m²     SpO2 Readings from Last 6 Encounters:   09/11/22 99%    O2 Flow Rate (L/min): 60 l/min     Intake/Output Summary (Last 24 hours) at 9/11/2022 1152  Last data filed at 9/11/2022 1000  Gross per 24 hour   Intake 3144.18 ml   Output 1241 ml   Net 1903.18 ml            Exam:     Physical Exam:    Gen:  Intubated, sedated  HEENT:  Pink conjunctivae, PERRL, moist mucous membranes  Neck:  Supple, without masses, thyroid non-tender  Resp:  No accessory muscle use  Card:  No murmurs, normal S1, S2 without thrills, bruits or peripheral edema  Abd:  Soft, non-tender, non-distended, normoactive bowel sounds are present  Musc:  No cyanosis or clubbing  Skin:  No rashes or ulcers, skin turgor is good  Neuro:  Intubated, sedated  Psych:   Intubated, sedated       Medications Reviewed: (see below)    Lab Data Reviewed: (see below)    ______________________________________________________________________    Medications:     Current Facility-Administered Medications   Medication Dose Route Frequency    sodium phosphate 30 mmol in 0.9% sodium chloride 250 mL infusion   IntraVENous ONCE    QUEtiapine (SEROquel) tablet 50 mg  50 mg Oral BID    [START ON 9/12/2022] insulin glargine (LANTUS) injection 16 Units  16 Units SubCUTAneous DAILY folic acid (FOLVITE) tablet 1 mg  1 mg Oral DAILY    cyanocobalamin (VITAMIN B12) tablet 500 mcg  500 mcg Per NG tube DAILY    albuterol-ipratropium (DUO-NEB) 2.5 MG-0.5 MG/3 ML  3 mL Nebulization Q6H RT    NOREPINephrine (LEVOPHED) 8 mg in 5% dextrose 250mL (32 mcg/mL) infusion  0.5-50 mcg/min IntraVENous TITRATE    dexmedeTOMidine in 0.9 % NaCl (PRECEDEX) 400 mcg/100 mL (4 mcg/mL) infusion soln  0.1-1.5 mcg/kg/hr IntraVENous TITRATE    fentaNYL citrate (PF) injection 100 mcg  100 mcg IntraVENous Q4H PRN    dextrose 10% infusion 0-250 mL  0-250 mL IntraVENous PRN    insulin lispro (HUMALOG) injection   SubCUTAneous Q6H    levothyroxine (SYNTHROID) tablet 100 mcg  100 mcg Oral ACB    glucose chewable tablet 16 g  4 Tablet Oral PRN    glucagon (GLUCAGEN) injection 1 mg  1 mg IntraMUSCular PRN    bicarbonate dialysis (PRISMASOL) BG K 4/Ca 2.5 5000 ml solution   Extracorporeal DIALYSIS CONTINUOUS    pantoprazole (PROTONIX) 40 mg in 0.9% sodium chloride 10 mL injection  40 mg IntraVENous DAILY    [Held by provider] heparin (porcine) injection 5,000 Units  5,000 Units SubCUTAneous Q12H    fentaNYL (PF) 1,500 mcg/30 mL (50 mcg/mL) infusion  0-200 mcg/hr IntraVENous TITRATE            Lab Review:     Recent Labs     09/11/22  0013 09/10/22  0321 09/09/22  0437   WBC 5.0 4.4 5.8   HGB 8.2* 8.2* 9.4*   HCT 24.2* 24.0* 27.7*   PLT 94* 95* 124*       Recent Labs     09/11/22  0013 09/10/22  1059 09/09/22  2137 09/09/22  1539    137 137  --    K 4.1 4.2 3.9  --     105 106  --    CO2 27 23 25  --    * 209* 164*  --    BUN 21* 22* 24*  --    CREA 1.95* 2.14* 2.42*  --    CA 7.5* 7.4* 7.5*  --    MG 2.4 2.7* 2.4  --    PHOS 2.4* 2.8 2.9  --    ALB  --   --   --  2.0*   ALT  --   --   --  12       No components found for: [place]

## 2022-09-11 NOTE — WOUND CARE
Wound Nurse Note    Initial consult received to see patient regarding sacral area skin changes. Spoke with patient's nurse; noted small right buttock skin changes. Foam dressing applied. Nursing requested that assessment be deferred due to patient's current agitation post extubation. Plan: Will reattempt a full skin assessment at a later time after patient settles.     Akhil Baptiste RN [facility], [facility].  [facility]  [phone]

## 2022-09-11 NOTE — PROGRESS NOTES
Progress Note  Date:2022       Room:[address] [place] [address]  Patient Ana Alonso     YOB: 1960     Age:62 y.o. Subjective    Subjective remains intubated and sedated but more interactive this am  Review of Systems paulette given ams   Objective         Vitals Last 24 Hours:  TEMPERATURE:  Temp  Av.5 °F (36.4 °C)  Min: 97 °F (36.1 °C)  Max: 98.2 °F (36.8 °C)  RESPIRATIONS RANGE: Resp  Av.1  Min: 18  Max: 23  PULSE OXIMETRY RANGE: SpO2  Av %  Min: 99 %  Max: 100 %  PULSE RANGE: Pulse  Av.1  Min: 73  Max: 104  BLOOD PRESSURE RANGE: Systolic (42IQP), OGA:587 , Min:84 , BPH:300   ; Diastolic (97MTE), QDJ:43, Min:50, Max:103    I/O (24Hr):     Intake/Output Summary (Last 24 hours) at 2022 0919  Last data filed at 2022 0800  Gross per 24 hour   Intake 3097.3 ml   Output 1256 ml   Net 1841.3 ml     Objective  Exam facilitated by use of telemedicine platform and with assistance from in house team  Gen - intubated and sedated; nad; ill appearing; orients to some stimuli  Head - nc/at  Eyes - anicteric sclera  Ent - normal external anatomy; ett without secretions or hemorrhage  Cvs - sinus rhythm without external evidence of hypoperfusion  Pulm - equal movement; no autopeep by flow analysis  Gi-no evidence of tenderness with passive movement  Ext - no c/c; +edema  Neuro - intubated, sedated; responds to noxious stimuli and followed some commands for nurse; no tremor at rest - possible with movement  Labs/Imaging/Diagnostics    Labs:  CBC:  Recent Labs     22  0013 09/10/22  0321 22  0437   WBC 5.0 4.4 5.8   RBC 2.60* 2.56* 2.96*   HGB 8.2* 8.2* 9.4*   HCT 24.2* 24.0* 27.7*   MCV 93.1 93.8 93.6   RDW 12.5 12.8 13.0   PLT 94* 95* 124*     CHEMISTRIES:  Recent Labs     22  0013 09/10/22  1059 22  2137    137 137   K 4.1 4.2 3.9    105 106   CO2 27 23 25   BUN 21* 22* 24*   CA 7.5* 7.4* 7.5*   PHOS 2.4* 2.8 2.9   MG 2.4 2.7* 2.4   PT/INR:No results for input(s): INR, INREXT in the last 72 hours. No lab exists for component: PROTIME  APTT:No results for input(s): APTT in the last 72 hours. LIVER PROFILE:  Recent Labs     09/09/22  1539   AST 27   ALT 12     Lab Results   Component Value Date/Time    ALT (SGPT) 12 09/09/2022 03:39 PM    AST (SGOT) 27 09/09/2022 03:39 PM    Alk. phosphatase 63 09/09/2022 03:39 PM    Bilirubin, direct 0.3 (H) 09/09/2022 03:39 PM    Bilirubin, total 0.6 09/09/2022 03:39 PM       Imaging Last 24 Hours:  No results found.   Assessment//Plan   Principal Problem:    Severe sepsis ([facility] (9/6/2022)    Active Problems:    Acute renal failure (ARF) ([facility] (9/6/2022)      Hyperkalemia (9/6/2022)      Melanoma ([facility] (9/6/2022)      HTN (hypertension) (9/6/2022)      DM (diabetes mellitus) ([facility] (9/6/2022)      Hyperlipidemia (9/6/2022)      Assessment & Plan  Neuro- Remains intubated and sedated; initial metabolic encephalopathy subsequently complicated by delirium for which we will continue Precedex and fentanyl while trying to avoid benzodiazepine given his prior response; may benefit from additional neuropsych medication - still on low dose seroquel and bumped to next dose level - appears more appropriate this am  - Possible seizure-like activity noted earlier but the EEG was negative for epileptic activity  - CT brain demonstrated no acute pathology  - Notes indicate deconditioning but patient is still unable to participate with PT     Cvs-Likely mixed etiology for shock with hypovolemia in the setting of dehydration and DKA and, although less likely, septic shock but there was no clear source of infection and negative imaging/cultures thus far  -Meeting goal mean arterial pressures with the assistance of IV fluid resuscitation and vasopressors with no evidence of ongoing hypoperfusion or ischemia  -Suspect that atrial fibrillation with RVR was triggered secondary to above but this does increase his long-term risk of A. fib; rate better controlled with medical management; defer to cardiology re duration of AC  -Echo demonstrated ejection fraction 65-70% with hyperdynamic LV and no evidence of diastolic dysfunction        Pulm-remains intubated for airway protection - SAT/SBT this am  - His IBW is 64kg and 8,7,6ml/kg translates to 984,111,197     Gi-cont TORI proph  -CT imaging unrevealing     Gu-oliguric acute kidney injury with significant anion gap metabolic acidosis/lactic acidosis; indicis have improved with supportive care and initiation of renal replacement therapy-defer to nephrology regarding dose/duration of CRRT; oliguria persists and will d/c Vega     Heme-thrombocytopenia persists - doubt HIT and will follow on panel  -diagnosed with melanoma and will require follow-up post hospitalization re: treatment     id - on empiric abx coverage but cultures unrevealing     Endo - DKA resolved and on sub cut regimen; follow BS and adjust regimen accordingly  -A1c 6.6% and no reported blood transfusions - ?triggered event  - h/o hypothyroidism and on synthroid        CCT 50minutes excluding teaching and procedures     I performed all aspects of the physical examination via Telemedicine associated with two way audio and video communication and with the on-site assistance of the bedside nurse. I am located in [place] and the patient is located in [place] at [address] [place] [address]   The patient is critically ill in the ICU. I  personally  reviewed the pertinent medical records, laboratory/ pathology data and radiographic images. The decision making regarding this patient is as documented above, which was generated  following  discussion  with the multidisciplinary ICU team and creation of a treatment plan for  the patient. We discussed the patient's interval history and future coordination of care and  plans. The patient's medications  were reviewed and changes made as stipulated above. Due to  critical illness impairing one or more vital organs of this patient resulting in life threatening clinical situation  I have provided direct, frequent personal  assessment and manipulation in management plan.   Electronically signed by Dionte Elizabeth MD on 9/11/2022 at 9:20 AM

## 2022-09-11 NOTE — PROGRESS NOTES
Problem: Ventilator Management  Goal: *Adequate oxygenation and ventilation  Outcome: Progressing Towards Goal  Goal: *Patient maintains clear airway/free of aspiration  Outcome: Progressing Towards Goal  Goal: *Absence of infection signs and symptoms  Outcome: Progressing Towards Goal  Goal: *Normal spontaneous ventilation  Outcome: Progressing Towards Goal     Problem: Patient Education: Go to Patient Education Activity  Goal: Patient/Family Education  Outcome: Progressing Towards Goal     Problem: Non-Violent Restraints  Goal: Removal from restraints as soon as assessed to be safe  Outcome: Progressing Towards Goal  Goal: No harm/injury to patient while restraints in use  Outcome: Progressing Towards Goal  Goal: Patient's dignity will be maintained  Outcome: Progressing Towards Goal  Goal: Patient Interventions  Outcome: Progressing Towards Goal     Problem: Falls - Risk of  Goal: *Absence of Falls  Description: Document Clarence Chowdhury Fall Risk and appropriate interventions in the flowsheet. Outcome: Progressing Towards Goal  Note: Fall Risk Interventions:       Mentation Interventions: Bed/chair exit alarm    Medication Interventions: Bed/chair exit alarm, Patient to call before getting OOB    Elimination Interventions: Bed/chair exit alarm, Call light in reach              Problem: Patient Education: Go to Patient Education Activity  Goal: Patient/Family Education  Outcome: Progressing Towards Goal     Problem: Pressure Injury - Risk of  Goal: *Prevention of pressure injury  Description: Document David Scale and appropriate interventions in the flowsheet.   Outcome: Progressing Towards Goal  Note: Pressure Injury Interventions:  Sensory Interventions: Assess changes in LOC, Assess need for specialty bed, Avoid rigorous massage over bony prominences    Moisture Interventions: Absorbent underpads    Activity Interventions: Pressure redistribution bed/mattress(bed type)    Mobility Interventions: Float heels, HOB 30 degrees or less, Pressure redistribution bed/mattress (bed type)    Nutrition Interventions: Discuss nutritional consult with provider    Friction and Shear Interventions: Apply protective barrier, creams and emollients, Feet elevated on foot rest                Problem: Patient Education: Go to Patient Education Activity  Goal: Patient/Family Education  Outcome: Progressing Towards Goal     Problem: Nutrition Deficit  Goal: *Optimize nutritional status  Outcome: Progressing Towards Goal     Problem: Diabetes Self-Management  Goal: *Disease process and treatment process  Description: Define diabetes and identify own type of diabetes; list 3 options for treating diabetes. Outcome: Progressing Towards Goal  Goal: *Incorporating nutritional management into lifestyle  Description: Describe effect of type, amount and timing of food on blood glucose; list 3 methods for planning meals. Outcome: Progressing Towards Goal  Goal: *Incorporating physical activity into lifestyle  Description: State effect of exercise on blood glucose levels. Outcome: Progressing Towards Goal  Goal: *Developing strategies to promote health/change behavior  Description: Define the ABC's of diabetes; identify appropriate screenings, schedule and personal plan for screenings. Outcome: Progressing Towards Goal  Goal: *Using medications safely  Description: State effect of diabetes medications on diabetes; name diabetes medication taking, action and side effects. Outcome: Progressing Towards Goal  Goal: *Monitoring blood glucose, interpreting and using results  Description: Identify recommended blood glucose targets  and personal targets. Outcome: Progressing Towards Goal  Goal: *Prevention, detection, treatment of acute complications  Description: List symptoms of hyper- and hypoglycemia; describe how to treat low blood sugar and actions for lowering  high blood glucose level.   Outcome: Progressing Towards Goal  Goal: *Prevention, detection and treatment of chronic complications  Description: Define the natural course of diabetes and describe the relationship of blood glucose levels to long term complications of diabetes.   Outcome: Progressing Towards Goal  Goal: *Developing strategies to address psychosocial issues  Description: Describe feelings about living with diabetes; identify support needed and support network  Outcome: Progressing Towards Goal  Goal: *Insulin pump training  Outcome: Progressing Towards Goal  Goal: *Sick day guidelines  Outcome: Progressing Towards Goal  Goal: *Patient Specific Goal (EDIT GOAL, INSERT TEXT)  Outcome: Progressing Towards Goal     Problem: Patient Education: Go to Patient Education Activity  Goal: Patient/Family Education  Outcome: Progressing Towards Goal     Problem: Delirium Treatment  Goal: *Level of consciousness restored to baseline  Outcome: Progressing Towards Goal  Goal: *Level of environmental perceptions restored to baseline  Outcome: Progressing Towards Goal  Goal: *Sensory perception restored to baseline  Outcome: Progressing Towards Goal  Goal: *Emotional stability restored to baseline  Outcome: Progressing Towards Goal  Goal: *Functional assessment restored to baseline  Outcome: Progressing Towards Goal  Goal: *Absence of falls  Outcome: Progressing Towards Goal  Goal: *Will remain free of delirium, CAM Score negative  Outcome: Progressing Towards Goal  Goal: *Cognitive status will be restored to baseline  Outcome: Progressing Towards Goal  Goal: Interventions  Outcome: Progressing Towards Goal     Problem: Patient Education: Go to Patient Education Activity  Goal: Patient/Family Education  Outcome: Progressing Towards Goal

## 2022-09-11 NOTE — PROGRESS NOTES
..Bedside and Verbal shift change report given to Mele (oncoming nurse) by Scott Hartman (offgoing nurse). Report included the following information SBAR, Kardex, MAR, Med Rec Status, Cardiac Rhythm SR, and Quality Measures. 0800  Upon assessment, pt awake and moving arms, following commands. Arterial line not functioning properly. Now using cuff for BP monitoring. 0830  Decreased fentanyl to 75mcg/hour to see how pt tolerates in preparation for SBT.     0845  Pt opening eyes spontaneously and appears calm. 0900  Tube feed paused in preparation for possible extubation. 0950  Pt becoming restless during SBT with slight increase in HR. Precedex increased to 1.1 mcg. 1030  Pt  tolerating SBT but continues to have increased HR. Will continue to monitor. MD notified of HR.     1050  A line removed. 1100  HR now in low 100s. Pt calm and resting quietly. 1400  Pt no longer tachycardic. Pt resting quietly. 1420  Pt extubated with RT. Placed on 6 L nasal cannula. Tachycardic in 120s. Pt reports he's having trouble breathing. Wheezes bilaterally. O2 sats stable. Pt unable to cough and refuses deep suction. 1445  Pt switched to high flow oxygen by RT. O2 sats stable. Pt still stating hes having trouble breathing and appears anxious. Remains tachycardic. Wheezing noted in upper airway. MD notified. [address]. Pt states to sister to get him out of here and still appears anxious. 1530   Pt now resting quietly. Receiving breathing treatment. 1600  Tube feeds resumed. 1635  Levophed stopped. 1700  Pt off high flow. Now nasal cannula 6 L. O2 sats remain stable.

## 2022-09-11 NOTE — PROGRESS NOTES
[id number]  YOB: 1960          Assessment & Plan:     Severe oliguric LAVINIA on CVVH  Severe lactic acidosis  Ketoacidosis  Resp failure  Shock on pressors  DM2  Thrombocytopenia    Rec:  Continue CVVH, factor 25, 4K RF  No heparin due to low Plt, POOJA pending  Hold CRRT if clots again and re-eval tomorrow for possible IHD  Avoid nephrotoxins   ICU support/vent       Subjective:   CC: LAVINIA  HPI: Seen on CRRT. Clotted twice overnight. Factor 25. On low dose levo but pressor requirement decreasing. Remains on vent, failed SBT.   Plt low, POOJA pending  ROS: unable to obtain due to pt condition  Current Facility-Administered Medications   Medication Dose Route Frequency    albuterol-ipratropium (DUO-NEB) 2.5 MG-0.5 MG/3 ML  3 mL Nebulization Q6H RT    NOREPINephrine (LEVOPHED) 8 mg in 5% dextrose 250mL (32 mcg/mL) infusion  0.5-50 mcg/min IntraVENous TITRATE    QUEtiapine (SEROquel) tablet 25 mg  25 mg Oral BID    dexmedeTOMidine in 0.9 % NaCl (PRECEDEX) 400 mcg/100 mL (4 mcg/mL) infusion soln  0.1-1.5 mcg/kg/hr IntraVENous TITRATE    fentaNYL citrate (PF) injection 100 mcg  100 mcg IntraVENous Q4H PRN    dextrose 10% infusion 0-250 mL  0-250 mL IntraVENous PRN    insulin lispro (HUMALOG) injection   SubCUTAneous Q6H    insulin glargine (LANTUS) injection 14 Units  14 Units SubCUTAneous DAILY    levothyroxine (SYNTHROID) tablet 100 mcg  100 mcg Oral ACB    glucose chewable tablet 16 g  4 Tablet Oral PRN    glucagon (GLUCAGEN) injection 1 mg  1 mg IntraMUSCular PRN    bicarbonate dialysis (PRISMASOL) BG K 4/Ca 2.5 5000 ml solution   Extracorporeal DIALYSIS CONTINUOUS    pantoprazole (PROTONIX) 40 mg in 0.9% sodium chloride 10 mL injection  40 mg IntraVENous DAILY    [Held by provider] heparin (porcine) injection 5,000 Units  5,000 Units SubCUTAneous Q12H    fentaNYL (PF) 1,500 mcg/30 mL (50 mcg/mL) infusion  0-200 mcg/hr IntraVENous TITRATE          Objective:     Vitals:  Blood pressure (!) 88/50, pulse 86, temperature 97.5 °F (36.4 °C), resp. rate 20, height 5' 6\" (1.676 m), weight 86 kg (189 lb 9.5 oz), SpO2 100 %. Temp (24hrs), Av.4 °F (36.3 °C), Min:97 °F (36.1 °C), Max:97.7 °F (36.5 °C)      Intake and Output:  No intake/output data recorded.  1901 -  0700  In: 5250.6 [I.V.:800.6]  Out: 1518 [Urine:127]    Physical Exam:               GENERAL ASSESSMENT: Sedated on vent  HEENT: ETT  CHEST: Vent BS  HEART: S1S2  ABDOMEN: Soft,NT  : +Vega:   EXTREMITY: no EDEMA        ECG/rhythm:    Data Review      No results for input(s): TNIPOC in the last 72 hours. No lab exists for component: ITNL     No results for input(s): CPK, CKMB, TROIQ in the last 72 hours. Recent Labs     22  0013 09/10/22  1059 09/10/22  0321 22  2137 22  1539 22  0859 22  0437    137  --  137  --    < > 138   K 4.1 4.2  --  3.9  --    < > 4.5    105  --  106  --    < > 104   CO2 27 23  --  25  --    < > 26   BUN 21* 22*  --  24*  --    < > 27*   CREA 1.95* 2.14*  --  2.42*  --    < > 3.09*   * 209*  --  164*  --    < > 197*   PHOS 2.4* 2.8  --  2.9  --   --   --    MG 2.4 2.7*  --  2.4  --    < > 2.2   CA 7.5* 7.4*  --  7.5*  --    < > 7.5*   ALB  --   --   --   --  2.0*  --   --    WBC 5.0  --  4.4  --   --   --  5.8   HGB 8.2*  --  8.2*  --   --   --  9.4*   HCT 24.2*  --  24.0*  --   --   --  27.7*   PLT 94*  --  95*  --   --   --  124*    < > = values in this interval not displayed. No results for input(s): INR, PTP, APTT, INREXT, INREXT in the last 72 hours. Needs: urine analysis, urine sodium, protein and creatinine  No results found for: AMINA ZAMARRIPA        : Jeremie Caruso MD  2022        [facility]:  [email or web address]  [facility]cessary office:  [address] [place] [address], [address] [place] [address],[address] [place] [address]  Phone: [phone]  Fax : [phone]    [facility] office:  [address] [facility], [address]  Phone - [phone]  Fax - [phone]

## 2022-09-11 NOTE — DIALYSIS
CRRT / [phone]           Orders   Mode: CVVH restarted @ 0300   Blood Flow Rate: 200 ml/min   Prismasol Dose: 25 ml/kg/hr  PBP: 900 ml/hr  Replacement: 900 ml/hr   Prismasol Concentrate: 4K / 2.5Ca   Blood Warmer Temp: 37*C   Net Fluid Removal: 25 ml/hr            Metrics   BP: 107/57   HR: 88   Access Pressure: -37   Filter Pressure: 86   Return Pressure: 51   TMP: 43   Pressure Drop: 6            Access   Type & Location: RIJ non-tunneled CVC: tegaderm dressing with CHG patch C/D/I, dated 9/6/22. Each catheter limb disinfected for 60 seconds per limb with alcohol swabs and hubs scrubbed with alcohol for 30 sec seconds, followed by 5 second dry time per Hospital P&P. +asp/+flush x 2 ports. Labs   HBsAg (Antigen) / date: Negative 09/08/22        HBsAb (Antibody) / date: Susceptible 09/08/22   Source: [facility]            Safety:   Time Out Done:   0400   Consent obtained/signed: Verified   Education: Intubated/sedated   Primary Nurse Rpt: H. Drew Epley, RN      Comments / Plan:   Code status, labs, notes and orders reviewed. In at bedside to restart CVVH due to clotted filter, primary RN returned all possible blood 165 mls. Old set discarded in biohazard bin. New HF-1000 filter set up, primed with 1L NS, tested and running well. Lines visible and connections secure with blood warmer supported on return line & set at 37*C. Education & pre/post to primary RN.

## 2022-09-12 LAB
AMMONIA PLAS-SCNC: 30 UMOL/L
ANION GAP SERPL CALC-SCNC: 7 MMOL/L (ref 5–15)
ANION GAP SERPL CALC-SCNC: 7 MMOL/L (ref 5–15)
ARTERIAL PATENCY WRIST A: POSITIVE
BASE EXCESS BLD CALC-SCNC: 0.1 MMOL/L
BASOPHILS # BLD: 0 K/UL (ref 0–0.1)
BASOPHILS NFR BLD: 0 % (ref 0–1)
BDY SITE: ABNORMAL
BUN SERPL-MCNC: 26 MG/DL (ref 6–20)
BUN SERPL-MCNC: 32 MG/DL (ref 6–20)
BUN/CREAT SERPL: 12 (ref 12–20)
BUN/CREAT SERPL: 14 (ref 12–20)
CALCIUM SERPL-MCNC: 7.7 MG/DL (ref 8.5–10.1)
CALCIUM SERPL-MCNC: 7.8 MG/DL (ref 8.5–10.1)
CHLORIDE SERPL-SCNC: 103 MMOL/L (ref 97–108)
CHLORIDE SERPL-SCNC: 105 MMOL/L (ref 97–108)
CO2 SERPL-SCNC: 25 MMOL/L (ref 21–32)
CO2 SERPL-SCNC: 25 MMOL/L (ref 21–32)
CREAT SERPL-MCNC: 2.11 MG/DL (ref 0.7–1.3)
CREAT SERPL-MCNC: 2.32 MG/DL (ref 0.7–1.3)
DIFFERENTIAL METHOD BLD: ABNORMAL
EOSINOPHIL # BLD: 0 K/UL (ref 0–0.4)
EOSINOPHIL NFR BLD: 0 % (ref 0–7)
ERYTHROCYTE [DISTWIDTH] IN BLOOD BY AUTOMATED COUNT: 12.5 % (ref 11.5–14.5)
GAS FLOW.O2 O2 DELIVERY SYS: ABNORMAL L/MIN
GLUCOSE BLD STRIP.AUTO-MCNC: 168 MG/DL (ref 65–117)
GLUCOSE BLD STRIP.AUTO-MCNC: 199 MG/DL (ref 65–117)
GLUCOSE BLD STRIP.AUTO-MCNC: 208 MG/DL (ref 65–117)
GLUCOSE BLD STRIP.AUTO-MCNC: 281 MG/DL (ref 65–117)
GLUCOSE SERPL-MCNC: 200 MG/DL (ref 65–100)
GLUCOSE SERPL-MCNC: 283 MG/DL (ref 65–100)
HCO3 BLD-SCNC: 23.1 MMOL/L (ref 22–26)
HCT VFR BLD AUTO: 23.3 % (ref 36.6–50.3)
HGB BLD-MCNC: 8 G/DL (ref 12.1–17)
IMM GRANULOCYTES # BLD AUTO: 0 K/UL
IMM GRANULOCYTES NFR BLD AUTO: 0 %
LYMPHOCYTES # BLD: 0.5 K/UL (ref 0.8–3.5)
LYMPHOCYTES NFR BLD: 8 % (ref 12–49)
MAGNESIUM SERPL-MCNC: 2.6 MG/DL (ref 1.6–2.4)
MAGNESIUM SERPL-MCNC: 2.7 MG/DL (ref 1.6–2.4)
MCH RBC QN AUTO: 32 PG (ref 26–34)
MCHC RBC AUTO-ENTMCNC: 34.3 G/DL (ref 30–36.5)
MCV RBC AUTO: 93.2 FL (ref 80–99)
MONOCYTES # BLD: 0.1 K/UL (ref 0–1)
MONOCYTES NFR BLD: 2 % (ref 5–13)
MYELOCYTES NFR BLD MANUAL: 1 %
NEUTS BAND NFR BLD MANUAL: 2 % (ref 0–6)
NEUTS SEG # BLD: 5.3 K/UL (ref 1.8–8)
NEUTS SEG NFR BLD: 87 % (ref 32–75)
NRBC # BLD: 0 K/UL (ref 0–0.01)
NRBC BLD-RTO: 0 PER 100 WBC
O2/TOTAL GAS SETTING VFR VENT: 2 %
PCO2 BLD: 29.5 MMHG (ref 35–45)
PH BLD: 7.5 [PH] (ref 7.35–7.45)
PHOSPHATE SERPL-MCNC: 3.2 MG/DL (ref 2.6–4.7)
PHOSPHATE SERPL-MCNC: 3.8 MG/DL (ref 2.6–4.7)
PLATELET # BLD AUTO: 100 K/UL (ref 150–400)
PMV BLD AUTO: 9.9 FL (ref 8.9–12.9)
PO2 BLD: 54 MMHG (ref 80–100)
POTASSIUM SERPL-SCNC: 5.1 MMOL/L (ref 3.5–5.1)
POTASSIUM SERPL-SCNC: 5.2 MMOL/L (ref 3.5–5.1)
RBC # BLD AUTO: 2.5 M/UL (ref 4.1–5.7)
RBC MORPH BLD: ABNORMAL
SAO2 % BLD: 90.8 % (ref 92–97)
SERVICE CMNT-IMP: ABNORMAL
SODIUM SERPL-SCNC: 135 MMOL/L (ref 136–145)
SODIUM SERPL-SCNC: 137 MMOL/L (ref 136–145)
SPECIMEN TYPE: ABNORMAL
T4 FREE SERPL-MCNC: 0.8 NG/DL (ref 0.8–1.5)
WBC # BLD AUTO: 5.9 K/UL (ref 4.1–11.1)
WBC MORPH BLD: ABNORMAL

## 2022-09-12 PROCEDURE — 90935 HEMODIALYSIS ONE EVALUATION: CPT

## 2022-09-12 PROCEDURE — 74011250636 HC RX REV CODE- 250/636: Performed by: INTERNAL MEDICINE

## 2022-09-12 PROCEDURE — 84439 ASSAY OF FREE THYROXINE: CPT

## 2022-09-12 PROCEDURE — 74011000250 HC RX REV CODE- 250: Performed by: INTERNAL MEDICINE

## 2022-09-12 PROCEDURE — 90945 DIALYSIS ONE EVALUATION: CPT

## 2022-09-12 PROCEDURE — 94664 DEMO&/EVAL PT USE INHALER: CPT

## 2022-09-12 PROCEDURE — 74011250637 HC RX REV CODE- 250/637: Performed by: INTERNAL MEDICINE

## 2022-09-12 PROCEDURE — 77030041174 HC STOOL COL SYS DIGNSHLD BARD -C

## 2022-09-12 PROCEDURE — 80048 BASIC METABOLIC PNL TOTAL CA: CPT

## 2022-09-12 PROCEDURE — 83735 ASSAY OF MAGNESIUM: CPT

## 2022-09-12 PROCEDURE — 84100 ASSAY OF PHOSPHORUS: CPT

## 2022-09-12 PROCEDURE — 36600 WITHDRAWAL OF ARTERIAL BLOOD: CPT

## 2022-09-12 PROCEDURE — 82962 GLUCOSE BLOOD TEST: CPT

## 2022-09-12 PROCEDURE — 36415 COLL VENOUS BLD VENIPUNCTURE: CPT

## 2022-09-12 PROCEDURE — 82803 BLOOD GASES ANY COMBINATION: CPT

## 2022-09-12 PROCEDURE — 85025 COMPLETE CBC W/AUTO DIFF WBC: CPT

## 2022-09-12 PROCEDURE — 77030018798 HC PMP KT ENTRL FED COVD -A

## 2022-09-12 PROCEDURE — 74011636637 HC RX REV CODE- 636/637: Performed by: INTERNAL MEDICINE

## 2022-09-12 PROCEDURE — 5A1D70Z PERFORMANCE OF URINARY FILTRATION, INTERMITTENT, LESS THAN 6 HOURS PER DAY: ICD-10-PCS | Performed by: INTERNAL MEDICINE

## 2022-09-12 PROCEDURE — 2709999900 HC NON-CHARGEABLE SUPPLY

## 2022-09-12 PROCEDURE — C9113 INJ PANTOPRAZOLE SODIUM, VIA: HCPCS | Performed by: INTERNAL MEDICINE

## 2022-09-12 PROCEDURE — 94640 AIRWAY INHALATION TREATMENT: CPT

## 2022-09-12 PROCEDURE — 82140 ASSAY OF AMMONIA: CPT

## 2022-09-12 PROCEDURE — 65610000006 HC RM INTENSIVE CARE

## 2022-09-12 PROCEDURE — 74011250636 HC RX REV CODE- 250/636: Performed by: HOSPITALIST

## 2022-09-12 RX ORDER — HYDRALAZINE HYDROCHLORIDE 20 MG/ML
10 INJECTION INTRAMUSCULAR; INTRAVENOUS
Status: DISCONTINUED | OUTPATIENT
Start: 2022-09-12 | End: 2022-09-23 | Stop reason: HOSPADM

## 2022-09-12 RX ORDER — OLANZAPINE 5 MG/1
5 TABLET ORAL
Status: DISCONTINUED | OUTPATIENT
Start: 2022-09-12 | End: 2022-09-23 | Stop reason: HOSPADM

## 2022-09-12 RX ORDER — ACETAMINOPHEN 325 MG/1
650 TABLET ORAL
Status: DISCONTINUED | OUTPATIENT
Start: 2022-09-12 | End: 2022-09-23 | Stop reason: HOSPADM

## 2022-09-12 RX ADMIN — HYDRALAZINE HYDROCHLORIDE 10 MG: 20 INJECTION INTRAMUSCULAR; INTRAVENOUS at 00:45

## 2022-09-12 RX ADMIN — DEXMEDETOMIDINE HYDROCHLORIDE 1.1 MCG/KG/HR: 400 INJECTION INTRAVENOUS at 07:36

## 2022-09-12 RX ADMIN — ACETAMINOPHEN 650 MG: 325 TABLET ORAL at 12:13

## 2022-09-12 RX ADMIN — INSULIN GLARGINE 16 UNITS: 100 INJECTION, SOLUTION SUBCUTANEOUS at 09:22

## 2022-09-12 RX ADMIN — HYDRALAZINE HYDROCHLORIDE 10 MG: 20 INJECTION INTRAMUSCULAR; INTRAVENOUS at 20:54

## 2022-09-12 RX ADMIN — METHYLPREDNISOLONE SODIUM SUCCINATE 40 MG: 40 INJECTION, POWDER, FOR SOLUTION INTRAMUSCULAR; INTRAVENOUS at 07:41

## 2022-09-12 RX ADMIN — DEXMEDETOMIDINE HYDROCHLORIDE 0.8 MCG/KG/HR: 400 INJECTION INTRAVENOUS at 12:53

## 2022-09-12 RX ADMIN — CALCIUM CHLORIDE, MAGNESIUM CHLORIDE, DEXTROSE MONOHYDRATE, LACTIC ACID, SODIUM CHLORIDE, SODIUM BICARBONATE AND POTASSIUM CHLORIDE: 3.68; 3.05; 22; 5.4; 6.46; 3.09; .314 INJECTION INTRAVENOUS at 05:48

## 2022-09-12 RX ADMIN — DEXMEDETOMIDINE HYDROCHLORIDE 1.5 MCG/KG/HR: 400 INJECTION INTRAVENOUS at 21:25

## 2022-09-12 RX ADMIN — DEXMEDETOMIDINE HYDROCHLORIDE 1.3 MCG/KG/HR: 400 INJECTION INTRAVENOUS at 03:17

## 2022-09-12 RX ADMIN — SODIUM CHLORIDE 40 MG: 9 INJECTION, SOLUTION INTRAMUSCULAR; INTRAVENOUS; SUBCUTANEOUS at 09:21

## 2022-09-12 RX ADMIN — IPRATROPIUM BROMIDE AND ALBUTEROL SULFATE 3 ML: .5; 3 SOLUTION RESPIRATORY (INHALATION) at 07:59

## 2022-09-12 RX ADMIN — FOLIC ACID 1 MG: 1 TABLET ORAL at 09:22

## 2022-09-12 RX ADMIN — CALCIUM CHLORIDE, MAGNESIUM CHLORIDE, DEXTROSE MONOHYDRATE, LACTIC ACID, SODIUM CHLORIDE, SODIUM BICARBONATE AND POTASSIUM CHLORIDE: 3.68; 3.05; 22; 5.4; 6.46; 3.09; .314 INJECTION INTRAVENOUS at 00:05

## 2022-09-12 RX ADMIN — OLANZAPINE 5 MG: 5 TABLET, FILM COATED ORAL at 23:37

## 2022-09-12 RX ADMIN — DEXMEDETOMIDINE HYDROCHLORIDE 0.8 MCG/KG/HR: 400 INJECTION INTRAVENOUS at 15:43

## 2022-09-12 RX ADMIN — INSULIN LISPRO 2 UNITS: 100 INJECTION, SOLUTION INTRAVENOUS; SUBCUTANEOUS at 23:49

## 2022-09-12 RX ADMIN — HYDRALAZINE HYDROCHLORIDE 10 MG: 20 INJECTION INTRAMUSCULAR; INTRAVENOUS at 16:23

## 2022-09-12 RX ADMIN — IPRATROPIUM BROMIDE AND ALBUTEROL SULFATE 3 ML: .5; 3 SOLUTION RESPIRATORY (INHALATION) at 02:13

## 2022-09-12 RX ADMIN — DEXMEDETOMIDINE HYDROCHLORIDE 0.8 MCG/KG/HR: 400 INJECTION INTRAVENOUS at 18:17

## 2022-09-12 RX ADMIN — IPRATROPIUM BROMIDE AND ALBUTEROL SULFATE 3 ML: .5; 3 SOLUTION RESPIRATORY (INHALATION) at 20:47

## 2022-09-12 RX ADMIN — LEVOTHYROXINE SODIUM 100 MCG: 0.1 TABLET ORAL at 05:47

## 2022-09-12 RX ADMIN — INSULIN LISPRO 3 UNITS: 100 INJECTION, SOLUTION INTRAVENOUS; SUBCUTANEOUS at 11:36

## 2022-09-12 RX ADMIN — IPRATROPIUM BROMIDE AND ALBUTEROL SULFATE 3 ML: .5; 3 SOLUTION RESPIRATORY (INHALATION) at 13:08

## 2022-09-12 RX ADMIN — CYANOCOBALAMIN TAB 500 MCG 500 MCG: 500 TAB at 09:22

## 2022-09-12 RX ADMIN — LORAZEPAM 1 MG: 2 INJECTION INTRAMUSCULAR; INTRAVENOUS at 01:03

## 2022-09-12 NOTE — PROGRESS NOTES
..0700 Bedside and Verbal shift change report given to Andrez Hood RN (oncoming nurse) by [facility] RN (offgoing nurse). Report included the following information SBAR, Med Rec Status, Cardiac Rhythm NSR, and Dual Neuro Assessment. 0800 Morning Assessment  Neuro A&0 x2  Cardiac NSR  Respiratory 2L nC  GI TF Nepro   Vega  Skin Mepilex sacrum  IV L IJ, R fredi    0800 Assessment completed. VSS. Pt CRRT stopped and rinsed back at 6am. Awaiting orders from nephrology. 1000 Nephrology ordered HD will start around 3pm.   1200 Reassessment. Pt tachy Fent turned off per MD. Precedex titrated down. Pt still sleeping. 1400 Pt checked by wound care. Precedex only running. 1600 Reassessment. Pt started HD. BP elevated. Gave 1x hydralazine. 1800 TF changed. Pt still receiving dialysis. Pt responds to voice and is speaking incoherently. 1900 . Adelina Gaviria Bedside and Verbal shift change report given to 1200 [facility] (oncoming nurse) by Andrez Hood RN (offgoing nurse). Report included the following information SBAR, Med Rec Status, Cardiac Rhythm ST, and Dual Neuro Assessment.

## 2022-09-12 NOTE — PROGRESS NOTES
Bedside nurse inquired about deep suction. Rt assessed pre duo neb treatment. Pt does has some course breath sounds on right side, and a bit in the back of the throat.  RT attempted to clear back of throat with younker mostly unsuccessfully

## 2022-09-12 NOTE — PROGRESS NOTES
[id number]  YOB: 1960          Assessment & Plan:     Severe oliguric LAVINIA on CVVH  Severe lactic acidosis  Ketoacidosis  Resp failure  Shock on pressors  DM2  Thrombocytopenia    Rec:  He is off [address] d/t frequent clotted of filter  BP stable  I will transition to IHD today then MWF scedule  No heparin in the setting of thrombocytopenia  Avoid nephrotoxins   ICU support/vent       Subjective:   CC: LAVINIA  HPI: Seen off CRRT. Clotted  early am Factor 25. Remains on vent, failed SBT.   Plt low, POOJA pending  ROS: unable to obtain due to pt condition  Current Facility-Administered Medications   Medication Dose Route Frequency    hydrALAZINE (APRESOLINE) 20 mg/mL injection 10 mg  10 mg IntraVENous Q6H PRN    [START ON 9/13/2022] methylPREDNISolone (PF) (SOLU-MEDROL) injection 40 mg  40 mg IntraVENous DAILY    acetaminophen (TYLENOL) tablet 650 mg  650 mg Oral Q6H PRN    [Held by provider] QUEtiapine (SEROquel) tablet 50 mg  50 mg Oral BID    insulin glargine (LANTUS) injection 16 Units  16 Units SubCUTAneous DAILY    folic acid (FOLVITE) tablet 1 mg  1 mg Oral DAILY    cyanocobalamin (VITAMIN B12) tablet 500 mcg  500 mcg Per NG tube DAILY    albuterol-ipratropium (DUO-NEB) 2.5 MG-0.5 MG/3 ML  3 mL Nebulization Q6H RT    NOREPINephrine (LEVOPHED) 8 mg in 5% dextrose 250mL (32 mcg/mL) infusion  0.5-50 mcg/min IntraVENous TITRATE    dexmedeTOMidine in 0.9 % NaCl (PRECEDEX) 400 mcg/100 mL (4 mcg/mL) infusion soln  0.1-1.5 mcg/kg/hr IntraVENous TITRATE    dextrose 10% infusion 0-250 mL  0-250 mL IntraVENous PRN    insulin lispro (HUMALOG) injection   SubCUTAneous Q6H    levothyroxine (SYNTHROID) tablet 100 mcg  100 mcg Oral ACB    glucose chewable tablet 16 g  4 Tablet Oral PRN    glucagon (GLUCAGEN) injection 1 mg  1 mg IntraMUSCular PRN    bicarbonate dialysis (PRISMASOL) BG K 4/Ca 2.5 5000 ml solution   Extracorporeal DIALYSIS CONTINUOUS    [Held by provider] heparin (porcine) injection 5,000 Units  5,000 Units SubCUTAneous Q12H    fentaNYL (PF) 1,500 mcg/30 mL (50 mcg/mL) infusion  0-200 mcg/hr IntraVENous TITRATE          Objective:     Vitals:  Blood pressure (!) 161/70, pulse (!) 140, temperature 97.9 °F (36.6 °C), resp. rate (!) 34, height 5' 6\" (1.676 m), weight 86 kg (189 lb 9.5 oz), SpO2 92 %. Temp (24hrs), Av.8 °F (36.6 °C), Min:96.5 °F (35.8 °C), Max:98.6 °F (37 °C)      Intake and Output:   07 - 1900  In: -   Out: 15 [Urine:15]  09/10 1901 -  07  In: 4398.2 [I.V.:1023.2]  Out: 0707 [Urine:159]    Physical Exam:               GENERAL ASSESSMENT: Sedated on vent  HEENT: ETT  CHEST: Vent BS  HEART: S1S2  ABDOMEN: Soft,NT  : +Vega:   EXTREMITY: no EDEMA        ECG/rhythm:    Data Review      No results for input(s): TNIPOC in the last 72 hours. No lab exists for component: ITNL     No results for input(s): CPK, CKMB, TROIQ in the last 72 hours. Recent Labs     22  0908 22  0012 22  1230 22  0013 09/10/22  1059 09/10/22  0321 22  2137 22  1539   * 137 137 138   < >  --    < >  --    K 5.2* 5.1 4.1 4.1   < >  --    < >  --     105 105 105   < >  --    < >  --    CO2 25 25 27 27   < >  --    < >  --    BUN 32* 26* 24* 21*   < >  --    < >  --    CREA 2.32* 2.11* 2.02* 1.95*   < >  --    < >  --    * 200* 167* 152*   < >  --    < >  --    PHOS 3.2 3.8 3.9 2.4*   < >  --    < >  --    MG 2.7* 2.6* 2.6* 2.4   < >  --    < >  --    CA 7.8* 7.7* 7.6* 7.5*   < >  --    < >  --    ALB  --   --   --   --   --   --   --  2.0*   WBC  --  5.9  --  5.0  --  4.4  --   --    HGB  --  8.0*  --  8.2*  --  8.2*  --   --    HCT  --  23.3*  --  24.2*  --  24.0*  --   --    PLT  --  100*  --  94*  --  95*  --   --     < > = values in this interval not displayed. No results for input(s): INR, PTP, APTT, INREXT, INREXT in the last 72 hours.   Needs: urine analysis, urine sodium, protein and creatinine  No results found for: Relda Rounds        : Tyrone Casanova MD  9/12/2022        [facility]:  [email or web address]  [place] office:  [address] [place] [address], [address] [place] [address],[address]  [address]  Phone: [phone]  Fax :     [phone]    [place] office:  [address] [facility], [address] [place] [address]  Phone - [phone]  Fax - [phone]

## 2022-09-12 NOTE — PROGRESS NOTES
Bedside and Verbal shift change report given to [address] [place] [address] (oncoming nurse) by Dasha Calero RN (offgoing nurse). Report included the following information SBAR, Intake/Output, MAR, Recent Results, Cardiac Rhythm: NSR and Alarm Parameters . Primary Nurse Meron Freeman RN and Dasha Calero RN performed a dual skin assessment on this patient No impairment noted  David score is see flowsheets    See flowsheets for all assessments, see MAR for all medication administrations. 2025: Pt becoming increasingly agitated, hypertensive, and tachycardic despite increasing precedex and fentanyl. Dr Moe Jiménez notified. PRN ativan ordered and administered. 0027: Pt hypertensive again, notified Dr Moe Jiménez who ordered PRN hydralzine. Administered. 0600: Pt pressure drop and TMP steadily inclining, after previous conversation with Yoshi CALDERON, pt was rinsed back and clamped. Yoshi RN notified. Bedside and Verbal shift change report given to Chiquis CALDERON (oncoming nurse) by Savanah Martell RN (offgoing nurse). Report included the following information SBAR, Intake/Output, MAR, Recent Results, Cardiac Rhythm: and Alarm Parameters . 14-Feb-2020 23:12

## 2022-09-12 NOTE — PROGRESS NOTES
Comprehensive Nutrition Assessment    Type and Reason for Visit: Reassess    Nutrition Recommendations/Plan:   Continue TF until patient more alert/awake  Nepro @ 45 mL/hour  Provide 2 prosource/day, can give 2 simultaneously  FWF 90 mL q 2 hours    2. When alert/awake, perform YSS     Malnutrition Assessment:  Malnutrition Status:  Insufficient data (09/12/22 1132)    Context:  Acute illness     Findings of the 6 clinical characteristics of malnutrition:   Energy Intake:  No significant decrease in energy intake (TF)  Weight Loss:  Unable to assess (no wt history)     Body Fat Loss:  No significant body fat loss,     Muscle Mass Loss:  No significant muscle mass loss,    Fluid Accumulation:  No significant fluid accumulation,     Strength:  Not performed     Nutrition Assessment:     9/12: Follow up. Patient extubated yesterday but remains in ICU with NGT in place. Patient receiving TF at goal 45 mL/hour. Plan to modify CRRT to HD per discussion with nursing. Patient has been fluxing between hypothermia and feverish. Not currently on any sedation but remains lethargic. Discussed with IDR team - from nutrition standout, no contraindications to removing NGT and providing swallow screen when patient more alert. No contraindications at this time for continuing TF either. Estimated needs recalculated d/t extubation and updated measured weight (~30# higher than admission weight). Tube feeds at goal 45 mL/hour provide 1770 kcal (+ Prosource, 1850 kcal, 96% needs); 160g carbs; 81 g protein (+2 Prosource, 103 g, 100% needs). TF + FWF provides 1807 mL H2O/day.     Last 3 Recorded Weights in this Encounter    09/06/22 1002 09/09/22 0028 09/09/22 1431   Weight: 71.7 kg (158 lb) 86 kg (189 lb 9.5 oz) 86 kg (189 lb 9.5 oz)     Wt Readings from Last 10 Encounters:   09/09/22 86 kg (189 lb 9.5 oz)     Nutrition Related Findings:      Wound Type: None  Last Bowel Movement Date: 09/11/22  Stool Appearance: Soft, Loose  Abdominal Assessment: Intact, Semi-soft  Appetite: NPO  Bowel Sounds: Hypoactive   Edema:Facial: Scleral (9/12/2022  7:45 AM)  LUE: 1+ (9/12/2022  7:45 AM)  RUE: 1+ (9/12/2022  7:45 AM)      Nutr. Labs:    Lab Results   Component Value Date/Time    GFR est AA 35 (L) 09/12/2022 09:08 AM    GFR est non-AA 28 (L) 09/12/2022 09:08 AM    Creatinine 2.32 (H) 09/12/2022 09:08 AM    BUN 32 (H) 09/12/2022 09:08 AM    Sodium 135 (L) 09/12/2022 09:08 AM    Potassium 5.2 (H) 09/12/2022 09:08 AM    Chloride 103 09/12/2022 09:08 AM    CO2 25 09/12/2022 09:08 AM       Lab Results   Component Value Date/Time    Glucose 283 (H) 09/12/2022 09:08 AM    Glucose (POC) 281 (H) 09/12/2022 11:30 AM       Lab Results   Component Value Date/Time    Hemoglobin A1c 6.6 (H) 09/07/2022 08:11 AM     Magnesium   Date Value Ref Range Status   09/12/2022 2.7 (H) 1.6 - 2.4 mg/dL Final   09/12/2022 2.6 (H) 1.6 - 2.4 mg/dL Final   09/11/2022 2.6 (H) 1.6 - 2.4 mg/dL Final   09/11/2022 2.4 1.6 - 2.4 mg/dL Final   09/10/2022 2.7 (H) 1.6 - 2.4 mg/dL Final     Lab Results   Component Value Date/Time    Calcium 7.8 (L) 09/12/2022 09:08 AM    Phosphorus 3.2 09/12/2022 09:08 AM       Nutr. Meds:  Vit K54, Precedex, Folic Acid, lantus, synthroid, humalog, Seroquel    Current Nutrition Intake & Therapies:  Average Meal Intake: NPO  Average Supplement Intake: NPO  ADULT TUBE FEEDING Nasogastric; Renal; Delivery Method: Continuous; Continuous Initial Rate (mL/hr): 20; Continuous Advance Tube Feeding: Yes; Advancement Volume (mL/hr): 10; Advancement Frequency: Q 4 hours; Continuous Goal Rate (mL/hr): 45; Wate. .. Anthropometric Measures:  Height: 5' 6\" (167.6 cm)  Ideal Body Weight (IBW): 142 lbs (65 kg)     Current Body Wt:  86 kg (189 lb 9.5 oz), 133.5 % IBW. Bed scale  Current BMI (kg/m2): 30.6        Weight Adjustment: No adjustment                 BMI Category: Obese class 1 (BMI 30.0-34. 9)    Estimated Daily Nutrient Needs:  Energy Requirements Based On: Formula  Weight Used for Energy Requirements: Current  Energy (kcal/day): 1912 (MSJ x 1.2)  Weight Used for Protein Requirements: Current  Protein (g/day): 103-129 (1.2-1.5 g/kg)  Method Used for Fluid Requirements: 1 ml/kcal  Fluid (ml/day): 1912    Nutrition Diagnosis:   Inadequate oral intake related to inadequate protein-energy intake, impaired respiratory function, cognitive or neurological impairment as evidenced by NPO or clear liquid status due to medical condition  Increased nutrient needs related to renal dysfunction as evidenced by  (need for HD)  Inadequate oral intake related to impaired respiratory function as evidenced by NPO or clear liquid status due to medical condition, intubation - RESOLVED    Nutrition Interventions:   Food and/or Nutrient Delivery: Continue tube feeding (until patient more alert)  Nutrition Education/Counseling: No recommendations at this time  Coordination of Nutrition Care: Continue to monitor while inpatient, Interdisciplinary rounds  Plan of Care discussed with: IDR team    Goals:  Previous Goal Met: Goal(s) achieved  Goals: Meet at least 75% of estimated needs, by next RD assessment       Nutrition Monitoring and Evaluation:   Behavioral-Environmental Outcomes: None identified  Food/Nutrient Intake Outcomes: Enteral nutrition intake/tolerance  Physical Signs/Symptoms Outcomes: Biochemical data, Hemodynamic status, GI status, Weight    Discharge Planning:     Too soon to determine    Rita Vo MS, RD  Contact: Ext: [phone], or via [employer]

## 2022-09-12 NOTE — PROGRESS NOTES
Hospitalist Progress Note      NAME: Savannah Webber   :  1960  MRM:  [id number]    Date/Time: 2022         Assessment / Plan:     62M hx DM w/ CKD3 p/w dyspnea, nausea, vomiting. Refractory Acidemia / Holley Carrier / lactic acidosis: Presented with pH 6.77, profoundly metabolic w/ lactate > 18. Euglycemic but bOHb quite elevated so seems more likely this was an atypical presentation of DKA confounded by acute renal failure and possible metformin toxicity. Has improved s/p insulin gtt, CRRT. Gap 46 on arrival, now closed and lactate cleared. Transitioned to basal bolus insulin and tolerating    Acute Renal Failure on CKD 3: Presented with Cr 12.94, now improved w/ CRRT. Likely 2/2 profound volume depletion in s/o DKA or viral GI illness. CT C/A/P no acute concerns. Renal following for CRRT. Anticipate he will need iHD     Shock, potentially Septic: No clear source of infx including CT C/A/P. Presented with leukocytosis, bandemia. Anuric. No diarrhea. Doubt menignitis/encephalitis. CX 9/10 with possible PNA but this was following unintentional extubation 9/10; query aspiration but low suspicion at this point. Wean NE as able. Respiratory Failure: Intubated for airway protection, severe met acidosis. Unintentional extubation 9/10; then re-intubated 9/10. Extubated ; curretly on RA. Lethargy: Possibly due to sedation. Hold Seroquel. Ct head 9/10 no acute concerns. Monitor     Thrombocytopenia: Possibly due to low folic acid. Several days after hep. Less likely HIT, but will holding SQH for now. Smear no schistocytes; check pt/inr, fibrinogen and d-dimer. Continue folate. Anemia: b12 and folate low; c/w supplements     AFwRVR: New. Resolved. Echo unremarkable      Metabolic Encephalopathy: 2/2 profound metabolic acidosis. Wean sedation as able     Seizure like activity:  EEG negative. Monitor     DM2: A1c only 6.6%.  titrate lantus prn, SSI                 Care Plan discussed with: Family, Care Manager, Nursing Staff, and Consultant/Specialist    Discussed:  Care Plan    Prophylaxis:  SCD    Disposition:  SNF/LTC           ___________________________________________________    Attending Physician: Estelita Meneses DO        Subjective:     Chief Complaint:  FU acidemia. Extubated 9/11; currently on NC   ROS:  Unable to obtain due to lethargy           Objective:       Vitals:          Last 24hrs VS reviewed since prior progress note.  Most recent are:    Visit Vitals  /61 (BP 1 Location: Right leg, BP Patient Position: At rest)   Pulse 93   Temp 97.9 °F (36.6 °C)   Resp 19   Ht 5' 6\" (1.676 m)   Wt 86 kg (189 lb 9.5 oz)   SpO2 92%   BMI 30.60 kg/m²     SpO2 Readings from Last 6 Encounters:   09/12/22 92%    O2 Flow Rate (L/min): 2 l/min     Intake/Output Summary (Last 24 hours) at 9/12/2022 0842  Last data filed at 9/12/2022 0600  Gross per 24 hour   Intake 2211.02 ml   Output 1535 ml   Net 676.02 ml            Exam:     Physical Exam:    Gen:  NAD   HEENT:  Pink conjunctivae, PERRL  Neck:  Supple, without masses, thyroid non-tender  Resp:  No accessory muscle use, rhonchi b/l   Card:  No murmurs, normal S1, S2 without thrills, bruits or peripheral edema  Abd:  Soft, non-tender, non-distended, normoactive bowel sounds are present  Musc:  No cyanosis or clubbing  Skin:  No rashes or ulcers, skin turgor is good  Neuro:  lethargic, sedated   Psych:  sedated       Medications Reviewed: (see below)    Lab Data Reviewed: (see below)    ______________________________________________________________________    Medications:     Current Facility-Administered Medications   Medication Dose Route Frequency    hydrALAZINE (APRESOLINE) 20 mg/mL injection 10 mg  10 mg IntraVENous Q6H PRN    QUEtiapine (SEROquel) tablet 50 mg  50 mg Oral BID    insulin glargine (LANTUS) injection 16 Units  16 Units SubCUTAneous DAILY    folic acid (FOLVITE) tablet 1 mg  1 mg Oral DAILY    cyanocobalamin (VITAMIN B12) tablet 500 mcg 500 mcg Per NG tube DAILY    methylPREDNISolone (PF) (SOLU-MEDROL) injection 40 mg  40 mg IntraVENous Q8H    LORazepam (ATIVAN) injection 1 mg  1 mg IntraVENous Q4H PRN    albuterol-ipratropium (DUO-NEB) 2.5 MG-0.5 MG/3 ML  3 mL Nebulization Q6H RT    NOREPINephrine (LEVOPHED) 8 mg in 5% dextrose 250mL (32 mcg/mL) infusion  0.5-50 mcg/min IntraVENous TITRATE    dexmedeTOMidine in 0.9 % NaCl (PRECEDEX) 400 mcg/100 mL (4 mcg/mL) infusion soln  0.1-1.5 mcg/kg/hr IntraVENous TITRATE    fentaNYL citrate (PF) injection 100 mcg  100 mcg IntraVENous Q4H PRN    dextrose 10% infusion 0-250 mL  0-250 mL IntraVENous PRN    insulin lispro (HUMALOG) injection   SubCUTAneous Q6H    levothyroxine (SYNTHROID) tablet 100 mcg  100 mcg Oral ACB    glucose chewable tablet 16 g  4 Tablet Oral PRN    glucagon (GLUCAGEN) injection 1 mg  1 mg IntraMUSCular PRN    bicarbonate dialysis (PRISMASOL) BG K 4/Ca 2.5 5000 ml solution   Extracorporeal DIALYSIS CONTINUOUS    pantoprazole (PROTONIX) 40 mg in 0.9% sodium chloride 10 mL injection  40 mg IntraVENous DAILY    [Held by provider] heparin (porcine) injection 5,000 Units  5,000 Units SubCUTAneous Q12H    fentaNYL (PF) 1,500 mcg/30 mL (50 mcg/mL) infusion  0-200 mcg/hr IntraVENous TITRATE            Lab Review:     Recent Labs     09/12/22  0012 09/11/22  0013 09/10/22  0321   WBC 5.9 5.0 4.4   HGB 8.0* 8.2* 8.2*   HCT 23.3* 24.2* 24.0*   * 94* 95*       Recent Labs     09/12/22  0012 09/11/22  1230 09/11/22  0013 09/09/22  2137 09/09/22  1539    137 138   < >  --    K 5.1 4.1 4.1   < >  --     105 105   < >  --    CO2 25 27 27   < >  --    * 167* 152*   < >  --    BUN 26* 24* 21*   < >  --    CREA 2.11* 2.02* 1.95*   < >  --    CA 7.7* 7.6* 7.5*   < >  --    MG 2.6* 2.6* 2.4   < >  --    PHOS 3.8 3.9 2.4*   < >  --    ALB  --   --   --   --  2.0*   ALT  --   --   --   --  12    < > = values in this interval not displayed.        No components found for: [place]

## 2022-09-12 NOTE — PROCEDURES
Hemodialysis / [phone]    Vitals Pre Post Assessment Pre Post   BP BP: (!) 157/73 (09/12/22 1540)   164/66 LOC AXOX3 AXOX3   HR Pulse (Heart Rate): (!) 114 (09/12/22 1540)   117 Lungs DECREASED, DIMINISHED NO CHANGE   Resp Resp Rate: (!) 34 (09/12/22 1030)   30 Cardiac NSR NO CHANGE   Temp Temp: 97.9 °F (36.6 °C) (09/12/22 0800) 99.5 Skin WDI NO CHANGE   Weight    Edema GENERALIZED NO CHANGE   Tele status BEDSIDE BEDSIDE Pain Pain Intensity 1: 0 (09/12/22 0400) PT DENIES     Orders   Duration: Start: 3494 End: 1840 Total: 3.0   Dialyzer: Dialyzer/Set Up Inspection: Raisa Flanagan ([id number]) (09/12/22 1530)   K Bath: Dialysate K (mEq/L): 2 (09/12/22 1530)   Ca Bath: Dialysate CA (mEq/L): 2.5 (09/12/22 1530)   Na: Dialysate NA (mEq/L): 140 (09/12/22 1530)   Bicarb: Dialysate HCO3 (mEq/L): 35 (09/12/22 1530)   Target Fluid Removal: Goal/Amount of Fluid to Remove (mL): 500 mL (09/12/22 1530)     Access   Type & Location: Feli Alan / PC : Dressing CDI. No s/s of infection. Both lumens aspirate & flush well. Running well at .     Comments:                                        Labs   HBsAg (Antigen) / date:                                        09/08/22 NEG       HBsAb (Antibody) / date: 09/08/22 [facility]   Source:    Obtained/Reviewed  Critical Results Called HGB   Date Value Ref Range Status   09/12/2022 8.0 (L) 12.1 - 17.0 g/dL Final     Potassium   Date Value Ref Range Status   09/12/2022 5.2 (H) 3.5 - 5.1 mmol/L Final     Calcium   Date Value Ref Range Status   09/12/2022 7.8 (L) 8.5 - 10.1 MG/DL Final     BUN   Date Value Ref Range Status   09/12/2022 32 (H) 6 - 20 MG/DL Final     Creatinine   Date Value Ref Range Status   09/12/2022 2.32 (H) 0.70 - 1.30 MG/DL Final        Meds Given   Name Dose Route                    Adequacy / Fluid    Total Liters Process: 66   Net Fluid Removed: 500 ML      Comments   Time Out Done:   (Time) 1530 YES   Admitting Diagnosis: LAVINIA, SOB, WEAKNESS   Consent obtained/signed: Informed Consent Verified: Yes (09/12/22 1530)   Machine / RO # Machine Number: [id number] (09/12/22 4468)   Primary Nurse Rpt Pre: Ruth Ugalde RN   Primary Nurse Rpt Post: Ruth Ugalde RN   Pt Education:    Care Plan: FOLLOW MD [address]   Pts outpatient clinic: NEW PATIENT     Tx Summary     SBAR received from Primary RN. Pt A&Ox4. Consent signed & on file. 8815 Each catheter limb disinfected per p&p, caps removed, hubs disinfected per p&p. Each lumen aspirated for blood return and flushed with Normal Saline per policy. 1540 VSS. Dialysis Tx initiated. 1840: Tx ended. VSS. Each dialysis catheter limb disinfected per p&p, all possible blood returned per p&p, and each dialysis hub disinfected per p&p. Each lumen flushed, post dialysis catheter Heparin dwell instilled per order, and caps applied. Bed locked and in the lowest position, call bell and belongings in reach. SBAR given to Primary, RN. Patient is stable at time of my departure. All Dialysis related medications have been reviewed.        Comments:

## 2022-09-12 NOTE — DIALYSIS
CRRT / [phone]    Orders   Mode: [address] rounding   Blood Flow Rate: 200 ml/hr   Prismasol Dose: PBP: 900 ml/hr  Replacement: 900 ml/hr   Prismasol Concentrate: 4K / 2.5Ca   Blood Warmer Temp: 37*C   Net Fluid Removal: 25 ml/hr     Metrics   BP: 99/58   HR: 84   Access Pressure: -80   Filter Pressure: 186   Return Pressure: 45   TMP: 134   Pressure Drop: 110     Access   Type & Location: RIJ non-tunneled CVC C/D/I with transparent dressing and biopatch in place dated 09/06/22     Labs   HBsAg (Antigen) / date: Negative  09/08/22                                              HBsAb (Antibody) / date: Susceptible  09/08/22   Source: Epic     Safety:   Time Out Done:   (Time) 0147   Consent obtained/signed: Verified   Education: Access/Site Care   Primary Nurse Rpt Pre: PAWAN Kimball RN   Primary Nurse Rpt Post: PAWAN Kimball RN     Comments / Plan:   Patient, orders, line and consent verified. Labs, notes and code status reviewed. [id number] filter running well with no indications for change at this time. Lines visible/secure with blood warmer attached to the return line and set to 37C*. Education and Pre/Post with primary RN.  Per Dr. Buddy MERCADORT at next filter clot - pt to be re-evaluated for IHD later this AM.

## 2022-09-12 NOTE — PROGRESS NOTES
ICU Progress Note        Subjective: Overnight events noted. Vital Signs:    Visit Vitals  /61 (BP 1 Location: Right leg, BP Patient Position: At rest)   Pulse 93   Temp 97.9 °F (36.6 °C)   Resp 19   Ht 5' 6\" (1.676 m)   Wt 86 kg (189 lb 9.5 oz)   SpO2 92%   BMI 30.60 kg/m²       O2 Device: Nasal cannula   O2 Flow Rate (L/min): 2 l/min   Temp (24hrs), Av.8 °F (36.6 °C), Min:96.5 °F (35.8 °C), Max:98.6 °F (37 °C)       Intake/Output:   Last shift:      No intake/output data recorded. Last 3 shifts: 09/10 1901 -  0700  In: 4398.2 [I.V.:1023.2]  Out: 7271 [Urine:159]    Intake/Output Summary (Last 24 hours) at 2022 0901  Last data filed at 2022 0600  Gross per 24 hour   Intake 2211.02 ml   Output 1486 ml   Net 725.02 ml       Ventilator Settings:  Ventilator Mode: Assist control  Respiratory Rate  Resp Rate Observed: 18  Back-Up Rate: 20  Insp Time (sec): 0.72 sec  Insp Flow (l/min): 60 l/min  I:E Ratio: 1:2.9  Ventilator Volumes  Vt Set (ml): 400 ml  Vt Exhaled (Machine Breath) (ml): 402 ml  Vt Spont (ml): 444 ml  Ve Observed (l/min): 6.69 l/min  Ventilator Pressures  Pressure Support (cm H2O): 5 cm H2O  PIP Observed (cm H2O): 27 cm H2O  Plateau Pressure (cm H2O): 17 cm H2O  MAP (cm H2O): 10  PEEP/VENT (cm H2O): 5 cm H20  Auto PEEP Observed (cm H2O): 0.4 cm H2O    Physical Exam:    General:  Not in acute distress  HEENT:  Anicteric sclerae; pink palpebral conjunctivae; mucosa moist  Resp:  Bilateral air entry +, no crackles or wheeze  CV:  S1, S2 present  GI:  Abdomen soft, non-tender; (+) active bowel sounds  Extremities:  +2 pulses on all extremities; no edema/ cyanosis/ clubbing noted  Skin:  Warm; no rashes/ lesions noted  Neurologic:  Deeply sedated, does not wake up with tactile stimuli. Maintaining airways, on precedex gtt 1.1 mcg/min and fentanyl 25 mcg/min.     DATA:     Current Facility-Administered Medications   Medication Dose Route Frequency    hydrALAZINE (APRESOLINE) 20 mg/mL injection 10 mg  10 mg IntraVENous Q6H PRN    [Held by provider] QUEtiapine (SEROquel) tablet 50 mg  50 mg Oral BID    insulin glargine (LANTUS) injection 16 Units  16 Units SubCUTAneous DAILY    folic acid (FOLVITE) tablet 1 mg  1 mg Oral DAILY    cyanocobalamin (VITAMIN B12) tablet 500 mcg  500 mcg Per NG tube DAILY    methylPREDNISolone (PF) (SOLU-MEDROL) injection 40 mg  40 mg IntraVENous Q8H    LORazepam (ATIVAN) injection 1 mg  1 mg IntraVENous Q4H PRN    albuterol-ipratropium (DUO-NEB) 2.5 MG-0.5 MG/3 ML  3 mL Nebulization Q6H RT    NOREPINephrine (LEVOPHED) 8 mg in 5% dextrose 250mL (32 mcg/mL) infusion  0.5-50 mcg/min IntraVENous TITRATE    dexmedeTOMidine in 0.9 % NaCl (PRECEDEX) 400 mcg/100 mL (4 mcg/mL) infusion soln  0.1-1.5 mcg/kg/hr IntraVENous TITRATE    fentaNYL citrate (PF) injection 100 mcg  100 mcg IntraVENous Q4H PRN    dextrose 10% infusion 0-250 mL  0-250 mL IntraVENous PRN    insulin lispro (HUMALOG) injection   SubCUTAneous Q6H    levothyroxine (SYNTHROID) tablet 100 mcg  100 mcg Oral ACB    glucose chewable tablet 16 g  4 Tablet Oral PRN    glucagon (GLUCAGEN) injection 1 mg  1 mg IntraMUSCular PRN    bicarbonate dialysis (PRISMASOL) BG K 4/Ca 2.5 5000 ml solution   Extracorporeal DIALYSIS CONTINUOUS    pantoprazole (PROTONIX) 40 mg in 0.9% sodium chloride 10 mL injection  40 mg IntraVENous DAILY    [Held by provider] heparin (porcine) injection 5,000 Units  5,000 Units SubCUTAneous Q12H    fentaNYL (PF) 1,500 mcg/30 mL (50 mcg/mL) infusion  0-200 mcg/hr IntraVENous TITRATE         Labs: Results:       Chemistry Recent Labs     09/12/22  0012 09/11/22  1230 09/11/22  0013 09/09/22  2137 09/09/22  1539   * 167* 152*   < >  --     137 138   < >  --    K 5.1 4.1 4.1   < >  --     105 105   < >  --    CO2 25 27 27   < >  --    BUN 26* 24* 21*   < >  --    CREA 2.11* 2.02* 1.95*   < >  --    CA 7.7* 7.6* 7.5*   < >  --    AGAP 7 5 6   < >  --    BUCR 12 12 11*   < >  -- AP  --   --   --   --  63   TP  --   --   --   --  5.0*   ALB  --   --   --   --  2.0*   GLOB  --   --   --   --  3.0   AGRAT  --   --   --   --  0.7*    < > = values in this interval not displayed. CBC w/Diff Recent Labs     09/12/22  0012 09/11/22  0013 09/10/22  0321   WBC 5.9 5.0 4.4   RBC 2.50* 2.60* 2.56*   HGB 8.0* 8.2* 8.2*   HCT 23.3* 24.2* 24.0*   * 94* 95*   GRANS 87* 81*  --    LYMPH 8* 10*  --    EOS 0 1  --       Coagulation No results for input(s): PTP, INR, APTT, INREXT in the last 72 hours. Liver Enzymes Recent Labs     09/09/22  1539   TP 5.0*   ALB 2.0*   AP 63      ABG No results found for: PH, PHI, PCO2, PCO2I, PO2, PO2I, HCO3, HCO3I, FIO2, FIO2I   Microbiology No results for input(s): CULT in the last 72 hours. maging:  CXR Results  (Last 48 hours)      None            CT Results  (Last 48 hours)      None                 Assessment and Plan:    The patient is a 62/M who presented with symptoms of uremia. Found to have metabolic acidosis with renal failure. Intubated to protect airways. Extubated 09/11. On CRRT. AGMA / lactic acidosis: Presented with pH 6.77, profoundly metabolic w/ lactate > 18. Euglycemic, likely acute renal failure and possible metformin toxicity. Cont. CRRT. Acute Renal Failure: Presented with Cr 12.94, now improved w/ CRRT. Likely 2/2 profound volume depletion. CT C/A/P no acute concerns. Minimal UOP, but slowly improving. Renal following for CRRT. Anticipate he will need iHD      Shock - in the setting of acidosis. Respiratory Failure: Intubated for airway protection, severe met acidosis. Unintentional extubation 9/10; intubated and extubated 09/11. Patient is currently on 2 sedative medication, precedex gtt 1.1 mcg/min and fentanyl 25 mcg/min, he is clinically deeply sedated, but thankfully maintaining the airway. He also received Ativan 1 mg at 1 pm. Discussed with RN, both sedatives held.       Thrombocytopenia: With lymphopenia and low platelets, in the setting of low folic acid this is likely from bone marrow suppression (from critical illness). Differential diagnosis is ROBERTO, TTP and ITP. No schistocytes on the peripheral smear which rules out TTP. Anemia: b12 and folate low; start supplements      AFwRVR: New. Resolved. Echo unremarkable     DM - Insulin. CCM time - 40 minutes.      Darleen Hernandez MD,DONOVAN, FCCM, FCCP, ATSF, FACP, DAABIP  Interventional Pulmonology/Critical [address] [place] [address]

## 2022-09-12 NOTE — WOUND CARE
Wound Consult: Follow Up Visit. Chart reviewed. Consulted for area on buttocks. Spoke with patients nurse,  Alan Robles and in with Lory Del Toro and Bharat to turn and provide care. Patient is resting on a Kae bed with hercules mattress. Heels off loaded with boot. Patient is somewhat alert, mumbling words; requires two person assist to move side to side in bed. David score 12; incontinent of loose brown stool, pete. Assessment:  Right lateral buttock - 0.8 x 2 cm serous blister with pink discoloration under; no surrounding redness, no redness to sacrum or left buttock. Appears as friction injury, foam was placed but loose stool removed it, replaced with sacral foam dressing. Right dorsal hand; small skin tear 0.6 x 0.6 x 0.1 cm, red/pink, no surrounding redness, no drainage. No redness to spine, heels, ankles. Treatment:  Applied sacral foam dressing, repositioned after skin/incontinence care, linen change. Boots reapplied. Wound Recommendations:  Sacral foam dressing, change every three days and if soiled. Foam dressing to right dorsal hand, change every three days. Skin Care / PI Prevention Recommendations:  1. Minimize friction/shear: minimize layers of linen/pads under patient. 2. Off load pressure/reposition:  turn and reposition approximately every 2 hours; float heels with pillows or use off loading heel boots; waffle cushion for sitting; position wedge. 3. Manage Moisture - keep skin folds dry; incontinence skin care with incontinence wipes; add barrier ointment if needed; pete currently in place, consider FMS use to help contain stool remains liquid/large. 4. Continue to monitor nutrition, pain, and skin risk scale, and skin assessment. Plan:  Hand off to Dr. Harry Meyer regarding findings and proposed orders for treatment. We will continue to reassess routinley and as needed. Please re-consult should concerns arise despite continued skin/PI prevention measures.     Adilia Chavez, MSN, RN, [facility], Wound / [facility] Office [phone]

## 2022-09-13 ENCOUNTER — APPOINTMENT (OUTPATIENT)
Dept: GENERAL RADIOLOGY | Age: 62
DRG: 444 | End: 2022-09-13
Attending: INTERNAL MEDICINE
Payer: MEDICAID

## 2022-09-13 LAB
ANION GAP SERPL CALC-SCNC: 8 MMOL/L (ref 5–15)
ANION GAP SERPL CALC-SCNC: 9 MMOL/L (ref 5–15)
ARTERIAL PATENCY WRIST A: POSITIVE
BASE EXCESS BLD CALC-SCNC: 3.8 MMOL/L
BASOPHILS # BLD: 0 K/UL (ref 0–0.1)
BASOPHILS NFR BLD: 0 % (ref 0–1)
BDY SITE: ABNORMAL
BUN SERPL-MCNC: 26 MG/DL (ref 6–20)
BUN SERPL-MCNC: 54 MG/DL (ref 6–20)
BUN/CREAT SERPL: 12 (ref 12–20)
BUN/CREAT SERPL: 15 (ref 12–20)
CALCIUM SERPL-MCNC: 7.9 MG/DL (ref 8.5–10.1)
CALCIUM SERPL-MCNC: 8 MG/DL (ref 8.5–10.1)
CHLORIDE SERPL-SCNC: 103 MMOL/L (ref 97–108)
CHLORIDE SERPL-SCNC: 103 MMOL/L (ref 97–108)
CO2 SERPL-SCNC: 26 MMOL/L (ref 21–32)
CO2 SERPL-SCNC: 27 MMOL/L (ref 21–32)
CREAT SERPL-MCNC: 2.1 MG/DL (ref 0.7–1.3)
CREAT SERPL-MCNC: 3.57 MG/DL (ref 0.7–1.3)
DIFFERENTIAL METHOD BLD: ABNORMAL
EOSINOPHIL # BLD: 0.2 K/UL (ref 0–0.4)
EOSINOPHIL NFR BLD: 2 % (ref 0–7)
ERYTHROCYTE [DISTWIDTH] IN BLOOD BY AUTOMATED COUNT: 12.6 % (ref 11.5–14.5)
GLUCOSE BLD STRIP.AUTO-MCNC: 203 MG/DL (ref 65–117)
GLUCOSE BLD STRIP.AUTO-MCNC: 221 MG/DL (ref 65–117)
GLUCOSE BLD STRIP.AUTO-MCNC: 227 MG/DL (ref 65–117)
GLUCOSE BLD STRIP.AUTO-MCNC: 234 MG/DL (ref 65–117)
GLUCOSE SERPL-MCNC: 221 MG/DL (ref 65–100)
GLUCOSE SERPL-MCNC: 225 MG/DL (ref 65–100)
HCO3 BLD-SCNC: 26.4 MMOL/L (ref 22–26)
HCT VFR BLD AUTO: 21.8 % (ref 36.6–50.3)
HGB BLD-MCNC: 7.6 G/DL (ref 12.1–17)
IMM GRANULOCYTES # BLD AUTO: 0 K/UL
IMM GRANULOCYTES NFR BLD AUTO: 0 %
LYMPHOCYTES # BLD: 1.5 K/UL (ref 0.8–3.5)
LYMPHOCYTES NFR BLD: 15 % (ref 12–49)
MAGNESIUM SERPL-MCNC: 2.4 MG/DL (ref 1.6–2.4)
MAGNESIUM SERPL-MCNC: 2.6 MG/DL (ref 1.6–2.4)
MCH RBC QN AUTO: 31.9 PG (ref 26–34)
MCHC RBC AUTO-ENTMCNC: 34.9 G/DL (ref 30–36.5)
MCV RBC AUTO: 91.6 FL (ref 80–99)
MONOCYTES # BLD: 1 K/UL (ref 0–1)
MONOCYTES NFR BLD: 10 % (ref 5–13)
NEUTS BAND NFR BLD MANUAL: 1 % (ref 0–6)
NEUTS SEG # BLD: 7.5 K/UL (ref 1.8–8)
NEUTS SEG NFR BLD: 72 % (ref 32–75)
NRBC # BLD: 0.03 K/UL (ref 0–0.01)
NRBC BLD-RTO: 0.3 PER 100 WBC
O2/TOTAL GAS SETTING VFR VENT: 3 %
PCO2 BLD: 32.3 MMHG (ref 35–45)
PH BLD: 7.52 [PH] (ref 7.35–7.45)
PHOSPHATE SERPL-MCNC: 1.6 MG/DL (ref 2.6–4.7)
PHOSPHATE SERPL-MCNC: 4.6 MG/DL (ref 2.6–4.7)
PLATELET # BLD AUTO: 116 K/UL (ref 150–400)
PMV BLD AUTO: 9.5 FL (ref 8.9–12.9)
PO2 BLD: 70 MMHG (ref 80–100)
POTASSIUM SERPL-SCNC: 3.1 MMOL/L (ref 3.5–5.1)
POTASSIUM SERPL-SCNC: 3.8 MMOL/L (ref 3.5–5.1)
RBC # BLD AUTO: 2.38 M/UL (ref 4.1–5.7)
RBC MORPH BLD: ABNORMAL
SAO2 % BLD: 95.6 % (ref 92–97)
SERVICE CMNT-IMP: ABNORMAL
SODIUM SERPL-SCNC: 137 MMOL/L (ref 136–145)
SODIUM SERPL-SCNC: 139 MMOL/L (ref 136–145)
SPECIMEN TYPE: ABNORMAL
SRA 100IU/ML UFH SER-ACNC: <1 % (ref 0–20)
SRA UFH SER-IMP: NORMAL
UNFRAC HEPARIN LOW DOSE: <1 % (ref 0–20)
WBC # BLD AUTO: 10.2 K/UL (ref 4.1–11.1)

## 2022-09-13 PROCEDURE — 74011250636 HC RX REV CODE- 250/636: Performed by: HOSPITALIST

## 2022-09-13 PROCEDURE — 36600 WITHDRAWAL OF ARTERIAL BLOOD: CPT

## 2022-09-13 PROCEDURE — 74011250636 HC RX REV CODE- 250/636: Performed by: INTERNAL MEDICINE

## 2022-09-13 PROCEDURE — 80048 BASIC METABOLIC PNL TOTAL CA: CPT

## 2022-09-13 PROCEDURE — 74011000250 HC RX REV CODE- 250: Performed by: INTERNAL MEDICINE

## 2022-09-13 PROCEDURE — 84100 ASSAY OF PHOSPHORUS: CPT

## 2022-09-13 PROCEDURE — 36415 COLL VENOUS BLD VENIPUNCTURE: CPT

## 2022-09-13 PROCEDURE — 74011636637 HC RX REV CODE- 636/637: Performed by: INTERNAL MEDICINE

## 2022-09-13 PROCEDURE — 74011250637 HC RX REV CODE- 250/637: Performed by: INTERNAL MEDICINE

## 2022-09-13 PROCEDURE — 82962 GLUCOSE BLOOD TEST: CPT

## 2022-09-13 PROCEDURE — 94761 N-INVAS EAR/PLS OXIMETRY MLT: CPT

## 2022-09-13 PROCEDURE — 90935 HEMODIALYSIS ONE EVALUATION: CPT

## 2022-09-13 PROCEDURE — 83735 ASSAY OF MAGNESIUM: CPT

## 2022-09-13 PROCEDURE — 74018 RADEX ABDOMEN 1 VIEW: CPT

## 2022-09-13 PROCEDURE — 82803 BLOOD GASES ANY COMBINATION: CPT

## 2022-09-13 PROCEDURE — 65610000006 HC RM INTENSIVE CARE

## 2022-09-13 PROCEDURE — 77010033678 HC OXYGEN DAILY

## 2022-09-13 PROCEDURE — 71045 X-RAY EXAM CHEST 1 VIEW: CPT

## 2022-09-13 PROCEDURE — 77030018798 HC PMP KT ENTRL FED COVD -A

## 2022-09-13 PROCEDURE — 85025 COMPLETE CBC W/AUTO DIFF WBC: CPT

## 2022-09-13 RX ORDER — SENNOSIDES 8.8 MG/5ML
5 LIQUID ORAL DAILY
Status: DISCONTINUED | OUTPATIENT
Start: 2022-09-13 | End: 2022-09-22

## 2022-09-13 RX ORDER — METOPROLOL TARTRATE 5 MG/5ML
5 INJECTION INTRAVENOUS
Status: COMPLETED | OUTPATIENT
Start: 2022-09-13 | End: 2022-09-14

## 2022-09-13 RX ORDER — POLYETHYLENE GLYCOL 3350 17 G/17G
17 POWDER, FOR SOLUTION ORAL DAILY
Status: DISCONTINUED | OUTPATIENT
Start: 2022-09-13 | End: 2022-09-17

## 2022-09-13 RX ORDER — IPRATROPIUM BROMIDE AND ALBUTEROL SULFATE 2.5; .5 MG/3ML; MG/3ML
3 SOLUTION RESPIRATORY (INHALATION)
Status: DISCONTINUED | OUTPATIENT
Start: 2022-09-13 | End: 2022-09-23 | Stop reason: HOSPADM

## 2022-09-13 RX ORDER — IPRATROPIUM BROMIDE AND ALBUTEROL SULFATE 2.5; .5 MG/3ML; MG/3ML
3 SOLUTION RESPIRATORY (INHALATION)
Status: CANCELLED | OUTPATIENT
Start: 2022-09-13

## 2022-09-13 RX ORDER — POLYETHYLENE GLYCOL 3350 17 G/17G
17 POWDER, FOR SOLUTION ORAL DAILY
Status: DISCONTINUED | OUTPATIENT
Start: 2022-09-13 | End: 2022-09-13

## 2022-09-13 RX ORDER — SENNOSIDES 8.8 MG/5ML
5 LIQUID ORAL DAILY
Status: DISCONTINUED | OUTPATIENT
Start: 2022-09-13 | End: 2022-09-13

## 2022-09-13 RX ADMIN — POLYETHYLENE GLYCOL 3350 17 G: 17 POWDER, FOR SOLUTION ORAL at 12:00

## 2022-09-13 RX ADMIN — DEXMEDETOMIDINE HYDROCHLORIDE 1.5 MCG/KG/HR: 400 INJECTION INTRAVENOUS at 13:21

## 2022-09-13 RX ADMIN — METOPROLOL TARTRATE 5 MG: 5 INJECTION, SOLUTION INTRAVENOUS at 06:51

## 2022-09-13 RX ADMIN — HYDRALAZINE HYDROCHLORIDE 10 MG: 20 INJECTION INTRAMUSCULAR; INTRAVENOUS at 05:32

## 2022-09-13 RX ADMIN — SENNOSIDES 8.8 MG: 8.8 LIQUID ORAL at 12:14

## 2022-09-13 RX ADMIN — CYANOCOBALAMIN TAB 500 MCG 500 MCG: 500 TAB at 08:33

## 2022-09-13 RX ADMIN — DEXMEDETOMIDINE HYDROCHLORIDE 1.5 MCG/KG/HR: 400 INJECTION INTRAVENOUS at 08:36

## 2022-09-13 RX ADMIN — METHYLPREDNISOLONE SODIUM SUCCINATE 40 MG: 40 INJECTION, POWDER, FOR SOLUTION INTRAMUSCULAR; INTRAVENOUS at 08:33

## 2022-09-13 RX ADMIN — INSULIN LISPRO 2 UNITS: 100 INJECTION, SOLUTION INTRAVENOUS; SUBCUTANEOUS at 23:55

## 2022-09-13 RX ADMIN — DEXMEDETOMIDINE HYDROCHLORIDE 1.5 MCG/KG/HR: 400 INJECTION INTRAVENOUS at 05:21

## 2022-09-13 RX ADMIN — INSULIN LISPRO 2 UNITS: 100 INJECTION, SOLUTION INTRAVENOUS; SUBCUTANEOUS at 11:59

## 2022-09-13 RX ADMIN — DEXMEDETOMIDINE HYDROCHLORIDE 1.5 MCG/KG/HR: 400 INJECTION INTRAVENOUS at 20:54

## 2022-09-13 RX ADMIN — DEXMEDETOMIDINE HYDROCHLORIDE 1.5 MCG/KG/HR: 400 INJECTION INTRAVENOUS at 16:44

## 2022-09-13 RX ADMIN — POTASSIUM PHOSPHATE, MONOBASIC AND POTASSIUM PHOSPHATE, DIBASIC: 224; 236 INJECTION, SOLUTION, CONCENTRATE INTRAVENOUS at 02:43

## 2022-09-13 RX ADMIN — INSULIN LISPRO 2 UNITS: 100 INJECTION, SOLUTION INTRAVENOUS; SUBCUTANEOUS at 18:14

## 2022-09-13 RX ADMIN — FOLIC ACID 1 MG: 1 TABLET ORAL at 08:33

## 2022-09-13 RX ADMIN — IPRATROPIUM BROMIDE AND ALBUTEROL SULFATE 3 ML: .5; 3 SOLUTION RESPIRATORY (INHALATION) at 02:09

## 2022-09-13 RX ADMIN — INSULIN LISPRO 2 UNITS: 100 INJECTION, SOLUTION INTRAVENOUS; SUBCUTANEOUS at 05:31

## 2022-09-13 RX ADMIN — DEXMEDETOMIDINE HYDROCHLORIDE 1.5 MCG/KG/HR: 400 INJECTION INTRAVENOUS at 01:22

## 2022-09-13 RX ADMIN — METOPROLOL TARTRATE 5 MG: 5 INJECTION, SOLUTION INTRAVENOUS at 06:01

## 2022-09-13 RX ADMIN — QUETIAPINE FUMARATE 50 MG: 25 TABLET ORAL at 08:33

## 2022-09-13 RX ADMIN — QUETIAPINE FUMARATE 50 MG: 25 TABLET ORAL at 18:14

## 2022-09-13 RX ADMIN — INSULIN GLARGINE 16 UNITS: 100 INJECTION, SOLUTION SUBCUTANEOUS at 08:33

## 2022-09-13 RX ADMIN — LEVOTHYROXINE SODIUM 100 MCG: 0.1 TABLET ORAL at 05:32

## 2022-09-13 NOTE — PROGRESS NOTES
[id number]  YOB: 1960          Assessment & Plan:     Severe oliguric LAVINIA on CVVH then transition to IHD from 9/12  Severe lactic acidosis  Ketoacidosis  Resp failure s/p intubation and extubation   DM2  Thrombocytopenia  Hypophosphatemia     Rec:  Had HD yesterday with minimal UF  Plan for UF for 2 hr   Continue HD MWF schedule  Avoid nephrotoxins   ICU support       Subjective:   CC: LAVINIA  HPI: Seen, extubated,edema +  ROS: unable to obtain due to pt condition  Current Facility-Administered Medications   Medication Dose Route Frequency    metoprolol (LOPRESSOR) injection 5 mg  5 mg IntraVENous Q15MIN PRN    albuterol-ipratropium (DUO-NEB) 2.5 MG-0.5 MG/3 ML  3 mL Nebulization Q4H PRN    sennosides (SENOKOT) 8.8 mg/5 mL syrup 8.8 mg  5 mL Oral DAILY    polyethylene glycol (MIRALAX) packet 17 g  17 g Oral DAILY    hydrALAZINE (APRESOLINE) 20 mg/mL injection 10 mg  10 mg IntraVENous Q6H PRN    acetaminophen (TYLENOL) tablet 650 mg  650 mg Oral Q6H PRN    OLANZapine (ZyPREXA) tablet 5 mg  5 mg Per NG tube BID PRN    QUEtiapine (SEROquel) tablet 50 mg  50 mg Oral BID    insulin glargine (LANTUS) injection 16 Units  16 Units SubCUTAneous DAILY    folic acid (FOLVITE) tablet 1 mg  1 mg Oral DAILY    cyanocobalamin (VITAMIN B12) tablet 500 mcg  500 mcg Per NG tube DAILY    dexmedeTOMidine in 0.9 % NaCl (PRECEDEX) 400 mcg/100 mL (4 mcg/mL) infusion soln  0.1-1.5 mcg/kg/hr IntraVENous TITRATE    dextrose 10% infusion 0-250 mL  0-250 mL IntraVENous PRN    insulin lispro (HUMALOG) injection   SubCUTAneous Q6H    levothyroxine (SYNTHROID) tablet 100 mcg  100 mcg Oral ACB    glucose chewable tablet 16 g  4 Tablet Oral PRN    glucagon (GLUCAGEN) injection 1 mg  1 mg IntraMUSCular PRN    [Held by provider] heparin (porcine) injection 5,000 Units  5,000 Units SubCUTAneous Q12H          Objective:     Vitals:  Blood pressure (!) 146/92, pulse (!) 120, temperature 99.1 °F (37.3 °C), resp. rate 29, height 5' 6\" (1.676 m), weight 86 kg (189 lb 9.5 oz), SpO2 99 %. Temp (24hrs), Av.7 °F (37.6 °C), Min:99.1 °F (37.3 °C), Max:100.7 °F (38.2 °C)      Intake and Output:  No intake/output data recorded.  1901 -  0700  In: 4012.3 [I.V.:907.3]  Out: 1187 [Urine:174]    Physical Exam:               GENERAL ASSESSMENT: NAD  HEENT: peerla   CHEST: diminished BS +  HEART: S1S2  ABDOMEN: Soft,NT  : +Vega:   EXTREMITY: + EDEMA        ECG/rhythm:    Data Review      No results for input(s): TNIPOC in the last 72 hours. No lab exists for component: ITNL     No results for input(s): CPK, CKMB, TROIQ in the last 72 hours. Recent Labs     22  0007 22  0908 22  0012 22  1230 22  0013    135* 137   < > 138   K 3.1* 5.2* 5.1   < > 4.1    103 105   < > 105   CO2 27 25 25   < > 27   BUN 26* 32* 26*   < > 21*   CREA 2.10* 2.32* 2.11*   < > 1.95*   * 283* 200*   < > 152*   PHOS 1.6* 3.2 3.8   < > 2.4*   MG 2.4 2.7* 2.6*   < > 2.4   CA 7.9* 7.8* 7.7*   < > 7.5*   WBC 10.2  --  5.9  --  5.0   HGB 7.6*  --  8.0*  --  8.2*   HCT 21.8*  --  23.3*  --  24.2*   *  --  100*  --  94*    < > = values in this interval not displayed. No results for input(s): INR, PTP, APTT, INREXT, INREXT in the last 72 hours. Needs: urine analysis, urine sodium, protein and creatinine  No results found for: Lisa Rising        : Tyrone Tolentino MD  2022        [facility]:  [email or web address]  Millie Duggan office:  [address] [address]  [address]  Phone: [phone]  Fax :     [phone]    [place] office:  [address] [facility]  Phone - [phone]  Fax - [phone]

## 2022-09-13 NOTE — PROGRESS NOTES
Problem: Patient Education: Go to Patient Education Activity  Goal: Patient/Family Education  Outcome: Progressing Towards Goal     Problem: Falls - Risk of  Goal: *Absence of Falls  Description: Document Jarad Etaon Fall Risk and appropriate interventions in the flowsheet. Outcome: Progressing Towards Goal  Note: Fall Risk Interventions:       Mentation Interventions: Bed/chair exit alarm    Medication Interventions: Bed/chair exit alarm    Elimination Interventions: Bed/chair exit alarm, Call light in reach              Problem: Patient Education: Go to Patient Education Activity  Goal: Patient/Family Education  Outcome: Progressing Towards Goal     Problem: Pressure Injury - Risk of  Goal: *Prevention of pressure injury  Description: Document David Scale and appropriate interventions in the flowsheet. Outcome: Progressing Towards Goal  Note: Pressure Injury Interventions:  Sensory Interventions: Assess changes in LOC, Assess need for specialty bed    Moisture Interventions: Absorbent underpads, Apply protective barrier, creams and emollients    Activity Interventions: Increase time out of bed, PT/OT evaluation    Mobility Interventions: HOB 30 degrees or less, PT/OT evaluation    Nutrition Interventions: Discuss nutritional consult with provider    Friction and Shear Interventions: Feet elevated on foot rest, Apply protective barrier, creams and emollients                Problem: Patient Education: Go to Patient Education Activity  Goal: Patient/Family Education  Outcome: Progressing Towards Goal     Problem: Nutrition Deficit  Goal: *Optimize nutritional status  Outcome: Progressing Towards Goal     Problem: Diabetes Self-Management  Goal: *Disease process and treatment process  Description: Define diabetes and identify own type of diabetes; list 3 options for treating diabetes.   Outcome: Progressing Towards Goal  Goal: *Incorporating nutritional management into lifestyle  Description: Describe effect of type, amount and timing of food on blood glucose; list 3 methods for planning meals. Outcome: Progressing Towards Goal  Goal: *Incorporating physical activity into lifestyle  Description: State effect of exercise on blood glucose levels. Outcome: Progressing Towards Goal  Goal: *Developing strategies to promote health/change behavior  Description: Define the ABC's of diabetes; identify appropriate screenings, schedule and personal plan for screenings. Outcome: Progressing Towards Goal  Goal: *Using medications safely  Description: State effect of diabetes medications on diabetes; name diabetes medication taking, action and side effects. Outcome: Progressing Towards Goal  Goal: *Monitoring blood glucose, interpreting and using results  Description: Identify recommended blood glucose targets  and personal targets. Outcome: Progressing Towards Goal  Goal: *Prevention, detection, treatment of acute complications  Description: List symptoms of hyper- and hypoglycemia; describe how to treat low blood sugar and actions for lowering  high blood glucose level. Outcome: Progressing Towards Goal  Goal: *Prevention, detection and treatment of chronic complications  Description: Define the natural course of diabetes and describe the relationship of blood glucose levels to long term complications of diabetes.   Outcome: Progressing Towards Goal  Goal: *Developing strategies to address psychosocial issues  Description: Describe feelings about living with diabetes; identify support needed and support network  Outcome: Progressing Towards Goal  Goal: *Insulin pump training  Outcome: Progressing Towards Goal  Goal: *Sick day guidelines  Outcome: Progressing Towards Goal  Goal: *Patient Specific Goal (EDIT GOAL, INSERT TEXT)  Outcome: Progressing Towards Goal     Problem: Patient Education: Go to Patient Education Activity  Goal: Patient/Family Education  Outcome: Progressing Towards Goal     Problem: Delirium Treatment  Goal: *Level of consciousness restored to baseline  Outcome: Progressing Towards Goal  Goal: *Level of environmental perceptions restored to baseline  Outcome: Progressing Towards Goal  Goal: *Sensory perception restored to baseline  Outcome: Progressing Towards Goal  Goal: *Emotional stability restored to baseline  Outcome: Progressing Towards Goal  Goal: *Functional assessment restored to baseline  Outcome: Progressing Towards Goal  Goal: *Absence of falls  Outcome: Progressing Towards Goal  Goal: *Will remain free of delirium, CAM Score negative  Outcome: Progressing Towards Goal  Goal: *Cognitive status will be restored to baseline  Outcome: Progressing Towards Goal  Goal: Interventions  Outcome: Progressing Towards Goal     Problem: Patient Education: Go to Patient Education Activity  Goal: Patient/Family Education  Outcome: Progressing Towards Goal     Problem: Non-Violent Restraints  Goal: Removal from restraints as soon as assessed to be safe  Outcome: Progressing Towards Goal  Goal: No harm/injury to patient while restraints in use  Outcome: Progressing Towards Goal  Goal: Patient's dignity will be maintained  Outcome: Progressing Towards Goal  Goal: Patient Interventions  Outcome: Progressing Towards Goal

## 2022-09-13 NOTE — PROGRESS NOTES
Hospitalist Progress Note      NAME: Bladimir Chang   :  1960  MRM:  [id number]    Date/Time: 2022         Assessment / Plan:     62M hx DM w/ CKD3 p/w dyspnea, nausea, vomiting. Delirium: Ct head 9/10 no acute concerns. Seroquel restarted. Remains on precedex drip. Refractory Acidemia / Alan Miranda / lactic acidosis: Presented with pH 6.77, profoundly metabolic w/ lactate > 18. Euglycemic but bOHb quite elevated so seems more likely this was an atypical presentation of DKA confounded by acute renal failure and possible metformin toxicity. Has improved s/p insulin gtt, CRRT. Gap 46 on arrival, now closed and lactate cleared. Transitioned to basal bolus insulin and tolerating    Acute Renal Failure on CKD 3: Presented with Cr 12.94, now improved w/ CRRT. Likely 2/2 profound volume depletion in s/o DKA or viral GI illness. CT C/A/P no acute concerns. Renal following for CRRT. Anticipate he will need iHD     Shock, potentially Septic: No clear source of infx including CT C/A/P. Presented with leukocytosis, bandemia. Anuric. No diarrhea. Doubt menignitis/encephalitis. CX 9/10 with possible PNA but this was following unintentional extubation 9/10; query aspiration but low suspicion at this point. Wean NE as able. Respiratory Failure: Intubated for airway protection, severe met acidosis. Unintentional extubation 9/10; then re-intubated 9/10. Extubated ; curretly on NC. Thrombocytopenia: Possibly due to low folic acid. Several days after hep. Less likely HIT, but will holding SQH for now. Smear no schistocytes; check pt/inr, fibrinogen and d-dimer. Continue folate. Anemia: b12 and folate low; c/w supplements     ABD distension: obtain KUB. Ct abd pelvis  wo any acute concerns. AFwRVR: New. Resolved. Echo unremarkable      Metabolic Encephalopathy: 2/2 profound metabolic acidosis. Wean sedation as able     Seizure like activity:  EEG negative. Monitor     DM2: A1c only 6.6%. titrate lantus prn, SSI                 Care Plan discussed with: Family, Care Manager, Nursing Staff, and Consultant/Specialist    Discussed:  Care Plan    Prophylaxis:  SCD    Disposition:  SNF/LTC           ___________________________________________________    Attending Physician: Noemi Jurado DO        Subjective:     Chief Complaint:  FU acidemia. Extubated 9/11; currently on NC     ROS:  Unable to obtain due to delrium          Objective:       Vitals:          Last 24hrs VS reviewed since prior progress note.  Most recent are:    Visit Vitals  /75   Pulse (!) 139   Temp 99.5 °F (37.5 °C)   Resp 27   Ht 5' 6\" (1.676 m)   Wt 86 kg (189 lb 9.5 oz)   SpO2 97%   BMI 30.60 kg/m²     SpO2 Readings from Last 6 Encounters:   09/13/22 97%    O2 Flow Rate (L/min): 5 l/min     Intake/Output Summary (Last 24 hours) at 9/13/2022 0858  Last data filed at 9/13/2022 0600  Gross per 24 hour   Intake 2323.82 ml   Output 598 ml   Net 1725.82 ml            Exam:     Physical Exam:    Gen:  NAD   HEENT:  Pink conjunctivae, PERRL  Neck:  Supple, without masses, thyroid non-tender  Resp:  No accessory muscle use, rhonchi b/l   Card:  No murmurs, normal S1, S2 without thrills, bruits or peripheral edema  Abd:  Soft, non-tender, distended, normoactive bowel sounds are present  Musc:  No cyanosis or clubbing  Skin:  No rashes or ulcers, skin turgor is good  Neuro:  lethargic, sedated   Psych:  sedated, oriented x 1-2        Medications Reviewed: (see below)    Lab Data Reviewed: (see below)    ______________________________________________________________________    Medications:     Current Facility-Administered Medications   Medication Dose Route Frequency    metoprolol (LOPRESSOR) injection 5 mg  5 mg IntraVENous Q15MIN PRN    albuterol-ipratropium (DUO-NEB) 2.5 MG-0.5 MG/3 ML  3 mL Nebulization Q4H PRN    hydrALAZINE (APRESOLINE) 20 mg/mL injection 10 mg  10 mg IntraVENous Q6H PRN    methylPREDNISolone (PF) (SOLU-MEDROL) injection 40 mg  40 mg IntraVENous DAILY    acetaminophen (TYLENOL) tablet 650 mg  650 mg Oral Q6H PRN    OLANZapine (ZyPREXA) tablet 5 mg  5 mg Per NG tube BID PRN    QUEtiapine (SEROquel) tablet 50 mg  50 mg Oral BID    insulin glargine (LANTUS) injection 16 Units  16 Units SubCUTAneous DAILY    folic acid (FOLVITE) tablet 1 mg  1 mg Oral DAILY    cyanocobalamin (VITAMIN B12) tablet 500 mcg  500 mcg Per NG tube DAILY    NOREPINephrine (LEVOPHED) 8 mg in 5% dextrose 250mL (32 mcg/mL) infusion  0.5-50 mcg/min IntraVENous TITRATE    dexmedeTOMidine in 0.9 % NaCl (PRECEDEX) 400 mcg/100 mL (4 mcg/mL) infusion soln  0.1-1.5 mcg/kg/hr IntraVENous TITRATE    dextrose 10% infusion 0-250 mL  0-250 mL IntraVENous PRN    insulin lispro (HUMALOG) injection   SubCUTAneous Q6H    levothyroxine (SYNTHROID) tablet 100 mcg  100 mcg Oral ACB    glucose chewable tablet 16 g  4 Tablet Oral PRN    glucagon (GLUCAGEN) injection 1 mg  1 mg IntraMUSCular PRN    bicarbonate dialysis (PRISMASOL) BG K 4/Ca 2.5 5000 ml solution   Extracorporeal DIALYSIS CONTINUOUS    [Held by provider] heparin (porcine) injection 5,000 Units  5,000 Units SubCUTAneous Q12H    fentaNYL (PF) 1,500 mcg/30 mL (50 mcg/mL) infusion  0-200 mcg/hr IntraVENous TITRATE            Lab Review:     Recent Labs     09/13/22  0007 09/12/22  0012 09/11/22  0013   WBC 10.2 5.9 5.0   HGB 7.6* 8.0* 8.2*   HCT 21.8* 23.3* 24.2*   * 100* 94*       Recent Labs     09/13/22  0007 09/12/22  0908 09/12/22  0012    135* 137   K 3.1* 5.2* 5.1    103 105   CO2 27 25 25   * 283* 200*   BUN 26* 32* 26*   CREA 2.10* 2.32* 2.11*   CA 7.9* 7.8* 7.7*   MG 2.4 2.7* 2.6*   PHOS 1.6* 3.2 3.8       No components found for: [place]

## 2022-09-13 NOTE — PROGRESS NOTES
Bedside and Verbal shift change report given to [address] [place] [address] (oncoming nurse) by Spenser Hernandez RN (offgoing nurse). Report included the following information SBAR, Intake/Output, MAR, Recent Results, Cardiac Rhythm: NSR and Alarm Parameters . Primary Nurse Titus Bailey, KVNG and KVNG Sim performed a dual skin assessment on this patient impairment noted  David score is see flowsheets    See flowsheets for all assessments, see MAR for all medication administrations. 2215: pt tachycardic and tachypneic, very agitated, notified Dr Celina Lopez who came on the monitor, discussed whether his presentation was due to agitation or an underlying factor. Going to try Zyprexa and see how the pt responds. 0045: pt has had several loose stools, dressing on sacrum keeps getting saturated. Dr Jennifer Gaston notified who ordered an [address]. 0130: Notified Dr Jennifer Gaston of K 3.1 and phos 1.6, MD ordered 30mmol potassium phosphate. No recheck before Q12 labs at 1200    0530: pt generally not presenting well, tachycardic and tachypneic, breath sounds are extremely dry and wheezy, RN called RT to bedside to discuss possible nebulizers, requested nebulizer from Dr Jennifer Gaston, STAT chest XR ordered per protocol, Dr Celina Lopez notified of pts status. Dr Celina Lopez ordered metoprolol, PRN duonebs, and an ABG. Bedside and Verbal shift change report given to Toni Pickett (oncoming nurse) by Troy Chester RN (offgoing nurse). Report included the following information SBAR, Intake/Output, MAR, Recent Results, Cardiac Rhythm: and Alarm Parameters .

## 2022-09-13 NOTE — PROGRESS NOTES
Transition of care note:    RUR 15%(moderate risk for readmission)    I met with pt.'s sisters in a supportive way. I will meet with sisters /family periodically to discuss disposition needs.     Debbie Hamilton

## 2022-09-13 NOTE — MANAGEMENT PLAN
Pt noted to be on higher dose of precedex than is typical.  Was turned down to 1.5 and pt now more agitated. Relatively calm at time of CCM video, but per RN ramps up with tachypnea and tachycardia. Will add zyprexa BID PRN. Trying to avoid benzos.

## 2022-09-13 NOTE — PROGRESS NOTES
Follow up spiritual care for Mr. Sara Medina in ICU. He is visited by 2 of his 3 sisters. They welcome  and share updates and life review. Mr Darren Richards has 1 son. His sisters shared that they feel he is doing much better, and are hopeful and optimistic of recovery. They are supportive to him and welcomed  to visit again when he is more awake. They expressed their gratitude for the care and support.      Chaplain Reema Marion M.Div.  Abimbola Arevalo ([id number])

## 2022-09-13 NOTE — PROGRESS NOTES
ICU Progress Note        Subjective: He is currently delirious, states \"that bitch dump lots of stuff into me\", \"I want to go home\"      Vital Signs:    Visit Vitals  /75   Pulse (!) 139   Temp 99.5 °F (37.5 °C)   Resp 27   Ht 5' 6\" (1.676 m)   Wt 86 kg (189 lb 9.5 oz)   SpO2 97%   BMI 30.60 kg/m²       O2 Device: Nasal cannula   O2 Flow Rate (L/min): 5 l/min   Temp (24hrs), Av.8 °F (37.7 °C), Min:99.4 °F (37.4 °C), Max:100.7 °F (38.2 °C)       Intake/Output:   Last shift:      No intake/output data recorded. Last 3 shifts:  1901 -  0700  In: 4012.3 [I.V.:907.3]  Out: 1187 [Urine:174]    Intake/Output Summary (Last 24 hours) at 2022 0837  Last data filed at 2022 0600  Gross per 24 hour   Intake 2323.82 ml   Output 598 ml   Net 1725.82 ml         Ventilator Settings:  Ventilator Mode: Assist control  Respiratory Rate  Resp Rate Observed: 18  Back-Up Rate: 20  Insp Time (sec): 0.72 sec  Insp Flow (l/min): 60 l/min  I:E Ratio: 1:2.9  Ventilator Volumes  Vt Set (ml): 400 ml  Vt Exhaled (Machine Breath) (ml): 402 ml  Vt Spont (ml): 444 ml  Ve Observed (l/min): 6.69 l/min  Ventilator Pressures  Pressure Support (cm H2O): 5 cm H2O  PIP Observed (cm H2O): 27 cm H2O  Plateau Pressure (cm H2O): 17 cm H2O  MAP (cm H2O): 10  PEEP/VENT (cm H2O): 5 cm H20  Auto PEEP Observed (cm H2O): 0.4 cm H2O    Physical Exam:    General:  Not in acute distress  HEENT:  Anicteric sclerae; pink palpebral conjunctivae; mucosa moist  Resp:  Bilateral air entry +, no crackles or wheeze  CV:  S1, S2 present  GI:  Abdomen soft, non-tender; (+) active bowel sounds  Extremities:  +2 pulses on all extremities; no edema/ cyanosis/ clubbing noted  Skin:  Warm; no rashes/ lesions noted  Neurologic:  Alert, awake and delirious.      DATA:     Current Facility-Administered Medications   Medication Dose Route Frequency    metoprolol (LOPRESSOR) injection 5 mg  5 mg IntraVENous Q15MIN PRN    albuterol-ipratropium (DUO-NEB) 2.5 MG-0.5 MG/3 ML  3 mL Nebulization Q4H PRN    hydrALAZINE (APRESOLINE) 20 mg/mL injection 10 mg  10 mg IntraVENous Q6H PRN    methylPREDNISolone (PF) (SOLU-MEDROL) injection 40 mg  40 mg IntraVENous DAILY    acetaminophen (TYLENOL) tablet 650 mg  650 mg Oral Q6H PRN    OLANZapine (ZyPREXA) tablet 5 mg  5 mg Per NG tube BID PRN    QUEtiapine (SEROquel) tablet 50 mg  50 mg Oral BID    insulin glargine (LANTUS) injection 16 Units  16 Units SubCUTAneous DAILY    folic acid (FOLVITE) tablet 1 mg  1 mg Oral DAILY    cyanocobalamin (VITAMIN B12) tablet 500 mcg  500 mcg Per NG tube DAILY    NOREPINephrine (LEVOPHED) 8 mg in 5% dextrose 250mL (32 mcg/mL) infusion  0.5-50 mcg/min IntraVENous TITRATE    dexmedeTOMidine in 0.9 % NaCl (PRECEDEX) 400 mcg/100 mL (4 mcg/mL) infusion soln  0.1-1.5 mcg/kg/hr IntraVENous TITRATE    dextrose 10% infusion 0-250 mL  0-250 mL IntraVENous PRN    insulin lispro (HUMALOG) injection   SubCUTAneous Q6H    levothyroxine (SYNTHROID) tablet 100 mcg  100 mcg Oral ACB    glucose chewable tablet 16 g  4 Tablet Oral PRN    glucagon (GLUCAGEN) injection 1 mg  1 mg IntraMUSCular PRN    bicarbonate dialysis (PRISMASOL) BG K 4/Ca 2.5 5000 ml solution   Extracorporeal DIALYSIS CONTINUOUS    [Held by provider] heparin (porcine) injection 5,000 Units  5,000 Units SubCUTAneous Q12H    fentaNYL (PF) 1,500 mcg/30 mL (50 mcg/mL) infusion  0-200 mcg/hr IntraVENous TITRATE         Labs: Results:       Chemistry Recent Labs     09/13/22  0007 09/12/22  0908 09/12/22  0012   * 283* 200*    135* 137   K 3.1* 5.2* 5.1    103 105   CO2 27 25 25   BUN 26* 32* 26*   CREA 2.10* 2.32* 2.11*   CA 7.9* 7.8* 7.7*   AGAP 9 7 7   BUCR 12 14 12        CBC w/Diff Recent Labs     09/13/22  0007 09/12/22  0012 09/11/22  0013   WBC 10.2 5.9 5.0   RBC 2.38* 2.50* 2.60*   HGB 7.6* 8.0* 8.2*   HCT 21.8* 23.3* 24.2*   * 100* 94*   GRANS 72 87* 81*   LYMPH 15 8* 10*   EOS 2 0 1        Coagulation No results for input(s): PTP, INR, APTT, INREXT, INREXT in the last 72 hours. Liver Enzymes No results for input(s): TP, ALB, TBIL, AP in the last 72 hours. No lab exists for component: SGOT, GPT, DBIL     ABG Lab Results   Component Value Date/Time    PHI 7.52 (H) 09/13/2022 06:09 AM    PCO2I 32.3 (L) 09/13/2022 06:09 AM    PO2I 70 (L) 09/13/2022 06:09 AM    HCO3I 26.4 (H) 09/13/2022 06:09 AM    FIO2I 3 09/13/2022 06:09 AM      Microbiology No results for input(s): CULT in the last 72 hours. maging:  CXR Results  (Last 48 hours)                 09/13/22 0549  XR CHEST PORT Final result    Impression:  Pulmonary edema. Narrative:  PORTABLE CHEST RADIOGRAPH/S: 9/13/2022 5:49 AM       INDICATION: Change in breathing. COMPARISON: 9/10/2022, 9/9/2022, 9/6/2022. TECHNIQUE: Portable frontal upright radiograph/s of the chest.       FINDINGS:    Hazy, perihilar, interstitial and airspace disease has fluctuated from 2 days   ago, and likely represents pulmonary edema. The lungs are hypoinflated. The   central airways are patent. No pneumothorax and no large pleural effusion. A   right IJ hemodialysis catheter, left IJ central line, and NG tube are in   appropriate position. There are surgical clips in the left axilla. CT Results  (Last 48 hours)      None                 Assessment and Plan:    The patient is a 62/M who presented with symptoms of uremia. Found to have metabolic acidosis with renal failure. Intubated to protect airways. Extubated 09/11. On CRRT. Now struggling with delirium. Delirium - Currently on precedex gtt. Restarted his Seroquel. Cont. Non pharmacological interventions. Avoid benzo's. AGMA / lactic acidosis: Presented with pH 6.77, profoundly metabolic w/ lactate > 18. Euglycemic, likely acute renal failure and possible metformin toxicity. Resolved, treated with CRRT. Now changed to iHD on Monday/Wednesday/Friday.      Acute Renal Failure: Presented with Cr 12.94, now improved w/ CRRT. Likely 2/2 profound volume depletion. CT C/A/P no acute concerns. Minimal UOP, but slowly improving. Renal following, on iHD      Shock - in the setting of acidosis. Resolved. Respiratory Failure: Intubated for airway protection, severe met acidosis. Unintentional extubation 9/10; intubated and extubated 09/11. Cont. Supplemental oxygen. Thrombocytopenia: With lymphopenia and low platelets, in the setting of low folic acid this is likely from bone marrow suppression (from critical illness). Differential diagnosis is ROBERTO, TTP and ITP. No schistocytes on the peripheral smear which rules out TTP. Platelets improving. Cont. To monitor. Anemia: b12 and folate low; start supplements      AFwRVR: New. Resolved. Echo unremarkable     DM - Insulin. CCM time - 40 minutes.      Levi Watkins MD,DONOVAN, FCCM, FCCP, ATSF, FACP, DAABIP  Interventional Pulmonology/Critical [address] [place] [address]

## 2022-09-13 NOTE — PROGRESS NOTES
1900-Bedside and Verbal shift change report given to Yariel Norman (oncoming nurse) by Bella Mcgowan RN (offgoing nurse). Report included the following information SBAR, Kardex, Intake/Output, MAR, Recent Results, Med Rec Status, and Cardiac Rhythm Sinus Tachy . 1930-Patient resting quietly in bed receiving dialysis treatment. Dialysis RN and son at bedside    1953-Dialysis treatment complete. 2 Liters pulled off    2000-Full assessment completed, see flow flow sheets     0000-Reassessment complete, no changes noted. Blood sugar check @234    0400-Reassessment complete, no changes noted. 0430-Patient received a PRN NEB treatment    0600-Blood suger check @195    0700-Bedside and Verbal shift change report given to Yanick Chavarria RN (oncoming nurse) by Tio Hannah RN (offgoing nurse). Report included the following information SBAR, Kardex, Intake/Output, MAR, Recent Results, Med Rec Status, and Cardiac Rhythm Snus Tachy .

## 2022-09-13 NOTE — PROCEDURES
Hemodialysis / [phone]    Vitals Pre Post Assessment Pre Post   /97   127/82 LOC AMS AMS    115   Lungs +SOB NC    Resp 27 24 Cardiac tachycardia tachycardia   Temp 98.3 98.3 Skin Dry, warm Dry, warm   Weight    Edema generalized generalized   Tele status Bedside monitor Bedside monitor Pain 0 0     Orders   Duration: Start: 1740 End: 1940 Total: 2 hrs   Dialyzer: Dialyzer/Set Up Inspection: Revaclear (09/13/22 1740)   K Bath: Dialysate K (mEq/L): 2 (09/12/22 1530)   Ca Bath: Dialysate CA (mEq/L): 2.5 (09/12/22 1530)   Na: Dialysate NA (mEq/L): 140 (09/12/22 1530)   Bicarb: Dialysate HCO3 (mEq/L): 35 (09/12/22 1530)   Target Fluid Removal: Goal/Amount of Fluid to Remove (mL): 2000 mL (09/13/22 1740)     Access   Type & Location: Right IJ: dressing DCI, no S&S of infection, ports cleaned per P&P, flushing well   Comments:                                        Labs   HBsAg (Antigen) / date:   09/08/22 NEGATIVE                                            HBsAb (Antibody) / date: 09/08/22 SUSCEPTIBLE   Source:    Obtained/Reviewed  Critical Results Called HGB   Date Value Ref Range Status   09/13/2022 7.6 (L) 12.1 - 17.0 g/dL Final     Potassium   Date Value Ref Range Status   09/13/2022 3.1 (L) 3.5 - 5.1 mmol/L Final     Comment:     INVESTIGATED PER DELTA CHECK PROTOCOL     Calcium   Date Value Ref Range Status   09/13/2022 7.9 (L) 8.5 - 10.1 MG/DL Final     BUN   Date Value Ref Range Status   09/13/2022 26 (H) 6 - 20 MG/DL Final     Creatinine   Date Value Ref Range Status   09/13/2022 2.10 (H) 0.70 - 1.30 MG/DL Final        Meds Given   Name Dose Route                    Adequacy / Fluid    Total Liters Process: 0   Net Fluid Removed: 2000 cc      Comments   Time Out Done:   (Time) 0392   Admitting Diagnosis:    Consent obtained/signed: Informed Consent Verified: Yes (09/12/22 1530)   Machine / Bruce Sever # Machine Number: Raj Christianson (09/13/22 [id number])   Primary Nurse Rpt Pre: Carly Parker, RN   Primary Nurse Rpt Post: German Worthy RN   Pt Education: ACCESS CARE, PROCEDURE   Care Plan: CONTINUE hd TX AS PER MD ORDER   Pts outpatient clinic:      Tx Summary   Comments: Tolerated tx well, at the end remaining blood in circuit returned with 300 cc NS, ports flushed with NS, cleaned per P&P, capped.

## 2022-09-13 NOTE — PROGRESS NOTES
Physician Progress Note      PATIENT:               John Le  CSN #:                  [id number]  :                       1960  ADMIT DATE:       2022 9:52 AM  DISCH DATE:  RESPONDING  PROVIDER #:        ALEXANDER ORTIZ DO          QUERY TEXT:    Good Afternoon  This patient admitted for Aypical presentation of DKA confounded by acute renal failure    The medical record has both Acute Respiratory Failure and also has \"Intubated for aiwary protection\". , and if possible can you please clarify: If possible, can you  please clarify and document in progress notes and discharge summary if you are evaluating and /or treating any of the following: The medical record reflects the following:  Risk Factors: HX of Melanoma not yet on chemo. Acute Renal failure, Initally thought Septic, but no source of infection  Clinical Indicators: H&P notes \" acute respiraotry failure with hypoxia, other notes, inubated for airway protection. Anestheisa noted, \"Called to bedside 2nd to impeding respiratory failure. ABG ph 6.77 and pco2 of 14. ICU Dr. Marek Nuñez notes' Acue resp. failure intubted for overall clinical decline  Treatment: ICU level care, intubated in the ER, self extubated on 09/10 and then reintubated, extubated on  and on RA    Thank you  Edwin Choudhury ,MARYN,RN, CPHQ, CCDS, SMART  Options provided:  -- Yes, confirmed the patient was intubated for airway protection only and acute respiratory failure has been ruled out. -- After study, Acute Respiratory failure is confirmed and intuabted only for airway protection has been ruled out  -- Other - I will add my own diagnosis  -- Disagree - Not applicable / Not valid  -- Disagree - Clinically unable to determine / Unknown  -- Refer to Clinical Documentation Reviewer    PROVIDER RESPONSE TEXT:    Yes, confirmed patient was intuabted for aiway protection only and acute respiratory failure ruled out.     Query created by: Félix Cook on 2022 11:22 AM      QUERY TEXT:    Good Afternoon    This patient admitted DKA and Acute Renal Failure. Shock is documented, initally noted  \"possiblity of sepsis, but now no source of infection has been found \". If possible, can you know please document in progress notes and discharge summary further specificity regarding the type of shock: The medical record reflects the following:  Risk Factors: ARF, DKA, Metabolic acidosis, hx of Malignant myeloma  Clinical Indicators: Presented with dizzines, faitness, nause and vomiting SOB. ARF, hyperkalemia and metabolic acidosis, Creat @ 12.94 ,  Lactiac aicd @ 18. Initally thought to be sepsis, however, no source of infection noted. POssilbie metformin toxicity. Echo is est. EF 65-70%  Treatment: ICU level care ,  Insulin drip Kane Pressors, Emergent dialysis ,  Mx doeses of bicarb,      Thank you  MARY WahlN,RN, CPHQ, CCDS, SMART  Options provided:  -- Septic Shock confirmed  -- Hypovolemic Shock  -- Other - I will add my own diagnosis  -- Disagree - Not applicable / Not valid  -- Disagree - Clinically unable to determine / Unknown  -- Refer to Clinical Documentation Reviewer    PROVIDER RESPONSE TEXT:    Provider is clinically unable to determine a response to this query.     Query created by: Lazarus Griffon on 9/13/2022 1:42 PM      Electronically signed by:  Bambi Dooley DO 9/13/2022 2:57 PM

## 2022-09-13 NOTE — PROGRESS NOTES
0700 Primary nurse will be cosigning charting done by student nurse, Donell Neumann. Bedside and Verbal shift change report given to Jordan Stuart RN (oncoming nurse) by Patrick Holloway RN (offgoing nurse). Report included the following information SBAR, ED Summary, Intake/Output, MAR, Recent Results, Med Rec Status, and Cardiac Rhythm sinus tachy . Primary Nurse Douglas Garcia RN and Patrick Holloway RN performed a dual skin assessment on this patient No impairment noted  David score is see flowsheet. 6558 Morning meds given. Patient assessed, see flowsheet. Patient turned and repositioned. New bag of precedex hung. 5696 KUB done at bedside. 46 Rounded with intensivist. Agreed to try to make patient less constipated and agitated. Patient turned and repositioned. 1159 2 units lispro given for BG of 227. Meds given. Patient turned and repositioned. Patient reassessed, see flowsheet. [address] precedex hung. 1400 Patient turned and repositioned. 1600 Patient reassessed, see flowsheet, no change. Patient turned and repositioned. Vega removed. Henry cath dressing changed, per protocol. Parva Haius 8141 precedex hung. 1730 Dialysis nurse at bedside for filtration. 1814 2 units lispro given for BG of 221. Seroquel given. 2 prosource given. Patient turned and repositioned. 1900 Bedside and Verbal shift change report given to Kendall Adame RN (oncoming nurse) by Jordan Stuart RN (offgoing nurse). Report included the following information SBAR, ED Summary, Intake/Output, MAR, Recent Results, Med Rec Status, and Cardiac Rhythm sinus tachy .

## 2022-09-13 NOTE — DIABETES MGMT
This is a 58-year-old gentleman with a history of type 2 diabetes mellitus on metformin therapy with an A1c of 6.6%, chronic kidney disease with a baseline creatinine of 1.44 April of 2022, recent diagnosis of melanoma not on chemotherapy, diabetic neuropathy treated with gabapentin who presented on September 6, 2022 with dizziness after taking several doses of gabapentin. He was found to have altered mental status, hypothermia, and a venous pH of 6.8 with a lactate of 11. He was transferred to [place] where he was intubated. Admission labs were remarkable for glucose of 160, creatinine 12.94, sodium 139, potassium 7.6, bicarb 4, lactate 18.9, anion gap 46, beta hydroxybutyrate greater than 4.4. Arterial blood gas remarkable for a pH of 6.77 PCO2 14.9, Bicarb 2.1. Course n the hospital  1. His acidosis was treated aggressively with insulin and bicarbonate and has responded well  2. His hyperglycemia was originally managed with an insulin infusion and he is currently on a single dose of glargine insulin  3. Nutrition is now managed with tube feeding at 45 cc an hour includes a total of 160 g of carbohydrate per 24 hours and   4. Respiratory failure now extubated. Needs on glucocorticoids recently decreased to 40 mg daily  5. Acute renal insufficiency currently receiving intermittent hemodialysis    Examination  Blood pressure 146/92  Pulse 120  Temperature 99.1  99% on 5 L  The patient is responsive but confused    Recent laboratory data  Glucose 227 (range 203-227) on 16 units of glargine insulin once daily  Creatinine 2.10  Calcium 7.9  Phosphate 1.6  Potassium 3.1    Impression  1. Marked metabolic acidosis which was multifactorial now resolved  2. Acute renal insufficiency currently receiving hemodialysis  3. Type 2 diabetes mellitus now receiving 16 units of glargine insulin daily (0.2 units/kg)     Recommendation  1.   Regarding his hyperglycemia the 16 units of glargine is an appropriate basal insulin dose but we are not really covering his 160 g of carbohydrate per day. The addition of NPH insulin 8 units twice daily would accomplish this. When the tube feedings are discontinued, this would be discontinued and the 16 units of glargine continued  2. Rest per team    Total time 35 minutes, more than 50% of which was in direct patient care and/or care coordination. Please note that this dictation was completed with [employer], the computer voice recognition software. Quite often unanticipated grammatical, syntax, homophones, and other interpretive errors are inadvertently transcribed by the computer software. Please disregard these errors. Please excuse any errors that have escaped final proofreading.

## 2022-09-13 NOTE — PROGRESS NOTES
Rt called to bedside to evaluate pt. Rt found pt audibly loud and dry breath sounds with tachypnea, no wheeze appreciated. Respiratory rate 35. Pt delirious and asking for water. Pt's mouth appears dry despite oral care provided by bedside nurse. ABG drawn, prn Duo neb ordered. Pt heart rate 118 with metoprolol given at time of ABG draw. Rt elected to administer a saline nebulizer vs. prn Duo neb due to tachycardia to help with dry and loud breath sounds. RT to follow.

## 2022-09-14 PROBLEM — D69.6 THROMBOCYTOPENIA (HCC): Status: ACTIVE | Noted: 2022-09-14

## 2022-09-14 PROBLEM — I10 HTN (HYPERTENSION), BENIGN: Status: ACTIVE | Noted: 2022-09-06

## 2022-09-14 PROBLEM — R41.0 DELIRIUM: Status: ACTIVE | Noted: 2022-09-14

## 2022-09-14 PROBLEM — N17.9 AKI (ACUTE KIDNEY INJURY) (HCC): Status: ACTIVE | Noted: 2022-09-06

## 2022-09-14 PROBLEM — R57.9 SHOCK (HCC): Status: ACTIVE | Noted: 2022-09-14

## 2022-09-14 PROBLEM — I48.0 PAF (PAROXYSMAL ATRIAL FIBRILLATION) (HCC): Status: ACTIVE | Noted: 2022-09-14

## 2022-09-14 PROBLEM — E11.9 DM (DIABETES MELLITUS), TYPE 2 (HCC): Status: ACTIVE | Noted: 2022-09-06

## 2022-09-14 LAB
ANION GAP SERPL CALC-SCNC: 5 MMOL/L (ref 5–15)
ANION GAP SERPL CALC-SCNC: 7 MMOL/L (ref 5–15)
BUN SERPL-MCNC: 36 MG/DL (ref 6–20)
BUN SERPL-MCNC: 67 MG/DL (ref 6–20)
BUN/CREAT SERPL: 12 (ref 12–20)
BUN/CREAT SERPL: 15 (ref 12–20)
CALCIUM SERPL-MCNC: 7.7 MG/DL (ref 8.5–10.1)
CALCIUM SERPL-MCNC: 8.1 MG/DL (ref 8.5–10.1)
CHLORIDE SERPL-SCNC: 101 MMOL/L (ref 97–108)
CHLORIDE SERPL-SCNC: 104 MMOL/L (ref 97–108)
CO2 SERPL-SCNC: 28 MMOL/L (ref 21–32)
CO2 SERPL-SCNC: 31 MMOL/L (ref 21–32)
CREAT SERPL-MCNC: 2.91 MG/DL (ref 0.7–1.3)
CREAT SERPL-MCNC: 4.39 MG/DL (ref 0.7–1.3)
GLUCOSE BLD STRIP.AUTO-MCNC: 149 MG/DL (ref 65–117)
GLUCOSE BLD STRIP.AUTO-MCNC: 183 MG/DL (ref 65–117)
GLUCOSE BLD STRIP.AUTO-MCNC: 192 MG/DL (ref 65–117)
GLUCOSE BLD STRIP.AUTO-MCNC: 195 MG/DL (ref 65–117)
GLUCOSE SERPL-MCNC: 155 MG/DL (ref 65–100)
GLUCOSE SERPL-MCNC: 225 MG/DL (ref 65–100)
MAGNESIUM SERPL-MCNC: 2.3 MG/DL (ref 1.6–2.4)
MAGNESIUM SERPL-MCNC: 2.5 MG/DL (ref 1.6–2.4)
PHOSPHATE SERPL-MCNC: 2.2 MG/DL (ref 2.6–4.7)
PHOSPHATE SERPL-MCNC: 4.5 MG/DL (ref 2.6–4.7)
POTASSIUM SERPL-SCNC: 3.4 MMOL/L (ref 3.5–5.1)
POTASSIUM SERPL-SCNC: 3.7 MMOL/L (ref 3.5–5.1)
SERVICE CMNT-IMP: ABNORMAL
SODIUM SERPL-SCNC: 136 MMOL/L (ref 136–145)
SODIUM SERPL-SCNC: 140 MMOL/L (ref 136–145)

## 2022-09-14 PROCEDURE — 80048 BASIC METABOLIC PNL TOTAL CA: CPT

## 2022-09-14 PROCEDURE — 77010033678 HC OXYGEN DAILY

## 2022-09-14 PROCEDURE — 82962 GLUCOSE BLOOD TEST: CPT

## 2022-09-14 PROCEDURE — 74011000250 HC RX REV CODE- 250: Performed by: INTERNAL MEDICINE

## 2022-09-14 PROCEDURE — 74011250637 HC RX REV CODE- 250/637: Performed by: INTERNAL MEDICINE

## 2022-09-14 PROCEDURE — 93005 ELECTROCARDIOGRAM TRACING: CPT

## 2022-09-14 PROCEDURE — 36415 COLL VENOUS BLD VENIPUNCTURE: CPT

## 2022-09-14 PROCEDURE — 77030029684 HC NEB SM VOL KT MONA -A

## 2022-09-14 PROCEDURE — 2709999900 HC NON-CHARGEABLE SUPPLY

## 2022-09-14 PROCEDURE — 74011000258 HC RX REV CODE- 258: Performed by: INTERNAL MEDICINE

## 2022-09-14 PROCEDURE — 94664 DEMO&/EVAL PT USE INHALER: CPT

## 2022-09-14 PROCEDURE — 83735 ASSAY OF MAGNESIUM: CPT

## 2022-09-14 PROCEDURE — 65610000006 HC RM INTENSIVE CARE

## 2022-09-14 PROCEDURE — 74011250636 HC RX REV CODE- 250/636: Performed by: INTERNAL MEDICINE

## 2022-09-14 PROCEDURE — 74011636637 HC RX REV CODE- 636/637: Performed by: INTERNAL MEDICINE

## 2022-09-14 PROCEDURE — 94640 AIRWAY INHALATION TREATMENT: CPT

## 2022-09-14 PROCEDURE — 90935 HEMODIALYSIS ONE EVALUATION: CPT

## 2022-09-14 PROCEDURE — 77030018798 HC PMP KT ENTRL FED COVD -A

## 2022-09-14 PROCEDURE — 84100 ASSAY OF PHOSPHORUS: CPT

## 2022-09-14 RX ORDER — IPRATROPIUM BROMIDE AND ALBUTEROL SULFATE 2.5; .5 MG/3ML; MG/3ML
3 SOLUTION RESPIRATORY (INHALATION)
Status: DISCONTINUED | OUTPATIENT
Start: 2022-09-14 | End: 2022-09-23 | Stop reason: HOSPADM

## 2022-09-14 RX ADMIN — METOPROLOL TARTRATE 5 MG: 5 INJECTION, SOLUTION INTRAVENOUS at 07:48

## 2022-09-14 RX ADMIN — POTASSIUM PHOSPHATE, MONOBASIC AND POTASSIUM PHOSPHATE, DIBASIC: 224; 236 INJECTION, SOLUTION, CONCENTRATE INTRAVENOUS at 23:23

## 2022-09-14 RX ADMIN — DEXTROSE MONOHYDRATE 150 MG: 50 INJECTION, SOLUTION INTRAVENOUS at 23:34

## 2022-09-14 RX ADMIN — IPRATROPIUM BROMIDE AND ALBUTEROL SULFATE 3 ML: .5; 2.5 SOLUTION RESPIRATORY (INHALATION) at 14:05

## 2022-09-14 RX ADMIN — DEXMEDETOMIDINE HYDROCHLORIDE 0.8 MCG/KG/HR: 400 INJECTION INTRAVENOUS at 10:35

## 2022-09-14 RX ADMIN — CYANOCOBALAMIN TAB 500 MCG 500 MCG: 500 TAB at 08:09

## 2022-09-14 RX ADMIN — DEXMEDETOMIDINE HYDROCHLORIDE 1.2 MCG/KG/HR: 400 INJECTION INTRAVENOUS at 05:40

## 2022-09-14 RX ADMIN — HEPARIN SODIUM 5000 UNITS: 5000 INJECTION INTRAVENOUS; SUBCUTANEOUS at 10:39

## 2022-09-14 RX ADMIN — AMIODARONE HYDROCHLORIDE 1 MG/MIN: 50 INJECTION, SOLUTION INTRAVENOUS at 23:48

## 2022-09-14 RX ADMIN — QUETIAPINE FUMARATE 50 MG: 25 TABLET ORAL at 08:09

## 2022-09-14 RX ADMIN — IPRATROPIUM BROMIDE AND ALBUTEROL SULFATE 3 ML: .5; 2.5 SOLUTION RESPIRATORY (INHALATION) at 20:26

## 2022-09-14 RX ADMIN — QUETIAPINE FUMARATE 50 MG: 25 TABLET ORAL at 19:30

## 2022-09-14 RX ADMIN — LEVOTHYROXINE SODIUM 100 MCG: 0.1 TABLET ORAL at 08:09

## 2022-09-14 RX ADMIN — INSULIN GLARGINE 16 UNITS: 100 INJECTION, SOLUTION SUBCUTANEOUS at 08:01

## 2022-09-14 RX ADMIN — SODIUM CHLORIDE 500 ML: 900 INJECTION, SOLUTION INTRAVENOUS at 20:00

## 2022-09-14 RX ADMIN — DEXMEDETOMIDINE HYDROCHLORIDE 0.5 MCG/KG/HR: 400 INJECTION INTRAVENOUS at 17:57

## 2022-09-14 RX ADMIN — DEXMEDETOMIDINE HYDROCHLORIDE 1.3 MCG/KG/HR: 400 INJECTION INTRAVENOUS at 00:44

## 2022-09-14 RX ADMIN — HEPARIN SODIUM 5000 UNITS: 5000 INJECTION INTRAVENOUS; SUBCUTANEOUS at 20:49

## 2022-09-14 RX ADMIN — IPRATROPIUM BROMIDE AND ALBUTEROL SULFATE 3 ML: .5; 3 SOLUTION RESPIRATORY (INHALATION) at 04:30

## 2022-09-14 RX ADMIN — FOLIC ACID 1 MG: 1 TABLET ORAL at 08:09

## 2022-09-14 NOTE — PROGRESS NOTES
ICU Progress Note        Subjective: Overnight events noted. Vital Signs:    Visit Vitals  BP (!) 146/96 (BP 1 Location: Right upper arm, BP Patient Position: At rest)   Pulse (!) 123   Temp 98.5 °F (36.9 °C)   Resp (!) 31   Ht 5' 6\" (1.676 m)   Wt 86 kg (189 lb 9.5 oz)   SpO2 97%   BMI 30.60 kg/m²       O2 Device: Nasal cannula   O2 Flow Rate (L/min): 5 l/min   Temp (24hrs), Av.7 °F (37.1 °C), Min:98.3 °F (36.8 °C), Max:99.1 °F (37.3 °C)       Intake/Output:   Last shift:      No intake/output data recorded. Last 3 shifts:  1901 -  0700  In: 3934.8 [I.V.:1099.8]  Out: 2353 [Urine:103; Drains:250]    Intake/Output Summary (Last 24 hours) at 2022 0849  Last data filed at 2022 0600  Gross per 24 hour   Intake 1509.1 ml   Output 2230 ml   Net -720.9 ml         Ventilator Settings:  Ventilator Mode: Assist control  Respiratory Rate  Resp Rate Observed: 18  Back-Up Rate: 20  Insp Time (sec): 0.72 sec  Insp Flow (l/min): 60 l/min  I:E Ratio: 1:2.9  Ventilator Volumes  Vt Set (ml): 400 ml  Vt Exhaled (Machine Breath) (ml): 402 ml  Vt Spont (ml): 444 ml  Ve Observed (l/min): 6.69 l/min  Ventilator Pressures  Pressure Support (cm H2O): 5 cm H2O  PIP Observed (cm H2O): 27 cm H2O  Plateau Pressure (cm H2O): 17 cm H2O  MAP (cm H2O): 10  PEEP/VENT (cm H2O): 5 cm H20  Auto PEEP Observed (cm H2O): 0.4 cm H2O    Physical Exam:    General:  Not in acute distress  HEENT:  Anicteric sclerae; pink palpebral conjunctivae; mucosa moist. Thick mucus noted in the oral cavity. Resp:  Bilateral air entry +, no crackles or wheeze  CV:  S1, S2 present  GI:  Abdomen soft, non-tender; (+) active bowel sounds  Extremities:  +2 pulses on all extremities; no edema/ cyanosis/ clubbing noted  Skin:  Warm; no rashes/ lesions noted  Neurologic:  Alert, awake and following commands. Still on Precedex gtt.      DATA:     Current Facility-Administered Medications   Medication Dose Route Frequency    albuterol-ipratropium (DUO-NEB) 2.5 MG-0.5 MG/3 ML  3 mL Nebulization Q4H PRN    polyethylene glycol (MIRALAX) packet 17 g  17 g Oral DAILY    sennosides (SENOKOT) 8.8 mg/5 mL syrup 8.8 mg  5 mL Oral DAILY    hydrALAZINE (APRESOLINE) 20 mg/mL injection 10 mg  10 mg IntraVENous Q6H PRN    acetaminophen (TYLENOL) tablet 650 mg  650 mg Oral Q6H PRN    OLANZapine (ZyPREXA) tablet 5 mg  5 mg Per NG tube BID PRN    QUEtiapine (SEROquel) tablet 50 mg  50 mg Oral BID    insulin glargine (LANTUS) injection 16 Units  16 Units SubCUTAneous DAILY    folic acid (FOLVITE) tablet 1 mg  1 mg Oral DAILY    cyanocobalamin (VITAMIN B12) tablet 500 mcg  500 mcg Per NG tube DAILY    dexmedeTOMidine in 0.9 % NaCl (PRECEDEX) 400 mcg/100 mL (4 mcg/mL) infusion soln  0.1-1.5 mcg/kg/hr IntraVENous TITRATE    dextrose 10% infusion 0-250 mL  0-250 mL IntraVENous PRN    insulin lispro (HUMALOG) injection   SubCUTAneous Q6H    levothyroxine (SYNTHROID) tablet 100 mcg  100 mcg Oral ACB    glucose chewable tablet 16 g  4 Tablet Oral PRN    glucagon (GLUCAGEN) injection 1 mg  1 mg IntraMUSCular PRN    [Held by provider] heparin (porcine) injection 5,000 Units  5,000 Units SubCUTAneous Q12H         Labs: Results:       Chemistry Recent Labs     09/13/22 2036 09/13/22  0007 09/12/22  0908   * 225* 283*    139 135*   K 3.8 3.1* 5.2*    103 103   CO2 26 27 25   BUN 54* 26* 32*   CREA 3.57* 2.10* 2.32*   CA 8.0* 7.9* 7.8*   AGAP 8 9 7   BUCR 15 12 14        CBC w/Diff Recent Labs     09/13/22  0007 09/12/22  0012   WBC 10.2 5.9   RBC 2.38* 2.50*   HGB 7.6* 8.0*   HCT 21.8* 23.3*   * 100*   GRANS 72 87*   LYMPH 15 8*   EOS 2 0        Coagulation No results for input(s): PTP, INR, APTT, INREXT, INREXT in the last 72 hours. Liver Enzymes No results for input(s): TP, ALB, TBIL, AP in the last 72 hours.     No lab exists for component: SGOT, GPT, DBIL     ABG No results found for: PH, PHI, PCO2, PCO2I, PO2, PO2I, HCO3, HCO3I, FIO2, FIO2I Microbiology No results for input(s): CULT in the last 72 hours. maging:  CXR Results  (Last 48 hours)                 09/13/22 0549  XR CHEST PORT Final result    Impression:  Pulmonary edema. Narrative:  PORTABLE CHEST RADIOGRAPH/S: 9/13/2022 5:49 AM       INDICATION: Change in breathing. COMPARISON: 9/10/2022, 9/9/2022, 9/6/2022. TECHNIQUE: Portable frontal upright radiograph/s of the chest.       FINDINGS:    Hazy, perihilar, interstitial and airspace disease has fluctuated from 2 days   ago, and likely represents pulmonary edema. The lungs are hypoinflated. The   central airways are patent. No pneumothorax and no large pleural effusion. A   right IJ hemodialysis catheter, left IJ central line, and NG tube are in   appropriate position. There are surgical clips in the left axilla. CT Results  (Last 48 hours)      None                 Assessment and Plan:    The patient is a 62/M who presented with symptoms of uremia. Found to have metabolic acidosis with renal failure. Intubated to protect airways. Extubated 09/11. On CRRT. Now struggling with delirium. Delirium -  Improved. Currently on precedex gtt. Cont. To wean aggressively. Restarted his Seroquel. Cont. Non pharmacological interventions. Avoid benzo's. AGMA / lactic acidosis: Presented with pH 6.77, profoundly metabolic w/ lactate > 18. Euglycemic, likely acute renal failure and possible metformin toxicity. Resolved, treated with CRRT. Now changed to iHD on Monday/Wednesday/Friday. Acute Renal Failure: Presented with Cr 12.94, now improved w/ CRRT. Likely 2/2 profound volume depletion. CT C/A/P no acute concerns. Minimal UOP, but slowly improving. Renal following, on iHD      Shock - in the setting of acidosis. Resolved. Respiratory Failure: Intubated for airway protection, severe met acidosis. Unintentional extubation 9/10; intubated and extubated 09/11. Cont. Supplemental oxygen. Thrombocytopenia: With lymphopenia and low platelets, in the setting of low folic acid this is likely from bone marrow suppression (from critical illness). Differential diagnosis is ROBERTO, TTP and ITP. No schistocytes on the peripheral smear which rules out TTP. Platelets improving. Cont. To monitor. No labs 09/14. Anemia: b12 and folate low; start supplements      AFwRVR: New. Resolved. Echo unremarkable     DM - Insulin. CCM time - 40 minutes.      Nelly Patel MD,DONOVAN, FCCM, FCCP, ATSF, FACP, DAABIP  Interventional Pulmonology/Critical [address] [place] [address]

## 2022-09-14 NOTE — PROGRESS NOTES
Comprehensive Nutrition Assessment    Type and Reason for Visit: Reassess    Nutrition Recommendations/Plan:   Modifying TF -   Nepro @ 56 mL/hour  FWF 80 mL q 2 hours    Please obtain new measured weight        Malnutrition Assessment:  Malnutrition Status:  Insufficient data (09/14/22 1228)    Context:  Acute illness     Findings of the 6 clinical characteristics of malnutrition:   Energy Intake:  No significant decrease in energy intake (TF)  Weight Loss:  Unable to assess     Body Fat Loss:  No significant body fat loss,     Muscle Mass Loss:  No significant muscle mass loss,    Fluid Accumulation:  No significant fluid accumulation,     Strength:  Not performed     Nutrition Assessment:     9/14: Follow up. Patient remains in ICU, TF running at goal. Patient started on HD - needs recalculated. RD to modify TF to better meet patient kcal needs. Continues to be inappropriate for YSS/removal of NGT at this time. No family at bedside during attempted RD encounter. Per IDR discussion, patient continues to require frequent suctioning. Plan to wean Precedex as able. Edema has increased. TF at new goal 56 mL/hour provides 2212 kcal (100% needs); 200 g carbs; 101 g protein (100% needs). TF + FWF provides 1868 mL H2O/day. Nutrition Related Findings:      Wound Type: None  Last Bowel Movement Date: 09/14/22  Stool Appearance: Watery, Loose  Abdominal Assessment: Distended, Intact  Appetite: NPO  Bowel Sounds: Active   Edema:Facial: Scleral (9/13/2022  8:00 AM)  LUE: 3+; Pitting (9/14/2022  7:00 AM)  RUE: 2+; Pitting (9/14/2022  7:00 AM)      Nutr.  Labs:    Lab Results   Component Value Date/Time    GFR est AA 17 (L) 09/14/2022 09:18 AM    GFR est non-AA 14 (L) 09/14/2022 09:18 AM    Creatinine 4.39 (H) 09/14/2022 09:18 AM    BUN 67 (H) 09/14/2022 09:18 AM    Sodium 136 09/14/2022 09:18 AM    Potassium 3.7 09/14/2022 09:18 AM    Chloride 101 09/14/2022 09:18 AM    CO2 28 09/14/2022 09:18 AM       Lab Results Component Value Date/Time    Glucose 225 (H) 09/14/2022 09:18 AM    Glucose (POC) 195 (H) 09/14/2022 06:42 AM       Lab Results   Component Value Date/Time    Hemoglobin A1c 6.6 (H) 09/07/2022 08:11 AM     Magnesium   Date Value Ref Range Status   09/14/2022 2.5 (H) 1.6 - 2.4 mg/dL Final   09/13/2022 2.6 (H) 1.6 - 2.4 mg/dL Final   09/13/2022 2.4 1.6 - 2.4 mg/dL Final   09/12/2022 2.7 (H) 1.6 - 2.4 mg/dL Final   09/12/2022 2.6 (H) 1.6 - 2.4 mg/dL Final     Lab Results   Component Value Date/Time    Calcium 8.1 (L) 09/14/2022 09:18 AM    Phosphorus 4.5 09/14/2022 09:18 AM       Nutr. Meds:  Vit B12, Precedex, folvite, heparin, lantus, humalog, synthroid, miralax PRN, sennosides    Current Nutrition Intake & Therapies:  Average Meal Intake: NPO  Average Supplement Intake: NPO  ADULT TUBE FEEDING Nasogastric; Renal; Delivery Method: Continuous; Continuous Initial Rate (mL/hr): 20; Continuous Advance Tube Feeding: Yes; Advancement Volume (mL/hr): 10; Advancement Frequency: Q 4 hours; Continuous Goal Rate (mL/hr): 45; Wate. .. Anthropometric Measures:  Height: 5' 6\" (167.6 cm)  Ideal Body Weight (IBW): 142 lbs (65 kg)     Current Body Wt:  86 kg (189 lb 9.5 oz), 133.5 % IBW. Bed scale  Current BMI (kg/m2): 30.6        Weight Adjustment: No adjustment                 BMI Category: Obese class 1 (BMI 30.0-34. 9)    Estimated Daily Nutrient Needs:  Energy Requirements Based On: Kcal/kg  Weight Used for Energy Requirements: Current  Energy (kcal/day): 2150- 2580 (25-30 kcal/kg, HD)  Weight Used for Protein Requirements: Current  Protein (g/day): 103-129 (1.2-1.5 g/kg)  Method Used for Fluid Requirements: Standard renal  Fluid (ml/day): 2000 or per MD    Nutrition Diagnosis:   Inadequate oral intake related to inadequate protein-energy intake, impaired respiratory function, cognitive or neurological impairment as evidenced by NPO or clear liquid status due to medical condition  Increased nutrient needs related to renal dysfunction as evidenced by  (need for HD)      Nutrition Interventions:   Food and/or Nutrient Delivery: Modify tube feeding  Nutrition Education/Counseling: No recommendations at this time  Coordination of Nutrition Care: Continue to monitor while inpatient, Interdisciplinary rounds  Plan of Care discussed with: IDR team    Goals:  Previous Goal Met: Goal(s) achieved  Goals: Tolerate nutrition support at goal rate, by next RD assessment       Nutrition Monitoring and Evaluation:   Behavioral-Environmental Outcomes: None identified  Food/Nutrient Intake Outcomes: Enteral nutrition intake/tolerance  Physical Signs/Symptoms Outcomes: Biochemical data, Hemodynamic status, GI status, Weight    Discharge Planning:     Too soon to determine    Roxie Casas MS, RD  Contact: Ext: [phone], or via [employer]

## 2022-09-14 NOTE — PROGRESS NOTES
Bedside and Verbal shift change report given to Coreen Caldwell RN (oncoming nurse) by Faith Henson RN (offgoing nurse). Report included the following information SBAR, Kardex, MAR, and Cardiac Rhythm sinus tach . 0700: Pt without complaints of pain. Alert to self and place. Hallucinating. Pointing toward TV stating \" killer. \" TV was off at the time. 1400: HR in the 160s then back down to the 130s. Pt asymptomatic. Dr. Kait Cintron informed. Orders for EKG. Dr. Vanessa Feldman informed. 1900: HR in the 140s. Dr. Kait Cintron informed. Orders for 500cc NS bolus. Bedside and Verbal shift change report given to Katie Venegas RN (oncoming nurse) by Coreen Caldwell RN (offgoing nurse). Report included the following information SBAR, Kardex, MAR, and Cardiac Rhythm Sinus tach. Zakia Gillespie

## 2022-09-14 NOTE — PROGRESS NOTES
1900: Bedside and Verbal shift change report given to KVNG Goldberg (oncoming nurse) by Dominic Evangelista RN (offgoing nurse). Report included the following information SBAR, Kardex, Intake/Output, MAR, Recent Results, Cardiac Rhythm Sinus tachy, and Quality Measures. 2000: Shift assessment completed. See flowsheet    2115: Dr. Enedina Cook contacted in regards to heart rate sustained in 130s-140s despite having a 500mL bolus of NS. Per MD, continue to monitor heart rate and contact again in 4 hours if HR continues to be tachycardic    2227: Dr. Enedina Cook notified of K+ of 3.4 and Phos of 2.2. Anticipate new orders    2308: EKG taken. Showed A. Fib with RVR. Dr. Madie Schwartz notified. Orders for amio bolus and amio drip.    0000: Reassessment completed. See flowsheet    0400: Reassessment completed. See flowsheet  Pt hallucinating stating \"cover up the hole\", describes how \"[place]\" had seen  being killed. Redirected pt that there is nothing in the hole and that he is safe. 0700: Bedside and Verbal shift change report given to Dominic Evangelista, [facility] (oncoming nurse) by Geovany Rivera RN (offgoing nurse). Report included the following information SBAR, Kardex, Intake/Output, MAR, Recent Results, Cardiac Rhythm A. FIb, and Quality Measures.

## 2022-09-14 NOTE — PROGRESS NOTES
Hospitalist Progress Note      NAME: Александр Lim   :  1960  MRM:  [id number]    Date/Time: 2022         Assessment / Plan:     62M hx DM w/ CKD3 p/w dyspnea, nausea, vomiting. Delirium: Ct head 9/10 no acute concerns. Seroquel restarted. Remains on precedex drip; weaning as able. Refractory Acidemia / [place] / lactic acidosis: Presented with pH 6.77, profoundly metabolic w/ lactate > 18. Euglycemic but bOHb quite elevated so seems more likely this was an atypical presentation of DKA confounded by acute renal failure and possible metformin toxicity. Has improved s/p insulin gtt, CRRT. Gap 46 on arrival, now closed and lactate cleared. Transitioned to basal bolus insulin and tolerating    Acute Renal Failure on CKD 3: Presented with Cr 12.94, now improved w/ CRRT. Likely 2/2 profound volume depletion in s/o DKA or viral GI illness. CT C/A/P no acute concerns. Renal following for CRRT. Anticipate he will need iHD     Shock, potentially Septic: No clear source of infx including CT C/A/P. Presented with leukocytosis, bandemia. Anuric. No diarrhea. Doubt menignitis/encephalitis. CX 9/10 with possible PNA but this was following unintentional extubation 9/10; query aspiration but low suspicion given no fevers or luekocyotsis. Wean NE as able. Respiratory Failure: Intubated for airway protection, severe met acidosis. Unintentional extubation 9/10; then re-intubated 9/10. Extubated ; curretly on NC. Continue scheduled nebs. Thrombocytopenia: Improving. Possibly due to low folic acid. Several days after hep. Less likely HIT and serotonin release assay negative, but will holding SQH for now. Smear no schistocytes; check pt/inr, fibrinogen and d-dimer. Continue folate. Anemia: b12 and folate low; c/w supplements     ABD distension: improved. KUB  wo large obstruction. Ct abd pelvis  wo any acute concerns. Continue bowel regimen. AFwRVR: New. Resolved.  Echo unremarkable Metabolic Encephalopathy: 2/2 profound metabolic acidosis. Wean sedation as able     Seizure like activity:  EEG negative. Monitor     DM2: A1c only 6.6%. titrate lantus prn, SSI                 Care Plan discussed with: Family, Care Manager, Nursing Staff, and Consultant/Specialist    Discussed:  Care Plan    Prophylaxis:  SCDs    Disposition:  SNF/LTC           ___________________________________________________    Attending Physician: Pratima Dunham DO        Subjective:     Chief Complaint:  FU acidemia. Extubated 9/11; currently on NC. Discussed with family at bedside     ROS:  Unable to obtain due to delrium          Objective:       Vitals:          Last 24hrs VS reviewed since prior progress note.  Most recent are:    Visit Vitals  /72   Pulse (!) 130   Temp 98.5 °F (36.9 °C)   Resp 28   Ht 5' 6\" (1.676 m)   Wt 86 kg (189 lb 9.5 oz)   SpO2 93%   BMI 30.60 kg/m²     SpO2 Readings from Last 6 Encounters:   09/14/22 93%    O2 Flow Rate (L/min): 5 l/min     Intake/Output Summary (Last 24 hours) at 9/14/2022 1258  Last data filed at 9/14/2022 0700  Gross per 24 hour   Intake 1531.5 ml   Output 2160 ml   Net -628.5 ml            Exam:     Physical Exam:    Gen:  NAD   HEENT:  Pink conjunctivae, PERRL  Neck:  Supple, without masses, thyroid non-tender  Resp:  No accessory muscle use, rhonchi b/l   Card:  No murmurs, normal S1, S2 without thrills, bruits or peripheral edema  Abd:  Soft, non-tender, distended, normoactive bowel sounds are present  Musc:  No cyanosis or clubbing  Skin:  No rashes or ulcers, skin turgor is good  Neuro:  lethargic, sedated   Psych:  sedated, oriented x 1-2        Medications Reviewed: (see below)    Lab Data Reviewed: (see below)    ______________________________________________________________________    Medications:     Current Facility-Administered Medications   Medication Dose Route Frequency    albuterol-ipratropium (DUO-NEB) 2.5 MG-0.5 MG/3 ML  3 mL Nebulization Q6H RT albuterol-ipratropium (DUO-NEB) 2.5 MG-0.5 MG/3 ML  3 mL Nebulization Q4H PRN    polyethylene glycol (MIRALAX) packet 17 g  17 g Oral DAILY    sennosides (SENOKOT) 8.8 mg/5 mL syrup 8.8 mg  5 mL Oral DAILY    hydrALAZINE (APRESOLINE) 20 mg/mL injection 10 mg  10 mg IntraVENous Q6H PRN    acetaminophen (TYLENOL) tablet 650 mg  650 mg Oral Q6H PRN    OLANZapine (ZyPREXA) tablet 5 mg  5 mg Per NG tube BID PRN    QUEtiapine (SEROquel) tablet 50 mg  50 mg Oral BID    insulin glargine (LANTUS) injection 16 Units  16 Units SubCUTAneous DAILY    folic acid (FOLVITE) tablet 1 mg  1 mg Oral DAILY    cyanocobalamin (VITAMIN B12) tablet 500 mcg  500 mcg Per NG tube DAILY    dexmedeTOMidine in 0.9 % NaCl (PRECEDEX) 400 mcg/100 mL (4 mcg/mL) infusion soln  0.1-1.5 mcg/kg/hr IntraVENous TITRATE    dextrose 10% infusion 0-250 mL  0-250 mL IntraVENous PRN    insulin lispro (HUMALOG) injection   SubCUTAneous Q6H    levothyroxine (SYNTHROID) tablet 100 mcg  100 mcg Oral ACB    glucose chewable tablet 16 g  4 Tablet Oral PRN    glucagon (GLUCAGEN) injection 1 mg  1 mg IntraMUSCular PRN    heparin (porcine) injection 5,000 Units  5,000 Units SubCUTAneous Q12H            Lab Review:     Recent Labs     09/13/22  0007 09/12/22  0012   WBC 10.2 5.9   HGB 7.6* 8.0*   HCT 21.8* 23.3*   * 100*       Recent Labs     09/14/22 0918 09/13/22 2036 09/13/22  0007    137 139   K 3.7 3.8 3.1*    103 103   CO2 28 26 27   * 221* 225*   BUN 67* 54* 26*   CREA 4.39* 3.57* 2.10*   CA 8.1* 8.0* 7.9*   MG 2.5* 2.6* 2.4   PHOS 4.5 4.6 1.6*       No components found for: [place]

## 2022-09-14 NOTE — PROCEDURES
Hemodialysis / [phone]    Vitals Pre Post Assessment Pre Post   BP BP: (!) 142/87 (09/14/22 1045)   131/68 LOC AXOX4 NO CHANGE   HR Pulse (Heart Rate): (!) 125 (09/14/22 1045)   126 Lungs CONGESTED, PRODUCTIVE COUGH NO CHANGE   Resp Resp Rate: 30 (09/14/22 1045) 23 Cardiac NSR NO CHANGES   Temp Temp: 98.5 °F (36.9 °C) (09/14/22 0715) 98.3 Skin WDI NO CHANGE   Weight    Edema +2 EXTREMITIES NO CHANGE   Tele status BEDSIDE BEDSIDE Pain Pain Intensity 1: 0 (09/14/22 0715) PT DENIES     Orders   Duration: Start: 1100 End: 1400 Total: 3.5   Dialyzer: Dialyzer/Set Up Inspection: Revaclear (09/14/22 1045)   K Bath: Dialysate K (mEq/L): 3 (09/14/22 1045)   Ca Bath: Dialysate CA (mEq/L): 2.5 (09/14/22 1045)   Na: Dialysate NA (mEq/L): 140 (09/14/22 1045)   Bicarb: Dialysate HCO3 (mEq/L): 35 (09/14/22 1045)   Target Fluid Removal: Goal/Amount of Fluid to Remove (mL): 4000 mL (09/14/22 1045)     Access   Type & Location: [facility] / PC : Dressing CDI. No s/s of infection. Both lumens aspirate & flush well. Running well at .     Comments:                                        Labs   HBsAg (Antigen) / date:         NEGATIVE                       09/08/22               HBsAb (Antibody) / date:         SUSCEPTIBLE                09/08/22   Source:    Obtained/Reviewed  Critical Results Called HGB   Date Value Ref Range Status   09/13/2022 7.6 (L) 12.1 - 17.0 g/dL Final     Potassium   Date Value Ref Range Status   09/14/2022 3.7 3.5 - 5.1 mmol/L Final     Calcium   Date Value Ref Range Status   09/14/2022 8.1 (L) 8.5 - 10.1 MG/DL Final     BUN   Date Value Ref Range Status   09/14/2022 67 (H) 6 - 20 MG/DL Final     Creatinine   Date Value Ref Range Status   09/14/2022 4.39 (H) 0.70 - 1.30 MG/DL Final     Comment:     INVESTIGATED PER DELTA CHECK PROTOCOL        Meds Given   Name Dose Route                    Adequacy / Fluid    Total Liters Process: 80 LITERS   Net Fluid Removed: 2500 ML      Comments   Time Out Done: (Time) 1045 YES   Admitting Diagnosis: SOB   Consent obtained/signed: Informed Consent Verified: Yes (09/14/22 1045)   Machine / RO # Machine Number: [email or web address] (09/14/22 1045)   Primary Nurse Rpt Pre: Simba Springer RN   Primary Nurse Rpt Post: Simba Springer RN   Pt Education: FAMILY SUPPORT   Care Plan: FOLLOW MD POC   Pts outpatient clinic: [facility]     Tx Summary   SBAR received from Primary RN. Pt arrived to HD suite A&Ox4. Consent signed & on file. 1045: Each catheter limb disinfected per p&p, caps removed, hubs disinfected per p&p. Each lumen aspirated for blood return and flushed with Normal Saline per policy. 1100 VSS. Dialysis Tx initiated. 1430: Tx ended. VSS. Each dialysis catheter limb disinfected per p&p, all possible blood returned per p&p, and each dialysis hub disinfected per p&p. Each lumen flushed, post dialysis catheter saline dwell instilled per order, and caps applied. Bed locked and in the lowest position, call bell and belongings in reach. SBAR given to Margret, RN. Patient is stable at time of  my departure. All Dialysis related medications have been reviewed.        Comments:

## 2022-09-14 NOTE — PROGRESS NOTES
[address]  YOB: 1960          Assessment & Plan:     Severe oliguric LAVINIA on CVVH then transition to IHD from 9/12  Severe lactic acidosis  Ketoacidosis  Resp failure s/p intubation and extubation   DM2  Thrombocytopenia  Hypophosphatemia     Rec:  Now HD on MWF schedule  He is tolerating HD well and will attempt 3 kg  Avoid nephrotoxins   ICU support       Subjective:   CC: LAVINIA  HPI: Seen, extubated,edema +  ROS: unable to obtain due to pt condition  Current Facility-Administered Medications   Medication Dose Route Frequency    albuterol-ipratropium (DUO-NEB) 2.5 MG-0.5 MG/3 ML  3 mL Nebulization Q6H RT    albuterol-ipratropium (DUO-NEB) 2.5 MG-0.5 MG/3 ML  3 mL Nebulization Q4H PRN    polyethylene glycol (MIRALAX) packet 17 g  17 g Oral DAILY    sennosides (SENOKOT) 8.8 mg/5 mL syrup 8.8 mg  5 mL Oral DAILY    hydrALAZINE (APRESOLINE) 20 mg/mL injection 10 mg  10 mg IntraVENous Q6H PRN    acetaminophen (TYLENOL) tablet 650 mg  650 mg Oral Q6H PRN    OLANZapine (ZyPREXA) tablet 5 mg  5 mg Per NG tube BID PRN    QUEtiapine (SEROquel) tablet 50 mg  50 mg Oral BID    insulin glargine (LANTUS) injection 16 Units  16 Units SubCUTAneous DAILY    folic acid (FOLVITE) tablet 1 mg  1 mg Oral DAILY    cyanocobalamin (VITAMIN B12) tablet 500 mcg  500 mcg Per NG tube DAILY    dexmedeTOMidine in 0.9 % NaCl (PRECEDEX) 400 mcg/100 mL (4 mcg/mL) infusion soln  0.1-1.5 mcg/kg/hr IntraVENous TITRATE    dextrose 10% infusion 0-250 mL  0-250 mL IntraVENous PRN    insulin lispro (HUMALOG) injection   SubCUTAneous Q6H    levothyroxine (SYNTHROID) tablet 100 mcg  100 mcg Oral ACB    glucose chewable tablet 16 g  4 Tablet Oral PRN    glucagon (GLUCAGEN) injection 1 mg  1 mg IntraMUSCular PRN    heparin (porcine) injection 5,000 Units  5,000 Units SubCUTAneous Q12H          Objective:     Vitals:  Blood pressure 129/74, pulse (!) 130, temperature 98.5 °F (36.9 °C), resp. rate 27, height 5' 6\" (1.676 m), weight 86 kg (189 lb 9.5 oz), SpO2 (!) 81 %. Temp (24hrs), Av.6 °F (37 °C), Min:98.3 °F (36.8 °C), Max:99.1 °F (37.3 °C)      Intake and Output:  No intake/output data recorded.  1901 -  0700  In: 4064.8 [I.V.:1099.8]  Out: 2353 [Urine:103; Drains:250]    Physical Exam:               GENERAL ASSESSMENT: NAD  HEENT: peerla   CHEST: diminished BS +  HEART: S1S2  ABDOMEN: Soft,NT  : +Vega:   EXTREMITY: + EDEMA        ECG/rhythm:    Data Review      No results for input(s): TNIPOC in the last 72 hours. No lab exists for component: ITNL     No results for input(s): CPK, CKMB, TROIQ in the last 72 hours. Recent Labs     2218 22  0007 22  0908 22  0012    137 139   < > 137   K 3.7 3.8 3.1*   < > 5.1    103 103   < > 105   CO2 28 26 27   < > 25   BUN 67* 54* 26*   < > 26*   CREA 4.39* 3.57* 2.10*   < > 2.11*   * 221* 225*   < > 200*   PHOS 4.5 4.6 1.6*   < > 3.8   MG 2.5* 2.6* 2.4   < > 2.6*   CA 8.1* 8.0* 7.9*   < > 7.7*   WBC  --   --  10.2  --  5.9   HGB  --   --  7.6*  --  8.0*   HCT  --   --  21.8*  --  23.3*   PLT  --   --  116*  --  100*    < > = values in this interval not displayed. No results for input(s): INR, PTP, APTT, INREXT, INREXT in the last 72 hours. Needs: urine analysis, urine sodium, protein and creatinine  No results found for: Phong Hernandez        : Tyrone Marks MD  2022        [facility]:  [email or web address]  Rosalie Zamora office:  [address], [address] [place] [address],[address]  [place], [address] [place] [address]  Phone: [phone]  Fax :     [phone]    [facility] office:  [address] [facility], [address] [facility]  Phone - [phone]  Fax - [phone]

## 2022-09-14 NOTE — PROGRESS NOTES
Problem: Patient Education: Go to Patient Education Activity  Goal: Patient/Family Education  Outcome: Progressing Towards Goal     Problem: Non-Violent Restraints  Goal: Removal from restraints as soon as assessed to be safe  Outcome: Progressing Towards Goal  Goal: No harm/injury to patient while restraints in use  Outcome: Progressing Towards Goal  Goal: Patient's dignity will be maintained  Outcome: Progressing Towards Goal  Goal: Patient Interventions  Outcome: Progressing Towards Goal     Problem: Falls - Risk of  Goal: *Absence of Falls  Description: Document Michell Blood Fall Risk and appropriate interventions in the flowsheet. Outcome: Progressing Towards Goal  Note: Fall Risk Interventions:       Mentation Interventions: Bed/chair exit alarm, Door open when patient unattended, More frequent rounding, Room close to nurse's station, Reorient patient    Medication Interventions: Patient to call before getting OOB, Bed/chair exit alarm    Elimination Interventions: Call light in reach              Problem: Patient Education: Go to Patient Education Activity  Goal: Patient/Family Education  Outcome: Progressing Towards Goal     Problem: Pressure Injury - Risk of  Goal: *Prevention of pressure injury  Description: Document David Scale and appropriate interventions in the flowsheet. Outcome: Progressing Towards Goal  Note: Pressure Injury Interventions:  Sensory Interventions: Assess changes in LOC, Discuss PT/OT consult with provider, Check visual cues for pain, Float heels, Keep linens dry and wrinkle-free, Minimize linen layers, Turn and reposition approx. every two hours (pillows and wedges if needed)    Moisture Interventions: Check for incontinence Q2 hours and as needed, Absorbent underpads    Activity Interventions: Increase time out of bed    Mobility Interventions: HOB 30 degrees or less, Turn and reposition approx.  every two hours(pillow and wedges)    Nutrition Interventions: Document food/fluid/supplement intake    Friction and Shear Interventions: Apply protective barrier, creams and emollients                Problem: Patient Education: Go to Patient Education Activity  Goal: Patient/Family Education  Outcome: Progressing Towards Goal     Problem: Nutrition Deficit  Goal: *Optimize nutritional status  Outcome: Progressing Towards Goal

## 2022-09-14 NOTE — PROGRESS NOTES
Problem: Patient Education: Go to Patient Education Activity  Goal: Patient/Family Education  Outcome: Progressing Towards Goal     Problem: Falls - Risk of  Goal: *Absence of Falls  Description: Document Torri Minium Fall Risk and appropriate interventions in the flowsheet. Outcome: Progressing Towards Goal  Note: Fall Risk Interventions:       Mentation Interventions: Adequate sleep, hydration, pain control, Door open when patient unattended, Family/sitter at bedside, More frequent rounding, Room close to nurse's station    Medication Interventions: Bed/chair exit alarm, Evaluate medications/consider consulting pharmacy    Elimination Interventions: Call light in reach, Stay With Me (per policy), Toileting schedule/hourly rounds              Problem: Patient Education: Go to Patient Education Activity  Goal: Patient/Family Education  Outcome: Progressing Towards Goal     Problem: Pressure Injury - Risk of  Goal: *Prevention of pressure injury  Description: Document David Scale and appropriate interventions in the flowsheet.   Outcome: Progressing Towards Goal  Note: Pressure Injury Interventions:  Sensory Interventions: Keep linens dry and wrinkle-free, Minimize linen layers    Moisture Interventions: Absorbent underpads, Apply protective barrier, creams and emollients    Activity Interventions: Increase time out of bed, Pressure redistribution bed/mattress(bed type)    Mobility Interventions: HOB 30 degrees or less, Pressure redistribution bed/mattress (bed type)    Nutrition Interventions: Document food/fluid/supplement intake    Friction and Shear Interventions: Apply protective barrier, creams and emollients, HOB 30 degrees or less, Minimize layers                Problem: Patient Education: Go to Patient Education Activity  Goal: Patient/Family Education  Outcome: Progressing Towards Goal     Problem: Nutrition Deficit  Goal: *Optimize nutritional status  Outcome: Progressing Towards Goal     Problem: Diabetes Self-Management  Goal: *Disease process and treatment process  Description: Define diabetes and identify own type of diabetes; list 3 options for treating diabetes. Outcome: Progressing Towards Goal  Goal: *Incorporating nutritional management into lifestyle  Description: Describe effect of type, amount and timing of food on blood glucose; list 3 methods for planning meals. Outcome: Progressing Towards Goal  Goal: *Incorporating physical activity into lifestyle  Description: State effect of exercise on blood glucose levels. Outcome: Progressing Towards Goal  Goal: *Developing strategies to promote health/change behavior  Description: Define the ABC's of diabetes; identify appropriate screenings, schedule and personal plan for screenings. Outcome: Progressing Towards Goal  Goal: *Using medications safely  Description: State effect of diabetes medications on diabetes; name diabetes medication taking, action and side effects. Outcome: Progressing Towards Goal  Goal: *Monitoring blood glucose, interpreting and using results  Description: Identify recommended blood glucose targets  and personal targets. Outcome: Progressing Towards Goal  Goal: *Prevention, detection, treatment of acute complications  Description: List symptoms of hyper- and hypoglycemia; describe how to treat low blood sugar and actions for lowering  high blood glucose level. Outcome: Progressing Towards Goal  Goal: *Prevention, detection and treatment of chronic complications  Description: Define the natural course of diabetes and describe the relationship of blood glucose levels to long term complications of diabetes.   Outcome: Progressing Towards Goal  Goal: *Developing strategies to address psychosocial issues  Description: Describe feelings about living with diabetes; identify support needed and support network  Outcome: Progressing Towards Goal  Goal: *Insulin pump training  Outcome: Progressing Towards Goal  Goal: *Sick day guidelines  Outcome: Progressing Towards Goal  Goal: *Patient Specific Goal (EDIT GOAL, INSERT TEXT)  Outcome: Progressing Towards Goal     Problem: Patient Education: Go to Patient Education Activity  Goal: Patient/Family Education  Outcome: Progressing Towards Goal     Problem: Delirium Treatment  Goal: *Level of consciousness restored to baseline  Outcome: Progressing Towards Goal  Goal: *Level of environmental perceptions restored to baseline  Outcome: Progressing Towards Goal  Goal: *Sensory perception restored to baseline  Outcome: Progressing Towards Goal  Goal: *Emotional stability restored to baseline  Outcome: Progressing Towards Goal  Goal: *Functional assessment restored to baseline  Outcome: Progressing Towards Goal  Goal: *Absence of falls  Outcome: Progressing Towards Goal  Goal: *Will remain free of delirium, CAM Score negative  Outcome: Progressing Towards Goal  Goal: *Cognitive status will be restored to baseline  Outcome: Progressing Towards Goal  Goal: Interventions  Outcome: Progressing Towards Goal     Problem: Patient Education: Go to Patient Education Activity  Goal: Patient/Family Education  Outcome: Progressing Towards Goal     Problem: Non-Violent Restraints  Goal: Removal from restraints as soon as assessed to be safe  Outcome: Progressing Towards Goal  Goal: No harm/injury to patient while restraints in use  Outcome: Progressing Towards Goal  Goal: Patient's dignity will be maintained  Outcome: Progressing Towards Goal  Goal: Patient Interventions  Outcome: Progressing Towards Goal

## 2022-09-14 NOTE — PROGRESS NOTES
Transition of care note:    RUR 16%(moderate risk score)    Today:  Per nephrology,pt is on HD M/W. Fr  Transitioned to HD from CVVH  Oxygen by nc(5 liters )  Precedex gtt    I am following pt for future discharge needs.     Daniel Mahajan

## 2022-09-15 ENCOUNTER — APPOINTMENT (OUTPATIENT)
Dept: GENERAL RADIOLOGY | Age: 62
DRG: 444 | End: 2022-09-15
Attending: INTERNAL MEDICINE
Payer: MEDICAID

## 2022-09-15 LAB
ANION GAP SERPL CALC-SCNC: 7 MMOL/L (ref 5–15)
ANION GAP SERPL CALC-SCNC: 8 MMOL/L (ref 5–15)
ATRIAL RATE: 134 BPM
ATRIAL RATE: 202 BPM
BASOPHILS # BLD: 0 K/UL (ref 0–0.1)
BASOPHILS NFR BLD: 0 % (ref 0–1)
BUN SERPL-MCNC: 48 MG/DL (ref 6–20)
BUN SERPL-MCNC: 56 MG/DL (ref 6–20)
BUN/CREAT SERPL: 12 (ref 12–20)
BUN/CREAT SERPL: 12 (ref 12–20)
CALCIUM SERPL-MCNC: 7.5 MG/DL (ref 8.5–10.1)
CALCIUM SERPL-MCNC: 8.3 MG/DL (ref 8.5–10.1)
CALCULATED P AXIS, ECG09: 50 DEGREES
CALCULATED R AXIS, ECG10: 31 DEGREES
CALCULATED R AXIS, ECG10: 34 DEGREES
CALCULATED T AXIS, ECG11: -160 DEGREES
CALCULATED T AXIS, ECG11: -161 DEGREES
CHLORIDE SERPL-SCNC: 100 MMOL/L (ref 97–108)
CHLORIDE SERPL-SCNC: 102 MMOL/L (ref 97–108)
CO2 SERPL-SCNC: 28 MMOL/L (ref 21–32)
CO2 SERPL-SCNC: 30 MMOL/L (ref 21–32)
CREAT SERPL-MCNC: 3.93 MG/DL (ref 0.7–1.3)
CREAT SERPL-MCNC: 4.69 MG/DL (ref 0.7–1.3)
DIAGNOSIS, 93000: NORMAL
DIAGNOSIS, 93000: NORMAL
DIFFERENTIAL METHOD BLD: ABNORMAL
EOSINOPHIL # BLD: 0.1 K/UL (ref 0–0.4)
EOSINOPHIL NFR BLD: 1 % (ref 0–7)
ERYTHROCYTE [DISTWIDTH] IN BLOOD BY AUTOMATED COUNT: 12.5 % (ref 11.5–14.5)
GLUCOSE BLD STRIP.AUTO-MCNC: 197 MG/DL (ref 65–117)
GLUCOSE BLD STRIP.AUTO-MCNC: 198 MG/DL (ref 65–117)
GLUCOSE BLD STRIP.AUTO-MCNC: 226 MG/DL (ref 65–117)
GLUCOSE BLD STRIP.AUTO-MCNC: 234 MG/DL (ref 65–117)
GLUCOSE SERPL-MCNC: 183 MG/DL (ref 65–100)
GLUCOSE SERPL-MCNC: 207 MG/DL (ref 65–100)
HCT VFR BLD AUTO: 21.3 % (ref 36.6–50.3)
HGB BLD-MCNC: 7.1 G/DL (ref 12.1–17)
IMM GRANULOCYTES # BLD AUTO: 0 K/UL
IMM GRANULOCYTES NFR BLD AUTO: 0 %
LYMPHOCYTES # BLD: 1.1 K/UL (ref 0.8–3.5)
LYMPHOCYTES NFR BLD: 14 % (ref 12–49)
MAGNESIUM SERPL-MCNC: 2.3 MG/DL (ref 1.6–2.4)
MAGNESIUM SERPL-MCNC: 2.4 MG/DL (ref 1.6–2.4)
MCH RBC QN AUTO: 32 PG (ref 26–34)
MCHC RBC AUTO-ENTMCNC: 33.3 G/DL (ref 30–36.5)
MCV RBC AUTO: 95.9 FL (ref 80–99)
MONOCYTES # BLD: 1.1 K/UL (ref 0–1)
MONOCYTES NFR BLD: 13 % (ref 5–13)
NEUTS BAND NFR BLD MANUAL: 2 % (ref 0–6)
NEUTS SEG # BLD: 5.8 K/UL (ref 1.8–8)
NEUTS SEG NFR BLD: 70 % (ref 32–75)
NRBC # BLD: 0 K/UL (ref 0–0.01)
NRBC BLD-RTO: 0 PER 100 WBC
P-R INTERVAL, ECG05: 126 MS
PHOSPHATE SERPL-MCNC: 3.8 MG/DL (ref 2.6–4.7)
PHOSPHATE SERPL-MCNC: 3.9 MG/DL (ref 2.6–4.7)
PLATELET # BLD AUTO: 135 K/UL (ref 150–400)
PMV BLD AUTO: 9.7 FL (ref 8.9–12.9)
POTASSIUM SERPL-SCNC: 3.2 MMOL/L (ref 3.5–5.1)
POTASSIUM SERPL-SCNC: 3.3 MMOL/L (ref 3.5–5.1)
Q-T INTERVAL, ECG07: 304 MS
Q-T INTERVAL, ECG07: 304 MS
QRS DURATION, ECG06: 66 MS
QRS DURATION, ECG06: 70 MS
QTC CALCULATION (BEZET), ECG08: 440 MS
QTC CALCULATION (BEZET), ECG08: 453 MS
RBC # BLD AUTO: 2.22 M/UL (ref 4.1–5.7)
RBC MORPH BLD: ABNORMAL
SERVICE CMNT-IMP: ABNORMAL
SODIUM SERPL-SCNC: 136 MMOL/L (ref 136–145)
SODIUM SERPL-SCNC: 139 MMOL/L (ref 136–145)
TROPONIN-HIGH SENSITIVITY: 325 NG/L (ref 0–76)
TROPONIN-HIGH SENSITIVITY: 328 NG/L (ref 0–76)
VENTRICULAR RATE, ECG03: 126 BPM
VENTRICULAR RATE, ECG03: 134 BPM
WBC # BLD AUTO: 8.1 K/UL (ref 4.1–11.1)

## 2022-09-15 PROCEDURE — 74011636637 HC RX REV CODE- 636/637: Performed by: INTERNAL MEDICINE

## 2022-09-15 PROCEDURE — 74011250637 HC RX REV CODE- 250/637: Performed by: INTERNAL MEDICINE

## 2022-09-15 PROCEDURE — 94640 AIRWAY INHALATION TREATMENT: CPT

## 2022-09-15 PROCEDURE — 90935 HEMODIALYSIS ONE EVALUATION: CPT

## 2022-09-15 PROCEDURE — 77010033678 HC OXYGEN DAILY

## 2022-09-15 PROCEDURE — 74011000258 HC RX REV CODE- 258: Performed by: STUDENT IN AN ORGANIZED HEALTH CARE EDUCATION/TRAINING PROGRAM

## 2022-09-15 PROCEDURE — 80048 BASIC METABOLIC PNL TOTAL CA: CPT

## 2022-09-15 PROCEDURE — 74011000250 HC RX REV CODE- 250: Performed by: STUDENT IN AN ORGANIZED HEALTH CARE EDUCATION/TRAINING PROGRAM

## 2022-09-15 PROCEDURE — 74011000250 HC RX REV CODE- 250: Performed by: INTERNAL MEDICINE

## 2022-09-15 PROCEDURE — 74011250636 HC RX REV CODE- 250/636: Performed by: INTERNAL MEDICINE

## 2022-09-15 PROCEDURE — P9047 ALBUMIN (HUMAN), 25%, 50ML: HCPCS | Performed by: INTERNAL MEDICINE

## 2022-09-15 PROCEDURE — 84100 ASSAY OF PHOSPHORUS: CPT

## 2022-09-15 PROCEDURE — 99223 1ST HOSP IP/OBS HIGH 75: CPT | Performed by: SPECIALIST

## 2022-09-15 PROCEDURE — 77030018798 HC PMP KT ENTRL FED COVD -A

## 2022-09-15 PROCEDURE — 74011250636 HC RX REV CODE- 250/636: Performed by: STUDENT IN AN ORGANIZED HEALTH CARE EDUCATION/TRAINING PROGRAM

## 2022-09-15 PROCEDURE — 85025 COMPLETE CBC W/AUTO DIFF WBC: CPT

## 2022-09-15 PROCEDURE — 83735 ASSAY OF MAGNESIUM: CPT

## 2022-09-15 PROCEDURE — 74011000258 HC RX REV CODE- 258: Performed by: INTERNAL MEDICINE

## 2022-09-15 PROCEDURE — 74011250636 HC RX REV CODE- 250/636: Performed by: NURSE PRACTITIONER

## 2022-09-15 PROCEDURE — 36415 COLL VENOUS BLD VENIPUNCTURE: CPT

## 2022-09-15 PROCEDURE — 82962 GLUCOSE BLOOD TEST: CPT

## 2022-09-15 PROCEDURE — 65610000006 HC RM INTENSIVE CARE

## 2022-09-15 PROCEDURE — 84484 ASSAY OF TROPONIN QUANT: CPT

## 2022-09-15 PROCEDURE — 71045 X-RAY EXAM CHEST 1 VIEW: CPT

## 2022-09-15 RX ORDER — ALBUMIN HUMAN 250 G/1000ML
25 SOLUTION INTRAVENOUS
Status: DISCONTINUED | OUTPATIENT
Start: 2022-09-15 | End: 2022-09-23 | Stop reason: HOSPADM

## 2022-09-15 RX ORDER — POTASSIUM CHLORIDE 29.8 MG/ML
20 INJECTION INTRAVENOUS
Status: COMPLETED | OUTPATIENT
Start: 2022-09-15 | End: 2022-09-16

## 2022-09-15 RX ORDER — DILTIAZEM HYDROCHLORIDE 5 MG/ML
10 INJECTION INTRAVENOUS ONCE
Status: COMPLETED | OUTPATIENT
Start: 2022-09-15 | End: 2022-09-15

## 2022-09-15 RX ORDER — SODIUM CHLORIDE 9 MG/ML
250 INJECTION, SOLUTION INTRAVENOUS ONCE
Status: COMPLETED | OUTPATIENT
Start: 2022-09-15 | End: 2022-09-15

## 2022-09-15 RX ADMIN — ALBUMIN (HUMAN) 25 G: 0.25 INJECTION, SOLUTION INTRAVENOUS at 12:46

## 2022-09-15 RX ADMIN — INSULIN LISPRO 2 UNITS: 100 INJECTION, SOLUTION INTRAVENOUS; SUBCUTANEOUS at 14:48

## 2022-09-15 RX ADMIN — DEXTROSE MONOHYDRATE 150 MG: 50 INJECTION, SOLUTION INTRAVENOUS at 15:29

## 2022-09-15 RX ADMIN — DEXMEDETOMIDINE HYDROCHLORIDE 0.4 MCG/KG/HR: 400 INJECTION INTRAVENOUS at 16:06

## 2022-09-15 RX ADMIN — AMIODARONE HYDROCHLORIDE 1 MG/MIN: 50 INJECTION, SOLUTION INTRAVENOUS at 05:58

## 2022-09-15 RX ADMIN — SODIUM CHLORIDE 250 ML: 9 INJECTION, SOLUTION INTRAVENOUS at 11:11

## 2022-09-15 RX ADMIN — FOLIC ACID 1 MG: 1 TABLET ORAL at 09:03

## 2022-09-15 RX ADMIN — LEVOTHYROXINE SODIUM 100 MCG: 0.1 TABLET ORAL at 09:00

## 2022-09-15 RX ADMIN — DEXMEDETOMIDINE HYDROCHLORIDE 0.9 MCG/KG/HR: 400 INJECTION INTRAVENOUS at 08:21

## 2022-09-15 RX ADMIN — QUETIAPINE FUMARATE 50 MG: 25 TABLET ORAL at 09:03

## 2022-09-15 RX ADMIN — CYANOCOBALAMIN TAB 500 MCG 500 MCG: 500 TAB at 09:03

## 2022-09-15 RX ADMIN — DEXMEDETOMIDINE HYDROCHLORIDE 0.5 MCG/KG/HR: 400 INJECTION INTRAVENOUS at 10:30

## 2022-09-15 RX ADMIN — POTASSIUM CHLORIDE 20 MEQ: 29.8 INJECTION, SOLUTION INTRAVENOUS at 01:19

## 2022-09-15 RX ADMIN — SENNOSIDES 8.8 MG: 8.8 LIQUID ORAL at 09:04

## 2022-09-15 RX ADMIN — IPRATROPIUM BROMIDE AND ALBUTEROL SULFATE 3 ML: .5; 2.5 SOLUTION RESPIRATORY (INHALATION) at 13:50

## 2022-09-15 RX ADMIN — HEPARIN SODIUM 5000 UNITS: 5000 INJECTION INTRAVENOUS; SUBCUTANEOUS at 09:03

## 2022-09-15 RX ADMIN — POTASSIUM CHLORIDE 20 MEQ: 29.8 INJECTION, SOLUTION INTRAVENOUS at 23:56

## 2022-09-15 RX ADMIN — INSULIN GLARGINE 16 UNITS: 100 INJECTION, SOLUTION SUBCUTANEOUS at 09:05

## 2022-09-15 RX ADMIN — POLYETHYLENE GLYCOL 3350 17 G: 17 POWDER, FOR SOLUTION ORAL at 09:04

## 2022-09-15 RX ADMIN — IPRATROPIUM BROMIDE AND ALBUTEROL SULFATE 3 ML: .5; 2.5 SOLUTION RESPIRATORY (INHALATION) at 07:51

## 2022-09-15 RX ADMIN — AMIODARONE HYDROCHLORIDE 1 MG/MIN: 50 INJECTION, SOLUTION INTRAVENOUS at 23:56

## 2022-09-15 RX ADMIN — DILTIAZEM HYDROCHLORIDE 10 MG: 5 INJECTION INTRAVENOUS at 10:53

## 2022-09-15 RX ADMIN — HEPARIN SODIUM 5000 UNITS: 5000 INJECTION INTRAVENOUS; SUBCUTANEOUS at 21:32

## 2022-09-15 RX ADMIN — IPRATROPIUM BROMIDE AND ALBUTEROL SULFATE 3 ML: .5; 2.5 SOLUTION RESPIRATORY (INHALATION) at 19:54

## 2022-09-15 RX ADMIN — IPRATROPIUM BROMIDE AND ALBUTEROL SULFATE 3 ML: .5; 2.5 SOLUTION RESPIRATORY (INHALATION) at 01:27

## 2022-09-15 RX ADMIN — QUETIAPINE FUMARATE 50 MG: 25 TABLET ORAL at 17:59

## 2022-09-15 RX ADMIN — AMIODARONE HYDROCHLORIDE 1 MG/MIN: 50 INJECTION, SOLUTION INTRAVENOUS at 17:48

## 2022-09-15 RX ADMIN — DEXMEDETOMIDINE HYDROCHLORIDE 0.9 MCG/KG/HR: 400 INJECTION INTRAVENOUS at 01:52

## 2022-09-15 RX ADMIN — INSULIN LISPRO 2 UNITS: 100 INJECTION, SOLUTION INTRAVENOUS; SUBCUTANEOUS at 05:57

## 2022-09-15 NOTE — PROGRESS NOTES
Critical Care      Progress Note  Date:9/15/2022       Room:[address] [place] [address]  Patient Jim Crump     YOB: 1960     Age:62 y.o. Subjective    Subjective   09/15: On Precedex. Started on Amiodarone for Afib RVR. Hb down to 7. 1sp IHD yesterday with 2.5 L UF. Review of Systems  Unable to obtain given clinical condition   Objective         Vitals Last 24 Hours:  TEMPERATURE:  Temp  Av.3 °F (36.8 °C)  Min: 97.7 °F (36.5 °C)  Max: 98.9 °F (37.2 °C)  RESPIRATIONS RANGE: Resp  Av.2  Min: 18  Max: 32  PULSE OXIMETRY RANGE: SpO2  Av.2 %  Min: 90 %  Max: 100 %  PULSE RANGE: Pulse  Av.3  Min: 87  Max: 149  BLOOD PRESSURE RANGE: Systolic (43YIA), AXH:309 , Min:76 , UHN:996   ; Diastolic (10UMQ), UAA:59, Min:35, Max:114    I/O (24Hr): Intake/Output Summary (Last 24 hours) at 9/15/2022 1828  Last data filed at 9/15/2022 0700  Gross per 24 hour   Intake 1289.2 ml   Output 50 ml   Net 1239.2 ml     I examined the patient via telemedicine, with its associated limitations. I beamed in and examined the patient with assistance of house staff     On exam:    Gen: awake, confused  HEENT:  Mucous membrane dry  Chest: symmetrical chest rise  CV:S1,S2  Abd: soft, non-distended  Ext+ve  edema  Neuro: moves all ext     Objective  Labs/Imaging/Diagnostics    Labs:  CBC:  Recent Labs     09/15/22  0004 22  0007   WBC 8.1 10.2   RBC 2.22* 2.38*   HGB 7.1* 7.6*   HCT 21.3* 21.8*   MCV 95.9 91.6   RDW 12.5 12.6   * 116*     CHEMISTRIES:  Recent Labs     09/15/22  0919 2218    140 136   K 3.3* 3.4* 3.7    104 101   CO2 30 31 28   BUN 48* 36* 67*   CA 7.5* 7.7* 8.1*   PHOS 3.9 2.2* 4.5   MG 2.4 2.3 2.5*   PT/INR:No results for input(s): INR, INREXT in the last 72 hours. No lab exists for component: PROTIME  APTT:No results for input(s): APTT in the last 72 hours. LIVER PROFILE:No results for input(s): AST, ALT in the last 72 hours.     No lab exists for component: JOSH Booker  Lab Results   Component Value Date/Time    ALT (SGPT) 12 09/09/2022 03:39 PM    AST (SGOT) 27 09/09/2022 03:39 PM    Alk. phosphatase 63 09/09/2022 03:39 PM    Bilirubin, direct 0.3 (H) 09/09/2022 03:39 PM    Bilirubin, total 0.6 09/09/2022 03:39 PM       Imaging Last 24 Hours:  XR CHEST PORT    Result Date: 9/15/2022  EXAM: XR CHEST PORT INDICATION: fever COMPARISON: 9/13/2022 FINDINGS: A portable AP radiograph of the chest was obtained at 0858 hours. The patient is on a cardiac monitor. Pulmonary edema has largely resolved and there is a trace left pleural effusion. Left axillary clips are present. The bones and soft tissues are grossly within normal limits. The NG tube extends below the hemidiaphragm. The left CVP line terminates at the cavoatrial junction. The right CVP line terminates in the right atrium. Largely resolved pulmonary edema Small residual left pleural effusion   Assessment//Plan   Principal Problem:    Severe sepsis ([address] (9/6/2022)    Active Problems:    LAVINIA (acute kidney injury) ([address] (9/6/2022)      Melanoma ([address] (9/6/2022)      HTN (hypertension), benign (9/6/2022)      DM (diabetes mellitus), type 2 ([address] (9/6/2022)      Hyperlipidemia (9/6/2022)      Delirium (9/14/2022)      Shock ([address] (9/14/2022)      PAF (paroxysmal atrial fibrillation) ([address] (9/14/2022)      Thrombocytopenia ([address] (9/14/2022)      Assessment & Plan  A 58 yo male with prolonged hospital stay, extubated. Now in ICU with Delirium, Renal Failure,Afib and Anemia    Plan:  Wean Precedex as tolerated  Continue Seroquel  Follow Cards recs  Dialysis per nephrology   Monitor H/H. Transfuse RBCs for Hb<7. Continue TF  PT eval  Follow up CXR       Discussed with bedside RN     I reviewed the electronic medical record, the x-rays, labs, progress notes, previous history and physicals and consultation notes that were available in the electronic medical record.       I performed all aspects of the physical examination via Telemedicine associated with two way audio and video communication and with the on-site assistance of  the bedside nurse. I am located in [place] and the patient is located in [place] at [facility]  The patient is critically ill in the ICU. I  personally  reviewed the pertinent medical records, laboratory/ pathology data and radiographic images. The decision making regarding this patient is as documented above, which was generated  following  discussion  with the multidisciplinary ICU team and creation of a treatment plan for  the patient. We discussed the patient's interval history and future coordination of care and  plans. The patient's medications  were reviewed and changes made as stipulated above. Due to  critical illness impairing one or more vital organs of this patient resulting in life threatening clinical situation  I have provided direct, frequent personal  assessment and manipulation in management plan and spent 35 minutes  of  critical care time excluding the time spent on procedures and teaching. Greater than 50% of this time  in patients care was  employed  in counseling and coordination of care and engaged in face to face discussion of case management issues, addressing questions, and outlining a plan of  therapy. Pt at risk of life threatening deterioration from Delirium     Pt seen and evaluated via tele encounter. Audio and Video were used for this interaction. ATTENTION:  This medical record was transcribed using an electronic medical records/speech recognition system. Although proofread, it may and can contain electronic, spelling and other errors. Corrections may be executed at a later time. Please feel free to contact us for any clarifications as needed.    Electronically signed by Boy Gonzalez MD on 9/15/2022 at 6:28 PM

## 2022-09-15 NOTE — PROGRESS NOTES
[facility]  [address] [facility] [address] [place] [address]  [phone]      Hospitalist  Progress Note      NAME:         Guillermo Zamarripa   :        1960  MRM:        [id number]    Date of service: 9/15/2022      Chief complaint: Confusion, delirious    Interval HPI: Patient remains confused and hallucinating. I have discussed with his nurse for collaborative Hx. Still has an NG tube. No fever or chills. No vomiting      Objective:    Vital Signs:    Visit Vitals  /69 (BP 1 Location: Right upper arm, BP Patient Position: At rest)   Pulse (!) 119   Temp 98.2 °F (36.8 °C)   Resp 30   Ht 5' 6\" (1.676 m)   Wt 87.8 kg (193 lb 9 oz)   SpO2 97%   BMI 31.24 kg/m²        Intake/Output Summary (Last 24 hours) at 9/15/2022 0738  Last data filed at 9/15/2022 0600  Gross per 24 hour   Intake 1209.2 ml   Output 2550 ml   Net -1340.8 ml        Physical Examination:    General:   Weak and ill looking, not in distress   Eyes:   pink conjunctivae, PERRLA with no discharge. ENT:   no ottorrhea or rhinorrhea with dry mucous membranes  Neck: no masses, thyroid non-tender and trachea central.  Pulm:  no accessory muscle use, decreased breath sounds without crackles or wheezes  Card:  no JVD or murmurs, has sinus tachycardia, without thrills, bruits or peripheral edema  Abd:  Soft, non-tender, non-distended, normoactive bowel sounds   Musc:  No cyanosis, clubbing, atrophy or deformities. Skin:  No rashes, bruising or ulcers. Neuro: Awake and alert but confused. Hallucinating.  Moves all extremities   Psych:  Has no insight to his illness     Current Facility-Administered Medications   Medication Dose Route Frequency    albuterol-ipratropium (DUO-NEB) 2.5 MG-0.5 MG/3 ML  3 mL Nebulization Q6H RT    amiodarone (CORDARONE) 375 mg in dextrose 5% 250 mL infusion  0.5-1 mg/min IntraVENous TITRATE    albuterol-ipratropium (DUO-NEB) 2.5 MG-0.5 MG/3 ML  3 mL Nebulization Q4H PRN    polyethylene glycol (MIRALAX) packet 17 g  17 g Oral DAILY    sennosides (SENOKOT) 8.8 mg/5 mL syrup 8.8 mg  5 mL Oral DAILY    hydrALAZINE (APRESOLINE) 20 mg/mL injection 10 mg  10 mg IntraVENous Q6H PRN    acetaminophen (TYLENOL) tablet 650 mg  650 mg Oral Q6H PRN    OLANZapine (ZyPREXA) tablet 5 mg  5 mg Per NG tube BID PRN    QUEtiapine (SEROquel) tablet 50 mg  50 mg Oral BID    insulin glargine (LANTUS) injection 16 Units  16 Units SubCUTAneous DAILY    folic acid (FOLVITE) tablet 1 mg  1 mg Oral DAILY    cyanocobalamin (VITAMIN B12) tablet 500 mcg  500 mcg Per NG tube DAILY    dexmedeTOMidine in 0.9 % NaCl (PRECEDEX) 400 mcg/100 mL (4 mcg/mL) infusion soln  0.1-1.5 mcg/kg/hr IntraVENous TITRATE    dextrose 10% infusion 0-250 mL  0-250 mL IntraVENous PRN    insulin lispro (HUMALOG) injection   SubCUTAneous Q6H    levothyroxine (SYNTHROID) tablet 100 mcg  100 mcg Oral ACB    glucose chewable tablet 16 g  4 Tablet Oral PRN    glucagon (GLUCAGEN) injection 1 mg  1 mg IntraMUSCular PRN    heparin (porcine) injection 5,000 Units  5,000 Units SubCUTAneous Q12H        Laboratory data and review:    Recent Labs     09/15/22  0004 09/13/22  0007   WBC 8.1 10.2   HGB 7.1* 7.6*   HCT 21.3* 21.8*   * 116*     Recent Labs     09/14/22 2047 09/14/22 0918 09/13/22 2036    136 137   K 3.4* 3.7 3.8    101 103   CO2 31 28 26   * 225* 221*   BUN 36* 67* 54*   CREA 2.91* 4.39* 3.57*   CA 7.7* 8.1* 8.0*   MG 2.3 2.5* 2.6*   PHOS 2.2* 4.5 4.6     No components found for: [place]    Diagnostics: Imaging studies have been reviewed    Telemetry reviewed by me:   normal sinus rhythm    Assessment and Plan:    LAVINIA (acute kidney injury) ([address] (9/6/2022)POA: has CKD stage III. Had severe acidemia which has now resolved. Suspected to have been triggered by volume depletion. Seen by nephrology and has been on CVVH and now transitioned to HD on MWFs.  Continue too monitor renal function. Delirium (9/14/2022) POA: noted post extubation. CT scan head neg for acute changes. Continue Zyprexa, Seroquel, Precedex gtt PRN. DC restraints ASAP      PAF (paroxysmal atrial fibrillation) ([facility] (9/14/2022) POA: went into RVR last night. Recent Echo showed a normal LV function with EF 65-70%. Continue Amiodarone gtt. Consult cardiology    HTN (hypertension), benign (9/6/2022) POA: given he has been in shock, medications on hold    DM (diabetes mellitus), type 2 ([facility] (9/6/2022) POA: A1c 6.6. BG stable. Continue Lantus, SSi per protocol. DM diet when able    Thrombocytopenia ([facility] (9/14/2022) POA: unclear etiology but improving. POOJA neg. Peripheral smear unremarkable. Monitor    Anemia - progressively worsening. Has low vitamin O24 and folic acid. Being repleted. Consider a hematology consult    Acute respiratory failure with hypoxia POA: intubated for airway protection due to severe acidosis. Extubated 9/11.  Continue supplemental oxygen to keep sats > 90%    Hyperlipidemia (9/6/2022) POA: Pravastatin on hold                 Care Plan discussed with: Patient, Care Manager, Nursing Staff, and Consultant/Specialist    Discussed:  Care Plan    Prophylaxis:  Hep SQ    Anticipated Disposition:  SNF/LTC vs  PT, OT, RN           ___________________________________________________    Attending Physician:   Giorgi Ku MD

## 2022-09-15 NOTE — CONSULTS
Itz Ta MD. [facility]  [facility] [address] [place] [address], [address] [place] [address] [phone]; Fax [phone]      Patient: Angelica Johnson  : 1960      Today's Date: 9/15/2022      HISTORY OF PRESENT ILLNESS:     History of Present Illness:    54yo M w/ PMHx of non-insulin dependent diabetes on metformin, melanoma s/p resection, HTN, HLD who presented to the ED  for evaluation of dizziness. Pt reports starting gabapentin 3 days prior for neuropathy and after taking a few doses started to feel dizzy and stopped. He endorsed feeling lightheaded, dyspnea, nausea, and vomiting. In the ED pt was found to be in acute renal failure w/ elevated lactic acid levels and AGMA - pt was euglycemic but sx thought to possibly be 2/2 atypical DKA and metformin toxicity. Upon arrival to the ICU pt was in respiratory distress and was subsequently intubated. He was extubated on  and his acidosis has improved however pt has remained altered requiring precedex drip. He has also intermittently required levophed for pressure support but is currently off pressors. On the afternoon of  pt was found to have new onset afib w/ RVR that resolved w/o any medical intervention. Last night he had recurrence of afib w/ RVR that did not respond to an amiodarone bolus and he was subsequently started on an amio drip.       PAST MEDICAL HISTORY:     Past Medical History:   Diagnosis Date    Hypercholesteremia     Hypertension     Melanoma ([address]     skin    Thyroid activity decreased            Past Surgical History:   Procedure Laterality Date    HX ORTHOPAEDIC      IR INSERT NON TUNL CVC OVER 5 YRS  2022           Patient Active Problem List   Diagnosis Code    Severe sepsis (HCC) A41.9, R65.20    LAVINIA (acute kidney injury) ([address] N17.9    Melanoma ([address] C43.9    HTN (hypertension), benign I10    DM (diabetes mellitus), type 2 ([address] E11.9    Hyperlipidemia E78.5    Delirium R41.0    Shock ([address] R57.9 PAF (paroxysmal atrial fibrillation) ([facility]) I48.0    Thrombocytopenia ([facility]) D69.6             FAMILY HISTORY:     FH - unable to obtain from patient (confused)          SOCIAL HISTORY:     Social History     Tobacco Use    Smoking status: Never    Smokeless tobacco: Never   Substance Use Topics    Alcohol use: Not Currently    Drug use: Yes     Types: Marijuana               REVIEW OF SYMPTOMS:     Review of Symptoms:  Constitutional: Negative for fever, chills  HEENT: Negative for nosebleeds, tinnitus, and vision changes. Respiratory: Negative for cough, wheezing  Cardiovascular: Negative for orthopnea, claudication, syncope, and PND. Gastrointestinal: Negative for abdominal pain, diarrhea, melena. Genitourinary: Negative for dysuria  Musculoskeletal: Negative for myalgias. Skin: Negative for rash  Heme: No problems bleeding. Neurological: Negative for speech change and focal weakness. PHYSICAL EXAM:     Physical Exam:  Visit Vitals  /69 (BP 1 Location: Right upper arm, BP Patient Position: At rest)   Pulse (!) 119   Temp 98.2 °F (36.8 °C)   Resp 30   Ht 5' 6\" (1.676 m)   Wt 193 lb 9 oz (87.8 kg)   SpO2 97%   BMI 31.24 kg/m²     Patient appears generally well, drowsy but arousable and follows commands. Mood and affect are appropriate, pt disoriented and tangential.  HEENT:  Hearing intact, non-icteric, normocephalic, atraumatic. Neck Exam: Supple, No JVD or carotid bruits. Lung Exam: B/l coarse breath sounds, even breath sounds. Cardiac Exam: Tachycardic rate and irregularly irregular rhythm with no murmur. Abdomen: Soft, non-tender, normal bowel sounds. No bruits or masses. Extremities: Moves all ext well. Trace b/l lower extremity edema at the ankles. Vascular: 2+ dorsalis pedis pulses bilaterally. Psych: Appropriate affect - disoriented to place and time.   Neuro - Grossly intact          LABS / OTHER STUDIES reviewed:     Recent Results (from the past 24 hour(s))   PHOSPHORUS Collection Time: 09/14/22  9:18 AM   Result Value Ref Range    Phosphorus 4.5 2.6 - 4.7 MG/DL   METABOLIC PANEL, BASIC    Collection Time: 09/14/22  9:18 AM   Result Value Ref Range    Sodium 136 136 - 145 mmol/L    Potassium 3.7 3.5 - 5.1 mmol/L    Chloride 101 97 - 108 mmol/L    CO2 28 21 - 32 mmol/L    Anion gap 7 5 - 15 mmol/L    Glucose 225 (H) 65 - 100 mg/dL    BUN 67 (H) 6 - 20 MG/DL    Creatinine 4.39 (H) 0.70 - 1.30 MG/DL    BUN/Creatinine ratio 15 12 - 20      GFR est AA 17 (L) >60 ml/min/1.73m2    GFR est non-AA 14 (L) >60 ml/min/1.73m2    Calcium 8.1 (L) 8.5 - 10.1 MG/DL   MAGNESIUM    Collection Time: 09/14/22  9:18 AM   Result Value Ref Range    Magnesium 2.5 (H) 1.6 - 2.4 mg/dL   GLUCOSE, POC    Collection Time: 09/14/22 12:58 PM   Result Value Ref Range    Glucose (POC) 149 (H) 65 - 117 mg/dL    Performed by Reba Deck    GLUCOSE, POC    Collection Time: 09/14/22  6:23 PM   Result Value Ref Range    Glucose (POC) 192 (H) 65 - 117 mg/dL    Performed by Reba Deck    PHOSPHORUS    Collection Time: 09/14/22  8:47 PM   Result Value Ref Range    Phosphorus 2.2 (L) 2.6 - 4.7 MG/DL   METABOLIC PANEL, BASIC    Collection Time: 09/14/22  8:47 PM   Result Value Ref Range    Sodium 140 136 - 145 mmol/L    Potassium 3.4 (L) 3.5 - 5.1 mmol/L    Chloride 104 97 - 108 mmol/L    CO2 31 21 - 32 mmol/L    Anion gap 5 5 - 15 mmol/L    Glucose 155 (H) 65 - 100 mg/dL    BUN 36 (H) 6 - 20 MG/DL    Creatinine 2.91 (H) 0.70 - 1.30 MG/DL    BUN/Creatinine ratio 12 12 - 20      GFR est AA 27 (L) >60 ml/min/1.73m2    GFR est non-AA 22 (L) >60 ml/min/1.73m2    Calcium 7.7 (L) 8.5 - 10.1 MG/DL   MAGNESIUM    Collection Time: 09/14/22  8:47 PM   Result Value Ref Range    Magnesium 2.3 1.6 - 2.4 mg/dL   GLUCOSE, POC    Collection Time: 09/14/22 11:19 PM   Result Value Ref Range    Glucose (POC) 183 (H) 65 - 117 mg/dL    Performed by Francis WARREN WITH AUTOMATED DIFF    Collection Time: 09/15/22 12:04 AM   Result Value Ref Range    WBC 8.1 4.1 - 11.1 K/uL    RBC 2.22 (L) 4.10 - 5.70 M/uL    HGB 7.1 (L) 12.1 - 17.0 g/dL    HCT 21.3 (L) 36.6 - 50.3 %    MCV 95.9 80.0 - 99.0 FL    MCH 32.0 26.0 - 34.0 PG    MCHC 33.3 30.0 - 36.5 g/dL    RDW 12.5 11.5 - 14.5 %    PLATELET 877 (L) 957 - 400 K/uL    MPV 9.7 8.9 - 12.9 FL    NRBC 0.0 0  WBC    ABSOLUTE NRBC 0.00 0.00 - 0.01 K/uL    NEUTROPHILS 70 32 - 75 %    BAND NEUTROPHILS 2 0 - 6 %    LYMPHOCYTES 14 12 - 49 %    MONOCYTES 13 5 - 13 %    EOSINOPHILS 1 0 - 7 %    BASOPHILS 0 0 - 1 %    IMMATURE GRANULOCYTES 0 %    ABS. NEUTROPHILS 5.8 1.8 - 8.0 K/UL    ABS. LYMPHOCYTES 1.1 0.8 - 3.5 K/UL    ABS. MONOCYTES 1.1 (H) 0.0 - 1.0 K/UL    ABS. EOSINOPHILS 0.1 0.0 - 0.4 K/UL    ABS. BASOPHILS 0.0 0.0 - 0.1 K/UL    ABS. IMM. GRANS. 0.0 K/UL    DF MANUAL      RBC COMMENTS NORMOCYTIC, NORMOCHROMIC     TROPONIN-HIGH SENSITIVITY    Collection Time: 09/15/22 12:04 AM   Result Value Ref Range    Troponin-High Sensitivity 325 (HH) 0 - 76 ng/L   GLUCOSE, POC    Collection Time: 09/15/22  5:52 AM   Result Value Ref Range    Glucose (POC) 226 (H) 65 - 117 mg/dL    Performed by [email or web address]:     Cardiac Evaluation Includes:    EKG 9/6: normal sinus  EKG 9/9: afib w/ RVR  EKG 9/9: sinus tach w/ PACs  Tele 9/15/22 - Afib with RVR    ECHO 9/9/22: EF of 65 - 70%      ASSESSMENT AND PLAN:     Assessment and Plan:    Afib with RVR:  - Had transient Afib with RVR 9/9/22 (spont converted to NSR)   - On 9/15/22 - back into afib with RVR  - New onset, suspect related to recent illness, no history of arrhythmia in the past although pt is on low dose atenolol for HTN. No response to amio bolus, currently on drip w/ rates in 120s.   - Considering recent onset w/I the last 12h and the possibility of self-conversion will hold off on electrical cardioversion at this time.  Pt is hemodynamically stable and has been off pressors x3d.  - BP improved this AM, will give cardizem bolus and start on gtt for improved rate control in the hope that he may convert to sinus. Monitor BP closely. - A short course of amiodarone for 30 days may be reasonable when he is back in sinus as pt recovers from illness. - Will hold off on therapeutic anticoagulation for now (given anemia, LAVINIA)     Elevated troponin: Likely type 2 in s/o supply demand mismatch in setting of afib RVR. HTN: Hold home atenolol, chlorthalidone, clonidine, enalapril. Delirium: Wean precedex. CT head wnl. Hypothyroid: TSH low at 0.14 however pt acutely ill, continue current home dose of synthroid 100mcg for now. AHRF: Supplemental O2. Augustin Garcia MD      CARDIOLOGY ATTENDING  Patient personally seen and examined. All the elements of history and examination were personally performed. Assessment and plan was discussed and agree. Patient admitted with DKA, LAVINIA, respiratory failure and shock. Called to see patient when back in to Afib with RVR 9/15/22  Patient confused but awake. NAD, hrt irreg irreg, no murmur. Lungs clear ant. No sig edema    Newly discovered Afib with RVR  - As hypotension has been a problem recently, will continue IV Amiodarone which was started overnight. Will try adding low dose cardizem to get HR better. Hoping he can convert as we get his HR down.   If he remains in afib, then would focus on rate control and he would then need anticoagulation for Afib       Itz Ta MD, [facility]

## 2022-09-15 NOTE — PROCEDURES
Hemodialysis / [phone]    Vitals Pre Post Assessment Pre Post   BP BP: 104/68 (hd started) (09/15/22 1240) 113/71 LOC PT AXOX1-2 PT AXOX2   HR Pulse (Heart Rate): 96 (09/15/22 1240) 87 Lungs Diminished Diminished   Resp Resp Rate: 25 (09/15/22 1230) 22 Cardiac Afib Afib   Temp Temp: 98.4 °F (36.9 °C) (09/15/22 1230) 97.7 Skin Warm and dry Warm and dry    Weight  Not able to obtain   Edema Generalized Generalized    Tele status Afib Afib  Pain Pain Intensity 1: 0 (09/15/22 0700) 0     Orders   Duration: Start: 2754 End: 1440 Total: 2 hours    Dialyzer: Dialyzer/Set Up Inspection: Revaclear (09/15/22 1240)   K Bath: Dialysate K (mEq/L):  (uf) (09/15/22 1240)   Ca Bath: Dialysate CA (mEq/L):  (uf) (09/15/22 1240)   Na: Dialysate NA (mEq/L): uf (09/15/22 1240)   Bicarb: Dialysate HCO3 (mEq/L):  (uf) (09/15/22 1240)   Target Fluid Removal: Goal/Amount of Fluid to Remove (mL): 2000 mL (09/15/22 1240)     Access   Type & Location: Right IJ   Comments: Dressing intact and dated 9/13/22. Each catheter limb disinfected per p&p, caps removed, hubs disinfected per p&p.                            Labs   HBsAg (Antigen) / date: Negative 9/8/22                                     HBsAb (Antibody) / date: Susceptible 9/8/22       Source: [facility] care    Obtained/Reviewed  Critical Results Called HGB   Date Value Ref Range Status   09/15/2022 7.1 (L) 12.1 - 17.0 g/dL Final     Potassium   Date Value Ref Range Status   09/15/2022 3.3 (L) 3.5 - 5.1 mmol/L Final     Calcium   Date Value Ref Range Status   09/15/2022 7.5 (L) 8.5 - 10.1 MG/DL Final     BUN   Date Value Ref Range Status   09/15/2022 48 (H) 6 - 20 MG/DL Final     Creatinine   Date Value Ref Range Status   09/15/2022 3.93 (H) 0.70 - 1.30 MG/DL Final     Comment:     INVESTIGATED PER DELTA CHECK PROTOCOL        Meds Given   Name Dose Route   Albumin  25g IV               Adequacy / Fluid    Total Liters Process: 0   Net Fluid Removed: 2000 ml       Comments   Time Out Done:   (Time) Yes.1230   Admitting Diagnosis: Severe Sepsis    Consent obtained/signed: Informed Consent Verified: Yes (09/15/22 1240)   Machine / RO # Machine Number: [id number] (09/15/22 1240)   Primary Nurse Rpt Pre: Beatriz Gilmore RN   Primary Nurse Rpt Post: Beatriz Gilmore RN   Pt Education: Pt educated about treatment an medications    Care Plan: MWF Hd and UF as needed    Pts outpatient clinic: N/A     Tx Summary   Comments: On Arrival pt BP was in the 90s. This nurse notified Dr. Oumou Lambert. Order received for 25g of Albumin with HD and UF goal to 2-3 L.   1240-Treatment started with Albumin         1130- Uf decreased to support BP.   1240- Treatment complete. All possible blood returned. Caps applied. Dressing dates 9/13/22. Report given to primary RN. Pt left resting in bed with call light in reach. Sister at bedside.

## 2022-09-15 NOTE — PROGRESS NOTES
Problem: Non-Violent Restraints  Goal: Removal from restraints as soon as assessed to be safe  Outcome: Progressing Towards Goal  Goal: No harm/injury to patient while restraints in use  Outcome: Progressing Towards Goal  Goal: Patient's dignity will be maintained  Outcome: Progressing Towards Goal  Goal: Patient Interventions  Outcome: Progressing Towards Goal     Problem: Falls - Risk of  Goal: *Absence of Falls  Description: Document Mart Stefanie Fall Risk and appropriate interventions in the flowsheet. Outcome: Progressing Towards Goal  Note: Fall Risk Interventions:       Mentation Interventions: Bed/chair exit alarm, Door open when patient unattended, Room close to nurse's station, Reorient patient, More frequent rounding    Medication Interventions: Bed/chair exit alarm, Patient to call before getting OOB    Elimination Interventions: Call light in reach, Patient to call for help with toileting needs              Problem: Falls - Risk of  Goal: *Absence of Falls  Description: Document Mart Stefanie Fall Risk and appropriate interventions in the flowsheet. Outcome: Progressing Towards Goal  Note: Fall Risk Interventions:       Mentation Interventions: Bed/chair exit alarm, Door open when patient unattended, Room close to nurse's station, Reorient patient, More frequent rounding    Medication Interventions: Bed/chair exit alarm, Patient to call before getting OOB    Elimination Interventions: Call light in reach, Patient to call for help with toileting needs              Problem: Pressure Injury - Risk of  Goal: *Prevention of pressure injury  Description: Document David Scale and appropriate interventions in the flowsheet. Outcome: Progressing Towards Goal  Note: Pressure Injury Interventions:  Sensory Interventions: Assess changes in LOC, Float heels, Minimize linen layers, Turn and reposition approx.  every two hours (pillows and wedges if needed), Check visual cues for pain, Keep linens dry and wrinkle-free    Moisture Interventions: Absorbent underpads, Internal/External fecal devices, Minimize layers    Activity Interventions: PT/OT evaluation    Mobility Interventions: Turn and reposition approx.  every two hours(pillow and wedges)    Nutrition Interventions: Document food/fluid/supplement intake    Friction and Shear Interventions: Minimize layers                Problem: Patient Education: Go to Patient Education Activity  Goal: Patient/Family Education  Outcome: Progressing Towards Goal     Problem: Nutrition Deficit  Goal: *Optimize nutritional status  Outcome: Progressing Towards Goal

## 2022-09-15 NOTE — WOUND CARE
Wound Nurse Note    Patient seen in follow-up skin assessment; currently resting on a [place] ICU bed with Davis mattress. Patient turned and assessed with nursing assist.  Last Wound Nurse visit 9/12/22. Assessment;  Sacral area/buttocks - FMS in place with some seepage of loose stool around tubing; cleansed with barrier wipes. Small decompressed blister on right buttock approx 2.0cm x 0.2cm. Mild excoriation at sacral crease. Right hand - skin tear with red moist tissue approx 0.5cm x 0.5cm. Bilateral heels - intact; no redness. Floating in off-loading boots. Recommendations:  Turn q2h and float heels. Continue foam dressing to right hand as ordered; change MWF. Continue sacral foam dressing to right buttock; change MWF. FMS care per policy; cleanse around insertion prn leakage with barrier wipes. Would consider adding barrier cream to sacral crease redness prn incontinence care. Plan: Will continue to follow; reconsult as needed.     Yris Isabel  [facility] Wound Dept  [phone]

## 2022-09-15 NOTE — PROGRESS NOTES
Bedside and Verbal shift change report given to Deonte Newell RN (oncoming nurse) by Darnell Andrews RN (offgoing nurse). Report included the following information SBAR, Kardex, MAR, and Cardiac Rhythm Afib .     0700: Pt without complaints of pain. Alert to self. Hallucination continues. 1100: Diltiazem 10 mg IV push administered per orders. Pt's blood pressure dropped to 79/53. Pt asymptomatic. Informed Zan Geiger NP. 250 cc NS bolus ordered. 1200: Pt to get ultrafiltration. Dialysis nurse at bedside. Informed Dr. Ernesto Castaneda of pt's blood pressure dropping after diltiazem bolus. Kane-synephrine ordered. 1300: Holding diltiazem gtt. per Nguyễn Simon NP.   1500: Placed pt on room air. O2 sats 93%  1700: Pt without complaints. Instructed patient to call for assistance. Call light in reach. Bedside and Verbal shift change report given to Darnell Andrews RN (oncoming nurse) by Deonte eNwell RN (offgoing nurse). Report included the following information SBAR, Kardex, MAR, and Cardiac Rhythm Afib .

## 2022-09-15 NOTE — PROGRESS NOTES
Transition of care note:    RUR 17%,LOS 8 days,GLOS 3.8    Today:  Per IDR discussion:  Weaning precedex  4 liters oxygen  Afib   Tube feeds\    Following for disposition needs.     Pratik Quintanilla

## 2022-09-15 NOTE — PROGRESS NOTES
Reviewed chart and attempted to see patient. Patient currently receiving Hemodialysis. Will continue to attempt at a later time.

## 2022-09-15 NOTE — PROGRESS NOTES
1900: Bedside and Verbal shift change report given to Yusuf RN (oncoming nurse) by Yohan Lucio RN (offgoing nurse). Report included the following information SBAR, Kardex, Intake/Output, MAR, Recent Results, Cardiac Rhythm A. Fib, and Quality Measures. 2000: Shift assessment completed. See flowsheet    0000: Reassessment completed. See flowsheet    0400: Reassessment completed. See flowsheet    0700: Bedside and Verbal shift change report given to JOSE E Basilio (oncoming nurse) by Shalini Morataya RN (offgoing nurse). Report included the following information SBAR, Kardex, Intake/Output, MAR, Recent Results, Cardiac Rhythm A. Fib, and Quality Measures.

## 2022-09-16 ENCOUNTER — APPOINTMENT (OUTPATIENT)
Dept: GENERAL RADIOLOGY | Age: 62
DRG: 444 | End: 2022-09-16
Attending: INTERNAL MEDICINE
Payer: MEDICAID

## 2022-09-16 PROBLEM — R14.0 ABDOMINAL DISTENSION: Status: ACTIVE | Noted: 2022-09-16

## 2022-09-16 LAB
ANION GAP SERPL CALC-SCNC: 5 MMOL/L (ref 5–15)
ANION GAP SERPL CALC-SCNC: 7 MMOL/L (ref 5–15)
BASOPHILS # BLD: 0 K/UL (ref 0–0.1)
BASOPHILS NFR BLD: 0 % (ref 0–1)
BUN SERPL-MCNC: 32 MG/DL (ref 6–20)
BUN SERPL-MCNC: 42 MG/DL (ref 6–20)
BUN/CREAT SERPL: 12 (ref 12–20)
BUN/CREAT SERPL: 9 (ref 12–20)
CALCIUM SERPL-MCNC: 8.1 MG/DL (ref 8.5–10.1)
CALCIUM SERPL-MCNC: 8.2 MG/DL (ref 8.5–10.1)
CHLORIDE SERPL-SCNC: 100 MMOL/L (ref 97–108)
CHLORIDE SERPL-SCNC: 101 MMOL/L (ref 97–108)
CO2 SERPL-SCNC: 29 MMOL/L (ref 21–32)
CO2 SERPL-SCNC: 30 MMOL/L (ref 21–32)
CREAT SERPL-MCNC: 3.47 MG/DL (ref 0.7–1.3)
CREAT SERPL-MCNC: 3.61 MG/DL (ref 0.7–1.3)
DIFFERENTIAL METHOD BLD: ABNORMAL
EOSINOPHIL # BLD: 0.2 K/UL (ref 0–0.4)
EOSINOPHIL NFR BLD: 1 % (ref 0–7)
ERYTHROCYTE [DISTWIDTH] IN BLOOD BY AUTOMATED COUNT: 13.2 % (ref 11.5–14.5)
GLUCOSE BLD STRIP.AUTO-MCNC: 172 MG/DL (ref 65–117)
GLUCOSE BLD STRIP.AUTO-MCNC: 195 MG/DL (ref 65–117)
GLUCOSE BLD STRIP.AUTO-MCNC: 223 MG/DL (ref 65–117)
GLUCOSE BLD STRIP.AUTO-MCNC: 272 MG/DL (ref 65–117)
GLUCOSE SERPL-MCNC: 187 MG/DL (ref 65–100)
GLUCOSE SERPL-MCNC: 201 MG/DL (ref 65–100)
HCT VFR BLD AUTO: 19.4 % (ref 36.6–50.3)
HCT VFR BLD AUTO: 20.6 % (ref 36.6–50.3)
HGB BLD-MCNC: 6.6 G/DL (ref 12.1–17)
HGB BLD-MCNC: 7.2 G/DL (ref 12.1–17)
IMM GRANULOCYTES # BLD AUTO: 0.2 K/UL (ref 0–0.04)
IMM GRANULOCYTES NFR BLD AUTO: 1 % (ref 0–0.5)
LYMPHOCYTES # BLD: 1.4 K/UL (ref 0.8–3.5)
LYMPHOCYTES NFR BLD: 9 % (ref 12–49)
MAGNESIUM SERPL-MCNC: 2.2 MG/DL (ref 1.6–2.4)
MAGNESIUM SERPL-MCNC: 2.3 MG/DL (ref 1.6–2.4)
MCH RBC QN AUTO: 32.6 PG (ref 26–34)
MCHC RBC AUTO-ENTMCNC: 35 G/DL (ref 30–36.5)
MCV RBC AUTO: 93.2 FL (ref 80–99)
MONOCYTES # BLD: 1.5 K/UL (ref 0–1)
MONOCYTES NFR BLD: 10 % (ref 5–13)
NEUTS SEG # BLD: 11.6 K/UL (ref 1.8–8)
NEUTS SEG NFR BLD: 79 % (ref 32–75)
NRBC # BLD: 0 K/UL (ref 0–0.01)
NRBC BLD-RTO: 0 PER 100 WBC
PHOSPHATE SERPL-MCNC: 2.7 MG/DL (ref 2.6–4.7)
PLATELET # BLD AUTO: 195 K/UL (ref 150–400)
PMV BLD AUTO: 9.6 FL (ref 8.9–12.9)
POTASSIUM SERPL-SCNC: 3.4 MMOL/L (ref 3.5–5.1)
POTASSIUM SERPL-SCNC: 3.8 MMOL/L (ref 3.5–5.1)
RBC # BLD AUTO: 2.21 M/UL (ref 4.1–5.7)
SERVICE CMNT-IMP: ABNORMAL
SODIUM SERPL-SCNC: 135 MMOL/L (ref 136–145)
SODIUM SERPL-SCNC: 137 MMOL/L (ref 136–145)
WBC # BLD AUTO: 14.8 K/UL (ref 4.1–11.1)

## 2022-09-16 PROCEDURE — 80048 BASIC METABOLIC PNL TOTAL CA: CPT

## 2022-09-16 PROCEDURE — 74011636637 HC RX REV CODE- 636/637: Performed by: INTERNAL MEDICINE

## 2022-09-16 PROCEDURE — 65610000006 HC RM INTENSIVE CARE

## 2022-09-16 PROCEDURE — 94640 AIRWAY INHALATION TREATMENT: CPT

## 2022-09-16 PROCEDURE — 74011000250 HC RX REV CODE- 250: Performed by: INTERNAL MEDICINE

## 2022-09-16 PROCEDURE — 77030041175 HC BAG DIGNSHLD BARD -A

## 2022-09-16 PROCEDURE — 74011250636 HC RX REV CODE- 250/636: Performed by: NURSE PRACTITIONER

## 2022-09-16 PROCEDURE — 84100 ASSAY OF PHOSPHORUS: CPT

## 2022-09-16 PROCEDURE — 74011250636 HC RX REV CODE- 250/636: Performed by: STUDENT IN AN ORGANIZED HEALTH CARE EDUCATION/TRAINING PROGRAM

## 2022-09-16 PROCEDURE — 90935 HEMODIALYSIS ONE EVALUATION: CPT

## 2022-09-16 PROCEDURE — 74011250636 HC RX REV CODE- 250/636: Performed by: INTERNAL MEDICINE

## 2022-09-16 PROCEDURE — 82962 GLUCOSE BLOOD TEST: CPT

## 2022-09-16 PROCEDURE — 74011250637 HC RX REV CODE- 250/637: Performed by: INTERNAL MEDICINE

## 2022-09-16 PROCEDURE — 74011000258 HC RX REV CODE- 258: Performed by: STUDENT IN AN ORGANIZED HEALTH CARE EDUCATION/TRAINING PROGRAM

## 2022-09-16 PROCEDURE — 74011000258 HC RX REV CODE- 258: Performed by: NURSE PRACTITIONER

## 2022-09-16 PROCEDURE — 99232 SBSQ HOSP IP/OBS MODERATE 35: CPT | Performed by: CLINICAL NURSE SPECIALIST

## 2022-09-16 PROCEDURE — 83735 ASSAY OF MAGNESIUM: CPT

## 2022-09-16 PROCEDURE — 36415 COLL VENOUS BLD VENIPUNCTURE: CPT

## 2022-09-16 PROCEDURE — 85025 COMPLETE CBC W/AUTO DIFF WBC: CPT

## 2022-09-16 PROCEDURE — 85018 HEMOGLOBIN: CPT

## 2022-09-16 PROCEDURE — APPSS30 APP SPLIT SHARED TIME 16-30 MINUTES: Performed by: NURSE PRACTITIONER

## 2022-09-16 PROCEDURE — 99232 SBSQ HOSP IP/OBS MODERATE 35: CPT | Performed by: SPECIALIST

## 2022-09-16 PROCEDURE — 74018 RADEX ABDOMEN 1 VIEW: CPT

## 2022-09-16 PROCEDURE — 77010033678 HC OXYGEN DAILY

## 2022-09-16 RX ORDER — INSULIN GLARGINE 100 [IU]/ML
18 INJECTION, SOLUTION SUBCUTANEOUS DAILY
Status: DISCONTINUED | OUTPATIENT
Start: 2022-09-17 | End: 2022-09-18

## 2022-09-16 RX ORDER — QUETIAPINE FUMARATE 25 MG/1
50 TABLET, FILM COATED ORAL 3 TIMES DAILY
Status: DISCONTINUED | OUTPATIENT
Start: 2022-09-16 | End: 2022-09-21

## 2022-09-16 RX ADMIN — INSULIN GLARGINE 16 UNITS: 100 INJECTION, SOLUTION SUBCUTANEOUS at 08:41

## 2022-09-16 RX ADMIN — SODIUM CHLORIDE 2.5 MG/HR: 900 INJECTION, SOLUTION INTRAVENOUS at 09:27

## 2022-09-16 RX ADMIN — AMIODARONE HYDROCHLORIDE 1 MG/MIN: 50 INJECTION, SOLUTION INTRAVENOUS at 06:53

## 2022-09-16 RX ADMIN — IPRATROPIUM BROMIDE AND ALBUTEROL SULFATE 3 ML: .5; 2.5 SOLUTION RESPIRATORY (INHALATION) at 07:37

## 2022-09-16 RX ADMIN — IPRATROPIUM BROMIDE AND ALBUTEROL SULFATE 3 ML: .5; 2.5 SOLUTION RESPIRATORY (INHALATION) at 14:24

## 2022-09-16 RX ADMIN — AMIODARONE HYDROCHLORIDE 1 MG/MIN: 50 INJECTION, SOLUTION INTRAVENOUS at 20:55

## 2022-09-16 RX ADMIN — QUETIAPINE FUMARATE 50 MG: 25 TABLET ORAL at 15:18

## 2022-09-16 RX ADMIN — LEVOTHYROXINE SODIUM 100 MCG: 0.1 TABLET ORAL at 12:30

## 2022-09-16 RX ADMIN — QUETIAPINE FUMARATE 50 MG: 25 TABLET ORAL at 21:45

## 2022-09-16 RX ADMIN — FOLIC ACID 1 MG: 1 TABLET ORAL at 12:30

## 2022-09-16 RX ADMIN — IPRATROPIUM BROMIDE AND ALBUTEROL SULFATE 3 ML: .5; 2.5 SOLUTION RESPIRATORY (INHALATION) at 02:40

## 2022-09-16 RX ADMIN — IPRATROPIUM BROMIDE AND ALBUTEROL SULFATE 3 ML: .5; 2.5 SOLUTION RESPIRATORY (INHALATION) at 19:47

## 2022-09-16 RX ADMIN — POTASSIUM CHLORIDE 20 MEQ: 29.8 INJECTION, SOLUTION INTRAVENOUS at 00:59

## 2022-09-16 RX ADMIN — HEPARIN SODIUM 5000 UNITS: 5000 INJECTION INTRAVENOUS; SUBCUTANEOUS at 08:41

## 2022-09-16 RX ADMIN — INSULIN LISPRO 2 UNITS: 100 INJECTION, SOLUTION INTRAVENOUS; SUBCUTANEOUS at 05:40

## 2022-09-16 RX ADMIN — HEPARIN SODIUM 5000 UNITS: 5000 INJECTION INTRAVENOUS; SUBCUTANEOUS at 21:45

## 2022-09-16 RX ADMIN — CYANOCOBALAMIN TAB 500 MCG 500 MCG: 500 TAB at 12:30

## 2022-09-16 RX ADMIN — INSULIN LISPRO 3 UNITS: 100 INJECTION, SOLUTION INTRAVENOUS; SUBCUTANEOUS at 16:35

## 2022-09-16 RX ADMIN — DEXMEDETOMIDINE HYDROCHLORIDE 0.4 MCG/KG/HR: 400 INJECTION INTRAVENOUS at 06:24

## 2022-09-16 RX ADMIN — AMIODARONE HYDROCHLORIDE 1 MG/MIN: 50 INJECTION, SOLUTION INTRAVENOUS at 13:40

## 2022-09-16 NOTE — PROGRESS NOTES
Problem: Patient Education: Go to Patient Education Activity  Goal: Patient/Family Education  Outcome: Resolved/Met     Problem: Non-Violent Restraints  Goal: Removal from restraints as soon as assessed to be safe  Outcome: Resolved/Met  Goal: No harm/injury to patient while restraints in use  Outcome: Resolved/Met  Goal: Patient's dignity will be maintained  Outcome: Resolved/Met  Goal: Patient Interventions  Outcome: Resolved/Met

## 2022-09-16 NOTE — PROCEDURES
Hemodialysis / [phone]    Vitals Pre Post Assessment Pre Post   BP BP: (!) 167/90 (09/16/22 0824)   109/68 LOC AxOx4 AxOx3   HR Pulse (Heart Rate): (!) 104 (09/16/22 0824)   114 Lungs Coarse / intermittent wet cough / 4L/NC 4L/NC / intermittent wet cough    Resp Resp Rate: 23 (09/16/22 0824) 30 Cardiac Afib 100-120s Afib 100-120s   Temp Temp: 98.3 °F (36.8 °C) (09/16/22 0824) 97.5 Skin Warm/moist Warm/moist   Weight    Edema generalized generalized   Tele status ICU monitoring ICU monitoring Pain Pain Intensity 1: 0 (09/16/22 0400) 0     Orders   Duration: Start: 1492 End: 6761 Total: 3.5 hrs   Dialyzer: Dialyzer/Set Up Inspection: Lita Valdez (09/16/22 0824)   K Bath: Dialysate K (mEq/L): 4 (09/16/22 0824)   Ca Bath: Dialysate CA (mEq/L): 2.5 (09/16/22 0824)   Na: Dialysate NA (mEq/L): 138 (09/16/22 0824)   Bicarb: Dialysate HCO3 (mEq/L): 35 (09/16/22 0824)   Target Fluid Removal: Goal/Amount of Fluid to Remove (mL): 3000 mL (09/16/22 0824)     Access   Type & Location: DANELLE Frederick   Comments:                                     Right non-tunneled CVC, CDI. Each catheter limb disinfected for 60 seconds per limb with alcohol swabs.  Caps removed, dialysis CVC hub scrubbed with Alcohol prep pad for 60 seconds x 2 limbs, followed by a 5 second dry time per Hospital P&P.   +aspirated/+flushed       Labs   HBsAg (Antigen) / date: 9/8/22 negative                                              HBsAb (Antibody) / date: 9/8/22 susceptible   Source: Epic   Obtained/Reviewed  Critical Results Called HGB   Date Value Ref Range Status   09/16/2022 7.2 (L) 12.1 - 17.0 g/dL Final     Potassium   Date Value Ref Range Status   09/15/2022 3.2 (L) 3.5 - 5.1 mmol/L Final     Calcium   Date Value Ref Range Status   09/15/2022 8.3 (L) 8.5 - 10.1 MG/DL Final     BUN   Date Value Ref Range Status   09/15/2022 56 (H) 6 - 20 MG/DL Final     Creatinine   Date Value Ref Range Status   09/15/2022 4.69 (H) 0.70 - 1.30 MG/DL Final        Meds Given   Name Dose Route   N/a                 Adequacy / Fluid    Total Liters Process: 78.1 L   Net Fluid Removed: 2500 mL      Comments   Time Out Done:   (Time) 0820   Admitting Diagnosis: Sepsis   Consent obtained/signed: Informed Consent Verified: Yes (09/16/22 9095)   Machine / RO # Machine Number: [id number] (09/16/22 4829)   Primary Nurse Rpt Pre: ITZEL Damon RN   Primary Nurse Rpt Post: ITZEL Damon RN   Pt Education: Procedural / infection control   Care Plan: Continue current HD plan of care   Pts outpatient clinic: TBD     Tx Summary   Comments:               0361 HD treatment initiated per physicians order. 0826 Saline un-clamped at initiation,  mL infused to patient. Dr Cassia Pimentel notified - telephone order with read-back to increase treatment by 30 minutes and increase UF goal 1 kg to total 3 kg to get extra fluid off during treatment. ICU RN and Cardiology NP notified of potential change in respiratory status. Patient feels 'okay', states breathing feels good and vitals remain as prior to start of treatment, slightly tachypneic and Afib 110-120's. Will monitor closely. 0930 Primary RN at bedside to start Diltiazem drip. BP interval increased to q5mins for now. 4232 Dr Cassia Pimentel at bedside. [place] alarm sounding and unable to clear. Clotting noted in venous chamber. All possible blood returned to patient. 1055 HD treatment restarted with new circuit set up. 1215 HD treatment completed per physician order. All possible blood returned to patient. Each catheter limb disinfected for 60 seconds per limb with alcohol swabs. Dialysis CVC hub scrubbed with [employer] for 15 seconds, followed by a 5 second dry time per Hospital P&P.   +flushed/+saline-lock/+capped.

## 2022-09-16 NOTE — PROGRESS NOTES
[id number]  YOB: 1960          Assessment & Plan:     Severe oliguric LAVINIA on CVVH then transition to IHD from 9/12  Severe lactic acidosis  Ketoacidosis  Resp failure s/p intubation and extubation   DM2  Thrombocytopenia  Hypophosphatemia     Rec:  HD MWF schedule   He is receiving HD now   Plan for next HD Monday  Avoid nephrotoxins   ICU support       Subjective:   CC: LAVINIA  HPI: Seen, feels better , edema + NGT  ROS: unable to obtain due to pt condition  Current Facility-Administered Medications   Medication Dose Route Frequency    dilTIAZem (CARDIZEM) 100 mg in 0.9% sodium chloride (MBP/ADV) 100 mL infusion  0-15 mg/hr IntraVENous TITRATE    QUEtiapine (SEROquel) tablet 50 mg  50 mg Oral TID    [START ON 9/17/2022] insulin glargine (LANTUS) injection 18 Units  18 Units SubCUTAneous DAILY    albumin human 25% (BUMINATE) solution 25 g  25 g IntraVENous DIALYSIS PRN    albuterol-ipratropium (DUO-NEB) 2.5 MG-0.5 MG/3 ML  3 mL Nebulization Q6H RT    amiodarone (CORDARONE) 375 mg in dextrose 5% 250 mL infusion  1 mg/min IntraVENous TITRATE    albuterol-ipratropium (DUO-NEB) 2.5 MG-0.5 MG/3 ML  3 mL Nebulization Q4H PRN    polyethylene glycol (MIRALAX) packet 17 g  17 g Oral DAILY    sennosides (SENOKOT) 8.8 mg/5 mL syrup 8.8 mg  5 mL Oral DAILY    hydrALAZINE (APRESOLINE) 20 mg/mL injection 10 mg  10 mg IntraVENous Q6H PRN    acetaminophen (TYLENOL) tablet 650 mg  650 mg Oral Q6H PRN    OLANZapine (ZyPREXA) tablet 5 mg  5 mg Per NG tube BID PRN    folic acid (FOLVITE) tablet 1 mg  1 mg Oral DAILY    cyanocobalamin (VITAMIN B12) tablet 500 mcg  500 mcg Per NG tube DAILY    dexmedeTOMidine in 0.9 % NaCl (PRECEDEX) 400 mcg/100 mL (4 mcg/mL) infusion soln  0.1-1.5 mcg/kg/hr IntraVENous TITRATE    dextrose 10% infusion 0-250 mL  0-250 mL IntraVENous PRN    insulin lispro (HUMALOG) injection   SubCUTAneous Q6H    levothyroxine (SYNTHROID) tablet 100 mcg  100 mcg Oral ACB    glucose chewable tablet 16 g  4 Tablet Oral PRN    glucagon (GLUCAGEN) injection 1 mg  1 mg IntraMUSCular PRN    heparin (porcine) injection 5,000 Units  5,000 Units SubCUTAneous Q12H          Objective:     Vitals:  Blood pressure (!) 117/102, pulse (!) 115, temperature 98.3 °F (36.8 °C), temperature source Oral, resp. rate 22, height 5' 6\" (1.676 m), weight 87.8 kg (193 lb 9 oz), SpO2 100 %. Temp (24hrs), Av.3 °F (36.8 °C), Min:97.7 °F (36.5 °C), Max:98.6 °F (37 °C)      Intake and Output:   07 - 1900  In: 230.4 [I.V.:14.4]  Out: -    1901 -  0700  In: 2117.3 [I.V.:1437.3]  Out: 500 [Drains:500]    Physical Exam:               GENERAL ASSESSMENT: NAD  HEENT: peerla   CHEST: diminished BS +  HEART: S1S2  ABDOMEN: Soft,NT  : +Vega:   EXTREMITY: + EDEMA        ECG/rhythm:    Data Review      No results for input(s): TNIPOC in the last 72 hours. No lab exists for component: ITNL     No results for input(s): CPK, CKMB, TROIQ in the last 72 hours. Recent Labs     22  0907 22  0314 09/15/22  2138 09/15/22  0919 09/15/22  0004 22     --  136 139  --  140   K 3.4*  --  3.2* 3.3*  --  3.4*     --  100 102  --  104   CO2 29  --  28 30  --  31   BUN 42*  --  56* 48*  --  36*   CREA 3.61*  --  4.69* 3.93*  --  2.91*   *  --  183* 207*  --  155*   PHOS  --   --  3.8 3.9  --  2.2*   MG 2.2  --  2.3 2.4  --  2.3   CA 8.1*  --  8.3* 7.5*  --  7.7*   WBC  --  14.8*  --   --  8.1  --    HGB  --  7.2*  --   --  7.1*  --    HCT  --  20.6*  --   --  21.3*  --    PLT  --  195  --   --  135*  --       No results for input(s): INR, PTP, APTT, INREXT, INREXT in the last 72 hours. Needs: urine analysis, urine sodium, protein and creatinine  No results found for: Lovely Koyanagi        : Tyrone Benitez MD  2022        [facility]:  [email or web address]. [email or web address]  [email or web address]    [place] office:  [address], [address] [place] [address] [address]  [place], [address] [facility]  Phone: [phone]  Fax :     [phone]    [place] office:  [address] [facility]  [place], [address] [place] [address]  Phone - [phone]  Fax - [phone]

## 2022-09-16 NOTE — PROGRESS NOTES
[facility]  [address] [facility], [address] [place] [address]  [phone]      Hospitalist  Progress Note      NAME:         Deni Kearney   :        1960  MRM:        [id number]    Date of service: 2022      Chief complaint: Confusion, delirious    Interval HPI: Patient remains confused although able to converse saying he wants to go home. I have discussed with his nurse for collaborative Hx. Still has an NG tube. No fever or chills. Abdomen distended. Objective:    Vital Signs:    Visit Vitals  BP (!) 158/79   Pulse (!) 106   Temp 98.4 °F (36.9 °C)   Resp 24   Ht 5' 6\" (1.676 m)   Wt 87.8 kg (193 lb 9 oz)   SpO2 96%   BMI 31.24 kg/m²        Intake/Output Summary (Last 24 hours) at 2022 0711  Last data filed at 2022 0600  Gross per 24 hour   Intake 828.08 ml   Output 450 ml   Net 378.08 ml          Physical Examination:    General:   Weak and ill looking but not in acute distress    Eyes:   pink conjunctivae, PERRLA with no discharge. ENT:   no ottorrhea or rhinorrhea with dry mucous membranes. NG tube in place  Neck: no masses, thyroid non-tender and trachea central.  Pulm:  no accessory muscle use, decreased breath sounds without crackles or wheezes  Card:  no JVD or murmurs, has atrial fibrillation, without thrills, bruits. No peripheral edema  Abd:  firm, distended, decreased bowel sounds   Musc:  No cyanosis, clubbing, atrophy or deformities. Soft restraints both wrists  Skin:  No rashes, bruising or ulcers. Neuro: Awake and alert but confused.  Non focal exam    Psych:  Has no insight to his illness     Current Facility-Administered Medications   Medication Dose Route Frequency    dilTIAZem (CARDIZEM) 100 mg in 0.9% sodium chloride (MBP/ADV) 100 mL infusion  0-15 mg/hr IntraVENous TITRATE    albumin human 25% (BUMINATE) solution 25 g  25 g IntraVENous DIALYSIS PRN    PHENYLephrine (SINAN-SYNEPHRINE) 30 mg in 0.9% sodium chloride 250 mL infusion   mcg/min IntraVENous TITRATE    albuterol-ipratropium (DUO-NEB) 2.5 MG-0.5 MG/3 ML  3 mL Nebulization Q6H RT    amiodarone (CORDARONE) 375 mg in dextrose 5% 250 mL infusion  1 mg/min IntraVENous TITRATE    albuterol-ipratropium (DUO-NEB) 2.5 MG-0.5 MG/3 ML  3 mL Nebulization Q4H PRN    polyethylene glycol (MIRALAX) packet 17 g  17 g Oral DAILY    sennosides (SENOKOT) 8.8 mg/5 mL syrup 8.8 mg  5 mL Oral DAILY    hydrALAZINE (APRESOLINE) 20 mg/mL injection 10 mg  10 mg IntraVENous Q6H PRN    acetaminophen (TYLENOL) tablet 650 mg  650 mg Oral Q6H PRN    OLANZapine (ZyPREXA) tablet 5 mg  5 mg Per NG tube BID PRN    QUEtiapine (SEROquel) tablet 50 mg  50 mg Oral BID    insulin glargine (LANTUS) injection 16 Units  16 Units SubCUTAneous DAILY    folic acid (FOLVITE) tablet 1 mg  1 mg Oral DAILY    cyanocobalamin (VITAMIN B12) tablet 500 mcg  500 mcg Per NG tube DAILY    dexmedeTOMidine in 0.9 % NaCl (PRECEDEX) 400 mcg/100 mL (4 mcg/mL) infusion soln  0.1-1.5 mcg/kg/hr IntraVENous TITRATE    dextrose 10% infusion 0-250 mL  0-250 mL IntraVENous PRN    insulin lispro (HUMALOG) injection   SubCUTAneous Q6H    levothyroxine (SYNTHROID) tablet 100 mcg  100 mcg Oral ACB    glucose chewable tablet 16 g  4 Tablet Oral PRN    glucagon (GLUCAGEN) injection 1 mg  1 mg IntraMUSCular PRN    heparin (porcine) injection 5,000 Units  5,000 Units SubCUTAneous Q12H        Laboratory data and review:    Recent Labs     09/16/22  0314 09/15/22  0004   WBC 14.8* 8.1   HGB 7.2* 7.1*   HCT 20.6* 21.3*    135*       Recent Labs     09/15/22  2138 09/15/22  0919 09/14/22  2047    139 140   K 3.2* 3.3* 3.4*    102 104   CO2 28 30 31   * 207* 155*   BUN 56* 48* 36*   CREA 4.69* 3.93* 2.91*   CA 8.3* 7.5* 7.7*   MG 2.3 2.4 2.3   PHOS 3.8 3.9 2.2*       No components found for: [place]    Diagnostics: Imaging studies have been reviewed    Telemetry reviewed by me:   normal sinus rhythm    Assessment and Plan:    LAVINIA (acute kidney injury) ([facility] (9/6/2022)POA: has CKD stage III. Had severe acidemia which has now resolved. Suspected to have been triggered by volume depletion, atypical DKA vs Metformin toxicity. Seen by nephrology and has been on CVVH and now transitioned to HD on MWFs. Continue to monitor renal function. Ambulate as soon as feasible    Delirium (9/14/2022) POA: noted post extubation. Better. CT scan head neg for acute changes. Continue Zyprexa, Seroquel, Precedex gtt PRN. DC restraints as soon as feasible      PAF (paroxysmal atrial fibrillation) ([facility] [address] [facility] (9/14/2022) POA: has episodes of RVR and now with variable ventricular response. Recent Echo showed a normal LV function with EF 65-70%. Continue Amiodarone gtt. Cardiology following     HTN (hypertension), benign (9/6/2022) POA: BP variable. Continue IV Hydralazine PRN. DM (diabetes mellitus), type 2 ([facility] [address] [facility] (9/6/2022) POA: A1c 6.6. BG stable. Continue Lantus, SSi per protocol. Tube feeds as tolerated. Thrombocytopenia ([facility] [address] [facility] (9/14/2022) POA: unclear etiology but improving. POOJA neg. Peripheral smear unremarkable. Monitor    Anemia - progressively worsening. Has low vitamin H84 and folic acid. Being repleted. Monitor     Acute respiratory failure with hypoxia POA: intubated for airway protection due to severe acidosis. Extubated 9/11. Continue supplemental oxygen to keep sats > 90%    Hyperlipidemia (9/6/2022) POA: Pravastatin on hold    Abdominal distension - abd xray done 9/13 showed suspected ileus. Repeat xray.  Continue NG tube for now                 Care Plan discussed with: Patient, Care Manager, Nursing Staff, and Consultant/Specialist    Discussed:  Care Plan    Prophylaxis:  Hep SQ    Anticipated Disposition:  SNF/LTC           ___________________________________________________    Attending Physician:   Jordin Guthrie MD

## 2022-09-16 NOTE — PROGRESS NOTES
..Bedside and Verbal shift change report given to eMle (oncoming nurse) by Ragini Castaneda (offgoing nurse). Report included the following information SBAR, Kardex, Intake/Output, MAR, Cardiac Rhythm Afib, and Quality Measures. 1315  Precedex off. Pt calm and resting in bed. 1440   Speech therapy seeing pt. NG tube removed.

## 2022-09-16 NOTE — PROGRESS NOTES
Transition of care note:    RUR 16%(moderate risk for future readmission)    LOS 9 days,GLOS 3.8    I met with pt earlier this shift who was conversing with me and chiefly oriented except to situation.     As per IDR discussion:  Weaning precedex as tolerated  Amiodarone gtt  Diltiazem added    Future disposition:  Likely SNF placement    Corita Ray

## 2022-09-16 NOTE — PROGRESS NOTES
CARDIOLOGY PROGRESS NOTE    [address] [facility] [address], [place], [address] [facility]  Phone [phone]; Fax [phone]        2022 8:45 AM     Patient:   Adelia Hawk  :  1960   MRN:  [id number]       Assessment/Plan  1) Afib with RVR:  - Had transient Afib with RVR 22 (spont converted to NSR)   - On 9/15/22 - back into afib with RVR  - New onset, suspect related to recent illness, no history of arrhythmia in the past although pt is on low dose atenolol for HTN. No response to amio bolus, currently on drip w/ rates in 120s. - BP dropped w/ dilt bolus yesterday but improved sig today, try dilt gtt - titrate slowly   - A short course of amiodarone for 30 days may be reasonable when he is back in sinus as pt recovers from illness. - Will need to consider [address] prior to discharge if remains in afib, however he is higher risk of bleeding (anemia, LAVINIA)     2) Elevated troponin: Likely type 2 in s/o supply demand mismatch in setting of afib RVR. 3) HTN: Hold home atenolol, chlorthalidone, clonidine, enalapril. 4) Delirium: Wean precedex. CT head wnl.     5) Hypothyroid: TSH low at 0.14 however pt acutely ill, continue current home dose of synthroid 100mcg for now. 6) AHRF: Supplemental O2.       CARDIOLOGY ATTENDING  Patient personally seen and examined. All the elements of history and examination were personally performed. Assessment and plan was discussed and agree. Patient admitted with DKA, LAVINIA, respiratory failure and shock. Called to see patient when back in to Afib with RVR 9/15/22  Patient confused but awake. NAD, hrt irreg irreg, no murmur. Lungs clear ant. No sig edema     Newly discovered Afib with RVR  - HR remains fast past day ---> Cont Amio gtt. Rechallenge with IV Cardizem   ----> will transition cardizem + amiodarone to PO as HR comes down   - Will need to consider [address] prior to DC if he remains in afib.   [id number] score is 2 (almost 3 at age 70). Of note, he is at higher risk of bleeding (Anemia, LAVINIA). Ron Daugherty MD, [address] [facility]    Total time (preparing to see the patient, reviewing data, seeing patient face to face, taking history, examining patient, counseling patient, documenting information, coordinating care, etc) was  20  min. NP spent 10 minutes reviewing chart, labs, diagnostics and coordination of care . NP spent 8 minutes with pt/family in examination and care plan review. Subjective:   54yo M w/ PMHx of non-insulin dependent diabetes on metformin, melanoma s/p resection, HTN, HLD who presented to the ED 9/6 for evaluation of dizziness. In the ED pt was found to be in acute renal failure w/ elevated lactic acid levels and AGMA - pt was euglycemic but sx thought to possibly be 2/2 atypical DKA and metformin toxicity. Upon arrival to the ICU pt was in respiratory distress and was subsequently intubated. He was extubated on 9/11 and his acidosis has improved however pt has remained altered requiring precedex drip. On the afternoon of 9/9 pt was found to have new onset afib w/ RVR that resolved w/o any medical intervention. Last night he had recurrence of afib w/ RVR that did not respond to an amiodarone bolus and he was subsequently started on an amio drip.     Medications:     Current Facility-Administered Medications   Medication Dose Route Frequency    dilTIAZem (CARDIZEM) 100 mg in 0.9% sodium chloride (MBP/ADV) 100 mL infusion  0-15 mg/hr IntraVENous TITRATE    albumin human 25% (BUMINATE) solution 25 g  25 g IntraVENous DIALYSIS PRN    albuterol-ipratropium (DUO-NEB) 2.5 MG-0.5 MG/3 ML  3 mL Nebulization Q6H RT    amiodarone (CORDARONE) 375 mg in dextrose 5% 250 mL infusion  1 mg/min IntraVENous TITRATE    albuterol-ipratropium (DUO-NEB) 2.5 MG-0.5 MG/3 ML  3 mL Nebulization Q4H PRN    polyethylene glycol (MIRALAX) packet 17 g  17 g Oral DAILY    sennosides (SENOKOT) 8.8 mg/5 mL syrup 8.8 mg  5 mL Oral DAILY    hydrALAZINE (APRESOLINE) 20 mg/mL injection 10 mg  10 mg IntraVENous Q6H PRN    acetaminophen (TYLENOL) tablet 650 mg  650 mg Oral Q6H PRN    OLANZapine (ZyPREXA) tablet 5 mg  5 mg Per NG tube BID PRN    QUEtiapine (SEROquel) tablet 50 mg  50 mg Oral BID    insulin glargine (LANTUS) injection 16 Units  16 Units SubCUTAneous DAILY    folic acid (FOLVITE) tablet 1 mg  1 mg Oral DAILY    cyanocobalamin (VITAMIN B12) tablet 500 mcg  500 mcg Per NG tube DAILY    dexmedeTOMidine in 0.9 % NaCl (PRECEDEX) 400 mcg/100 mL (4 mcg/mL) infusion soln  0.1-1.5 mcg/kg/hr IntraVENous TITRATE    dextrose 10% infusion 0-250 mL  0-250 mL IntraVENous PRN    insulin lispro (HUMALOG) injection   SubCUTAneous Q6H    levothyroxine (SYNTHROID) tablet 100 mcg  100 mcg Oral ACB    glucose chewable tablet 16 g  4 Tablet Oral PRN    glucagon (GLUCAGEN) injection 1 mg  1 mg IntraMUSCular PRN    heparin (porcine) injection 5,000 Units  5,000 Units SubCUTAneous Q12H         Review of Systems:     Review of Symptoms:  Constitutional: Negative for fever   HEENT: Negative for vision changes. Respiratory: Negative for productive cough  Cardiovascular: Negative for syncope    Gastrointestinal: Negative for abdominal pain, melena  Genitourinary: Negative for dysuria  Skin: Negative for rash  Heme: No problems bleeding.   Neuro - no focal weakness      Physical Exam:     Vitals:    09/16/22 0630 09/16/22 0715 09/16/22 0737 09/16/22 0800   BP: (!) 158/79 (!) 165/77 (!) 151/89    Pulse: (!) 106 (!) 103 (!) 110    Resp: 24 21 25    Temp:   98.5 °F (36.9 °C) 98.3 °F (36.8 °C)   TempSrc:       SpO2: 96% 95% 97%    Weight:       Height:          Last 3 Recorded Weights in this Encounter    09/09/22 1431 09/12/22 1541 09/15/22 0320   Weight: 86 kg (189 lb 9.5 oz) 86 kg (189 lb 9.5 oz) 87.8 kg (193 lb 9 oz)       Intake and Output:    Intake/Output Summary (Last 24 hours) at 9/16/2022 0845  Last data filed at 9/16/2022 0600  Gross per 24 hour Intake 828.08 ml   Output 450 ml   Net 378.08 ml         Gen: Well-developed, alert but impulsive, slightly confused to situation  Neck: Supple,No JVD  Resp: + accessory muscle use, coarse bilat  Card: Irregular Rate,Rythm,Normal S1, S2, No murmurs, rubs or gallop.     Abd:  Soft, non-tender, +distended, BS+   MSK: No cyanosis  Skin: No rashes    Neuro: moving all four extremities , follows commands appropriately  Psych:  Poor insight, oriented to person, place only, alert, Nml Affect  LE: No edema         Labs:     Recent Results (from the past 24 hour(s))   PHOSPHORUS    Collection Time: 09/15/22  9:19 AM   Result Value Ref Range    Phosphorus 3.9 2.6 - 4.7 MG/DL   METABOLIC PANEL, BASIC    Collection Time: 09/15/22  9:19 AM   Result Value Ref Range    Sodium 139 136 - 145 mmol/L    Potassium 3.3 (L) 3.5 - 5.1 mmol/L    Chloride 102 97 - 108 mmol/L    CO2 30 21 - 32 mmol/L    Anion gap 7 5 - 15 mmol/L    Glucose 207 (H) 65 - 100 mg/dL    BUN 48 (H) 6 - 20 MG/DL    Creatinine 3.93 (H) 0.70 - 1.30 MG/DL    BUN/Creatinine ratio 12 12 - 20      GFR est AA 19 (L) >60 ml/min/1.73m2    GFR est non-AA 16 (L) >60 ml/min/1.73m2    Calcium 7.5 (L) 8.5 - 10.1 MG/DL   MAGNESIUM    Collection Time: 09/15/22  9:19 AM   Result Value Ref Range    Magnesium 2.4 1.6 - 2.4 mg/dL   TROPONIN-HIGH SENSITIVITY    Collection Time: 09/15/22  9:20 AM   Result Value Ref Range    Troponin-High Sensitivity 328 (HH) 0 - 76 ng/L   GLUCOSE, POC    Collection Time: 09/15/22 12:24 PM   Result Value Ref Range    Glucose (POC) 234 (H) 65 - 117 mg/dL    Performed by Will Phillips, POC    Collection Time: 09/15/22  5:58 PM   Result Value Ref Range    Glucose (POC) 197 (H) 65 - 117 mg/dL    Performed by Carrie Trotter    PHOSPHORUS    Collection Time: 09/15/22  9:38 PM   Result Value Ref Range    Phosphorus 3.8 2.6 - 4.7 MG/DL   METABOLIC PANEL, BASIC    Collection Time: 09/15/22  9:38 PM   Result Value Ref Range    Sodium 136 136 - 145 mmol/L    Potassium 3.2 (L) 3.5 - 5.1 mmol/L    Chloride 100 97 - 108 mmol/L    CO2 28 21 - 32 mmol/L    Anion gap 8 5 - 15 mmol/L    Glucose 183 (H) 65 - 100 mg/dL    BUN 56 (H) 6 - 20 MG/DL    Creatinine 4.69 (H) 0.70 - 1.30 MG/DL    BUN/Creatinine ratio 12 12 - 20      GFR est AA 15 (L) >60 ml/min/1.73m2    GFR est non-AA 13 (L) >60 ml/min/1.73m2    Calcium 8.3 (L) 8.5 - 10.1 MG/DL   MAGNESIUM    Collection Time: 09/15/22  9:38 PM   Result Value Ref Range    Magnesium 2.3 1.6 - 2.4 mg/dL   GLUCOSE, POC    Collection Time: 09/15/22 10:59 PM   Result Value Ref Range    Glucose (POC) 198 (H) 65 - 117 mg/dL    Performed by Ericka Leung    CBC WITH AUTOMATED DIFF    Collection Time: 09/16/22  3:14 AM   Result Value Ref Range    WBC 14.8 (H) 4.1 - 11.1 K/uL    RBC 2.21 (L) 4.10 - 5.70 M/uL    HGB 7.2 (L) 12.1 - 17.0 g/dL    HCT 20.6 (L) 36.6 - 50.3 %    MCV 93.2 80.0 - 99.0 FL    MCH 32.6 26.0 - 34.0 PG    MCHC 35.0 30.0 - 36.5 g/dL    RDW 13.2 11.5 - 14.5 %    PLATELET 477 857 - 744 K/uL    MPV 9.6 8.9 - 12.9 FL    NRBC 0.0 0  WBC    ABSOLUTE NRBC 0.00 0.00 - 0.01 K/uL    NEUTROPHILS 79 (H) 32 - 75 %    LYMPHOCYTES 9 (L) 12 - 49 %    MONOCYTES 10 5 - 13 %    EOSINOPHILS 1 0 - 7 %    BASOPHILS 0 0 - 1 %    IMMATURE GRANULOCYTES 1 (H) 0.0 - 0.5 %    ABS. NEUTROPHILS 11.6 (H) 1.8 - 8.0 K/UL    ABS. LYMPHOCYTES 1.4 0.8 - 3.5 K/UL    ABS. MONOCYTES 1.5 (H) 0.0 - 1.0 K/UL    ABS. EOSINOPHILS 0.2 0.0 - 0.4 K/UL    ABS. BASOPHILS 0.0 0.0 - 0.1 K/UL    ABS. IMM.  GRANS. 0.2 (H) 0.00 - 0.04 K/UL    DF AUTOMATED     GLUCOSE, POC    Collection Time: 09/16/22  5:35 AM   Result Value Ref Range    Glucose (POC) 223 (H) 65 - 117 mg/dL    Performed by Ericka Leung              Cardiology Data:     Cardiac Evaluation Includes:    EKG: A-fib in 120's     ECHO: 09/06/22    ECHO ADULT COMPLETE 09/09/2022 9/9/2022    Interpretation Summary  Formatting of this result is different from the original.      Left Ventricle: Hyperdynamic left ventricular systolic function with a visually estimated EF of 65 - 70%. Left ventricle size is normal. Normal wall thickness. Normal wall motion. Normal diastolic function. Right Ventricle: Not well visualized. Right ventricle size is normal. Normal wall thickness. Aortic Valve: Mild sclerosis of the aortic valve cusp.     Signed by: Juan Pacheco DO on 9/9/2022  3:19 PM      Signed By: Jose David Mack NP     September 16, 2022

## 2022-09-16 NOTE — DIABETES MGMT
[address] [facility]    CLINICAL NURSE SPECIALIST CONSULT     Initial Presentation   Adelia Hawk is a 57 y.o. male admitted 9/6/22 from the ER after experiencing dizziness. Started taking gabapentin X3 doses prior to symptom. Hypothermic. Tachypneic. Hypertensive. O2 sats 100%. ER exam:    LAB: WBC 21. K 7.6. /AG 46. Creatinine 12.94. GFR 4. Alcohol <10. Lactic acid 17.1. CXR: Diffuse mild interstitial opacities may represent pulmonary edema. CT: No acute pathology    HX:   Past Medical History:   Diagnosis Date    Hypercholesteremia     Hypertension     Melanoma ([facility] [address]     skin    Thyroid activity decreased       INITIAL DX:   Severe sepsis ([facility] [address] [A41.9, R65.20]     Current Treatment     TX: Precedex, amio and dilt infusions. Nebs. Heparin. Synthroid. TF. Seroquel. Dialysis. Consulted by Provider for advanced diabetes nursing assessment and care for:   [] Transitioning off Tangela Silvius   [x] Inpatient management strategy  [] Home management assessment  [] Survival skill education    Hospital Course   Clinical progress has been complicated by need for ICU level of care. 9/6/22 Nephrology: Severe anuric óscar => CRRT  9/7/22 EEG: Diffuse low voltage delta in keeping with an encephalopathy which may have contributions from toxic, metabolic, diffuse structural, and or pharmacologic effects. No epileptiform abnormalities are seen. No suggestion of subclinical ictus. 9/8/22 Intubated & sedated on Propofol & Precedex. C vent support FiO2 40%/Peep 5. Levo for BP management. Fentanyl for pain. CRRT.   9/16/22 Alert; CT Head neg. Hospitalist exam: Pulm:  no accessory muscle use, decreased breath sounds without crackles or wheezes. Transient episodes of Afib with RVR since 9/9/22. On Tfs (nepro @56cc/hr. Now on HD (MWF).      Diabetes History   Type 2 diabetes on metformin therapy  A1c 6.6%    Diabetes-related Medical History  Microvascular disease  Nephropathy    Diabetes Medication History  Key Antihyperglycemic Medications               metFORMIN (GLUMETZA ER) 500 mg TG24 24 hour tablet (Taking) Take 1,000 mg by mouth two (2) times a day. Diabetes self-management practices: Deferred  Overall evaluation:    [x] Achieving A1c target with drug therapy & self-care practices    Subjective   \"I want to go home. \"     Objective   Physical exam  General Normal weight male who is ill-appearing  Neuro  Alert & oriented to person & place  Vital Signs Visit Vitals  BP (!) 145/71   Pulse (!) 117   Temp 98.3 °F (36.8 °C) (Oral)   Resp 24   Ht 5' 6\" (1.676 m)   Wt 87.8 kg (193 lb 9 oz)   SpO2 99%   BMI 31.24 kg/m²     Laboratory  Recent Labs     09/16/22  0907 09/16/22  0314 09/15/22  2138 09/15/22  0919 09/15/22  0004   *  --  183* 207*  --    AGAP 7  --  8 7  --    WBC  --  14.8*  --   --  8.1   CREA 3.61*  --  4.69* 3.93*  --    GFRNA 17*  --  13* 16*  --      Factors impacting BG management  Factor Dose Comments   Nutrition:  TF   Nepro @ 56cc/hr  (211 carbs/D)    Drugs:  Vasopressor load  Seroquel   Levo infusion   Affects insulin delivery   Other:   Kidney function  Liver function   LAVINIA  Normalized   [facility] transitioned to HD 9/12/22     Blood glucose pattern      Significant diabetes-related events over the past 24-72 hours  9/6/22 Admission . A1c 6.6%  9/7-8/22 Glucostabilizer used to control Bgs => transitioned off with Lantus insulin 10 units  9/9/22 Lantus insulin increased  9/12/22 Lantus insulin increased after starting steroids on 9/11/22 9/13/22 Last dose of steroid    Assessment and Plan   Nursing Diagnosis Risk for unstable blood glucose pattern   Nursing Intervention Domain 7364 Decision-making Support   Nursing Interventions Examined current inpatient diabetes/blood glucose control   Explored factors facilitating and impeding inpatient management     Evaluation   This unfortunate 57year old  male was admitted in sepsis.  Bgs easily controlled on GS before transition off to daily Lantus insulin. Bgs affected by both steroids and Tfs. Now that steroids are not in play, need to provide enough insulin to override TF. Will likely need 20 units of basal insulin daily to get to target BG. Recommendations     [x] Use of Subcutaneous Insulin Order set (1550)  Insulin Dosing Specific recommendation   Basal                                      (Based on weight, BMI & GFR)  Lantus insulin 20 units D   Corrective                                       (Useful in adjusting insulin dosing) [] Normal sensitivity  [x] HIGH sensitivity  [] Insulin-resistant sensitivity      Billing Code(s)   [] 07227 IP subsequent hospital care - 35 minutes [] 00775 Prolonged Services - 65 minutes [] 40434 Prolonged Services - 110 minutes  [x] 65896 IP subsequent hospital care - 25 minutes [] 18483 Prolonged Services - 55 minutes [] 84972 Prolonged Services - 100 minutes  [] 81593 IP subsequent hospital care - 15 minutes [] 74503 Prolonged Services - 45 minutes [] 31552 Prolonged Services - 90 minutes    Before making these care recommendations, I personally reviewed the hospitalization record, including notes, laboratory & diagnostic data and current medications, and examined the patient at the bedside (circumstances permitting) before making care recommendations. More than fifty (50) percent of the time was spent in patient counseling and/or care coordination.   Total minutes: [address], CNS  Diabetes Clinical Nurse Specialist  [facility]  Access via [address] [place] [address]

## 2022-09-16 NOTE — PROGRESS NOTES
Problem: Dysphagia (Adult)  Goal: *Acute Goals and Plan of Care (Insert Text)  Description: Swallowing goals initiated 9-16-22:  1) tolerate clear liquids without s/s aspiration by 9-19-22  2) if increased concerns about aspiration, may need MBS vs FEES. 3) eval for solid diet once family brings in dentures  Outcome: Progressing Towards Goal   SPEECH [address] EVALUATION  Patient: Hanna Oviedo (27 y.o. male)  Date: 9/16/2022  Primary Diagnosis: Severe sepsis ([facility] [address] [A41.9, R65.20]       Precautions: aspiration       ASSESSMENT :  Based on the objective data described below, the bedside swallowing evaluation was complicated by frequent  wet, congested coughing before and sometimes during evaluation. Did find NGT curled in pharynx and RN removed it. After removal his 02 sats improved and  his coughing slightly lessened. Aspiration risks include 5 days on intubation with 1 partial self extubation and reintubation. Also has post prandial Will need to follow closely for any increased s/s aspiration. Admitted 9-6-22 with dizziness, photosensitivity,  SOB, rhonchi, AMs,  dx: sepsis, ARF, intubated that day due to WOB. 9-10-: partially self extubated and was reintubated, anesthesiologist did note a small amount of gastric contents in oral-pharynx, but not airwasy. 9-11: extubated. Delirious. Still has NGT. PMH:  back melanoma, HTN, XOL. Patient will benefit from skilled intervention to address the above impairments. Patients rehabilitation potential is considered to be Fair     PLAN :  Recommendations and Planned Interventions:  Start clear liquid diet. 1 sip at a time. Upright for all PO. Sister to bring in teeth, then will -reveal for solids. If increased concerns about aspiration, will need MBS vs FEES. Frequency/Duration: Patient will be followed by speech-language pathology 5 times a week to address goals. Discharge Recommendations:  To Be Determined SUBJECTIVE:   Patient stated I don't like water. I like sweet tea or Dr. Douglas Jasmine. OBJECTIVE:     Past Medical History:   Diagnosis Date    Hypercholesteremia     Hypertension     Melanoma ([address]     skin    Thyroid activity decreased      Past Surgical History:   Procedure Laterality Date    HX ORTHOPAEDIC      IR INSERT NON TUNL CVC OVER 5 YRS  9/6/2022     Prior Level of Function/Home Situation: lives with mother and son  Home Situation  Patient Expects to be Discharged to[de-identified] Unable to determine at this time  Diet prior to admission: regular, thins  Current Diet:  NPO with NGT   Cognitive and Communication Status:  Neurologic State: Eyes open spontaneously, Alert  Orientation Level: Oriented to person, Disoriented to place, Disoriented to situation, Disoriented to time  Cognition:  (fair command following)  Perception: Appears intact  Perseveration: No perseveration noted  Safety/Judgement: Decreased insight into deficits  Oral Assessment:  Oral Assessment  Labial:  (masked facies)  Dentition: Natural;Limited (has dentures at home-sister will bring in)  Oral Hygiene: WFL  Lingual: No impairment  Velum: No impairment  Mandible: No impairment  P.O. Trials:  Patient Position: upright inbed  Vocal quality prior to P.O.:  (mildly hoarse and strained)  Consistency Presented: Thin liquid; Ice chips; Nectar thick liquid;Puree; Solid  How Presented: SLP-fed/presented;Spoon;Straw   ORAL PHASE:   Bolus Acceptance: No impairment  Bolus Formation/Control: Impaired (reduced chew due to no teeth)  Type of Impairment: Mastication  Propulsion: Delayed (# of seconds) (with solids)  Oral Residue: None  PHARYNGEAL PHASE:   Initiation of Swallow: No impairment  Laryngeal Elevation: Weak  Aspiration Signs/Symptoms:  (he was coughing excessively before and sometimes during swallow. could palpate congestion in pharynx.  his wet, congested cough was rarely productive to oral cavity for sx. minimal production into yankauer)  Pharyngeal Phase Characteristics:  (difficult to r/o at bedside. did note that NGT was curled up in  pharynx. RN removed. His 02 sats improved from 90 to 95 and his cough decreased slightly)                     NOMS:   The NOMS functional outcome measure was used to quantify this patient's level of swallowing impairment. Based on the NOMS, the patient was determined to be at level 3 for swallow function       NOMS Swallowing Levels:  Level 1 (CN): NPO  Level 2 (CM): NPO but takes consistency in therapy  Level 3 (CL): Takes less than 50% of nutrition p.o. and continues with nonoral feedings; and/or safe with mod cues; and/or max diet restriction  Level 4 (CK): Safe swallow but needs mod cues; and/or mod diet restriction; and/or still requires some nonoral feeding/supplements  Level 5 (CJ): Safe swallow with min diet restriction; and/or needs min cues  Level 6 (CI): Independent with p.o.; rare cues; usually self cues; may need to avoid some foods or needs extra time  Level 7 ([address] [place] [address]): Independent for all p.o.  TIMOTHY. (2003). National Outcomes Measurement System (NOMS): Adult Speech-Language Pathology User's Guide. Pain:  Pain Scale 1: Numeric (0 - 10)  Pain Intensity 1: 0       After treatment:   Patient left in no apparent distress in bed, Nursing notified, and Caregiver / family present    COMMUNICATION/EDUCATION:   Patient was educated regarding his deficit(s) of POTENTIAL DYSPHAGIA  as this relates to his diagnosis of RESPIRATORY FAILURE. He demonstrated Fair understanding as evidenced by discussion. His sister was present and understood. .    The patient's plan of care including recommendations, planned interventions, and recommended diet changes were discussed with: Registered nurse. Family understood eval and recommendations. Patient with questionable comprehension.     Thank you for this referral.  Shawnee Cruz, SLP

## 2022-09-16 NOTE — PROGRESS NOTES
Physical Therapy orders acknowledged, chart reviewed and discussed with nurse patient having dialysis at bed side. We will continue to follow up with the patient for therapy thank you.

## 2022-09-16 NOTE — PROGRESS NOTES
1900 Bedside shift change report given to Darcy Cote RN (oncoming nurse) by Aden De Leon RN (offgoing nurse). Report included the following information SBAR, Kardex, ED Summary, Procedure Summary, Intake/Output, MAR, Recent Results, Med Rec Status, Cardiac Rhythm SR/Afib, Alarm Parameters , and Quality Measures. Patient resting quietly in bed. Will assess. Primary Nurse Lorre Rubinstein, RN and Aden De Leon RN performed a dual skin assessment on this patient. Impairment noted - see wound doc flow sheet. David score is; see flow sheet. 2130 Patient did not tolerate drinking thin liquids/taking PO meds. Coughing noted. Will notify day team/speech to further evaluate. 2230 Attempted to titrate up on Cardizem; patient did not tolerate; SBP dropped to 80s. Titrated back down. 0500 Hgb 6.1 (trending down from 6.6). Dr. Zeke Rojas notified. 0700 Bedside shift change report given to Aden De Leon RN (oncoming nurse) by Darcy Cote RN (offgoing nurse). Report included the following information SBAR, Kardex, ED Summary, Procedure Summary, Intake/Output, MAR, Recent Results, Med Rec Status, Cardiac Rhythm ST, Alarm Parameters , and Quality Measures.

## 2022-09-16 NOTE — PROGRESS NOTES
Critical Care      Progress Note  Date:2022       Room:[address] [place] [address]  Patient Fredo Adams     YOB: 1960     Age:62 y.o. Subjective    Subjective   : On Precedex. On Amiodarone. Receiving IHD. Hb stable 7.2 KUB noted. 09/15: On Precedex. Started on Amiodarone for Afib RVR. Hb down to 7. 1sp IHD yesterday with 2.5 L UF. Review of Systems  Unable to obtain given clinical condition   Objective         Vitals Last 24 Hours:  TEMPERATURE:  Temp  Av.1 °F (36.7 °C)  Min: 97.5 °F (36.4 °C)  Max: 98.6 °F (37 °C)  RESPIRATIONS RANGE: Resp  Av.4  Min: 19  Max: 31  PULSE OXIMETRY RANGE: SpO2  Av.6 %  Min: 89 %  Max: 100 %  PULSE RANGE: Pulse  Av.7  Min: 87  Max: 138  BLOOD PRESSURE RANGE: Systolic (48KOR), NWN:696 , Min:92 , USZ:960   ; Diastolic (80OHV), WLF:17, Min:57, Max:102    I/O (24Hr): Intake/Output Summary (Last 24 hours) at 2022 1446  Last data filed at 2022 1215  Gross per 24 hour   Intake 1879.36 ml   Output 2950 ml   Net -1070.64 ml       I examined the patient via telemedicine, with its associated limitations. I beamed in and examined the patient with assistance of house staff     On exam:    Gen: awake, confused  HEENT:  Mucous membrane dry  Chest: symmetrical chest rise  CV:S1,S2  Abd: soft, non-distended  Ext+ve  edema  Neuro: moves all ext     Objective:  Vital signs: (most recent): Blood pressure 111/67, pulse (!) 120, temperature 97.5 °F (36.4 °C), temperature source Oral, resp. rate 26, height 5' 6\" (1.676 m), weight 87.8 kg (193 lb 9 oz), SpO2 93 %.     Labs/Imaging/Diagnostics    Labs:  CBC:  Recent Labs     22  0314 09/15/22  0004   WBC 14.8* 8.1   RBC 2.21* 2.22*   HGB 7.2* 7.1*   HCT 20.6* 21.3*   MCV 93.2 95.9   RDW 13.2 12.5    135*       CHEMISTRIES:  Recent Labs     09/16/22  0907 09/15/22  2138 09/15/22  0919 09/14/22  2047    136 139 140   K 3.4* 3.2* 3.3* 3.4*    100 102 104   CO2 29 28 30 31   BUN 42* 56* 48* 36*   CA 8.1* 8.3* 7.5* 7.7*   PHOS  --  3.8 3.9 2.2*   MG 2.2 2.3 2.4 2.3     PT/INR:No results for input(s): INR, INREXT, INREXT in the last 72 hours. No lab exists for component: PROTIME  APTT:No results for input(s): APTT in the last 72 hours. LIVER PROFILE:No results for input(s): AST, ALT in the last 72 hours. No lab exists for component: Zoya Roberts, ALKPHOS  Lab Results   Component Value Date/Time    ALT (SGPT) 12 09/09/2022 03:39 PM    AST (SGOT) 27 09/09/2022 03:39 PM    Alk. phosphatase 63 09/09/2022 03:39 PM    Bilirubin, direct 0.3 (H) 09/09/2022 03:39 PM    Bilirubin, total 0.6 09/09/2022 03:39 PM       Imaging Last 24 Hours:  XR ABD PORT  1 V    Result Date: 9/16/2022  INDICATION: Abdominal distention concerning for ileus EXAMINATION:  ABDOMEN KUB COMPARISON: September 13, 2022 FINDINGS: AP radiograph of the abdomen demonstrates a nonobstructive bowel gas pattern. Nasogastric tube tip unchanged over the stomach. No significant amount of colonic stool. No abnormal calcifications are identified. The osseous structures are normal.     Nonobstructive bowel gas pattern. Assessment//Plan   Principal Problem:    Severe sepsis ([facility] [address] (9/6/2022)    Active Problems:    LAVINIA (acute kidney injury) ([facility] [address] (9/6/2022)      Melanoma ([facility] [address] (9/6/2022)      HTN (hypertension), benign (9/6/2022)      DM (diabetes mellitus), type 2 ([address] (9/6/2022)      Hyperlipidemia (9/6/2022)      Delirium (9/14/2022)      Shock ([address] (9/14/2022)      PAF (paroxysmal atrial fibrillation) ([facility] [address] (9/14/2022)      Thrombocytopenia ([address] (9/14/2022)      Abdominal distension (9/16/2022)    Assessment & Plan  A 58 yo male with prolonged hospital stay, extubated. Now in ICU with Delirium, Renal Failure,Afib and Anemia    Plan:  Wean Precedex as tolerated  Increase Seroquel  Follow Cards recs,plan to start Cardizem   Dialysis per nephrology   Monitor H/H. Transfuse RBCs for Hb<7.   Speech eval   Increase Lantus.   Check Lfts  PT eval  Follow up CXR       Discussed with bedside RN     I reviewed the electronic medical record, the x-rays, labs, progress notes, previous history and physicals and consultation notes that were available in the electronic medical record. I performed all aspects of the physical examination via Telemedicine associated with two way audio and video communication and with the on-site assistance of  the bedside nurse. I am located in [place] and the patient is located in [place] at [facility]  The patient is critically ill in the ICU. I  personally  reviewed the pertinent medical records, laboratory/ pathology data and radiographic images. The decision making regarding this patient is as documented above, which was generated  following  discussion  with the multidisciplinary ICU team and creation of a treatment plan for  the patient. We discussed the patient's interval history and future coordination of care and  plans. The patient's medications  were reviewed and changes made as stipulated above. Due to  critical illness impairing one or more vital organs of this patient resulting in life threatening clinical situation  I have provided direct, frequent personal  assessment and manipulation in management plan and spent 35 minutes  of  subsequent care time excluding the time spent on procedures and teaching. Greater than 50% of this time  in patients care was  employed  in counseling and coordination of care and engaged in face to face discussion of case management issues, addressing questions, and outlining a plan of  therapy. Pt at risk of life threatening deterioration from Delirium     Pt seen and evaluated via tele encounter. Audio and Video were used for this interaction. ATTENTION:  This medical record was transcribed using an electronic medical records/speech recognition system. Although proofread, it may and can contain electronic, spelling and other errors.   Corrections may be executed at a later time. Please feel free to contact us for any clarifications as needed.    Electronically signed by Jacquie Esquivel MD on 9/16/2022 at 6:28 PM

## 2022-09-16 NOTE — PROGRESS NOTES
Problem: Patient Education: Go to Patient Education Activity  Goal: Patient/Family Education  Outcome: Progressing Towards Goal     Problem: Falls - Risk of  Goal: *Absence of Falls  Description: Document Lula Rehana Fall Risk and appropriate interventions in the flowsheet. Outcome: Progressing Towards Goal  Note: Fall Risk Interventions:       Mentation Interventions: Adequate sleep, hydration, pain control, Bed/chair exit alarm, Door open when patient unattended, Evaluate medications/consider consulting pharmacy, More frequent rounding, Reorient patient, Room close to nurse's station, Toileting rounds, Update white board    Medication Interventions: Assess postural VS orthostatic hypotension, Bed/chair exit alarm    Elimination Interventions: Bed/chair exit alarm, Call light in reach              Problem: Patient Education: Go to Patient Education Activity  Goal: Patient/Family Education  Outcome: Progressing Towards Goal     Problem: Pressure Injury - Risk of  Goal: *Prevention of pressure injury  Description: Document David Scale and appropriate interventions in the flowsheet. Outcome: Progressing Towards Goal  Note: Pressure Injury Interventions:  Sensory Interventions: Assess changes in LOC, Assess need for specialty bed, Avoid rigorous massage over bony prominences, Keep linens dry and wrinkle-free, Pressure redistribution bed/mattress (bed type), Turn and reposition approx. every two hours (pillows and wedges if needed)    Moisture Interventions: Absorbent underpads, Apply protective barrier, creams and emollients, Check for incontinence Q2 hours and as needed, Internal/External fecal devices, Maintain skin hydration (lotion/cream), Minimize layers    Activity Interventions: Assess need for specialty bed, Pressure redistribution bed/mattress(bed type)    Mobility Interventions: Assess need for specialty bed, Pressure redistribution bed/mattress (bed type), Turn and reposition approx.  every two hours(pillow and wedges)    Nutrition Interventions: Document food/fluid/supplement intake    Friction and Shear Interventions: Apply protective barrier, creams and emollients, Lift sheet, Lift team/patient mobility team, Minimize layers, Transferring/repositioning devices                Problem: Patient Education: Go to Patient Education Activity  Goal: Patient/Family Education  Outcome: Progressing Towards Goal     Problem: Nutrition Deficit  Goal: *Optimize nutritional status  Outcome: Progressing Towards Goal     Problem: Diabetes Self-Management  Goal: *Disease process and treatment process  Description: Define diabetes and identify own type of diabetes; list 3 options for treating diabetes. Outcome: Progressing Towards Goal  Goal: *Incorporating nutritional management into lifestyle  Description: Describe effect of type, amount and timing of food on blood glucose; list 3 methods for planning meals. Outcome: Progressing Towards Goal  Goal: *Incorporating physical activity into lifestyle  Description: State effect of exercise on blood glucose levels. Outcome: Progressing Towards Goal  Goal: *Developing strategies to promote health/change behavior  Description: Define the ABC's of diabetes; identify appropriate screenings, schedule and personal plan for screenings. Outcome: Progressing Towards Goal  Goal: *Using medications safely  Description: State effect of diabetes medications on diabetes; name diabetes medication taking, action and side effects. Outcome: Progressing Towards Goal  Goal: *Monitoring blood glucose, interpreting and using results  Description: Identify recommended blood glucose targets  and personal targets. Outcome: Progressing Towards Goal  Goal: *Prevention, detection, treatment of acute complications  Description: List symptoms of hyper- and hypoglycemia; describe how to treat low blood sugar and actions for lowering  high blood glucose level.   Outcome: Progressing Towards Goal  Goal: *Prevention, detection and treatment of chronic complications  Description: Define the natural course of diabetes and describe the relationship of blood glucose levels to long term complications of diabetes.   Outcome: Progressing Towards Goal  Goal: *Developing strategies to address psychosocial issues  Description: Describe feelings about living with diabetes; identify support needed and support network  Outcome: Progressing Towards Goal  Goal: *Insulin pump training  Outcome: Progressing Towards Goal  Goal: *Sick day guidelines  Outcome: Progressing Towards Goal  Goal: *Patient Specific Goal (EDIT GOAL, INSERT TEXT)  Outcome: Progressing Towards Goal     Problem: Patient Education: Go to Patient Education Activity  Goal: Patient/Family Education  Outcome: Progressing Towards Goal     Problem: Delirium Treatment  Goal: *Level of consciousness restored to baseline  Outcome: Progressing Towards Goal  Goal: *Level of environmental perceptions restored to baseline  Outcome: Progressing Towards Goal  Goal: *Sensory perception restored to baseline  Outcome: Progressing Towards Goal  Goal: *Emotional stability restored to baseline  Outcome: Progressing Towards Goal  Goal: *Functional assessment restored to baseline  Outcome: Progressing Towards Goal  Goal: *Absence of falls  Outcome: Progressing Towards Goal  Goal: *Will remain free of delirium, CAM Score negative  Outcome: Progressing Towards Goal  Goal: *Cognitive status will be restored to baseline  Outcome: Progressing Towards Goal  Goal: Interventions  Outcome: Progressing Towards Goal     Problem: Patient Education: Go to Patient Education Activity  Goal: Patient/Family Education  Outcome: Progressing Towards Goal     Problem: Non-Violent Restraints  Goal: Removal from restraints as soon as assessed to be safe  Outcome: Progressing Towards Goal  Goal: No harm/injury to patient while restraints in use  Outcome: Progressing Towards Goal  Goal: Patient's dignity will be maintained  Outcome: Progressing Towards Goal  Goal: Patient Interventions  Outcome: Progressing Towards Goal

## 2022-09-17 LAB
ANION GAP SERPL CALC-SCNC: 7 MMOL/L (ref 5–15)
BASOPHILS # BLD: 0 K/UL (ref 0–0.1)
BASOPHILS NFR BLD: 0 % (ref 0–1)
BUN SERPL-MCNC: 36 MG/DL (ref 6–20)
BUN/CREAT SERPL: 8 (ref 12–20)
CALCIUM SERPL-MCNC: 8.2 MG/DL (ref 8.5–10.1)
CHLORIDE SERPL-SCNC: 99 MMOL/L (ref 97–108)
CO2 SERPL-SCNC: 28 MMOL/L (ref 21–32)
CREAT SERPL-MCNC: 4.29 MG/DL (ref 0.7–1.3)
DIFFERENTIAL METHOD BLD: ABNORMAL
EOSINOPHIL # BLD: 0.1 K/UL (ref 0–0.4)
EOSINOPHIL NFR BLD: 1 % (ref 0–7)
ERYTHROCYTE [DISTWIDTH] IN BLOOD BY AUTOMATED COUNT: 13.4 % (ref 11.5–14.5)
GLUCOSE BLD STRIP.AUTO-MCNC: 150 MG/DL (ref 65–117)
GLUCOSE BLD STRIP.AUTO-MCNC: 154 MG/DL (ref 65–117)
GLUCOSE BLD STRIP.AUTO-MCNC: 174 MG/DL (ref 65–117)
GLUCOSE BLD STRIP.AUTO-MCNC: 194 MG/DL (ref 65–117)
GLUCOSE SERPL-MCNC: 153 MG/DL (ref 65–100)
HCT VFR BLD AUTO: 18.5 % (ref 36.6–50.3)
HCT VFR BLD AUTO: 21.1 % (ref 36.6–50.3)
HEMOCCULT STL QL: NEGATIVE
HGB BLD-MCNC: 6.1 G/DL (ref 12.1–17)
HGB BLD-MCNC: 7.3 G/DL (ref 12.1–17)
HISTORY CHECKED?,CKHIST: NORMAL
IMM GRANULOCYTES # BLD AUTO: 0.2 K/UL (ref 0–0.04)
IMM GRANULOCYTES NFR BLD AUTO: 1 % (ref 0–0.5)
LYMPHOCYTES # BLD: 1.5 K/UL (ref 0.8–3.5)
LYMPHOCYTES NFR BLD: 10 % (ref 12–49)
MAGNESIUM SERPL-MCNC: 2.3 MG/DL (ref 1.6–2.4)
MCH RBC QN AUTO: 32.3 PG (ref 26–34)
MCHC RBC AUTO-ENTMCNC: 33 G/DL (ref 30–36.5)
MCV RBC AUTO: 97.9 FL (ref 80–99)
MONOCYTES # BLD: 1 K/UL (ref 0–1)
MONOCYTES NFR BLD: 7 % (ref 5–13)
NEUTS SEG # BLD: 13 K/UL (ref 1.8–8)
NEUTS SEG NFR BLD: 82 % (ref 32–75)
NRBC # BLD: 0 K/UL (ref 0–0.01)
NRBC BLD-RTO: 0 PER 100 WBC
PHOSPHATE SERPL-MCNC: 3.4 MG/DL (ref 2.6–4.7)
PLATELET # BLD AUTO: 246 K/UL (ref 150–400)
PMV BLD AUTO: 10.6 FL (ref 8.9–12.9)
POTASSIUM SERPL-SCNC: 3.9 MMOL/L (ref 3.5–5.1)
RBC # BLD AUTO: 1.89 M/UL (ref 4.1–5.7)
SERVICE CMNT-IMP: ABNORMAL
SODIUM SERPL-SCNC: 134 MMOL/L (ref 136–145)
WBC # BLD AUTO: 15.8 K/UL (ref 4.1–11.1)

## 2022-09-17 PROCEDURE — 77010033678 HC OXYGEN DAILY

## 2022-09-17 PROCEDURE — 74011000258 HC RX REV CODE- 258: Performed by: STUDENT IN AN ORGANIZED HEALTH CARE EDUCATION/TRAINING PROGRAM

## 2022-09-17 PROCEDURE — 94762 N-INVAS EAR/PLS OXIMTRY CONT: CPT

## 2022-09-17 PROCEDURE — 74011250637 HC RX REV CODE- 250/637: Performed by: INTERNAL MEDICINE

## 2022-09-17 PROCEDURE — 36415 COLL VENOUS BLD VENIPUNCTURE: CPT

## 2022-09-17 PROCEDURE — 94640 AIRWAY INHALATION TREATMENT: CPT

## 2022-09-17 PROCEDURE — 74011250636 HC RX REV CODE- 250/636: Performed by: STUDENT IN AN ORGANIZED HEALTH CARE EDUCATION/TRAINING PROGRAM

## 2022-09-17 PROCEDURE — 83735 ASSAY OF MAGNESIUM: CPT

## 2022-09-17 PROCEDURE — 82962 GLUCOSE BLOOD TEST: CPT

## 2022-09-17 PROCEDURE — 85025 COMPLETE CBC W/AUTO DIFF WBC: CPT

## 2022-09-17 PROCEDURE — 74011636637 HC RX REV CODE- 636/637: Performed by: INTERNAL MEDICINE

## 2022-09-17 PROCEDURE — 86923 COMPATIBILITY TEST ELECTRIC: CPT

## 2022-09-17 PROCEDURE — 74011250636 HC RX REV CODE- 250/636: Performed by: NURSE PRACTITIONER

## 2022-09-17 PROCEDURE — 74011000250 HC RX REV CODE- 250: Performed by: INTERNAL MEDICINE

## 2022-09-17 PROCEDURE — 84100 ASSAY OF PHOSPHORUS: CPT

## 2022-09-17 PROCEDURE — 2709999900 HC NON-CHARGEABLE SUPPLY

## 2022-09-17 PROCEDURE — 77030029065 HC DRSG HEMO QCLOT ZMED -B

## 2022-09-17 PROCEDURE — 86900 BLOOD TYPING SEROLOGIC ABO: CPT

## 2022-09-17 PROCEDURE — 65610000006 HC RM INTENSIVE CARE

## 2022-09-17 PROCEDURE — 30233N1 TRANSFUSION OF NONAUTOLOGOUS RED BLOOD CELLS INTO PERIPHERAL VEIN, PERCUTANEOUS APPROACH: ICD-10-PCS | Performed by: INTERNAL MEDICINE

## 2022-09-17 PROCEDURE — P9016 RBC LEUKOCYTES REDUCED: HCPCS

## 2022-09-17 PROCEDURE — 36430 TRANSFUSION BLD/BLD COMPNT: CPT

## 2022-09-17 PROCEDURE — 85018 HEMOGLOBIN: CPT

## 2022-09-17 PROCEDURE — 92526 ORAL FUNCTION THERAPY: CPT

## 2022-09-17 PROCEDURE — 77030020847 HC STATLOK BARD -A

## 2022-09-17 PROCEDURE — 80048 BASIC METABOLIC PNL TOTAL CA: CPT

## 2022-09-17 PROCEDURE — 82272 OCCULT BLD FECES 1-3 TESTS: CPT

## 2022-09-17 PROCEDURE — 99233 SBSQ HOSP IP/OBS HIGH 50: CPT | Performed by: SPECIALIST

## 2022-09-17 PROCEDURE — 74011000258 HC RX REV CODE- 258: Performed by: NURSE PRACTITIONER

## 2022-09-17 RX ORDER — SODIUM CHLORIDE 9 MG/ML
250 INJECTION, SOLUTION INTRAVENOUS AS NEEDED
Status: DISCONTINUED | OUTPATIENT
Start: 2022-09-17 | End: 2022-09-21

## 2022-09-17 RX ORDER — INSULIN LISPRO 100 [IU]/ML
INJECTION, SOLUTION INTRAVENOUS; SUBCUTANEOUS
Status: DISCONTINUED | OUTPATIENT
Start: 2022-09-17 | End: 2022-09-23 | Stop reason: HOSPADM

## 2022-09-17 RX ORDER — POLYETHYLENE GLYCOL 3350 17 G/17G
17 POWDER, FOR SOLUTION ORAL
Status: DISCONTINUED | OUTPATIENT
Start: 2022-09-17 | End: 2022-09-23 | Stop reason: HOSPADM

## 2022-09-17 RX ADMIN — QUETIAPINE FUMARATE 50 MG: 25 TABLET ORAL at 10:22

## 2022-09-17 RX ADMIN — QUETIAPINE FUMARATE 50 MG: 25 TABLET ORAL at 21:30

## 2022-09-17 RX ADMIN — INSULIN GLARGINE 18 UNITS: 100 INJECTION, SOLUTION SUBCUTANEOUS at 10:07

## 2022-09-17 RX ADMIN — SODIUM CHLORIDE 7.5 MG/HR: 900 INJECTION, SOLUTION INTRAVENOUS at 10:03

## 2022-09-17 RX ADMIN — IPRATROPIUM BROMIDE AND ALBUTEROL SULFATE 3 ML: .5; 2.5 SOLUTION RESPIRATORY (INHALATION) at 14:15

## 2022-09-17 RX ADMIN — FOLIC ACID 1 MG: 1 TABLET ORAL at 10:22

## 2022-09-17 RX ADMIN — LEVOTHYROXINE SODIUM 100 MCG: 0.1 TABLET ORAL at 07:47

## 2022-09-17 RX ADMIN — AMIODARONE HYDROCHLORIDE 1 MG/MIN: 50 INJECTION, SOLUTION INTRAVENOUS at 10:03

## 2022-09-17 RX ADMIN — CYANOCOBALAMIN TAB 500 MCG 500 MCG: 500 TAB at 10:22

## 2022-09-17 RX ADMIN — IPRATROPIUM BROMIDE AND ALBUTEROL SULFATE 3 ML: .5; 2.5 SOLUTION RESPIRATORY (INHALATION) at 08:05

## 2022-09-17 RX ADMIN — QUETIAPINE FUMARATE 50 MG: 25 TABLET ORAL at 16:11

## 2022-09-17 RX ADMIN — AMIODARONE HYDROCHLORIDE 1 MG/MIN: 50 INJECTION, SOLUTION INTRAVENOUS at 03:20

## 2022-09-17 RX ADMIN — IPRATROPIUM BROMIDE AND ALBUTEROL SULFATE 3 ML: .5; 2.5 SOLUTION RESPIRATORY (INHALATION) at 02:43

## 2022-09-17 NOTE — PROGRESS NOTES
Ultrasound IV by Avery Wills RN :  Procedure Note    Patient meets criteria for US IV insertion. Ultrasound IV education provided to patient. Opportunities for questions given. Ultrasound used for PIV placement:  20 gauge 8 cm Arrow Endurance Extended Dwell(may stay in place for 29 days)  Left lower arm location. 1 X Attempt(s). Flushed with ease; vigorous blood return. Procedure tolerated well. Primary RN aware of IV placement and added to LDA.       Avery Wills RN

## 2022-09-17 NOTE — PROGRESS NOTES
CARDIOLOGY PROGRESS NOTE    [address], [place], [address] [facility]  Phone [phone]; Fax [phone]        2022 8:45 AM     Patient:   Andrey Knowles  :  1960   MRN:  [id number]       Assessment/Plan  1) Afib with RVR:  - Had transient Afib with RVR 22 (spont converted to NSR)   - On 9/15/22 - back into afib with RVR  - New onset, suspect related to recent illness, no history of arrhythmia in the past although pt is on low dose atenolol for HTN. No response to amio bolus, currently on drip w/ rates in 120s. - BP dropped w/ dilt bolus yesterday but improved sig today, try dilt gtt - titrate slowly   - A short course of amiodarone for 30 days may be reasonable when he is back in sinus as pt recovers from illness. - Will need to consider [email or web address] prior to discharge if remains in afib, however he is higher risk of bleeding (anemia, LAVINIA)     2) Elevated troponin: Likely type 2 in s/o supply demand mismatch in setting of afib RVR. 3) HTN: Hold home atenolol, chlorthalidone, clonidine, enalapril. 4) Delirium: Wean precedex. CT head wnl.     5) Hypothyroid: TSH low at 0.14 however pt acutely ill, continue current home dose of synthroid 100mcg for now. 6) AHRF: Supplemental O2.       CARDIOLOGY ATTENDING  Patient personally seen and examined. All the elements of history and examination were personally performed. Assessment and plan was discussed and agree. Patient admitted with DKA, LAVINIA, respiratory failure and shock. Called to see patient when back in to Afib with RVR 9/15/22  Patient confused but awake. NAD, hrt irreg irreg, no murmur. Lungs clear ant. No sig edema     Newly discovered Afib with RVR  - HR remains fast past day ---> Cont Amio gtt. Rechallenge with IV Cardizem   ----> will transition cardizem + amiodarone to PO as HR comes down   - Will need to consider [address] prior to DC if he remains in afib.   [id number] score is 2 (almost 3 at age 70). Of note, he is at higher risk of bleeding (Anemia, LAVINIA). He is now in sinus rhythm at 106, appropriate for Hgb 6.1, receiving blood, on IV amiodarone and diltiazem. Will reduce amiodarone and stop diltiazem. Stool negative for blood. Total time (preparing to see the patient, reviewing data, seeing patient face to face, taking history, examining patient, counseling patient, documenting information, coordinating care, etc) was  20  min. NP spent 10 minutes reviewing chart, labs, diagnostics and coordination of care . NP spent 8 minutes with pt/family in examination and care plan review. Subjective:   54yo M w/ PMHx of non-insulin dependent diabetes on metformin, melanoma s/p resection, HTN, HLD who presented to the ED 9/6 for evaluation of dizziness. In the ED pt was found to be in acute renal failure w/ elevated lactic acid levels and AGMA - pt was euglycemic but sx thought to possibly be 2/2 atypical DKA and metformin toxicity. Upon arrival to the ICU pt was in respiratory distress and was subsequently intubated. He was extubated on 9/11 and his acidosis has improved however pt has remained altered requiring precedex drip. On the afternoon of 9/9 pt was found to have new onset afib w/ RVR that resolved w/o any medical intervention. Last night he had recurrence of afib w/ RVR that did not respond to an amiodarone bolus and he was subsequently started on an amio drip.     Medications:     Current Facility-Administered Medications   Medication Dose Route Frequency    0.9% sodium chloride infusion 250 mL  250 mL IntraVENous PRN    polyethylene glycol (MIRALAX) packet 17 g  17 g Oral DAILY PRN    dilTIAZem (CARDIZEM) 100 mg in 0.9% sodium chloride (MBP/ADV) 100 mL infusion  0-15 mg/hr IntraVENous TITRATE    QUEtiapine (SEROquel) tablet 50 mg  50 mg Oral TID    insulin glargine (LANTUS) injection 18 Units  18 Units SubCUTAneous DAILY    albumin human 25% (BUMINATE) solution 25 g  25 g IntraVENous DIALYSIS PRN    albuterol-ipratropium (DUO-NEB) 2.5 MG-0.5 MG/3 ML  3 mL Nebulization Q6H RT    amiodarone (CORDARONE) 375 mg in dextrose 5% 250 mL infusion  1 mg/min IntraVENous TITRATE    albuterol-ipratropium (DUO-NEB) 2.5 MG-0.5 MG/3 ML  3 mL Nebulization Q4H PRN    sennosides (SENOKOT) 8.8 mg/5 mL syrup 8.8 mg  5 mL Oral DAILY    hydrALAZINE (APRESOLINE) 20 mg/mL injection 10 mg  10 mg IntraVENous Q6H PRN    acetaminophen (TYLENOL) tablet 650 mg  650 mg Oral Q6H PRN    OLANZapine (ZyPREXA) tablet 5 mg  5 mg Per NG tube BID PRN    folic acid (FOLVITE) tablet 1 mg  1 mg Oral DAILY    cyanocobalamin (VITAMIN B12) tablet 500 mcg  500 mcg Per NG tube DAILY    dexmedeTOMidine in 0.9 % NaCl (PRECEDEX) 400 mcg/100 mL (4 mcg/mL) infusion soln  0.1-1.5 mcg/kg/hr IntraVENous TITRATE    dextrose 10% infusion 0-250 mL  0-250 mL IntraVENous PRN    insulin lispro (HUMALOG) injection   SubCUTAneous Q6H    levothyroxine (SYNTHROID) tablet 100 mcg  100 mcg Oral ACB    glucose chewable tablet 16 g  4 Tablet Oral PRN    glucagon (GLUCAGEN) injection 1 mg  1 mg IntraMUSCular PRN    [Held by provider] heparin (porcine) injection 5,000 Units  5,000 Units SubCUTAneous Q12H         Review of Systems:     Review of Symptoms:  Constitutional: Negative for fever   HEENT: Negative for vision changes. Respiratory: Negative for productive cough  Cardiovascular: Negative for syncope    Gastrointestinal: Negative for abdominal pain, melena  Genitourinary: Negative for dysuria  Skin: Negative for rash  Heme: No problems bleeding.   Neuro - no focal weakness      Physical Exam:     Vitals:    09/17/22 0730 09/17/22 0800 09/17/22 0807 09/17/22 0952   BP: 115/81 124/87  129/77   Pulse: (!) 107 (!) 112  (!) 107   Resp: 24 19  23   Temp:  99.6 °F (37.6 °C)  99.5 °F (37.5 °C)   TempSrc:       SpO2: 92% 95% 95% 96%   Weight:       Height:          Last 3 Recorded Weights in this Encounter    09/09/22 1431 09/12/22 1541 09/15/22 0320   Weight: 189 lb 9.5 oz (86 kg) 189 lb 9.5 oz (86 kg) 193 lb 9 oz (87.8 kg)       Intake and Output:    Intake/Output Summary (Last 24 hours) at 9/17/2022 1015  Last data filed at 9/17/2022 0800  Gross per 24 hour   Intake 1607.96 ml   Output 2800 ml   Net -1192.04 ml           Gen: Well-developed, alert but impulsive, slightly confused to situation  Neck: Supple,No JVD  Resp: + accessory muscle use, coarse bilat  Card: Regular Rate,Rythm,Normal S1, S2, No murmurs, rubs or gallop.     Abd:  Soft, non-tender, +distended, BS+   MSK: No cyanosis  Skin: No rashes    Neuro: moving all four extremities , follows commands appropriately  Psych:  Poor insight, oriented to person, place only, alert, Nml Affect  LE: No edema         Labs:     Recent Results (from the past 24 hour(s))   GLUCOSE, POC    Collection Time: 09/16/22 11:43 AM   Result Value Ref Range    Glucose (POC) 172 (H) 65 - 117 mg/dL    Performed by [facility]    HGB & HCT    Collection Time: 09/16/22  3:17 PM   Result Value Ref Range    HGB 6.6 (L) 12.1 - 17.0 g/dL    HCT 19.4 (L) 36.6 - 50.3 %   GLUCOSE, POC    Collection Time: 09/16/22  4:33 PM   Result Value Ref Range    Glucose (POC) 272 (H) 65 - 117 mg/dL    Performed by [facility]    PHOSPHORUS    Collection Time: 09/16/22  9:38 PM   Result Value Ref Range    Phosphorus 2.7 2.6 - 4.7 MG/DL   METABOLIC PANEL, BASIC    Collection Time: 09/16/22  9:38 PM   Result Value Ref Range    Sodium 135 (L) 136 - 145 mmol/L    Potassium 3.8 3.5 - 5.1 mmol/L    Chloride 100 97 - 108 mmol/L    CO2 30 21 - 32 mmol/L    Anion gap 5 5 - 15 mmol/L    Glucose 187 (H) 65 - 100 mg/dL    BUN 32 (H) 6 - 20 MG/DL    Creatinine 3.47 (H) 0.70 - 1.30 MG/DL    BUN/Creatinine ratio 9 (L) 12 - 20      GFR est AA 22 (L) >60 ml/min/1.73m2    GFR est non-AA 18 (L) >60 ml/min/1.73m2    Calcium 8.2 (L) 8.5 - 10.1 MG/DL   MAGNESIUM    Collection Time: 09/16/22  9:38 PM   Result Value Ref Range    Magnesium 2.3 1.6 - 2.4 mg/dL GLUCOSE, POC    Collection Time: 09/16/22 11:22 PM   Result Value Ref Range    Glucose (POC) 195 (H) 65 - 117 mg/dL    Performed by Partha Dominguez    CBC WITH AUTOMATED DIFF    Collection Time: 09/17/22  3:24 AM   Result Value Ref Range    WBC 15.8 (H) 4.1 - 11.1 K/uL    RBC 1.89 (L) 4.10 - 5.70 M/uL    HGB 6.1 (L) 12.1 - 17.0 g/dL    HCT 18.5 (L) 36.6 - 50.3 %    MCV 97.9 80.0 - 99.0 FL    MCH 32.3 26.0 - 34.0 PG    MCHC 33.0 30.0 - 36.5 g/dL    RDW 13.4 11.5 - 14.5 %    PLATELET 531 256 - 476 K/uL    MPV 10.6 8.9 - 12.9 FL    NRBC 0.0 0  WBC    ABSOLUTE NRBC 0.00 0.00 - 0.01 K/uL    NEUTROPHILS 82 (H) 32 - 75 %    LYMPHOCYTES 10 (L) 12 - 49 %    MONOCYTES 7 5 - 13 %    EOSINOPHILS 1 0 - 7 %    BASOPHILS 0 0 - 1 %    IMMATURE GRANULOCYTES 1 (H) 0.0 - 0.5 %    ABS. NEUTROPHILS 13.0 (H) 1.8 - 8.0 K/UL    ABS. LYMPHOCYTES 1.5 0.8 - 3.5 K/UL    ABS. MONOCYTES 1.0 0.0 - 1.0 K/UL    ABS. EOSINOPHILS 0.1 0.0 - 0.4 K/UL    ABS. BASOPHILS 0.0 0.0 - 0.1 K/UL    ABS. IMM. GRANS. 0.2 (H) 0.00 - 0.04 K/UL    DF AUTOMATED     GLUCOSE, POC    Collection Time: 09/17/22  5:29 AM   Result Value Ref Range    Glucose (POC) 174 (H) 65 - 117 mg/dL    Performed by Haylee Cash, ALLOCERIN    Collection Time: 09/17/22  7:00 AM   Result Value Ref Range    HISTORY CHECKED?  Historical check performed    PHOSPHORUS    Collection Time: 09/17/22  7:52 AM   Result Value Ref Range    Phosphorus 3.4 2.6 - 4.7 MG/DL   TYPE & SCREEN    Collection Time: 09/17/22  7:52 AM   Result Value Ref Range    Crossmatch Expiration 09/20/2022,5407     ABO/Rh(D) Taylor Guallpa POSITIVE     Antibody screen NEG     Unit number [id number]     Blood component type RC LR,1     Unit division 00     Status of unit ISSUED     Crossmatch result Compatible    OCCULT BLOOD, STOOL    Collection Time: 09/17/22  8:48 AM   Result Value Ref Range    Occult blood, stool Negative NEG               Cardiology Data:     Cardiac Evaluation Includes:    EKG: A-fib in 120's     ECHO: 09/06/22    ECHO ADULT COMPLETE 09/09/2022 9/9/2022    Interpretation Summary  Formatting of this result is different from the original.      Left Ventricle: Hyperdynamic left ventricular systolic function with a visually estimated EF of 65 - 70%. Left ventricle size is normal. Normal wall thickness. Normal wall motion. Normal diastolic function. Right Ventricle: Not well visualized. Right ventricle size is normal. Normal wall thickness. Aortic Valve: Mild sclerosis of the aortic valve cusp.     Signed by: Ronit Geronimo DO on 9/9/2022  3:19 PM      Signed By: Thi Srinivasan MD     September 17, 2022

## 2022-09-17 NOTE — PROGRESS NOTES
Problem: Dysphagia (Adult)  Goal: *Acute Goals and Plan of Care (Insert Text)  Description: Swallowing goals initiated 9-16-22:  1) tolerate clear liquids without s/s aspiration by 9-19-22  2) if increased concerns about aspiration, may need MBS vs FEES. 3) eval for solid diet once family brings in dentures  Outcome: Progressing Towards Goal      SPEECH LANGUAGE PATHOLOGY DYSPHAGIA TREATMENT  Patient: Kisha Lopez (42 y.o. male)  Date: 9/17/2022  Diagnosis: Severe sepsis ([facility] [address] [A41.9, R65.20] Severe sepsis ([facility] [address]      Precautions:       ASSESSMENT:  Patient for diagnostic treatment to ensure tolerance of PO trials on this date. Pt reportedly having difficulty with medications with liquids, but otherwise without difficulty per RN. Pt seen with thin liquids and purees without any difficulty. Therefore, suspect that pt would benefit from placing medications in applesauce for better clearance. Given patient able to tolerate clear liquid diet without difficulty thus far, and given strong voicing despite prolonged intubation, will plan to advance diet as outlined below. Suspect as pt strength improves, swallow function will only continue to improve. PLAN:  Recommendations and Planned Interventions:  --Pureed diet/thin liquids  --good oral care  --upright during meals  --meds in applesauce (whole vs crushed)  --small sips/bites    Patient continues to benefit from skilled intervention to address the above impairments. Continue treatment per established plan of care. Discharge Recommendations: To Be Determined     SUBJECTIVE:   Patient stated, \"I'm going home this week. I've been here too long.     OBJECTIVE:   Cognitive and Communication Status:  Neurologic State: Alert  Orientation Level: Oriented to person, Oriented to place, Oriented to situation  Cognition: Appropriate decision making, Appropriate for age attention/concentration, Poor safety awareness  Perception: Appears intact  Perseveration: No perseveration noted  Safety/Judgement: Decreased insight into deficits  Dysphagia Treatment:  Oral Assessment:  Oral Assessment  Labial: No impairment  Dentition: Natural;Limited  Oral Hygiene: oral mucosa moist and clear of secretions  Lingual: No impairment  Velum: No impairment  Mandible: No impairment  P.O. Trials:  Patient Position: upright in bed  Vocal quality prior to P.O.: No impairment  Consistency Presented: Thin liquid;Puree  How Presented: SLP-fed/presented;Spoon;Straw     Bolus Acceptance: No impairment  Bolus Formation/Control: No impairment     Propulsion: No impairment  Oral Residue: None  Initiation of Swallow: No impairment  Laryngeal Elevation: Functional  Aspiration Signs/Symptoms: None  Pharyngeal Phase Characteristics: No impairment, issues, or problems              Oral Phase Severity: No impairment  Pharyngeal Phase Severity :  (at risk but appears to be tolerating)  Pain:  Pain Scale 1: Numeric (0 - 10)  Pain Intensity 1: 0       After treatment:   Call bell within reach and Nursing notified    COMMUNICATION/EDUCATION:     The patient's plan of care including recommendations, planned interventions, and recommended diet changes were discussed with: Registered nurse, SLP.      VASU Barnes  Time Calculation: 16 mins

## 2022-09-17 NOTE — PROGRESS NOTES
Problem: Falls - Risk of  Goal: *Absence of Falls  Description: Document Elton Gao Fall Risk and appropriate interventions in the flowsheet. Outcome: Progressing Towards Goal  Note: Fall Risk Interventions:       Mentation Interventions: Adequate sleep, hydration, pain control, Bed/chair exit alarm, Evaluate medications/consider consulting pharmacy, Room close to nurse's station, Update white board    Medication Interventions: Bed/chair exit alarm, Evaluate medications/consider consulting pharmacy    Elimination Interventions: Bed/chair exit alarm, Call light in reach, Patient to call for help with toileting needs, Toileting schedule/hourly rounds     Problem: Pressure Injury - Risk of  Goal: *Prevention of pressure injury  Description: Document David Scale and appropriate interventions in the flowsheet. Outcome: Progressing Towards Goal  Note: Pressure Injury Interventions:  Sensory Interventions: Assess changes in LOC, Check visual cues for pain, Float heels, Keep linens dry and wrinkle-free, Minimize linen layers, Monitor skin under medical devices, Pressure redistribution bed/mattress (bed type), Turn and reposition approx. every two hours (pillows and wedges if needed)    Moisture Interventions: Absorbent underpads, Check for incontinence Q2 hours and as needed, Internal/External fecal devices, Minimize layers, Moisture barrier    Activity Interventions: Pressure redistribution bed/mattress(bed type), PT/OT evaluation, Increase time out of bed    Mobility Interventions: Float heels, HOB 30 degrees or less, Pressure redistribution bed/mattress (bed type), PT/OT evaluation, Turn and reposition approx.  every two hours(pillow and wedges)    Nutrition Interventions: Document food/fluid/supplement intake, Discuss nutritional consult with provider    Friction and Shear Interventions: Foam dressings/transparent film/skin sealants, HOB 30 degrees or less, Lift sheet, Lift team/patient mobility team, Minimize layers, Transferring/repositioning devices

## 2022-09-17 NOTE — PROGRESS NOTES
Physical Therapy    Consult received, chart reviewed. Patient currently receiving blood transfusion. Will follow back later as able and appropriate.     Nelida Huynh, MS, PT

## 2022-09-17 NOTE — PROGRESS NOTES
Critical Care      Progress Note  Date:2022       Room:[address] [place] [address]  Patient Micheline Bolanos     YOB: 1960     Age:62 y.o. Subjective    Subjective   : OFF Precedex. On Amiodarone and Cardizem. Passed swallow eval. Hb down to 6.1  : On Precedex. On Amiodarone. Receiving IHD. Hb stable 7.2 KUB noted. 09/15: On Precedex. Started on Amiodarone for Afib RVR. Hb down to 7. 1sp IHD yesterday with 2.5 L UF. Review of Systems  Unable to obtain given clinical condition   Objective         Vitals Last 24 Hours:  TEMPERATURE:  Temp  Av.2 °F (37.3 °C)  Min: 98.7 °F (37.1 °C)  Max: 99.6 °F (37.6 °C)  RESPIRATIONS RANGE: Resp  Av.4  Min: 15  Max: 32  PULSE OXIMETRY RANGE: SpO2  Av.4 %  Min: 88 %  Max: 99 %  PULSE RANGE: Pulse  Av.8  Min: 88  Max: 121  BLOOD PRESSURE RANGE: Systolic (81YNC), GED:369 , Min:79 , OIN:253   ; Diastolic (88YWZ), WSO:87, Min:49, Max:91    I/O (24Hr): Intake/Output Summary (Last 24 hours) at 2022 1154  Last data filed at 2022 0800  Gross per 24 hour   Intake 1551.96 ml   Output 2800 ml   Net -1248.04 ml       I examined the patient via telemedicine, with its associated limitations. I beamed in and examined the patient with assistance of house staff     On exam:    Gen: awake, confused  HEENT:  Mucous membrane dry  Chest: symmetrical chest rise  CV:S1,S2  Abd: soft, non-distended  Ext+ve  edema  Neuro: moves all ext     Objective:  Vital signs: (most recent): Blood pressure 122/71, pulse (!) 108, temperature 99.4 °F (37.4 °C), resp. rate 30, height 5' 6\" (1.676 m), weight 87.8 kg (193 lb 9 oz), SpO2 91 %.     Labs/Imaging/Diagnostics    Labs:  CBC:  Recent Labs     22  0324 22  1517 22  0314 09/15/22  0004   WBC 15.8*  --  14.8* 8.1   RBC 1.89*  --  2.21* 2.22*   HGB 6.1* 6.6* 7.2* 7.1*   HCT 18.5* 19.4* 20.6* 21.3*   MCV 97.9  --  93.2 95.9   RDW 13.4  --  13.2 12.5     --  195 135* CHEMISTRIES:  Recent Labs     09/17/22  0752 09/16/22 2138 09/16/22  0907 09/15/22  2138   NA  --  135* 137 136   K  --  3.8 3.4* 3.2*   CL  --  100 101 100   CO2  --  30 29 28   BUN  --  32* 42* 56*   CA  --  8.2* 8.1* 8.3*   PHOS 3.4 2.7  --  3.8   MG  --  2.3 2.2 2.3     PT/INR:No results for input(s): INR, INREXT, INREXT in the last 72 hours. No lab exists for component: PROTIME  APTT:No results for input(s): APTT in the last 72 hours. LIVER PROFILE:No results for input(s): AST, ALT in the last 72 hours. No lab exists for component: Marti Maxim, ALKPHOS  Lab Results   Component Value Date/Time    ALT (SGPT) 12 09/09/2022 03:39 PM    AST (SGOT) 27 09/09/2022 03:39 PM    Alk. phosphatase 63 09/09/2022 03:39 PM    Bilirubin, direct 0.3 (H) 09/09/2022 03:39 PM    Bilirubin, total 0.6 09/09/2022 03:39 PM       Imaging Last 24 Hours:  No results found. Assessment//Plan   Principal Problem:    Severe sepsis ([address] (9/6/2022)    Active Problems:    LAVINIA (acute kidney injury) ([facility] (9/6/2022)      Melanoma ([facility] (9/6/2022)      HTN (hypertension), benign (9/6/2022)      DM (diabetes mellitus), type 2 ([address] (9/6/2022)      Hyperlipidemia (9/6/2022)      Delirium (9/14/2022)      Shock ([address] (9/14/2022)      PAF (paroxysmal atrial fibrillation) ([facility] (9/14/2022)      Thrombocytopenia ([facility] (9/14/2022)      Abdominal distension (9/16/2022)    Assessment & Plan  A 56 yo male with prolonged hospital stay, extubated. Now in ICU with Delirium, Renal Failure,Afib and Anemia    Plan:  Continue Seroquel  On Amiodarone and  Cardizem. Cards following   Dialysis per nephrology   Monitor H/H. Transfuse RBCs . Diet with aspiration precautions  Continue Lantus. PT    Discussed with bedside RN     I reviewed the electronic medical record, the x-rays, labs, progress notes, previous history and physicals and consultation notes that were available in the electronic medical record.       I performed all aspects of the physical examination via Telemedicine associated with two way audio and video communication and with the on-site assistance of  the bedside nurse. I am located in [place] and the patient is located in [place] at [facility]  The patient is critically ill in the ICU. I  personally  reviewed the pertinent medical records, laboratory/ pathology data and radiographic images. The decision making regarding this patient is as documented above, which was generated  following  discussion  with the multidisciplinary ICU team and creation of a treatment plan for  the patient. We discussed the patient's interval history and future coordination of care and  plans. The patient's medications  were reviewed and changes made as stipulated above. Due to  critical illness impairing one or more vital organs of this patient resulting in life threatening clinical situation  I have provided direct, frequent personal  assessment and manipulation in management plan and spent 35 minutes  of  subsequent care time excluding the time spent on procedures and teaching. Greater than 50% of this time  in patients care was  employed  in counseling and coordination of care and engaged in face to face discussion of case management issues, addressing questions, and outlining a plan of  therapy. Pt at risk of life threatening deterioration from Delirium     Pt seen and evaluated via tele encounter. Audio and Video were used for this interaction. ATTENTION:  This medical record was transcribed using an electronic medical records/speech recognition system. Although proofread, it may and can contain electronic, spelling and other errors. Corrections may be executed at a later time. Please feel free to contact us for any clarifications as needed.    Electronically signed by Gertrudis Bain MD on 9/17/2022 at 6:28 PM

## 2022-09-17 NOTE — PROGRESS NOTES
Progress Note          Pt Name  Aurea Aiken   Date of Birth 1960   Medical Record Number  [id number]      Age  57 y.o. PCP None   Admit date:  9/6/2022    Room Number  4524/84  @ [address] [facility]   Date of Service  9/17/2022     Admission Diagnoses:  Severe sepsis [facility])     Admission Summary:  \" Mr. Mac Hoffman is a 57 y.o.  male who presented to the Emergency Department complaining of dizziness, hypothermia, decreased appetite and photosensitivity. Patient is currently intubated and on ventilator. History was given by the ER physician and the sister. Patient has a history of melanoma and he had a melanoma surgery done on his back in May. Since that time he has been complaining of some tightness in his back. He was not started on any chemotherapy yet. Patient was thought to have a neuropathy and was started on gabapentin on Friday and he started feeling dizzy over the weekend. He took almost 3 capsules of gabapentin since Friday. He was having some respiratory issues yesterday as per the sister and he got really worse today and came to the ER in [facility]. He was found to be in acute renal failure, hyperkalemia, hypothermia and elevated lactic acid levels. He did not complain of any abdominal pain. He did complain of some nausea and vomiting though. He denied any fever or chills. No chest pain. His pulse was 94 respiratory rate was 23 pulse ox was 100% in the chest or ER. His white blood cell count was 21.0, potassium of 7.6, bicarb of 4, glucose of 160, creatinine of 12.9, lactic acid of 12.6. He was transferred to [facility] quickly and when he arrived to ICU he was not responding and was in respiratory distress. He was intubated quickly.  \"     Assessment and plan:     ARF   Metabolic acidosis -initially treated with CVVH followed byt Intermittent hemodialysis starting 09/12/22   Appreciate guidance from nephrologist   Monitor electrolytes as is   Mg+ and Phos check ordered BID - will defer to nephrologist - I would like to minimize blood draws due to progressive ongoing anemia     Anemia -progressive   ONE unit PRBC ordered this am - agree with that decision in light of Afib, RVR and other complex issues   Will hold Heparin prophylaxis for now (SCDs ordered). Check FOBT       Acute hypoxic resp failure -Intubated upon arrival at [facility], now extubated. Appreciate help from intensivist team   Continue to monitor in ICU     Delirium -possibly due to acute critical medical issues; no organic etiology identified. Supportive care   Improving     Thrombocytopenia -Platelets are reasonably stable   Monitor     T2DM -chronic, pt was on Metformin   Holding Metformin while in house   SSI   No change in current management plan     Afib RVR - did not tolerate diltiazem (low BP), now on Amiodarone drip   T2MI -will need OP workup   Hx of HTN   Tele Monitor   Rate control   Due to ongoing progressive anemia, agree with withholding Full Anticoagulation     Body mass index is 31.24 kg/m².  -                                                       CODE STATUS   Full    Functional Status  Pt resides in [place]   He reports that he was independent with ADLs PTA   His son Denver Meier helps with some of the chores     Surrogate decision maker:  Pt's son Wil      Prophylaxis   SCD's   Discharge Plan:  SNF/LTC,     Misc INPATIENT  Payor: [employer]KA [employer] / Plan: [address] [facility] / Product Type: Managed Care Medicaid /   There are currently no Active Isolations No active infections     Query   None noted today    Prognosis   Fair    Social issues  9/17/22 9:10 AM No family or visitor here with the patient today            Subjective Data     Chikis Chavez are taking good care of me \"  Review of Systems - History obtained from the patient  Respiratory ROS: positive for - cough  Cardiovascular ROS: negative for - chest pain or dyspnea on exertion    Objective Data       Comments  Pleasant gentleman lying in ICU bed in no distress      Patient Vitals for the past 24 hrs:   BP   09/17/22 0730 115/81   09/17/22 0700 118/69   09/17/22 0645 118/68   09/17/22 0630 (!) 130/91   09/17/22 0615 123/79   09/17/22 0600 119/62   09/17/22 0545 115/75   09/17/22 0530 124/63   09/17/22 0515 126/79   09/17/22 0500 106/72   09/17/22 0445 120/70   09/17/22 0430 114/68   09/17/22 0415 105/70   09/17/22 0400 111/68   09/17/22 0345 117/71   09/17/22 0330 109/69   09/17/22 0315 105/65   09/17/22 0300 118/75   09/17/22 0245 112/66   09/17/22 0230 119/64   09/17/22 0215 104/63   09/17/22 0200 115/67   09/17/22 0145 (!) 97/57   09/17/22 0130 102/67   09/17/22 0115 107/71   09/17/22 0100 (!) 119/59   09/17/22 0045 117/60   09/17/22 0030 110/69   09/17/22 0015 108/62   09/17/22 0000 (!) 113/52   09/16/22 2345 (!) 97/59   09/16/22 2330 106/61   09/16/22 2315 (!) 79/59   09/16/22 2300 (!) 91/52   09/16/22 2245 (!) 91/59   09/16/22 2230 (!) 84/56   09/16/22 2215 (!) 107/49   09/16/22 2200 122/66   09/16/22 2145 101/64   09/16/22 2130 109/64   09/16/22 2115 112/62   09/16/22 2100 106/62   09/16/22 2045 108/69   09/16/22 2030 96/64   09/16/22 2015 (!) 99/57   09/16/22 2000 101/69   09/16/22 1815 111/62   09/16/22 1800 95/61   09/16/22 1715 93/60   09/16/22 1600 104/61   09/16/22 1545 98/69   09/16/22 1530 105/67   09/16/22 1515 105/65   09/16/22 1500 90/61   09/16/22 1445 91/64   09/16/22 1430 111/67   09/16/22 1415 113/89   09/16/22 1400 110/68   09/16/22 1345 99/61   09/16/22 1330 (!) 92/58   09/16/22 1315 (!) 92/59   09/16/22 1300 (!) 108/57   09/16/22 1245 112/76   09/16/22 1230 112/71   09/16/22 1215 109/68   09/16/22 1200 117/66   09/16/22 1145 106/84   09/16/22 1130 (!) 117/102   09/16/22 1115 109/70   09/16/22 1100 118/87   09/16/22 1050 126/81   09/16/22 1035 134/82   09/16/22 1030 (!) 139/90   09/16/22 1015 127/84   09/16/22 1000 (!) 145/71   09/16/22 0945 114/75   09/16/22 0930 109/64   09/16/22 0915 121/79   09/16/22 0900 120/78   09/16/22 0845 127/74   09/16/22 0830 (!) 169/82   09/16/22 0824 (!) 167/90      Patient Vitals for the past 24 hrs:   Pulse   09/17/22 0730 (!) 107   09/17/22 0700 (!) 108   09/17/22 0645 (!) 109   09/17/22 0630 (!) 110   09/17/22 0615 (!) 109   09/17/22 0600 (!) 114   09/17/22 0545 (!) 109   09/17/22 0530 (!) 109   09/17/22 0515 (!) 114   09/17/22 0500 (!) 110   09/17/22 0445 (!) 117   09/17/22 0430 (!) 109   09/17/22 0415 (!) 111   09/17/22 0400 (!) 112   09/17/22 0345 (!) 114   09/17/22 0330 (!) 113   09/17/22 0315 (!) 113   09/17/22 0300 (!) 114   09/17/22 0245 (!) 113   09/17/22 0230 (!) 112   09/17/22 0215 (!) 113   09/17/22 0200 (!) 114   09/17/22 0145 (!) 114   09/17/22 0130 (!) 115   09/17/22 0115 (!) 115   09/17/22 0100 (!) 116   09/17/22 0045 (!) 112   09/17/22 0030 (!) 115   09/17/22 0015 (!) 118   09/17/22 0000 (!) 115   09/16/22 2345 (!) 119   09/16/22 2330 (!) 113   09/16/22 2315 100   09/16/22 2300 98   09/16/22 2245 100   09/16/22 2230 96   09/16/22 2215 (!) 108   09/16/22 2200 (!) 107   09/16/22 2145 95   09/16/22 2130 (!) 106   09/16/22 2115 (!) 113   09/16/22 2100 (!) 107   09/16/22 2045 (!) 114   09/16/22 2030 (!) 120   09/16/22 2015 (!) 121   09/16/22 2000 98   09/16/22 1815 (!) 108   09/16/22 1800 (!) 106   09/16/22 1715 (!) 107   09/16/22 1600 (!) 119   09/16/22 1545 96   09/16/22 1530 (!) 104   09/16/22 1515 (!) 101   09/16/22 1500 (!) 112   09/16/22 1445 (!) 118   09/16/22 1430 (!) 120   09/16/22 1415 88   09/16/22 1400 97   09/16/22 1345 (!) 104   09/16/22 1330 88   09/16/22 1315 97   09/16/22 1300 96   09/16/22 1245 (!) 112   09/16/22 1230 (!) 107   09/16/22 1215 (!) 114   09/16/22 1200 97   09/16/22 1145 (!) 102   09/16/22 1130 (!) 115   09/16/22 1115 (!) 108   09/16/22 1100 (!) 108   09/16/22 1050 (!) 114   09/16/22 1035 (!) 116   09/16/22 1030 (!) 115   09/16/22 1015 96   09/16/22 1000 (!) 117   09/16/22 0945 (!) 121   09/16/22 0930 98   09/16/22 0915 (!) 105   09/16/22 0900 (!) 118   09/16/22 0845 (!) 123   09/16/22 0830 (!) 110   09/16/22 0824 (!) 104      Patient Vitals for the past 24 hrs:   Resp   09/17/22 0730 24   09/17/22 0700 24   09/17/22 0645 24   09/17/22 0630 22   09/17/22 0615 23   09/17/22 0600 26   09/17/22 0545 23   09/17/22 0530 23   09/17/22 0515 24   09/17/22 0500 23   09/17/22 0445 22   09/17/22 0430 23   09/17/22 0415 22   09/17/22 0400 22   09/17/22 0345 22   09/17/22 0330 25   09/17/22 0315 24   09/17/22 0300 19   09/17/22 0245 24   09/17/22 0230 25   09/17/22 0215 24   09/17/22 0200 25   09/17/22 0145 24   09/17/22 0130 22   09/17/22 0115 21   09/17/22 0100 20   09/17/22 0045 24   09/17/22 0030 23   09/17/22 0015 23   09/17/22 0000 27   09/16/22 2345 24   09/16/22 2330 24   09/16/22 2315 22   09/16/22 2300 26   09/16/22 2245 23   09/16/22 2230 25   09/16/22 2215 24   09/16/22 2200 27   09/16/22 2145 28   09/16/22 2130 29   09/16/22 2115 25   09/16/22 2100 27   09/16/22 2045 28   09/16/22 2030 26   09/16/22 2015 27   09/16/22 2000 24   09/16/22 1815 27   09/16/22 1800 27   09/16/22 1715 26   09/16/22 1515 29   09/16/22 1500 (!) 31   09/16/22 1445 (!) 32   09/16/22 1430 26   09/16/22 1415 28   09/16/22 1400 28   09/16/22 1345 24   09/16/22 1330 24   09/16/22 1315 21   09/16/22 1300 28   09/16/22 1245 27   09/16/22 1230 19   09/16/22 1215 30   09/16/22 1200 26   09/16/22 1145 22   09/16/22 1130 22   09/16/22 1115 26   09/16/22 1100 30   09/16/22 1050 27   09/16/22 1035 26   09/16/22 1030 26   09/16/22 1015 25   09/16/22 1000 24   09/16/22 0945 20   09/16/22 0930 26   09/16/22 0915 22   09/16/22 0900 26   09/16/22 0845 24   09/16/22 0830 22   09/16/22 0824 23      Patient Vitals for the past 24 hrs:   Temp   09/17/22 0400 99 °F (37.2 °C)   09/17/22 0000 99.2 °F (37.3 °C)   09/16/22 2000 98.7 °F (37.1 °C)   09/16/22 1600 98.7 °F (37.1 °C)   09/16/22 1145 97.5 °F (36.4 °C)   09/16/22 0824 98.3 °F (36.8 °C)   09/16/22 0800 98.3 °F (36.8 °C)        SpO2 Readings from Last 6 Encounters:   09/17/22 92%       O2 Flow Rate (L/min): 2 l/min  O2 Device: Nasal cannula Body mass index is 31.24 kg/m². -  Wt Readings from Last 10 Encounters:   09/15/22 87.8 kg (193 lb 9 oz)        Intake/Output Summary (Last 24 hours) at 9/17/2022 0737  Last data filed at 9/17/2022 0600  Gross per 24 hour   Intake 2025.36 ml   Output 2800 ml   Net -774.64 ml         Physical Exam:             General:  Alert, cooperative,   well noursished,   well developed,   appears stated age    Ears/Eyes:  Hearing intact  Sclera anicteric. Pupils equal   Mouth/Throat:  Mucous membranes normal pink and moist  NG tube in place    Neck:     Lungs:  Chest excursion symmetrical   Auscultation B/L Symmetrical with   Vesicular breath sounds     No Crepitations noted          CVS:  IRRegular rate and rhythm   no  murmur,   No click, rub or gallop  S1 normal   S2 normal   Pedal pulses  b/l symmetrical   Abdomen:  Soft, non-tender  Bowel sounds normal     Extremities:  No cyanosis, jaundice  No edema noted   No sign of DVT/cord like lesion on palpation  No sign of acute trauma .     Skin:    Skin color, texture, turgor normal. no acute rash or lesions    Lymph nodes:     Musculoskeletal Muscle bulk B/L symmetrical   Neuro Cranial nerves are intact,   motor movement b/l symmetrical,   Sensory evaluation b/l symmetrical    Psych:  Alert and oriented,   normal mood & affect          Medications reviewed     Current Facility-Administered Medications   Medication Dose Route Frequency    0.9% sodium chloride infusion 250 mL  250 mL IntraVENous PRN    dilTIAZem (CARDIZEM) 100 mg in 0.9% sodium chloride (MBP/ADV) 100 mL infusion  0-15 mg/hr IntraVENous TITRATE    QUEtiapine (SEROquel) tablet 50 mg  50 mg Oral TID    insulin glargine (LANTUS) injection 18 Units  18 Units SubCUTAneous DAILY    albumin human 25% (BUMINATE) solution 25 g  25 g IntraVENous DIALYSIS PRN    albuterol-ipratropium (DUO-NEB) 2.5 MG-0.5 MG/3 ML  3 mL Nebulization Q6H RT    amiodarone (CORDARONE) 375 mg in dextrose 5% 250 mL infusion  1 mg/min IntraVENous TITRATE    albuterol-ipratropium (DUO-NEB) 2.5 MG-0.5 MG/3 ML  3 mL Nebulization Q4H PRN    polyethylene glycol (MIRALAX) packet 17 g  17 g Oral DAILY    sennosides (SENOKOT) 8.8 mg/5 mL syrup 8.8 mg  5 mL Oral DAILY    hydrALAZINE (APRESOLINE) 20 mg/mL injection 10 mg  10 mg IntraVENous Q6H PRN    acetaminophen (TYLENOL) tablet 650 mg  650 mg Oral Q6H PRN    OLANZapine (ZyPREXA) tablet 5 mg  5 mg Per NG tube BID PRN    folic acid (FOLVITE) tablet 1 mg  1 mg Oral DAILY    cyanocobalamin (VITAMIN B12) tablet 500 mcg  500 mcg Per NG tube DAILY    dexmedeTOMidine in 0.9 % NaCl (PRECEDEX) 400 mcg/100 mL (4 mcg/mL) infusion soln  0.1-1.5 mcg/kg/hr IntraVENous TITRATE    dextrose 10% infusion 0-250 mL  0-250 mL IntraVENous PRN    insulin lispro (HUMALOG) injection   SubCUTAneous Q6H    levothyroxine (SYNTHROID) tablet 100 mcg  100 mcg Oral ACB    glucose chewable tablet 16 g  4 Tablet Oral PRN    glucagon (GLUCAGEN) injection 1 mg  1 mg IntraMUSCular PRN    heparin (porcine) injection 5,000 Units  5,000 Units SubCUTAneous Q12H       Relevant other informations: Other medical conditions listed in [facility] problem list section; all of these and other pertinent data were taken into consideration when treatment plan is developed and customized to this patient's unique overall circumstances and needs. We have reviewed available old medical records within the constraints of this admission process.         Data Review:   Recent Days:  All Micro Results       Procedure Component Value Units Date/Time    CULTURE, BLOOD [[id number]] Collected: 09/06/22 1032    Order Status: Completed Specimen: Blood Updated: 09/11/22 0521     Special Requests: NO SPECIAL REQUESTS        Culture result: NO GROWTH 5 DAYS       CULTURE, BLOOD [[id number]] Collected: 09/06/22 1036    Order Status: Completed Specimen: Blood Updated: 09/11/22 0521     Special Requests: NO SPECIAL REQUESTS        Culture result: NO GROWTH 5 DAYS       CULTURE, MRSA [[id number]] Collected: 09/08/22 0929    Order Status: Completed Specimen: Nasal from Nares Updated: 09/09/22 1557     Special Requests: NO SPECIAL REQUESTS        Culture result: MRSA NOT PRESENT               Screening of patient nares for MRSA is for surveillance purposes and, if positive, to facilitate isolation considerations in high risk settings. It is not intended for automatic decolonization interventions per se as regimens are not sufficiently effective to warrant routine use. COVID-19 RAPID TEST [[id number]] Collected: 09/06/22 1010    Order Status: Completed Specimen: Nasopharyngeal Updated: 09/06/22 1043     Specimen source Nasopharyngeal        COVID-19 rapid test Not detected        Comment: Rapid Abbott ID Now       Rapid NAAT:  The specimen is NEGATIVE for SARS-CoV-2, the novel coronavirus associated with COVID-19. Negative results should be treated as presumptive and, if inconsistent with clinical signs and symptoms or necessary for patient management, should be tested with an alternative molecular assay. Negative results do not preclude SARS-CoV-2 infection and should not be used as the sole basis for patient management decisions. This test has been authorized by the FDA under an Emergency Use Authorization (EUA) for use by authorized laboratories.    Fact sheet for Healthcare Providers: ConventionUpdate.co.nz  Fact sheet for Patients: ConventionUpdate.co.nz       Methodology: Isothermal Nucleic Acid Amplification                 Recent Labs     09/17/22  0324 09/16/22  1517 09/16/22  0314 09/15/22  0004   WBC 15.8*  --  14.8* 8.1   HGB 6.1* 6.6* 7.2* 7.1*   HCT 18.5* 19.4* 20.6* 21.3*     --  195 135*     Recent Labs     09/16/22 2138 09/16/22  0907 09/15/22  2138 09/15/22  0919   * 137 136 139   K 3.8 3.4* 3.2* 3.3*  101 100 102   CO2 30 29 28 30   * 201* 183* 207*   BUN 32* 42* 56* 48*   CREA 3.47* 3.61* 4.69* 3.93*   CA 8.2* 8.1* 8.3* 7.5*   MG 2.3 2.2 2.3 2.4   PHOS 2.7  --  3.8 3.9      Lab Results   Component Value Date/Time    TSH 0.14 (L) 09/07/2022 08:11 AM            Care Plan discussed with:Patient/Family and Nurse   Other medical conditions are listed in the active hospital problem list section; these and other pertinent data were taken into consideration when the treatment plan was developed and customized to this patient's unique overall circumstances and needs. High complexity decision making was performed for this patient who is at high risk for decompensation with multiple organ involvement. Today total floor/unit time was 35 minutes while caring for this patient and greater than 50% of that time was spent with patient (and/or family) coordinating patients clinical issues; this includes time spent during multidisciplinary rounds.         Omar Nash MD MPH FACP [facility]  9/17/2022

## 2022-09-17 NOTE — PROGRESS NOTES
ADULT PROTOCOL: JET AEROSOL  REASSESSMENT    Patient  Thania Condon     57 y.o.   male     9/17/2022  2:44 AM    Breath Sounds Pre Procedure: Right Breath Sounds: Diminished                               Left Breath Sounds: Diminished    Breathing pattern: Pre procedure Breathing Pattern: Regular          Post procedure Breathing Pattern: Regular, Tachypneic    Heart Rate: Pre procedure Pulse: 113           Post procedure Pulse: 105    Resp Rate: Pre procedure Respirations: 24           Post procedure Respirations: 20    Oxygen: O2 Device: Nasal cannula   Flow rate (L/min) 2     Changed: NO    SpO2: Pre procedure SpO2: 96 %   with oxygen              Post procedure SpO2: 93 %  with oxygen    Nebulizer Therapy: Current medications Aerosolized Medications: DuoNeb      Changed: NO    Smoking History: never    Problem List:   Patient Active Problem List   Diagnosis Code    Severe sepsis (HCC) A41.9, R65.20    LAVINIA (acute kidney injury) ([facility] N17.9    Melanoma ([facility] C43.9    HTN (hypertension), benign I10    DM (diabetes mellitus), type 2 ([facility] E11.9    Hyperlipidemia E78.5    Delirium R41.0    Shock ([facility] R57.9    PAF (paroxysmal atrial fibrillation) ([facility] I48.0    Thrombocytopenia (HCC) D69.6    Abdominal distension R14.0       Respiratory Therapist: Ila Mohs

## 2022-09-17 NOTE — PROGRESS NOTES
1900 Bedside shift change report given to Zena Roman RN (oncoming nurse) by Malcolm Mae RN (offgoing nurse). Report included the following information SBAR, Kardex, ED Summary, Procedure Summary, Intake/Output, MAR, Recent Results, Med Rec Status, Cardiac Rhythm ST, Alarm Parameters , and Quality Measures. Patient resting quietly in bed. Family present. Will assess. Primary Nurse Abdoul Crespo RN and Malcolm Mae RN performed a dual skin assessment on this patient. Impairment noted - see wound doc flow sheet. David score is; see flow sheet. 0700 Bedside shift change report given to Malcolm Mae RN (oncoming nurse) by Zena Roman RN (offgoing nurse). Report included the following information SBAR, Kardex, ED Summary, Procedure Summary, Intake/Output, MAR, Recent Results, Med Rec Status, Cardiac Rhythm ST, Alarm Parameters , and Quality Measures.

## 2022-09-18 LAB
ABO + RH BLD: NORMAL
ANION GAP SERPL CALC-SCNC: 9 MMOL/L (ref 5–15)
BASOPHILS # BLD: 0.1 K/UL (ref 0–0.1)
BASOPHILS NFR BLD: 0 % (ref 0–1)
BLD PROD TYP BPU: NORMAL
BLOOD GROUP ANTIBODIES SERPL: NORMAL
BPU ID: NORMAL
BUN SERPL-MCNC: 45 MG/DL (ref 6–20)
BUN/CREAT SERPL: 8 (ref 12–20)
CALCIUM SERPL-MCNC: 7.9 MG/DL (ref 8.5–10.1)
CHLORIDE SERPL-SCNC: 98 MMOL/L (ref 97–108)
CO2 SERPL-SCNC: 28 MMOL/L (ref 21–32)
CREAT SERPL-MCNC: 5.57 MG/DL (ref 0.7–1.3)
CROSSMATCH RESULT,%XM: NORMAL
DIFFERENTIAL METHOD BLD: ABNORMAL
EOSINOPHIL # BLD: 0.2 K/UL (ref 0–0.4)
EOSINOPHIL NFR BLD: 1 % (ref 0–7)
ERYTHROCYTE [DISTWIDTH] IN BLOOD BY AUTOMATED COUNT: 13.6 % (ref 11.5–14.5)
GLUCOSE BLD STRIP.AUTO-MCNC: 103 MG/DL (ref 65–117)
GLUCOSE BLD STRIP.AUTO-MCNC: 115 MG/DL (ref 65–117)
GLUCOSE BLD STRIP.AUTO-MCNC: 164 MG/DL (ref 65–117)
GLUCOSE BLD STRIP.AUTO-MCNC: 221 MG/DL (ref 65–117)
GLUCOSE BLD STRIP.AUTO-MCNC: 93 MG/DL (ref 65–117)
GLUCOSE SERPL-MCNC: 111 MG/DL (ref 65–100)
HCT VFR BLD AUTO: 21.8 % (ref 36.6–50.3)
HEMOCCULT STL QL: NEGATIVE
HGB BLD-MCNC: 7.4 G/DL (ref 12.1–17)
IMM GRANULOCYTES # BLD AUTO: 0.1 K/UL (ref 0–0.04)
IMM GRANULOCYTES NFR BLD AUTO: 1 % (ref 0–0.5)
LYMPHOCYTES # BLD: 1.1 K/UL (ref 0.8–3.5)
LYMPHOCYTES NFR BLD: 8 % (ref 12–49)
MAGNESIUM SERPL-MCNC: 2.2 MG/DL (ref 1.6–2.4)
MCH RBC QN AUTO: 31.9 PG (ref 26–34)
MCHC RBC AUTO-ENTMCNC: 33.9 G/DL (ref 30–36.5)
MCV RBC AUTO: 94 FL (ref 80–99)
MONOCYTES # BLD: 0.8 K/UL (ref 0–1)
MONOCYTES NFR BLD: 5 % (ref 5–13)
NEUTS SEG # BLD: 12.6 K/UL (ref 1.8–8)
NEUTS SEG NFR BLD: 85 % (ref 32–75)
NRBC # BLD: 0 K/UL (ref 0–0.01)
NRBC BLD-RTO: 0 PER 100 WBC
PHOSPHATE SERPL-MCNC: 4.2 MG/DL (ref 2.6–4.7)
PLATELET # BLD AUTO: 288 K/UL (ref 150–400)
PMV BLD AUTO: 9.3 FL (ref 8.9–12.9)
POTASSIUM SERPL-SCNC: 3.7 MMOL/L (ref 3.5–5.1)
RBC # BLD AUTO: 2.32 M/UL (ref 4.1–5.7)
SERVICE CMNT-IMP: ABNORMAL
SERVICE CMNT-IMP: ABNORMAL
SERVICE CMNT-IMP: NORMAL
SODIUM SERPL-SCNC: 135 MMOL/L (ref 136–145)
SPECIMEN EXP DATE BLD: NORMAL
STATUS OF UNIT,%ST: NORMAL
UNIT DIVISION, %UDIV: 0
WBC # BLD AUTO: 14.8 K/UL (ref 4.1–11.1)

## 2022-09-18 PROCEDURE — 77010033678 HC OXYGEN DAILY

## 2022-09-18 PROCEDURE — 74011250636 HC RX REV CODE- 250/636: Performed by: SPECIALIST

## 2022-09-18 PROCEDURE — 74011636637 HC RX REV CODE- 636/637: Performed by: INTERNAL MEDICINE

## 2022-09-18 PROCEDURE — 85025 COMPLETE CBC W/AUTO DIFF WBC: CPT

## 2022-09-18 PROCEDURE — 99233 SBSQ HOSP IP/OBS HIGH 50: CPT | Performed by: SPECIALIST

## 2022-09-18 PROCEDURE — 97116 GAIT TRAINING THERAPY: CPT

## 2022-09-18 PROCEDURE — 94762 N-INVAS EAR/PLS OXIMTRY CONT: CPT

## 2022-09-18 PROCEDURE — 82962 GLUCOSE BLOOD TEST: CPT

## 2022-09-18 PROCEDURE — 83735 ASSAY OF MAGNESIUM: CPT

## 2022-09-18 PROCEDURE — 82272 OCCULT BLD FECES 1-3 TESTS: CPT

## 2022-09-18 PROCEDURE — 97530 THERAPEUTIC ACTIVITIES: CPT | Performed by: OCCUPATIONAL THERAPIST

## 2022-09-18 PROCEDURE — 74011250637 HC RX REV CODE- 250/637: Performed by: INTERNAL MEDICINE

## 2022-09-18 PROCEDURE — 94664 DEMO&/EVAL PT USE INHALER: CPT

## 2022-09-18 PROCEDURE — 84100 ASSAY OF PHOSPHORUS: CPT

## 2022-09-18 PROCEDURE — 97535 SELF CARE MNGMENT TRAINING: CPT | Performed by: OCCUPATIONAL THERAPIST

## 2022-09-18 PROCEDURE — 80048 BASIC METABOLIC PNL TOTAL CA: CPT

## 2022-09-18 PROCEDURE — 97165 OT EVAL LOW COMPLEX 30 MIN: CPT | Performed by: OCCUPATIONAL THERAPIST

## 2022-09-18 PROCEDURE — 36415 COLL VENOUS BLD VENIPUNCTURE: CPT

## 2022-09-18 PROCEDURE — 97530 THERAPEUTIC ACTIVITIES: CPT

## 2022-09-18 PROCEDURE — 94640 AIRWAY INHALATION TREATMENT: CPT

## 2022-09-18 PROCEDURE — 74011000258 HC RX REV CODE- 258: Performed by: SPECIALIST

## 2022-09-18 PROCEDURE — 97162 PT EVAL MOD COMPLEX 30 MIN: CPT

## 2022-09-18 PROCEDURE — 77030037878 HC DRSG MEPILEX >48IN BORD MOLN -B

## 2022-09-18 PROCEDURE — 2709999900 HC NON-CHARGEABLE SUPPLY

## 2022-09-18 PROCEDURE — 65610000006 HC RM INTENSIVE CARE

## 2022-09-18 PROCEDURE — 74011250637 HC RX REV CODE- 250/637: Performed by: SPECIALIST

## 2022-09-18 PROCEDURE — 74011000250 HC RX REV CODE- 250: Performed by: INTERNAL MEDICINE

## 2022-09-18 RX ORDER — CARVEDILOL 6.25 MG/1
6.25 TABLET ORAL 2 TIMES DAILY WITH MEALS
Status: DISCONTINUED | OUTPATIENT
Start: 2022-09-18 | End: 2022-09-23 | Stop reason: HOSPADM

## 2022-09-18 RX ORDER — INSULIN GLARGINE 100 [IU]/ML
15 INJECTION, SOLUTION SUBCUTANEOUS DAILY
Status: DISCONTINUED | OUTPATIENT
Start: 2022-09-19 | End: 2022-09-18

## 2022-09-18 RX ORDER — INSULIN GLARGINE 100 [IU]/ML
15 INJECTION, SOLUTION SUBCUTANEOUS DAILY
Status: DISCONTINUED | OUTPATIENT
Start: 2022-09-18 | End: 2022-09-19

## 2022-09-18 RX ORDER — MICONAZOLE NITRATE 2 %
POWDER (GRAM) TOPICAL 2 TIMES DAILY
Status: DISCONTINUED | OUTPATIENT
Start: 2022-09-18 | End: 2022-09-23 | Stop reason: HOSPADM

## 2022-09-18 RX ADMIN — QUETIAPINE FUMARATE 50 MG: 25 TABLET ORAL at 17:14

## 2022-09-18 RX ADMIN — IPRATROPIUM BROMIDE AND ALBUTEROL SULFATE 3 ML: .5; 2.5 SOLUTION RESPIRATORY (INHALATION) at 02:06

## 2022-09-18 RX ADMIN — CYANOCOBALAMIN TAB 500 MCG 500 MCG: 500 TAB at 09:36

## 2022-09-18 RX ADMIN — INSULIN GLARGINE 15 UNITS: 100 INJECTION, SOLUTION SUBCUTANEOUS at 12:22

## 2022-09-18 RX ADMIN — AMIODARONE HYDROCHLORIDE 0.5 MG/MIN: 50 INJECTION, SOLUTION INTRAVENOUS at 07:52

## 2022-09-18 RX ADMIN — QUETIAPINE FUMARATE 50 MG: 25 TABLET ORAL at 21:40

## 2022-09-18 RX ADMIN — MICONAZOLE NITRATE 2 % TOPICAL POWDER: at 17:14

## 2022-09-18 RX ADMIN — FOLIC ACID 1 MG: 1 TABLET ORAL at 09:35

## 2022-09-18 RX ADMIN — IPRATROPIUM BROMIDE AND ALBUTEROL SULFATE 3 ML: .5; 2.5 SOLUTION RESPIRATORY (INHALATION) at 07:31

## 2022-09-18 RX ADMIN — IPRATROPIUM BROMIDE AND ALBUTEROL SULFATE 3 ML: .5; 2.5 SOLUTION RESPIRATORY (INHALATION) at 19:57

## 2022-09-18 RX ADMIN — CARVEDILOL 6.25 MG: 6.25 TABLET, FILM COATED ORAL at 09:35

## 2022-09-18 RX ADMIN — MICONAZOLE NITRATE 2 % TOPICAL POWDER: at 12:22

## 2022-09-18 RX ADMIN — CARVEDILOL 6.25 MG: 6.25 TABLET, FILM COATED ORAL at 17:14

## 2022-09-18 RX ADMIN — LEVOTHYROXINE SODIUM 100 MCG: 0.1 TABLET ORAL at 07:45

## 2022-09-18 RX ADMIN — IPRATROPIUM BROMIDE AND ALBUTEROL SULFATE 3 ML: .5; 2.5 SOLUTION RESPIRATORY (INHALATION) at 13:36

## 2022-09-18 RX ADMIN — QUETIAPINE FUMARATE 50 MG: 25 TABLET ORAL at 09:35

## 2022-09-18 NOTE — WOUND CARE
Wound Nurse Note     Patient seen in follow-up skin assessment; currently resting on an ICU specialty mattress. Patient turned and assessed with nursing assist.   at bedside for assessment. Assessment;  Sacral area/buttocks - FMS in place draining loose/liquid brown stool. Small decompressed drying blister on right buttock approx 0.2cm x 2.0cm. Mild excoriation at sacral crease with yeast-like rash at periphery. Right hand - skin tear with red moist tissue approx 0.2cm x 0.4cm. Bilateral heels - intact; no redness. Floating in off-loading boots. Recommendations:  Turn q2h and float heels. Continue foam dressing to right hand as ordered; change MWF. Continue sacral foam dressing to right buttock; change MWF. FMS care per policy; cleanse around insertion prn leakage with barrier wipes. Would consider adding barrier cream to sacral crease redness prn incontinence care. Antifungal powder BID to yeast-like rash. Plan:  Orders updated per . Will continue to follow; reconsult as needed.      Lela Locke RN [facility], [facility].  [facility] Wound Dept  [phone]

## 2022-09-18 NOTE — PROGRESS NOTES
Problem: Mobility Impaired (Adult and Pediatric)  Goal: *Acute Goals and Plan of Care (Insert Text)  Description: FUNCTIONAL STATUS PRIOR TO ADMISSION: Patient was independent and active without use of DME.    HOME SUPPORT PRIOR TO ADMISSION: The patient lived with his mother and assisted with her iADLs. He has children and siblings also in the local area. Physical Therapy Goals  Initiated 9/18/2022  1. Patient will move from supine to sit and sit to supine , scoot up and down, and roll side to side in bed with minimal assistance/contact guard assist within 7 day(s). 2.  Patient will transfer from bed to chair and chair to bed with minimal assistance/contact guard assist using the least restrictive device within 7 day(s). 3.  Patient will perform sit to stand with minimal assistance/contact guard assist within 7 day(s). 4.  Patient will ambulate with minimal assistance/contact guard assist for 50 feet with the least restrictive device within 7 day(s). Outcome: Not Met   PHYSICAL THERAPY EVALUATION  Patient: Grace Mike (1 y.o. male)  Date: 9/18/2022  Primary Diagnosis: Severe sepsis ([facility] [A41.9, R65.20]       Precautions:   Fall, Skin, Aspiration      ASSESSMENT  Based on the objective data described below, the patient presents with generalized weakness (UEs>LEs), impaired sitting and standing balance, decreased activity tolerance, and gait dysfunction not consistent with baseline functional mobility level s/p admission for severe sepsis. Patient oriented x 4 and very cooperative and eager to work with therapy today. Requiring MOD A x 2 to come to sitting EOB with varying ability to maintain midline x 6-7 minutes sitting there. Tended to abruptly lurch after a couple minutes of mildline vs slow lean into one direction. Required MAX x 2 to come to standing with poor maintenance of midline, requiring continued MOD A to maintain.   Tolerated up to 1 minutes of standing first trial and then 1.5 minutes the second with patient able to take lateral steps up to [facility] with MOD A x 2. Returned to supine MAX x 2 and set up with lunch tray. At baseline, patient was active and today appears very motivated to regain prior level of independence. Recommend IPR to maximize his chance of full recovery, patient unsafe for d/c home at this time and requires 2 person assist for all activities. Recommend use of Kathe Mungo or Steven Burly for transfer to chair with nursing staff. Current Level of Function Impacting Discharge (mobility/balance): MOD to MAX A x 2 bed mob, transfers, sidesteps at bedside    Functional Outcome Measure: The patient scored Total: 10/100 on the Barthel Index outcome measure which is indicative of being totally dependent in basic self-care. Other factors to consider for discharge: very far below baseline, fall risk, supportive family     Patient will benefit from skilled therapy intervention to address the above noted impairments. PLAN :  Recommendations and Planned Interventions: bed mobility training, transfer training, gait training, therapeutic exercises, neuromuscular re-education, patient and family training/education, and therapeutic activities      Frequency/Duration: Patient will be followed by physical therapy:  5 times a week to address goals. Recommendation for discharge: (in order for the patient to meet his/her long term goals)  Therapy 3 hours per day 5-7 days per week    This discharge recommendation:  Has been made in collaboration with the attending provider and/or case management    IF patient discharges home will need the following DME: to be determined (TBD)         SUBJECTIVE:   Patient stated I really wanted to get to the chair.     OBJECTIVE DATA SUMMARY:   HISTORY:    Past Medical History:   Diagnosis Date    Hypercholesteremia     Hypertension     Melanoma ([facility]     skin    Thyroid activity decreased      Past Surgical History:   Procedure Laterality Date    HX ORTHOPAEDIC      IR INSERT NON TUNL CVC OVER 5 YRS  9/6/2022       Personal factors and/or comorbidities impacting plan of care:     Home Situation  Home Environment: Private residence  # Steps to Enter: 3  Rails to Enter: Yes  Hand Rails : Left  One/Two Story Residence: One story  Living Alone: No  Support Systems: Parent(s), Child(edi) (Pt cares for his mother)  Patient Expects to be Discharged to[de-identified] Skilled nursing facility  Current DME Used/Available at Home: [address], [place], straight, Walker, rolling, Grab bars, Shower chair  Tub or Shower Type: Tub/Shower combination    EXAMINATION/PRESENTATION/DECISION MAKING:   Critical Behavior:  Neurologic State: Alert  Orientation Level: Oriented X4  Cognition: Appropriate decision making, Appropriate for age attention/concentration, Follows commands  Safety/Judgement: Awareness of environment, Decreased awareness of need for assistance, Decreased awareness of need for safety, Decreased insight into deficits, Fall prevention  Hearing: Auditory  Auditory Impairment: None    Range Of Motion:  AROM: Generally decreased, functional (B shoulders, elbow, wrist and hands imp R > L)           PROM: Generally decreased, functional           Strength:    Strength: Generally decreased, functional (R shoulder 2-/5, L shoulder 2/5)                    Tone & Sensation:   Tone: Normal                              Coordination:  Coordination: Grossly decreased, non-functional  Vision:   Acuity: Able to read clock/calendar on wall without difficulty; Able to read employee name badge without difficulty  Functional Mobility:  Bed Mobility:  Rolling: Moderate assistance; Additional time;Assist x1  Supine to Sit: Moderate assistance; Additional time;Assist x1  Sit to Supine: Maximum assistance; Additional time;Assist x1  Scooting: Moderate assistance; Additional time;Assist x1  Transfers:  Sit to Stand: Maximum assistance; Additional time;Assist x2  Stand to Sit: Maximum assistance; Additional time;Assist x1        Bed to Chair: Maximum assistance; Additional time;Assist x2              Balance:   Sitting: Impaired; With support (LOB to R)  Sitting - Static: Fair (occasional)  Sitting - Dynamic: Poor (constant support)  Standing: Impaired; With support (LOB forward to R and L)  Standing - Static: Poor;Constant support  Standing - Dynamic : Poor;Constant support  Ambulation/Gait Training:                                                          Functional Measure:  Barthel Index:    Bathin  Bladder: 5  Bowels: 0  Groomin  Dressin  Feedin  Mobility: 0  Stairs: 0  Toilet Use: 0  Transfer (Bed to Chair and Back): 5  Total: 10/100       The Barthel ADL Index: Guidelines  1. The index should be used as a record of what a patient does, not as a record of what a patient could do. 2. The main aim is to establish degree of independence from any help, physical or verbal, however minor and for whatever reason. 3. The need for supervision renders the patient not independent. 4. A patient's performance should be established using the best available evidence. Asking the patient, friends/relatives and nurses are the usual sources, but direct observation and common sense are also important. However direct testing is not needed. 5. Usually the patient's performance over the preceding 24-48 hours is important, but occasionally longer periods will be relevant. 6. Middle categories imply that the patient supplies over 50 per cent of the effort. 7. Use of aids to be independent is allowed. Score Interpretation (from [address] [address])    Independent   60-79 Minimally independent   40-59 Partially dependent   20-39 Very dependent   <20 Totally dependent     -Zeus Sandoval., Barthel, D.W. (1965). Functional evaluation: the Barthel Index. [address] [facility] ([address] (1997). The Barthel activities of daily living index: self-reporting versus actual performance in the old (> or = 75 years). Journal of [address] [place] [address] 457), [facility] McCall[facility]., Lester Collazo.Delma. (1999). Measuring the change in disability after inpatient rehabilitation; comparison of the responsiveness of the Barthel Index and Functional [place] Measure. Journal of Neurology, Neurosurgery, and Psychiatry, 66(4), 357-876. ANISA Vee, MYRIAM Ervin, & Herman Saavedra M.A. (2004) Assessment of post-stroke quality of life in cost-effectiveness studies: The usefulness of the Barthel Index and the EuroQoL-5D. Quality of Life Research, 15, 429-60        Physical Therapy Evaluation Charge Determination   History Examination Presentation Decision-Making   HIGH Complexity :3+ comorbidities / personal factors will impact the outcome/ POC  MEDIUM Complexity : 3 Standardized tests and measures addressing body structure, function, activity limitation and / or participation in recreation  MEDIUM Complexity : Evolving with changing characteristics  Other outcome measures [address] 10  HIGH       Based on the above components, the patient evaluation is determined to be of the following complexity level: MEDIUM    Pain Rating:  None reported    Activity Tolerance:   Fair, SpO2 stable on RA, and requires rest breaks    After treatment patient left in no apparent distress:   Supine in bed, Call bell within reach, and Bed / chair alarm activated    COMMUNICATION/EDUCATION:   The patients plan of care was discussed with: Occupational therapist and Registered nurse. Fall prevention education was provided and the patient/caregiver indicated understanding. and Patient/family agree to work toward stated goals and plan of care.     Thank you for this referral.  Matt Calderon, PT   Time Calculation: 36 mins

## 2022-09-18 NOTE — PROGRESS NOTES
Problem: Falls - Risk of  Goal: *Absence of Falls  Description: Document Chiqui Bare Fall Risk and appropriate interventions in the flowsheet. Outcome: Progressing Towards Goal  Note: Fall Risk Interventions:       Mentation Interventions: Adequate sleep, hydration, pain control, Bed/chair exit alarm, Door open when patient unattended, Evaluate medications/consider consulting pharmacy, Room close to nurse's station, Update white board    Medication Interventions: Bed/chair exit alarm, Evaluate medications/consider consulting pharmacy    Elimination Interventions: Bed/chair exit alarm, Call light in reach, Toileting schedule/hourly rounds    Problem: Pressure Injury - Risk of  Goal: *Prevention of pressure injury  Description: Document David Scale and appropriate interventions in the flowsheet. Outcome: Progressing Towards Goal  Note: Pressure Injury Interventions:  Sensory Interventions: Assess changes in LOC, Float heels, Keep linens dry and wrinkle-free, Maintain/enhance activity level, Minimize linen layers, Monitor skin under medical devices, Pressure redistribution bed/mattress (bed type), Turn and reposition approx. every two hours (pillows and wedges if needed)    Moisture Interventions: Absorbent underpads, Check for incontinence Q2 hours and as needed, Internal/External fecal devices, Minimize layers    Activity Interventions: Pressure redistribution bed/mattress(bed type), PT/OT evaluation    Mobility Interventions: Float heels, HOB 30 degrees or less, Pressure redistribution bed/mattress (bed type), Turn and reposition approx.  every two hours(pillow and wedges)    Nutrition Interventions: Document food/fluid/supplement intake, Discuss nutritional consult with provider    Friction and Shear Interventions: Foam dressings/transparent film/skin sealants, HOB 30 degrees or less, Minimize layers, Transferring/repositioning devices

## 2022-09-18 NOTE — PROGRESS NOTES
Cardiology Progress Note  9/18/2022 9:17 AM    Admit Date: 9/6/2022    Admit Diagnosis: Severe sepsis ([facility] [A41.9, R65.20]      Assessment:     Principal Problem:    Severe [facility] ([facility] (9/6/2022)    Active Problems:    LAVINIA (acute kidney injury) ([facility] (9/6/2022)      Melanoma ([facility] (9/6/2022)      HTN (hypertension), benign (9/6/2022)      DM (diabetes mellitus), type 2 ([facility] (9/6/2022)      Hyperlipidemia (9/6/2022)      Delirium (9/14/2022)      Shock ([facility] (9/14/2022)      PAF (paroxysmal atrial fibrillation) ([facility] (9/14/2022)      Thrombocytopenia ([facility] (9/14/2022)      Abdominal distension (9/16/2022)        Plan:     Atrial Fibrillation - Flutter  improved   rhythm sinus tach with Hgb 7 despite transfusion. Will stop IV amiodarone, start oral beta blocker. He may need additional transfusion. Stool negative x 2, unclear why he has refractory anemia. Consider hemolytic uremic syndrome. Still needs dialysis. Rahul Bocanegra MD  [phone]  Cell        Subjective:     Kisha Lopez denies chest pain, dyspnea, palpitations. He reports symptoms are improved since yesterday. ROS: negative except as noted above.     Objective:       Visit Vitals  BP (!) 155/97 (BP 1 Location: Right upper arm, BP Patient Position: At rest)   Pulse (!) 109   Temp 98.5 °F (36.9 °C)   Resp 19   Ht 5' 6\" (1.676 m)   Wt 193 lb 9 oz (87.8 kg)   SpO2 93%   BMI 31.24 kg/m²       Current Facility-Administered Medications   Medication Dose Route Frequency    carvediloL (COREG) tablet 6.25 mg  6.25 mg Oral BID WITH MEALS    0.9% sodium chloride infusion 250 mL  250 mL IntraVENous PRN    polyethylene glycol (MIRALAX) packet 17 g  17 g Oral DAILY PRN    insulin lispro (HUMALOG) injection   SubCUTAneous AC&HS    QUEtiapine (SEROquel) tablet 50 mg  50 mg Oral TID    insulin glargine (LANTUS) injection 18 Units  18 Units SubCUTAneous DAILY    albumin human 25% (BUMINATE) solution 25 g  25 g IntraVENous DIALYSIS PRN albuterol-ipratropium (DUO-NEB) 2.5 MG-0.5 MG/3 ML  3 mL Nebulization Q6H RT    albuterol-ipratropium (DUO-NEB) 2.5 MG-0.5 MG/3 ML  3 mL Nebulization Q4H PRN    sennosides (SENOKOT) 8.8 mg/5 mL syrup 8.8 mg  5 mL Oral DAILY    hydrALAZINE (APRESOLINE) 20 mg/mL injection 10 mg  10 mg IntraVENous Q6H PRN    acetaminophen (TYLENOL) tablet 650 mg  650 mg Oral Q6H PRN    OLANZapine (ZyPREXA) tablet 5 mg  5 mg Per NG tube BID PRN    folic acid (FOLVITE) tablet 1 mg  1 mg Oral DAILY    cyanocobalamin (VITAMIN B12) tablet 500 mcg  500 mcg Per NG tube DAILY    dextrose 10% infusion 0-250 mL  0-250 mL IntraVENous PRN    levothyroxine (SYNTHROID) tablet 100 mcg  100 mcg Oral ACB    glucose chewable tablet 16 g  4 Tablet Oral PRN    glucagon (GLUCAGEN) injection 1 mg  1 mg IntraMUSCular PRN    [Held by provider] heparin (porcine) injection 5,000 Units  5,000 Units SubCUTAneous Q12H         Physical Exam:  Visit Vitals  BP (!) 155/97 (BP 1 Location: Right upper arm, BP Patient Position: At rest)   Pulse (!) 109   Temp 98.5 °F (36.9 °C)   Resp 19   Ht 5' 6\" (1.676 m)   Wt 193 lb 9 oz (87.8 kg)   SpO2 93%   BMI 31.24 kg/m²     General Appearance:  Well developed, well nourished,alert and oriented x 3, individual in no acute distress. Ears/Nose/Mouth/Throat:   Hearing grossly normal.         Neck: Supple. Chest:   Lungs clear to auscultation bilaterally. Cardiovascular:  Regular rate and rhythm, S1, S2 normal, no murmur. Abdomen:   Soft, non-tender, bowel sounds are active. Extremities: No edema bilaterally. Skin: Warm and dry.                Telemetry: normal sinus rhythm    Data Review:     Labs:    Recent Results (from the past 24 hour(s))   GLUCOSE, POC    Collection Time: 09/17/22 11:29 AM   Result Value Ref Range    Glucose (POC) 194 (H) 65 - 117 mg/dL    Performed by FARIDEH/ Mary 29, POC    Collection Time: 09/17/22  4:21 PM   Result Value Ref Range    Glucose (POC) 154 (H) 65 - 117 mg/dL Performed by Stephen Duffy    HGB & HCT    Collection Time: 09/17/22  4:28 PM   Result Value Ref Range    HGB 7.3 (L) 12.1 - 17.0 g/dL    HCT 21.1 (L) 36.6 - 50.3 %   GLUCOSE, POC    Collection Time: 09/17/22  9:37 PM   Result Value Ref Range    Glucose (POC) 150 (H) 65 - 117 mg/dL    Performed by Gian Sparrow [facility]    METABOLIC PANEL, BASIC    Collection Time: 09/18/22  3:08 AM   Result Value Ref Range    Sodium 135 (L) 136 - 145 mmol/L    Potassium 3.7 3.5 - 5.1 mmol/L    Chloride 98 97 - 108 mmol/L    CO2 28 21 - 32 mmol/L    Anion gap 9 5 - 15 mmol/L    Glucose 111 (H) 65 - 100 mg/dL    BUN 45 (H) 6 - 20 MG/DL    Creatinine 5.57 (H) 0.70 - 1.30 MG/DL    BUN/Creatinine ratio 8 (L) 12 - 20      GFR est AA 13 (L) >60 ml/min/1.73m2    GFR est non-AA 10 (L) >60 ml/min/1.73m2    Calcium 7.9 (L) 8.5 - 10.1 MG/DL   PHOSPHORUS    Collection Time: 09/18/22  3:08 AM   Result Value Ref Range    Phosphorus 4.2 2.6 - 4.7 MG/DL   MAGNESIUM    Collection Time: 09/18/22  3:08 AM   Result Value Ref Range    Magnesium 2.2 1.6 - 2.4 mg/dL   CBC WITH AUTOMATED DIFF    Collection Time: 09/18/22  3:08 AM   Result Value Ref Range    WBC 14.8 (H) 4.1 - 11.1 K/uL    RBC 2.32 (L) 4.10 - 5.70 M/uL    HGB 7.4 (L) 12.1 - 17.0 g/dL    HCT 21.8 (L) 36.6 - 50.3 %    MCV 94.0 80.0 - 99.0 FL    MCH 31.9 26.0 - 34.0 PG    MCHC 33.9 30.0 - 36.5 g/dL    RDW 13.6 11.5 - 14.5 %    PLATELET 523 383 - 382 K/uL    MPV 9.3 8.9 - 12.9 FL    NRBC 0.0 0  WBC    ABSOLUTE NRBC 0.00 0.00 - 0.01 K/uL    NEUTROPHILS 85 (H) 32 - 75 %    LYMPHOCYTES 8 (L) 12 - 49 %    MONOCYTES 5 5 - 13 %    EOSINOPHILS 1 0 - 7 %    BASOPHILS 0 0 - 1 %    IMMATURE GRANULOCYTES 1 (H) 0.0 - 0.5 %    ABS. NEUTROPHILS 12.6 (H) 1.8 - 8.0 K/UL    ABS. LYMPHOCYTES 1.1 0.8 - 3.5 K/UL    ABS. MONOCYTES 0.8 0.0 - 1.0 K/UL    ABS. EOSINOPHILS 0.2 0.0 - 0.4 K/UL    ABS. BASOPHILS 0.1 0.0 - 0.1 K/UL    ABS. IMM.  GRANS. 0.1 (H) 0.00 - 0.04 K/UL    DF AUTOMATED     OCCULT BLOOD, STOOL Collection Time: 09/18/22  4:59 AM   Result Value Ref Range    Occult blood, stool Negative NEG     GLUCOSE, POC    Collection Time: 09/18/22  7:41 AM   Result Value Ref Range    Glucose (POC) 103 65 - 117 mg/dL    Performed by Laol Lopes MD

## 2022-09-18 NOTE — PROGRESS NOTES
Progress Note  Date:2022       Room:[address] [place] [address]  Patient Kimberly Puente     YOB: 1960     Age:62 y.o. Subjective    Subjective:  Symptoms:  No shortness of breath or chest pain. awake, improved intake; denied pain; remains on amio  Review of Systems   Constitutional:  Positive for appetite change. Negative for chills, diaphoresis, fatigue and fever. HENT:  Negative for nosebleeds. Respiratory:  Negative for shortness of breath and wheezing. Cardiovascular:  Positive for leg swelling. Negative for chest pain and palpitations. Gastrointestinal:  Negative for abdominal distention, abdominal pain and anal bleeding. Objective         Vitals Last 24 Hours:  TEMPERATURE:  Temp  Av.8 °F (37.1 °C)  Min: 98.4 °F (36.9 °C)  Max: 99.1 °F (37.3 °C)  RESPIRATIONS RANGE: Resp  Av  Min: 12  Max: 36  PULSE OXIMETRY RANGE: SpO2  Av.5 %  Min: 90 %  Max: 97 %  PULSE RANGE: Pulse  Av.1  Min: 103  Max: 116  BLOOD PRESSURE RANGE: Systolic (45JFC), NFD:027 , Min:106 , KTL:349   ; Diastolic (43BKP), FGP:66, Min:66, Max:114    I/O (24Hr): Intake/Output Summary (Last 24 hours) at 2022 1023  Last data filed at 2022 0800  Gross per 24 hour   Intake 846.13 ml   Output 200 ml   Net 646.13 ml     Objective:  Vital signs: (most recent): Blood pressure (!) 155/97, pulse (!) 109, temperature 98.5 °F (36.9 °C), resp. rate 19, height 5' 6\" (1.676 m), weight 87.8 kg (193 lb 9 oz), SpO2 93 %. No fever.       Exam facilitated by use of telemedicine platform and with assistance from in house team  Gen - Awake and interactive; no acute distress; full sentences  Head - nc/at  Eyes - anicteric sclera  Ent - normal external anatomy  Cvs - sinus rhythm without external evidence of hypoperfusion  Pulm - normal WOB and adequate SaO2 on RA  GI-no evidence of tenderness with passive movement; no bruising or ecchymosis  Ext - no c/c/e  Msk - normal bulk  Neuro - oriented, no deficits in strength noted; no tremor; follows commands  Labs/Imaging/Diagnostics    Labs:  CBC:  Recent Labs     09/18/22  0308 09/17/22  1628 09/17/22  0324 09/16/22  1517 09/16/22  0314   WBC 14.8*  --  15.8*  --  14.8*   RBC 2.32*  --  1.89*  --  2.21*   HGB 7.4* 7.3* 6.1*   < > 7.2*   HCT 21.8* 21.1* 18.5*   < > 20.6*   MCV 94.0  --  97.9  --  93.2   RDW 13.6  --  13.4  --  13.2     --  246  --  195    < > = values in this interval not displayed. CHEMISTRIES:  Recent Labs     09/18/22  0308 09/17/22  0752 09/16/22  2138   * 134* 135*   K 3.7 3.9 3.8   CL 98 99 100   CO2 28 28 30   BUN 45* 36* 32*   CA 7.9* 8.2* 8.2*   PHOS 4.2 3.4 2.7   MG 2.2 2.3 2.3   PT/INR:No results for input(s): INR, INREXT in the last 72 hours. No lab exists for component: PROTIME  APTT:No results for input(s): APTT in the last 72 hours. LIVER PROFILE:No results for input(s): AST, ALT in the last 72 hours. No lab exists for component: Erendira Sender, ALKPHOS  Lab Results   Component Value Date/Time    ALT (SGPT) 12 09/09/2022 03:39 PM    AST (SGOT) 27 09/09/2022 03:39 PM    Alk. phosphatase 63 09/09/2022 03:39 PM    Bilirubin, direct 0.3 (H) 09/09/2022 03:39 PM    Bilirubin, total 0.6 09/09/2022 03:39 PM       Imaging Last 24 Hours:  No results found.   Assessment//Plan   Principal Problem:    Severe sepsis ([facility] (9/6/2022)    Active Problems:    LAVINIA (acute kidney injury) ([facility] (9/6/2022)      Melanoma ([address] (9/6/2022)      HTN (hypertension), benign (9/6/2022)      DM (diabetes mellitus), type 2 ([address] (9/6/2022)      Hyperlipidemia (9/6/2022)      Delirium (9/14/2022)      Shock ([address] (9/14/2022)      PAF (paroxysmal atrial fibrillation) ([address] (9/14/2022)      Thrombocytopenia ([address] (9/14/2022)      Abdominal distension (9/16/2022)      Assessment & Plan  Neuro - initial metabolic encephalopathy subsequently complicated by delirium     Supportive care and multi-modal neuropsych regimen has shown improvement and the latter was weaned; cont Seroquel for now    - Possible seizure-like activity noted earlier but the EEG was negative for epileptic activity  - CT brain demonstrated no acute pathology  - PT consulted, patient expressed motivation to move     Cvs-Likely mixed etiology for initial shock with hypovolemia in the setting of dehydration and DKA  -suspect that atrial fibrillation with RVR was triggered secondary to above but this does increase his long-term risk of A. fib and he remains on amio - as MAPs have limited other agents, would withhold antihypertensives that don't have concomitant impact on HR; that said, his maps are acceptable this am and may be able to start scheduled enteral aents and stop amio gtt +/- enteral transition - ultimately, defer to cardiology  - [facility] has been on hold given low day-to-day cva risk and nongoing transfusion requirements  -Echo demonstrated ejection fraction 65-70% with hyperdynamic LV and no evidence of diastolic dysfunction        Pulm-improved resp status and has normal WOB and adequate SaO2 on minimal support    Gi-no immediate need for TORI proph  -prior CT imaging unrevealing     Gu-presented with oliguric acute kidney injury with significant anion gap metabolic acidosis/lactic acidosis; initially required CVVHD but since transitioned to, and tolerating conventional HD    Heme-thrombocytopenia persists - ROBERTO negative; has required transfusions but no overt hemorrhage noted; ?silvana influenced by renal disease, dilution, and phlebotomy  -diagnosed with melanoma and will require follow-up post hospitalization re: treatment     id - s/p earlier course of empiric abx coverage but cultures unrevealing and no evidence of acute infection     Endo - DKA resolved and on sub cut regimen; follow BS and adjust regimen accordingly  - prior A1c 6.6% and no reported blood transfusions around that time - ?triggered event  - h/o hypothyroidism and on synthroid CCT 40minutes excluding teaching and procedures     I performed all aspects of the physical examination via Telemedicine associated with two way audio and video communication and with the on-site assistance of the bedside nurse. I am located in [place] and the patient is located in [place] at [address] [place] [address]   The patient is critically ill in the ICU. I  personally  reviewed the pertinent medical records, laboratory/ pathology data and radiographic images. The decision making regarding this patient is as documented above, which was generated  following  discussion  with the multidisciplinary ICU team and creation of a treatment plan for  the patient. We discussed the patient's interval history and future coordination of care and  plans. The patient's medications  were reviewed and changes made as stipulated above. Due to  critical illness impairing one or more vital organs of this patient resulting in life threatening clinical situation  I have provided direct, frequent personal  assessment and manipulation in management plan.   Electronically signed by Betsey Balderas MD on 9/18/2022 at 10:23 AM

## 2022-09-18 NOTE — PROGRESS NOTES
Problem: Self Care Deficits Care Plan (Adult)  Goal: *Acute Goals and Plan of Care (Insert Text)  Description: FUNCTIONAL STATUS PRIOR TO ADMISSION: Patient was independent and active without use of DME. Pt cared for his mother. Drives. HOME SUPPORT: The patient lived with mother whom pt cares for. Occupational Therapy Goals  Initiated 9/18/2022  1. Patient will perform self-feeding with supervision/set-up within 7 day(s). 2.  Patient will perform grooming with supervision/set-up within 7 day(s). 3.  Patient will perform upper body dressing with supervision/set-up within 7 day(s). 4.  Patient will perform toilet transfers with moderate assistance  within 7 day(s). 5.  Patient will perform all aspects of toileting with moderate assistance  within 7 day(s). 6.  Patient will participate in upper extremity therapeutic exercise/activities with independence for 10 minutes within 7 day(s). 7.  Patient will utilize energy conservation techniques during functional activities with verbal and visual cues within 7 day(s). Outcome: Progressing Towards Goal     OCCUPATIONAL THERAPY EVALUATION  Patient: Wes Tineo (85 y.o. male)  Date: 9/18/2022  Primary Diagnosis: Severe sepsis ([facility] [A41.9, R65.20]       Precautions: Fall, Skin, Aspiration    ASSESSMENT  Based on the objective data described below, the patient presents with decreased strength, AROM in UEs with edematous hands, decreases endurance, mobility, coordination, balance and safety following admission for severe sepsis on 9/6/22 requiring intubation and CRRT. Pt extubated a few days ago. He requires up to mod A for bed mobility, max A UE ADLs, total A LE ADLs and toileting and max A x2 for standing balance. Prior to admission pt with independent, active and caring for his mother. Based on above recommend IPR at discharge. Pt is hard working and very motivated for increased independence.     Current Level of Function Impacting Discharge (ADLs/self-care): total A    Functional Outcome Measure: The patient scored Total: 10/100 on the Barthel Index outcome measure. Other factors to consider for discharge: see above     Patient will benefit from skilled therapy intervention to address the above noted impairments. PLAN :  Recommendations and Planned Interventions: self care training, functional mobility training, therapeutic exercise, balance training, therapeutic activities, endurance activities, neuromuscular re-education, patient education, home safety training, and family training/education    Frequency/Duration: Patient will be followed by occupational therapy 5 times a week to address goals. Recommendation for discharge: (in order for the patient to meet his/her long term goals)  Therapy 3 hours per day 5-7 days per week    This discharge recommendation:  Has not yet been discussed the attending provider and/or case management    IF patient discharges home will need the following DME: TBD       SUBJECTIVE:   Patient stated I really want to get to the chair.     OBJECTIVE DATA SUMMARY:   HISTORY:   Past Medical History:   Diagnosis Date    Hypercholesteremia     Hypertension     Melanoma ([facility]     skin    Thyroid activity decreased      Past Surgical History:   Procedure Laterality Date    HX ORTHOPAEDIC      IR INSERT NON TUNL CVC OVER 5 YRS  9/6/2022       Expanded or extensive additional review of patient history:     Home Situation  Home Environment: Private residence  # Steps to Enter: 3  Rails to Enter: Yes  Hand Rails : Left  One/Two Story Residence: One story  Living Alone: No  Support Systems: Parent(s), Child(edi) (Pt cares for his mother)  Patient Expects to be Discharged to[de-identified] Skilled nursing facility  Current DME Used/Available at Home: Naylor Cushing, straight, Walker, rolling, Grab bars, Shower chair  Tub or Shower Type: Tub/Shower combination    Hand dominance: Right    EXAMINATION OF PERFORMANCE DEFICITS:  Cognitive/Behavioral Status:  Neurologic State: Alert  Orientation Level: Oriented X4  Cognition: Appropriate decision making; Appropriate for age attention/concentration; Follows commands  Perception: Appears intact  Perseveration: No perseveration noted  Safety/Judgement: Awareness of environment;Decreased awareness of need for assistance;Decreased awareness of need for safety;Decreased insight into deficits; Fall prevention    Hearing: Auditory  Auditory Impairment: None    Vision/Perceptual:    Acuity: Able to read clock/calendar on wall without difficulty; Able to read employee name badge without difficulty         Range of Motion:  AROM: Generally decreased, functional (B shoulders, elbow, wrist and hands imp R > L)  PROM: Generally decreased, functional                      Strength:  Strength: Generally decreased, functional (R shoulder 2-/5, L shoulder 2/5)                Coordination:  Coordination: Grossly decreased, non-functional  Fine Motor Skills-Upper: Left Impaired;Right Impaired    Gross Motor Skills-Upper: Left Impaired;Right Impaired    Tone & Sensation:  Tone: Normal                         Balance:  Sitting: Impaired; With support (LOB to R)  Sitting - Static: Fair (occasional)  Sitting - Dynamic: Poor (constant support)  Standing: Impaired; With support (LOB forward to R and L)  Standing - Static: Poor;Constant support  Standing - Dynamic : Poor;Constant support    Functional Mobility and Transfers for ADLs:  Bed Mobility:  Rolling: Moderate assistance; Additional time;Assist x1  Supine to Sit: Moderate assistance; Additional time;Assist x1  Sit to Supine: Maximum assistance; Additional time;Assist x1  Scooting: Moderate assistance; Additional time;Assist x1    Transfers:  Sit to Stand: Maximum assistance; Additional time;Assist x2  Stand to Sit: Maximum assistance; Additional time;Assist x1  Bed to Chair: Maximum assistance; Additional time;Assist x2  Toilet Transfer :  Total assistance (bed level)  Assistive Device : Caron Escalante, rolling;Gait Belt    ADL Assessment and Intervention:  Feeding: Moderate assistance; Additional time;Assist x1 (built up faoam and A at R elbow for shoulder elevation) Assisted pt with lunch. Oral Facial Hygiene/Grooming: Maximum assistance    Bathing: Total assistance    Type of Bath: Chlorhexidine (CHG); Bath Pack    Upper Body Dressing: Maximum assistance    Lower Body Dressing: Total assistance    Toileting: Total assistance (rectal tub)     Type of Bath: Chlorhexidine (CHG); Bath Pack    Cognitive Retraining  Safety/Judgement: Awareness of environment;Decreased awareness of need for assistance;Decreased awareness of need for safety;Decreased insight into deficits; Fall prevention    Functional Measure:    Barthel Index:  Bathin  Bladder: 5  Bowels: 0  Groomin  Dressin  Feedin  Mobility: 0  Stairs: 0  Toilet Use: 0  Transfer (Bed to Chair and Back): 5  Total: 10/100      The Barthel ADL Index: Guidelines  1. The index should be used as a record of what a patient does, not as a record of what a patient could do. 2. The main aim is to establish degree of independence from any help, physical or verbal, however minor and for whatever reason. 3. The need for supervision renders the patient not independent. 4. A patient's performance should be established using the best available evidence. Asking the patient, friends/relatives and nurses are the usual sources, but direct observation and common sense are also important. However direct testing is not needed. 5. Usually the patient's performance over the preceding 24-48 hours is important, but occasionally longer periods will be relevant. 6. Middle categories imply that the patient supplies over 50 per cent of the effort. 7. Use of aids to be independent is allowed.     Score Interpretation (from [address] [address])    Independent   60-79 Minimally independent   40-59 Partially dependent   20-39 Very dependent   <20 Totally dependent     -Jaime F.l., Barthel, DTimW. (1965). Functional evaluation: the Barthel Index. [address] ([address] (1997). The Barthel activities of daily living index: self-reporting versus actual performance in the old (> or = 75 years). Journal of [address] [place] [address] 45(7), [facility], J.LATRICE, Boni Rivera., Jewell Norris. (1999). Measuring the change in disability after inpatient rehabilitation; comparison of the responsiveness of the Barthel Index and Functional [place] Measure. Journal of Neurology, Neurosurgery, and Psychiatry, 66(4), 760-722. Karthikeyan Benton, N.J.A, MYRIAM Ervin, & Anjali Tran M.A. (2004) Assessment of post-stroke quality of life in cost-effectiveness studies: The usefulness of the Barthel Index and the EuroQoL-5D. Quality of Life Research, 15, 366-35     Occupational Therapy Evaluation Charge Determination   History Examination Decision-Making   LOW Complexity : Brief history review  HIGH Complexity : 5 or more performance deficits relating to physical, cognitive , or psychosocial skils that result in activity limitations and / or participation restrictions MEDIUM Complexity : Patient may present with comorbidities that affect occupational performnce. Miniml to moderate modification of tasks or assistance (eg, physical or verbal ) with assesment(s) is necessary to enable patient to complete evaluation       Based on the above components, the patient evaluation is determined to be of the following complexity level: LOW   Pain Rating:  No c/o pain    Activity Tolerance:   Fair and requires frequent rest breaks    After treatment patient left in no apparent distress:    Supine in bed, Call bell within reach, and Caregiver / family present    COMMUNICATION/EDUCATION:   The patients plan of care was discussed with: Physical therapist and Registered nurse.      Home safety education was provided and the patient/caregiver indicated understanding., Patient/family have participated as able in goal setting and plan of care. , and Patient/family agree to work toward stated goals and plan of care. This patients plan of care is appropriate for delegation to CHRISTINE.     Thank you for this referral.  Carley Taylor, OT  Time Calculation: 37 mins

## 2022-09-18 NOTE — PROGRESS NOTES
Progress Note          Pt Name  Harini Plummer   Date of Birth 1960   Medical Record Number  [id number]      Age  57 y.o. PCP None   Admit date:  9/6/2022    Room Number  2199/61  @ [facility]   Date of Service  9/18/2022     Admission Diagnoses:  Severe sepsis [facility])     Admission Summary:  \" Mr. oCrtez Naik is a 57 y.o.  male who presented to the Emergency Department complaining of dizziness, hypothermia, decreased appetite and photosensitivity. Patient is currently intubated and on ventilator. History was given by the ER physician and the sister. Patient has a history of melanoma and he had a melanoma surgery done on his back in May. Since that time he has been complaining of some tightness in his back. He was not started on any chemotherapy yet. Patient was thought to have a neuropathy and was started on gabapentin on Friday and he started feeling dizzy over the weekend. He took almost 3 capsules of gabapentin since Friday. He was having some respiratory issues yesterday as per the sister and he got really worse today and came to the ER in [facility]. He was found to be in acute renal failure, hyperkalemia, hypothermia and elevated lactic acid levels. He did not complain of any abdominal pain. He did complain of some nausea and vomiting though. He denied any fever or chills. No chest pain. His pulse was 94 respiratory rate was 23 pulse ox was 100% in the chest or ER. His white blood cell count was 21.0, potassium of 7.6, bicarb of 4, glucose of 160, creatinine of 12.9, lactic acid of 12.6. He was transferred to [facility] quickly and when he arrived to ICU he was not responding and was in respiratory distress. He was intubated quickly.  \"     Assessment and plan:     ARF   Metabolic acidosis -initially treated with CVVH followed byt Intermittent hemodialysis started on 09/12/22   Appreciate guidance from nephrologist   Monitor electrolytes per nephrology recommendation     Anemia -progressive - etiology ? - possibly from Acute illness, compounded by renal failure, blood draws   S/p ONE unit PRBC transfusion on 09/17/22   Continue to hold Heparin prophylaxis for now (SCDs ordered). FOBT x2 negative     Acute hypoxic resp failure -Intubated upon arrival at [facility], now extubated. Appreciate help from intensivist team   Currently remains on 2L/min oxygen supplementation     Delirium -possibly due to acute critical medical issues; no organic etiology identified. Supportive care   Improving     Thrombocytopenia -Platelets are reasonably stable   Monitor     T2DM -chronic, pt was on Metformin   Holding Metformin while in house   SSI   No change in current management plan     Afib RVR - did not tolerate diltiazem (low BP), now on Amiodarone drip   T2MI -will need OP workup   Hx of HTN   Tele Monitor   Rate control   Due to ongoing progressive anemia, agree with withholding Full Anticoagulation   Dr. Omar Tate recommendation for trial of slow titration of Diltiazem drip noted. Body mass index is 31.24 kg/m².  -                                                       CODE STATUS   Full    Functional Status  Pt resides in [facility]   He reports that he was independent with ADLs PTA   His son Russell Cantor helps with some of the chores     Surrogate decision maker:  Pt's son Wil      Prophylaxis   SCD's   Discharge Plan:  SNF/LTC,     Misc INPATIENT  Payor: ERIKA Farmer / Plan: [facility] / Product Type: Managed Care Medicaid /   There are currently no Active Isolations No active infections     Query   None noted today    Prognosis   Fair    Social issues  9/17/22 9:10 AM No family or visitor here with the patient today       9/18/22 .8:57 AM No family or visitor here with the patient today         Subjective Data     \"OK \"  Review of Systems - History obtained from the patient  Respiratory ROS: positive for - cough  Cardiovascular ROS: negative for - chest pain or dyspnea on exertion    Objective Data       Comments  Pleasant gentleman lying in ICU bed in no distress   His nurse is in the room      Patient Vitals for the past 24 hrs:   BP   09/18/22 0800 (!) 155/97   09/18/22 0700 (!) 156/114   09/18/22 0600 (!) 151/93   09/18/22 0500 129/77   09/18/22 0405 137/88   09/18/22 0400 (!) 161/96   09/18/22 0300 137/82   09/18/22 0225 139/84   09/18/22 0100 135/77   09/18/22 0000 133/72   09/17/22 2300 121/74   09/17/22 2200 (!) 144/76   09/17/22 2100 (!) 127/93   09/17/22 2030 (!) 149/88   09/17/22 2000 (!) 145/88   09/17/22 1800 (!) 143/80   09/17/22 1730 (!) 143/75   09/17/22 1700 125/87   09/17/22 1630 (!) 150/91   09/17/22 1600 133/84   09/17/22 1530 (!) 145/81   09/17/22 1515 129/85   09/17/22 1500 130/74   09/17/22 1445 122/75   09/17/22 1415 126/82   09/17/22 1400 139/75   09/17/22 1345 122/69   09/17/22 1330 131/86   09/17/22 1315 (!) 146/79   09/17/22 1300 132/83   09/17/22 1245 123/77   09/17/22 1230 124/72   09/17/22 1215 125/66   09/17/22 1200 120/80   09/17/22 1145 116/73   09/17/22 1130 122/71   09/17/22 1115 106/67   09/17/22 1100 117/75   09/17/22 1045 136/82   09/17/22 1030 (!) 143/84   09/17/22 1015 137/83   09/17/22 1000 129/76   09/17/22 0952 129/77   09/17/22 0945 126/76   09/17/22 0930 131/75   09/17/22 0915 122/75   09/17/22 0900 123/75   09/17/22 0845 126/73   09/17/22 0830 132/87   09/17/22 0815 123/76        Patient Vitals for the past 24 hrs:   Pulse   09/18/22 0800 (!) 109   09/18/22 0700 (!) 109   09/18/22 0600 (!) 110   09/18/22 0500 (!) 109   09/18/22 0405 (!) 116   09/18/22 0400 (!) 113   09/18/22 0300 (!) 106   09/18/22 0225 (!) 109   09/18/22 0200 (!) 108   09/18/22 0100 (!) 111   09/18/22 0000 (!) 111   09/17/22 2300 (!) 108   09/17/22 2200 (!) 103   09/17/22 2100 (!) 109   09/17/22 2045 (!) 110   09/17/22 2030 (!) 108   09/17/22 2015 (!) 110   09/17/22 2000 (!) 110   09/17/22 1800 (!) 107   09/17/22 1730 (!) 112   09/17/22 1700 (!) 111 09/17/22 1630 (!) 111   09/17/22 1600 (!) 109   09/17/22 1530 (!) 109   09/17/22 1515 (!) 108   09/17/22 1500 (!) 108   09/17/22 1445 (!) 110   09/17/22 1430 (!) 113   09/17/22 1415 (!) 107   09/17/22 1400 (!) 107   09/17/22 1345 (!) 104   09/17/22 1330 (!) 105   09/17/22 1315 (!) 106   09/17/22 1300 (!) 107   09/17/22 1245 (!) 107   09/17/22 1230 (!) 111   09/17/22 1215 (!) 111   09/17/22 1200 (!) 110   09/17/22 1145 (!) 112   09/17/22 1130 (!) 108   09/17/22 1115 (!) 109   09/17/22 1100 (!) 109   09/17/22 1045 (!) 110   09/17/22 1030 (!) 110   09/17/22 1015 (!) 106   09/17/22 1000 (!) 107   09/17/22 0952 (!) 107   09/17/22 0945 (!) 107   09/17/22 0930 (!) 107   09/17/22 0915 (!) 107   09/17/22 0900 (!) 109   09/17/22 0845 (!) 111   09/17/22 0830 (!) 111   09/17/22 0815 (!) 110        Patient Vitals for the past 24 hrs:   Resp   09/18/22 0800 19   09/18/22 0700 15   09/18/22 0600 12   09/18/22 0500 17   09/18/22 0405 20   09/18/22 0400 14   09/18/22 0300 20   09/18/22 0225 21   09/18/22 0200 22   09/18/22 0100 24   09/18/22 0000 20   09/17/22 2300 22   09/17/22 2200 27   09/17/22 2100 21   09/17/22 2045 21   09/17/22 2030 23   09/17/22 2015 16   09/17/22 2000 20   09/17/22 1800 (!) 31   09/17/22 1730 25   09/17/22 1700 23   09/17/22 1630 24   09/17/22 1600 20   09/17/22 1530 26   09/17/22 1515 20   09/17/22 1500 22   09/17/22 1445 18   09/17/22 1430 27   09/17/22 1415 23   09/17/22 1400 22   09/17/22 1345 20   09/17/22 1330 29   09/17/22 1315 (!) 31   09/17/22 1300 (!) 36   09/17/22 1245 21   09/17/22 1230 (!) 34   09/17/22 1215 25   09/17/22 1200 26   09/17/22 1145 24   09/17/22 1130 30   09/17/22 1115 26   09/17/22 1100 25   09/17/22 1045 24   09/17/22 1030 25   09/17/22 1015 21   09/17/22 1000 23   09/17/22 0952 23   09/17/22 0945 24   09/17/22 0930 21   09/17/22 0915 23   09/17/22 0900 24   09/17/22 0845 26   09/17/22 0830 17   09/17/22 0815 15        Patient Vitals for the past 24 hrs:   Temp   09/18/22 0800 98.5 °F (36.9 °C)   09/18/22 0400 98.4 °F (36.9 °C)   09/18/22 0000 99 °F (37.2 °C)   09/17/22 2000 99.1 °F (37.3 °C)   09/17/22 1600 99.1 °F (37.3 °C)   09/17/22 1400 98.6 °F (37 °C)   09/17/22 1200 99 °F (37.2 °C)   09/17/22 1015 99.4 °F (37.4 °C)   09/17/22 0952 99.5 °F (37.5 °C)          SpO2 Readings from Last 6 Encounters:   09/18/22 93%       O2 Flow Rate (L/min): 2 l/min  O2 Device: None (Room air) Body mass index is 31.24 kg/m². -  Wt Readings from Last 10 Encounters:   09/15/22 87.8 kg (193 lb 9 oz)        Intake/Output Summary (Last 24 hours) at 9/18/2022 0807  Last data filed at 9/18/2022 0600  Gross per 24 hour   Intake 806.13 ml   Output 200 ml   Net 606.13 ml           Physical Exam:             General:  Alert, cooperative,   well noursished,   well developed,   appears stated age    Ears/Eyes:  Hearing intact  Sclera anicteric. Pupils equal   Mouth/Throat:  Mucous membranes normal pink and moist  NG tube in place    Neck:     Lungs:  Chest excursion symmetrical   Auscultation B/L Symmetrical with   Vesicular breath sounds     No Crepitations noted          CVS:  IRRegular rate and rhythm   no  murmur,   No click, rub or gallop  S1 normal   S2 normal   Pedal pulses  b/l symmetrical   Abdomen:  Soft, non-tender  Bowel sounds normal     Extremities:  No cyanosis, jaundice  No edema noted   No sign of DVT/cord like lesion on palpation  No sign of acute trauma .     Skin:  Gluteal wounds are dressed  Skin color, texture, turgor normal. no acute rash or lesions    Lymph nodes:     Musculoskeletal Muscle bulk B/L symmetrical   Neuro Cranial nerves are intact,   motor movement b/l symmetrical,   Sensory evaluation b/l symmetrical    Psych:  Alert and oriented,   normal mood & affect          Medications reviewed     Current Facility-Administered Medications   Medication Dose Route Frequency    0.9% sodium chloride infusion 250 mL  250 mL IntraVENous PRN    polyethylene glycol (MIRALAX) packet 17 g  17 g Oral DAILY PRN    insulin lispro (HUMALOG) injection   SubCUTAneous AC&HS    QUEtiapine (SEROquel) tablet 50 mg  50 mg Oral TID    insulin glargine (LANTUS) injection 18 Units  18 Units SubCUTAneous DAILY    albumin human 25% (BUMINATE) solution 25 g  25 g IntraVENous DIALYSIS PRN    albuterol-ipratropium (DUO-NEB) 2.5 MG-0.5 MG/3 ML  3 mL Nebulization Q6H RT    amiodarone (CORDARONE) 375 mg in dextrose 5% 250 mL infusion  0.5 mg/min IntraVENous TITRATE    albuterol-ipratropium (DUO-NEB) 2.5 MG-0.5 MG/3 ML  3 mL Nebulization Q4H PRN    sennosides (SENOKOT) 8.8 mg/5 mL syrup 8.8 mg  5 mL Oral DAILY    hydrALAZINE (APRESOLINE) 20 mg/mL injection 10 mg  10 mg IntraVENous Q6H PRN    acetaminophen (TYLENOL) tablet 650 mg  650 mg Oral Q6H PRN    OLANZapine (ZyPREXA) tablet 5 mg  5 mg Per NG tube BID PRN    folic acid (FOLVITE) tablet 1 mg  1 mg Oral DAILY    cyanocobalamin (VITAMIN B12) tablet 500 mcg  500 mcg Per NG tube DAILY    dextrose 10% infusion 0-250 mL  0-250 mL IntraVENous PRN    levothyroxine (SYNTHROID) tablet 100 mcg  100 mcg Oral ACB    glucose chewable tablet 16 g  4 Tablet Oral PRN    glucagon (GLUCAGEN) injection 1 mg  1 mg IntraMUSCular PRN    [Held by provider] heparin (porcine) injection 5,000 Units  5,000 Units SubCUTAneous Q12H       Relevant other informations: Other medical conditions listed in [facility] problem list section; all of these and other pertinent data were taken into consideration when treatment plan is developed and customized to this patient's unique overall circumstances and needs. We have reviewed available old medical records within the constraints of this admission process.         Data Review:   Recent Days:  All Micro Results       Procedure Component Value Units Date/Time    CULTURE, BLOOD [[id number]] Collected: 09/06/22 1032    Order Status: Completed Specimen: Blood Updated: 09/11/22 5501     Special Requests: NO SPECIAL REQUESTS        Culture result: NO GROWTH 5 DAYS       CULTURE, BLOOD [[id number]] Collected: 09/06/22 1036    Order Status: Completed Specimen: Blood Updated: 09/11/22 0521     Special Requests: NO SPECIAL REQUESTS        Culture result: NO GROWTH 5 DAYS       CULTURE, MRSA [[id number]] Collected: 09/08/22 0929    Order Status: Completed Specimen: Nasal from Nares Updated: 09/09/22 1557     Special Requests: NO SPECIAL REQUESTS        Culture result: MRSA NOT PRESENT               Screening of patient nares for MRSA is for surveillance purposes and, if positive, to facilitate isolation considerations in high risk settings. It is not intended for automatic decolonization interventions per se as regimens are not sufficiently effective to warrant routine use. COVID-19 RAPID TEST [[id number]] Collected: 09/06/22 1010    Order Status: Completed Specimen: Nasopharyngeal Updated: 09/06/22 1043     Specimen source Nasopharyngeal        COVID-19 rapid test Not detected        Comment: Rapid Abbott ID Now       Rapid NAAT:  The specimen is NEGATIVE for SARS-CoV-2, the novel coronavirus associated with COVID-19. Negative results should be treated as presumptive and, if inconsistent with clinical signs and symptoms or necessary for patient management, should be tested with an alternative molecular assay. Negative results do not preclude SARS-CoV-2 infection and should not be used as the sole basis for patient management decisions. This test has been authorized by the FDA under an Emergency Use Authorization (EUA) for use by authorized laboratories.    Fact sheet for Healthcare Providers: ConventionUpdate.co.nz  Fact sheet for Patients: ConventionUpdate.co.nz       Methodology: Isothermal Nucleic Acid Amplification                 Recent Labs     09/18/22  0308 09/17/22  1628 09/17/22  0324 09/16/22  1517 09/16/22  0314   WBC 14.8*  --  15.8*  --  14.8*   HGB 7.4* 7.3* 6.1* 6.6* 7.2*   HCT 21.8* 21.1* 18.5* 19.4* 20.6*   --  246  --  195       Recent Labs     09/18/22  0308 09/17/22  0752 09/16/22  2138   * 134* 135*   K 3.7 3.9 3.8   CL 98 99 100   CO2 28 28 30   * 153* 187*   BUN 45* 36* 32*   CREA 5.57* 4.29* 3.47*   CA 7.9* 8.2* 8.2*   MG 2.2 2.3 2.3   PHOS 4.2 3.4 2.7        Lab Results   Component Value Date/Time    TSH 0.14 (L) 09/07/2022 08:11 AM            Care Plan discussed with:Patient/Family and Nurse   Other medical conditions are listed in the active hospital problem list section; these and other pertinent data were taken into consideration when the treatment plan was developed and customized to this patient's unique overall circumstances and needs. High complexity decision making was performed for this patient who is at high risk for decompensation with multiple organ involvement. Today total floor/unit time was 35 minutes while caring for this patient and greater than 50% of that time was spent with patient (and/or family) coordinating patients clinical issues; this includes time spent during multidisciplinary rounds.         Noel Leger MD MPH FACP [facility] Phy-Adv  9/18/2022

## 2022-09-18 NOTE — PROGRESS NOTES
1900 Bedside shift change report given to Lola King RN (oncoming nurse) by Miriam Suarze RN (offgoing nurse). Report included the following information SBAR, Kardex, ED Summary, OR Summary, Procedure Summary, Intake/Output, MAR, Recent Results, Med Rec Status, Cardiac Rhythm ST, Alarm Parameters , and Quality Measures. Patient resting quietly in bed. Will assess. Primary Nurse Jacquelyn Pollock RN and Nimo Herrera RN performed a dual skin assessment on this patient. Impairment noted - see wound doc flow sheet. David score is; see flow sheet. 0700 Bedside shift change report given to Gavin Gonzalez RN (oncoming nurse) by Lola King RN (offgoing nurse). Report included the following information SBAR, Kardex, ED Summary, OR Summary, Procedure Summary, Intake/Output, MAR, Recent Results, Med Rec Status, Cardiac Rhythm SR, Alarm Parameters , and Quality Measures.

## 2022-09-19 LAB
ANION GAP SERPL CALC-SCNC: 11 MMOL/L (ref 5–15)
BASOPHILS # BLD: 0 K/UL (ref 0–0.1)
BASOPHILS NFR BLD: 0 % (ref 0–1)
BUN SERPL-MCNC: 57 MG/DL (ref 6–20)
BUN/CREAT SERPL: 8 (ref 12–20)
CALCIUM SERPL-MCNC: 7.9 MG/DL (ref 8.5–10.1)
CHLORIDE SERPL-SCNC: 98 MMOL/L (ref 97–108)
CO2 SERPL-SCNC: 25 MMOL/L (ref 21–32)
CREAT SERPL-MCNC: 7.04 MG/DL (ref 0.7–1.3)
DIFFERENTIAL METHOD BLD: ABNORMAL
EOSINOPHIL # BLD: 0.2 K/UL (ref 0–0.4)
EOSINOPHIL NFR BLD: 1 % (ref 0–7)
ERYTHROCYTE [DISTWIDTH] IN BLOOD BY AUTOMATED COUNT: 13.7 % (ref 11.5–14.5)
GLUCOSE BLD STRIP.AUTO-MCNC: 126 MG/DL (ref 65–117)
GLUCOSE BLD STRIP.AUTO-MCNC: 138 MG/DL (ref 65–117)
GLUCOSE BLD STRIP.AUTO-MCNC: 237 MG/DL (ref 65–117)
GLUCOSE BLD STRIP.AUTO-MCNC: 87 MG/DL (ref 65–117)
GLUCOSE SERPL-MCNC: 87 MG/DL (ref 65–100)
HCT VFR BLD AUTO: 21.5 % (ref 36.6–50.3)
HGB BLD-MCNC: 7.2 G/DL (ref 12.1–17)
IMM GRANULOCYTES # BLD AUTO: 0.1 K/UL (ref 0–0.04)
IMM GRANULOCYTES NFR BLD AUTO: 1 % (ref 0–0.5)
LYMPHOCYTES # BLD: 1 K/UL (ref 0.8–3.5)
LYMPHOCYTES NFR BLD: 9 % (ref 12–49)
MAGNESIUM SERPL-MCNC: 2.2 MG/DL (ref 1.6–2.4)
MCH RBC QN AUTO: 32.6 PG (ref 26–34)
MCHC RBC AUTO-ENTMCNC: 33.5 G/DL (ref 30–36.5)
MCV RBC AUTO: 97.3 FL (ref 80–99)
MONOCYTES # BLD: 0.6 K/UL (ref 0–1)
MONOCYTES NFR BLD: 6 % (ref 5–13)
NEUTS SEG # BLD: 9.3 K/UL (ref 1.8–8)
NEUTS SEG NFR BLD: 83 % (ref 32–75)
NRBC # BLD: 0 K/UL (ref 0–0.01)
NRBC BLD-RTO: 0 PER 100 WBC
PHOSPHATE SERPL-MCNC: 5 MG/DL (ref 2.6–4.7)
PLATELET # BLD AUTO: 347 K/UL (ref 150–400)
PMV BLD AUTO: 9.4 FL (ref 8.9–12.9)
POTASSIUM SERPL-SCNC: 3.7 MMOL/L (ref 3.5–5.1)
RBC # BLD AUTO: 2.21 M/UL (ref 4.1–5.7)
SERVICE CMNT-IMP: ABNORMAL
SERVICE CMNT-IMP: NORMAL
SODIUM SERPL-SCNC: 134 MMOL/L (ref 136–145)
WBC # BLD AUTO: 11.2 K/UL (ref 4.1–11.1)

## 2022-09-19 PROCEDURE — 82962 GLUCOSE BLOOD TEST: CPT

## 2022-09-19 PROCEDURE — 74011250637 HC RX REV CODE- 250/637: Performed by: SPECIALIST

## 2022-09-19 PROCEDURE — 94640 AIRWAY INHALATION TREATMENT: CPT

## 2022-09-19 PROCEDURE — 74011000250 HC RX REV CODE- 250: Performed by: INTERNAL MEDICINE

## 2022-09-19 PROCEDURE — 36415 COLL VENOUS BLD VENIPUNCTURE: CPT

## 2022-09-19 PROCEDURE — 92526 ORAL FUNCTION THERAPY: CPT

## 2022-09-19 PROCEDURE — 85025 COMPLETE CBC W/AUTO DIFF WBC: CPT

## 2022-09-19 PROCEDURE — 65270000046 HC RM TELEMETRY

## 2022-09-19 PROCEDURE — 83735 ASSAY OF MAGNESIUM: CPT

## 2022-09-19 PROCEDURE — 77030040393 HC DRSG OPTIFOAM GENT MDII -B

## 2022-09-19 PROCEDURE — 74011250637 HC RX REV CODE- 250/637: Performed by: INTERNAL MEDICINE

## 2022-09-19 PROCEDURE — 90935 HEMODIALYSIS ONE EVALUATION: CPT

## 2022-09-19 PROCEDURE — 74011636637 HC RX REV CODE- 636/637: Performed by: INTERNAL MEDICINE

## 2022-09-19 PROCEDURE — 2709999900 HC NON-CHARGEABLE SUPPLY

## 2022-09-19 PROCEDURE — 99232 SBSQ HOSP IP/OBS MODERATE 35: CPT | Performed by: INTERNAL MEDICINE

## 2022-09-19 PROCEDURE — 84100 ASSAY OF PHOSPHORUS: CPT

## 2022-09-19 PROCEDURE — APPSS30 APP SPLIT SHARED TIME 16-30 MINUTES: Performed by: NURSE PRACTITIONER

## 2022-09-19 PROCEDURE — 80048 BASIC METABOLIC PNL TOTAL CA: CPT

## 2022-09-19 RX ORDER — INSULIN GLARGINE 100 [IU]/ML
7 INJECTION, SOLUTION SUBCUTANEOUS DAILY
Status: DISCONTINUED | OUTPATIENT
Start: 2022-09-20 | End: 2022-09-23 | Stop reason: HOSPADM

## 2022-09-19 RX ADMIN — LEVOTHYROXINE SODIUM 100 MCG: 0.1 TABLET ORAL at 08:19

## 2022-09-19 RX ADMIN — CARVEDILOL 6.25 MG: 6.25 TABLET, FILM COATED ORAL at 16:03

## 2022-09-19 RX ADMIN — IPRATROPIUM BROMIDE AND ALBUTEROL SULFATE 3 ML: .5; 2.5 SOLUTION RESPIRATORY (INHALATION) at 13:55

## 2022-09-19 RX ADMIN — QUETIAPINE FUMARATE 50 MG: 25 TABLET ORAL at 08:19

## 2022-09-19 RX ADMIN — SENNOSIDES 8.8 MG: 8.8 LIQUID ORAL at 08:20

## 2022-09-19 RX ADMIN — INSULIN LISPRO 1 UNITS: 100 INJECTION, SOLUTION INTRAVENOUS; SUBCUTANEOUS at 22:01

## 2022-09-19 RX ADMIN — IPRATROPIUM BROMIDE AND ALBUTEROL SULFATE 3 ML: .5; 2.5 SOLUTION RESPIRATORY (INHALATION) at 01:16

## 2022-09-19 RX ADMIN — FOLIC ACID 1 MG: 1 TABLET ORAL at 08:19

## 2022-09-19 RX ADMIN — IPRATROPIUM BROMIDE AND ALBUTEROL SULFATE 3 ML: .5; 2.5 SOLUTION RESPIRATORY (INHALATION) at 07:39

## 2022-09-19 RX ADMIN — MICONAZOLE NITRATE 2 % TOPICAL POWDER: at 08:23

## 2022-09-19 RX ADMIN — CYANOCOBALAMIN TAB 500 MCG 500 MCG: 500 TAB at 08:20

## 2022-09-19 RX ADMIN — MICONAZOLE NITRATE 2 % TOPICAL POWDER: at 18:27

## 2022-09-19 RX ADMIN — IPRATROPIUM BROMIDE AND ALBUTEROL SULFATE 3 ML: .5; 2.5 SOLUTION RESPIRATORY (INHALATION) at 20:34

## 2022-09-19 RX ADMIN — QUETIAPINE FUMARATE 50 MG: 25 TABLET ORAL at 22:01

## 2022-09-19 RX ADMIN — QUETIAPINE FUMARATE 50 MG: 25 TABLET ORAL at 16:03

## 2022-09-19 NOTE — PROCEDURES
Hemodialysis / [phone]    Vitals Pre Post Assessment Pre Post   BP BP: 125/77 (09/19/22 0900) 111/88 LOC AxOx4 AxOx4   HR Pulse (Heart Rate): (!) 110 (09/19/22 0900)   116 Lungs Coarse / intermittent wet cough Coarse / intermittent cough   Resp Resp Rate: 21 (09/19/22 0900) 25 Cardiac Tachycardic tachycardic   Temp Temp: 97.8 °F (36.6 °C) (09/19/22 0900) 98.2 Skin Warm. dry Warm/dry   Weight    Edema +1 BLE generalized   Tele status ICU monitoring ICU monitoring Pain Pain Intensity 1: 0 (09/19/22 0800) 0     Orders   Duration: Start: 0900 End: 1200 Total: 3 hrs   Dialyzer: Dialyzer/Set Up Inspection: Revaclear (09/19/22 0900)   K Bath: Dialysate K (mEq/L): 4 (09/19/22 0900)   Ca Bath: Dialysate CA (mEq/L): 2.5 (09/19/22 0900)   Na: Dialysate NA (mEq/L): 138 (09/19/22 0900)   Bicarb: Dialysate HCO3 (mEq/L): 35 (09/19/22 0900)   Target Fluid Removal: Goal/Amount of Fluid to Remove (mL): 2000 mL (09/19/22 0900)     Access   Type & Location: [facility]   Comments:                                     Right non-tunneled CVC, dressing changed and dated 9/19/22. Each catheter limb disinfected for 60 seconds per limb with alcohol swabs.  Caps removed, dialysis CVC hub scrubbed with Prevantics for 5 seconds, followed by a 5 second dry time per Hospital P&P.   +aspirated/+flushed       Labs   HBsAg (Antigen) / date: 9/8/22 negative                                              HBsAb (Antibody) / date: 9/8/22 susceptible   Source: Epic   Obtained/Reviewed  Critical Results Called HGB   Date Value Ref Range Status   09/19/2022 7.2 (L) 12.1 - 17.0 g/dL Final     Potassium   Date Value Ref Range Status   09/19/2022 3.7 3.5 - 5.1 mmol/L Final     Calcium   Date Value Ref Range Status   09/19/2022 7.9 (L) 8.5 - 10.1 MG/DL Final     BUN   Date Value Ref Range Status   09/19/2022 57 (H) 6 - 20 MG/DL Final     Creatinine   Date Value Ref Range Status   09/19/2022 7.04 (H) 0.70 - 1.30 MG/DL Final        Meds Given   Name Dose Route   N/a                 Adequacy / Fluid    Total Liters Process: 65.9 L   Net Fluid Removed: 1500 mL      Comments   Time Out Done:   (Time) 0165   Admitting Diagnosis: Severe Sepsis   Consent obtained/signed: Informed Consent Verified: Yes (09/19/22 0900)   Machine / RO # Machine Number: [id number] (09/19/22 0900)   Primary Nurse Rpt Pre: JOSE E Okeefe RN   Primary Nurse Rpt Post: JOSE E Okeefe RN   Pt Education: Procedural / infection control   Care Plan: Continue current HD plan of care   Pts outpatient clinic: TBD     Tx Summary   Comments:                   0900 HD treatment initiated per physicians order. 1200 HD treatment completed per physician order. All possible blood returned to patient. Each catheter limb disinfected for 60 seconds per limb with alcohol swabs. Dialysis CVC hub scrubbed with Prevantics for 15 seconds, followed by a 5 second dry time per Hospital P&P.   +flushed/+saline-lock/+capped.

## 2022-09-19 NOTE — PROGRESS NOTES
Problem: Dysphagia (Adult)  Goal: *Acute Goals and Plan of Care (Insert Text)  Description: Swallowing goals initiated 9-16-22:  1) tolerate clear liquids without s/s aspiration by 9-19-22  2) if increased concerns about aspiration, may need MBS vs FEES. 3) eval for solid diet once family brings in dentures  Outcome: Resolved/Met   SPEECH LANGUAGE PATHOLOGY DYSPHAGIA TREATMENT/DISCHARGE  Patient: Aurea Aiken (51 y.o. male)  Date: 9/19/2022  Diagnosis: Severe sepsis ([address] [A41.9, R65.20] Severe sepsis ([address]      Precautions:  Fall, Skin, Aspiration    ASSESSMENT:  Patient has his upper dentures present now and tolerating regular diet, thin liquids. No RN concerns about taking pills. Voice is clear. He may benefit from full integrated language evaluation on rehab     PLAN:  Continue regular diet, thin liquids. Patient will be discharged from acute skilled speech therapy at this time. Rationale for discharge:  Goals achieved    Discharge Recommendations:  per PT/OT     SUBJECTIVE:   Patient stated I need to get out of here. For every day you lay in the bed, that's 3 to recover. OBJECTIVE:   Cognitive and Communication Status:  Neurologic State: Alert  Orientation Level: Oriented to person, Oriented to place  Cognition: Impaired decision making, Impulsive, Memory loss    Perception: Appears intact    Perseveration: No perseveration noted    Safety/Judgement: Decreased insight into deficits  Dysphagia Treatment:  Oral Assessment:  Oral Assessment  Dentition: Upper dentures (present. SO RN/MD advanced the diet to regular)  P.O. Trials:  Patient Position: upright in bed  Vocal quality prior to P.O.: No impairment  Consistency Presented: Solid; Thin liquid  How Presented:  (sister feeding)   ORAL PHASE:   Bolus Acceptance: Impaired (not very interested in PO)  Bolus Formation/Control: No impairment     Propulsion: No impairment  Oral Residue: None   PHARYNGEAL PHASE:   Laryngeal Elevation: Functional  Aspiration Signs/Symptoms: None  Pharyngeal Phase Characteristics: No impairment, issues, or problems  (no RN concerns about swallowing)  Effective Modifications: None                                                                                                                                            NOMS:   The NOMS functional outcome measure was used to quantify this patient's level of swallowing impairment. Based on the NOMS, the patient was determined to be at level 7 for swallow function     NOMS Swallowing Levels:  Level 1 (CN): NPO  Level 2 (CM): NPO but takes consistency in therapy  Level 3 (CL): Takes less than 50% of nutrition p.o. and continues with nonoral feedings; and/or safe with mod cues; and/or max diet restriction  Level 4 (CK): Safe swallow but needs mod cues; and/or mod diet restriction; and/or still requires some nonoral feeding/supplements  Level 5 (CJ): Safe swallow with min diet restriction; and/or needs min cues  Level 6 (CI): Independent with p.o.; rare cues; usually self cues; may need to avoid some foods or needs extra time  Level 7 ([address] [place] [address]): Independent for all p.o.  TIMOTHY. (2003). National Outcomes Measurement System (NOMS): Adult Speech-Language Pathology User's Guide. Pain:  Pain Scale 1: Numeric (0 - 10)  Pain Intensity 1: 0       After treatment:   Patient left in no apparent distress in bed and Nursing notified    COMMUNICATION/EDUCATION:   Patient was educated regarding his deficit(s) of DYSPHAGIA  as this relates to his diagnosis of respiratory failure with intubation. He demonstrated Fair understanding as evidenced by discussion. 2 sisters present and understood. .    The patient's plan of care including recommendations, planned interventions, and recommended diet changes were discussed with: Registered nurse.      VASU Pinon  Time Calculation: 15 mins

## 2022-09-19 NOTE — PROGRESS NOTES
Comprehensive Nutrition Assessment    Type and Reason for Visit: Reassess    Nutrition Recommendations/Plan:   Continue pureed diet or per SLP   Provide Nepro once daily to increase kcal/protein intake (420 kcal, 38 g Carbs, 19 g Protein)      Malnutrition Assessment:  Malnutrition Status:  No malnutrition (09/19/22 0068)    Context:  Acute illness     Findings of the 6 clinical characteristics of malnutrition:   Energy Intake:  No significant decrease in energy intake  Weight Loss:  No significant weight loss     Body Fat Loss:  No significant body fat loss,     Muscle Mass Loss:  No significant muscle mass loss,    Fluid Accumulation:  No significant fluid accumulation,     Strength:  Not performed     Nutrition Assessment:     9/19: Follow up. NGT removed and patient now on pureed diet per SLP - per intensivist, plan to advance diet to regular. PO intake averaging 75% of all meals, feeding self with adaptive utensils. No complaints at this time. No pain with chewing/swallowing. Remains in ICU. Edema has improved. Voiced acceptance of ONS once daily. Last 3 Recorded Weights in this Encounter    09/12/22 1541 09/15/22 0320 09/18/22 1215   Weight: 86 kg (189 lb 9.5 oz) 87.8 kg (193 lb 9 oz) 84.7 kg (186 lb 11.2 oz)       Patient Vitals for the past 168 hrs:   % Diet Eaten   09/19/22 0800 51 - 75%       Nutrition Related Findings:      Wound Type: None  Last Bowel Movement Date: 09/18/22  Stool Appearance: Loose  Abdominal Assessment: Intact, Distended, Soft  Appetite: Fair  Bowel Sounds: Active   Edema:LLE: No Edema (9/19/2022  8:00 AM)  LUE: 2+; Non-pitting (9/19/2022  8:00 AM)  RLE: No Edema (9/19/2022  8:00 AM)  RUE: 2+; Non-pitting (9/19/2022  8:00 AM)      Nutr.  Labs:    Lab Results   Component Value Date/Time    GFR est AA 10 (L) 09/19/2022 03:54 AM    GFR est non-AA 8 (L) 09/19/2022 03:54 AM    Creatinine 7.04 (H) 09/19/2022 03:54 AM    BUN 57 (H) 09/19/2022 03:54 AM    Sodium 134 (L) 09/19/2022 03:54 AM    Potassium 3.7 09/19/2022 03:54 AM    Chloride 98 09/19/2022 03:54 AM    CO2 25 09/19/2022 03:54 AM       Lab Results   Component Value Date/Time    Glucose 87 09/19/2022 03:54 AM    Glucose (POC) 87 09/19/2022 07:45 AM       Lab Results   Component Value Date/Time    Hemoglobin A1c 6.6 (H) 09/07/2022 08:11 AM     Lab Results   Component Value Date/Time    Calcium 7.9 (L) 09/19/2022 03:54 AM    Phosphorus 5.0 (H) 09/19/2022 03:54 AM       Nutr. Meds:  Coreg, Vit B12, folvite, humalog, synthroid, miralax PRN, senokot       Current Nutrition Intake & Therapies:  Average Meal Intake: 51-75%  Average Supplement Intake: None ordered  ADULT DIET Dysphagia - Pureed    Anthropometric Measures:  Height: 5' 6\" (167.6 cm)  Ideal Body Weight (IBW): 142 lbs (65 kg)  Admission Body Weight: 189 lb 9.5 oz  Current Body Wt:  84.7 kg (186 lb 11.7 oz), 131.5 % IBW. Bed scale  Current BMI (kg/m2): 30.2        Weight Adjustment: No adjustment                 BMI Category: Obese class 1 (BMI 30.0-34. 9)    Estimated Daily Nutrient Needs:  Energy Requirements Based On: Kcal/kg  Weight Used for Energy Requirements: Current  Energy (kcal/day): 5367-7372 (25-30 kcal/kg)  Weight Used for Protein Requirements: Current  Protein (g/day): 101-127 (1.2-1.5 g/kg)  Method Used for Fluid Requirements: Standard renal  Fluid (ml/day): 1500 or per MD    Nutrition Diagnosis:   Increased nutrient needs related to renal dysfunction as evidenced by dialysis   Inadequate oral intake related to inadequate protein-energy intake, impaired respiratory function, cognitive or neurological impairment as evidenced by NPO or clear liquid status due to medical condition - RESOLVED    Nutrition Interventions:   Food and/or Nutrient Delivery: Continue current diet  Nutrition Education/Counseling: No recommendations at this time  Coordination of Nutrition Care: Continue to monitor while inpatient  Plan of Care discussed with: Nursing    Goals:  Previous Goal Met: Goal(s) achieved  Goals: Meet at least 75% of estimated needs, by next RD assessment       Nutrition Monitoring and Evaluation:   Behavioral-Environmental Outcomes: None identified  Food/Nutrient Intake Outcomes: Food and nutrient intake, Supplement intake  Physical Signs/Symptoms Outcomes: Biochemical data, Hemodynamic status, Weight, GI status    Discharge Planning:    Continue oral nutrition supplement    Rc Navarro MS, RD  Contact: Ext: [phone], or via [employer]

## 2022-09-19 NOTE — PROGRESS NOTES
Attempted to work with the patient for Physical Therapy, chart reviewed and discussed with nurse patient on dialysis at bedside. We will continue to follow up with the patient for therapy thank you.

## 2022-09-19 NOTE — PROGRESS NOTES
[place] [address]  [address] [facility], [place], [address] [facility]  [phone]      Hospitalist  Progress Note      NAME:         Rosana Zamora   :        1960  MRM:        [id number]    Date of service: 2022      Interval HPI: Patient weak but more awake and alert. Not confused. He has no cough or SOB. No fever or chills. Objective:    Vital Signs:    Visit Vitals  /62   Pulse (!) 117   Temp 98.2 °F (36.8 °C) (Oral)   Resp 24   Ht 5' 6\" (1.676 m)   Wt 84.7 kg (186 lb 11.2 oz)   SpO2 95%   BMI 30.13 kg/m²        Intake/Output Summary (Last 24 hours) at 2022 1330  Last data filed at 2022 1200  Gross per 24 hour   Intake 240 ml   Output 1700 ml   Net -1460 ml          Physical Examination:    General:   Weak and ill looking but he is not distressed     Eyes:   pink conjunctivae, PERRLA with no discharge. ENT:   dry mucous membranes. No rhinorrhea   Neck: no masses, thyroid non-tender and trachea central.  Pulm: generally decreased breath sounds without crackles or wheezes  Card:  no JVD or murmurs, has atrial fibrillation, without thrills, bruits. + peripheral edema  Abd:  firm, distended, decreased bowel sounds   Musc:  No cyanosis, clubbing, atrophy or deformities. Skin:  No rashes, bruising or ulcers.    Neuro: Awake and alert, generalized weakness but non focal.    Psych:  Has no insight to his illness     Current Facility-Administered Medications   Medication Dose Route Frequency    [START ON 2022] insulin glargine (LANTUS) injection 7 Units  7 Units SubCUTAneous DAILY    carvediloL (COREG) tablet 6.25 mg  6.25 mg Oral BID WITH MEALS    miconazole (MICOTIN) 2 % powder   Topical BID    0.9% sodium chloride infusion 250 mL  250 mL IntraVENous PRN    polyethylene glycol (MIRALAX) packet 17 g  17 g Oral DAILY PRN    insulin lispro (HUMALOG) injection   SubCUTAneous AC&HS    QUEtiapine (SEROquel) tablet 50 mg  50 mg Oral TID    albumin human 25% (BUMINATE) solution 25 g  25 g IntraVENous DIALYSIS PRN    albuterol-ipratropium (DUO-NEB) 2.5 MG-0.5 MG/3 ML  3 mL Nebulization Q6H RT    albuterol-ipratropium (DUO-NEB) 2.5 MG-0.5 MG/3 ML  3 mL Nebulization Q4H PRN    sennosides (SENOKOT) 8.8 mg/5 mL syrup 8.8 mg  5 mL Oral DAILY    hydrALAZINE (APRESOLINE) 20 mg/mL injection 10 mg  10 mg IntraVENous Q6H PRN    acetaminophen (TYLENOL) tablet 650 mg  650 mg Oral Q6H PRN    OLANZapine (ZyPREXA) tablet 5 mg  5 mg Per NG tube BID PRN    folic acid (FOLVITE) tablet 1 mg  1 mg Oral DAILY    cyanocobalamin (VITAMIN B12) tablet 500 mcg  500 mcg Per NG tube DAILY    dextrose 10% infusion 0-250 mL  0-250 mL IntraVENous PRN    levothyroxine (SYNTHROID) tablet 100 mcg  100 mcg Oral ACB    glucose chewable tablet 16 g  4 Tablet Oral PRN    glucagon (GLUCAGEN) injection 1 mg  1 mg IntraMUSCular PRN    [Held by provider] heparin (porcine) injection 5,000 Units  5,000 Units SubCUTAneous Q12H        Laboratory data and review:    Recent Labs     09/19/22  0354 09/18/22  0308 09/17/22  1628 09/17/22  0324   WBC 11.2* 14.8*  --  15.8*   HGB 7.2* 7.4* 7.3* 6.1*   HCT 21.5* 21.8* 21.1* 18.5*    288  --  246       Recent Labs     09/19/22  0354 09/18/22  0308 09/17/22  0752   * 135* 134*   K 3.7 3.7 3.9   CL 98 98 99   CO2 25 28 28   GLU 87 111* 153*   BUN 57* 45* 36*   CREA 7.04* 5.57* 4.29*   CA 7.9* 7.9* 8.2*   MG 2.2 2.2 2.3   PHOS 5.0* 4.2 3.4       No components found for: [place]    Diagnostics: Imaging studies have been reviewed    Telemetry reviewed by me:   normal sinus rhythm    Assessment and Plan:    LAVINIA (acute kidney injury) ([facility] (9/6/2022) POA: has CKD stage III. He had severe acidemia which has now resolved. Suspected to have been triggered by volume depletion, atypical DKA vs Metformin toxicity. Seen by nephrology and had been on CVVH and now transitioned to [facility] on MWFs.  He continues to tolerate this. Continue to monitor renal function. PT, OT and CM for discharge planning     Delirium (9/14/2022) POA: noted post extubation. CT scan head neg for acute changes. This is markedly better now. Continue Zyprexa, Seroquel and wean as tolerated. Ambulate as tolerated       PAF (paroxysmal atrial fibrillation) ([facility] [address] (9/14/2022) POA: has episodic RVR. Recent Echo showed a normal LV function with EF 65-70%. Continue Coreg. No anticoagulation due to anemia. Cardiology following     HTN (hypertension), benign (9/6/2022) POA: BP variable. Continue Coreg. DM (diabetes mellitus), type 2 ([facility] [address] (9/6/2022) POA: A1c 6.6. BG stable. Continue Lantus, SSi per protocol. DM diet      Thrombocytopenia ([facility] (9/14/2022) POA: unclear etiology but resolved. POOJA neg. Peripheral smear unremarkable. Monitor    Anemia - progressively worsening. Has low vitamin B28 and folic acid. Being repleted. Transfused 1 unit pRBCs 9/17. Monitor     Acute respiratory failure with hypoxia POA: intubated for airway protection due to severe acidosis. Extubated 9/11.  Resolved and now on RA     Hyperlipidemia (9/6/2022) POA: Pravastatin on hold                 Care Plan discussed with: Patient, Care Manager, Nursing Staff, and Consultant/Specialist    Discussed:  Care Plan    Prophylaxis:  Hep SQ    Anticipated Disposition:  SNF/LTC           ___________________________________________________    Attending Physician:   Charles Dominguez MD

## 2022-09-19 NOTE — PROGRESS NOTES
CARDIOLOGY PROGRESS NOTE        [address], [place], [address] [facility]  Phone [phone]; Fax [phone]          2022 7:41 AM       Admit Date:           2022  Admit Diagnosis:  Severe sepsis ([facility] [address] [A41.9, R65.20]  :          1960   MRN:          [id number]        Assessment/Plan   Atrial Fibrillation/ Flutter: now in sinus rhythm. Cont coreg for rate control. Not anticoagulated 2/2 anemia. CHADS 2 Vasc: 1   Anemia: per medicine team   LAVINIA/Renal failure: on HD. NP spent __12_ minutes in chart review of notes, VS, diagnostics. NP spent ___10__ minutes in examination of pt and review of plan of care  with pt/family. We discussed the expected course, resolution and complications of the diagnosis(es) in detail. Medication risks, benefits, costs, interactions, and alternatives were discussed as indicated. No intake/output data recorded. Last 3 Recorded Weights in this Encounter    22 1541 09/15/22 0320 22 1215   Weight: 86 kg (189 lb 9.5 oz) 87.8 kg (193 lb 9 oz) 84.7 kg (186 lb 11.2 oz)         1901 -  0700  In: 56 [I.V.:353]  Out: 80 [Urine:200]    SUBJECTIVE      56yo M w/ PMHx of non-insulin dependent diabetes on metformin, melanoma s/p resection, HTN, HLD who presented to the ED  for evaluation of dizziness. Pt reports starting gabapentin 3 days prior for neuropathy and after taking a few doses started to feel dizzy and stopped. He endorsed feeling lightheaded, dyspnea, nausea, and vomiting. In the ED pt was found to be in acute renal failure w/ elevated lactic acid levels and AGMA - pt was euglycemic but sx thought to possibly be 2/2 atypical DKA and metformin toxicity. Upon arrival to the ICU pt was in respiratory distress and was subsequently intubated. He was extubated on  and his acidosis has improved however pt has remained altered requiring precedex drip.  He has also intermittently required levophed for pressure support but is currently off pressors. On the afternoon of 9/9 pt was found to have new onset afib w/ RVR that resolved w/o any medical intervention. Last night he had recurrence of afib w/ RVR that did not respond to an amiodarone bolus and he was subsequently started on an amio drip. Sabina Bailey reports none.       Current Facility-Administered Medications   Medication Dose Route Frequency    carvediloL (COREG) tablet 6.25 mg  6.25 mg Oral BID WITH MEALS    miconazole (MICOTIN) 2 % powder   Topical BID    insulin glargine (LANTUS) injection 15 Units  15 Units SubCUTAneous DAILY    0.9% sodium chloride infusion 250 mL  250 mL IntraVENous PRN    polyethylene glycol (MIRALAX) packet 17 g  17 g Oral DAILY PRN    insulin lispro (HUMALOG) injection   SubCUTAneous AC&HS    QUEtiapine (SEROquel) tablet 50 mg  50 mg Oral TID    albumin human 25% (BUMINATE) solution 25 g  25 g IntraVENous DIALYSIS PRN    albuterol-ipratropium (DUO-NEB) 2.5 MG-0.5 MG/3 ML  3 mL Nebulization Q6H RT    albuterol-ipratropium (DUO-NEB) 2.5 MG-0.5 MG/3 ML  3 mL Nebulization Q4H PRN    sennosides (SENOKOT) 8.8 mg/5 mL syrup 8.8 mg  5 mL Oral DAILY    hydrALAZINE (APRESOLINE) 20 mg/mL injection 10 mg  10 mg IntraVENous Q6H PRN    acetaminophen (TYLENOL) tablet 650 mg  650 mg Oral Q6H PRN    OLANZapine (ZyPREXA) tablet 5 mg  5 mg Per NG tube BID PRN    folic acid (FOLVITE) tablet 1 mg  1 mg Oral DAILY    cyanocobalamin (VITAMIN B12) tablet 500 mcg  500 mcg Per NG tube DAILY    dextrose 10% infusion 0-250 mL  0-250 mL IntraVENous PRN    levothyroxine (SYNTHROID) tablet 100 mcg  100 mcg Oral ACB    glucose chewable tablet 16 g  4 Tablet Oral PRN    glucagon (GLUCAGEN) injection 1 mg  1 mg IntraMUSCular PRN    [Held by provider] heparin (porcine) injection 5,000 Units  5,000 Units SubCUTAneous Q12H      OBJECTIVE               Intake/Output Summary (Last 24 hours) at 9/19/2022 9712  Last data filed at 9/19/2022 0600  Gross per 24 hour   Intake 73 ml   Output 200 ml   Net -127 ml       Review of Systems - History obtained from the patient AS PER  HPI        PHYSICAL EXAM        Visit Vitals  /85   Pulse (!) 102   Temp 98.4 °F (36.9 °C)   Resp 14   Ht 5' 6\" (1.676 m)   Wt 84.7 kg (186 lb 11.2 oz)   SpO2 94%   BMI 30.13 kg/m²       Gen: Well-developed, well-nourished, in no acute distress  alert and oriented x 2  HEENT:  Pink conjunctivae, Hearing grossly normal.No scleral icterus or conjunctival, moist mucous membranes  Neck: Supple,No JVD, No Carotid Bruit,   Resp: No accessory muscle use, Clear breath sounds, No rales or rhonchi  Card: Tachycardic, Regular Rate,Rythm,Normal S1, S2, No murmurs, rubs or gallop. MSK: No cyanosis or clubbing, good capillary refill  Skin: No rashes or ulcers, no bruising  Neuro:  Moving all four extremities, no focal deficit, follows commands appropriately  Psych:  Limited insight, oriented to person, place and time, alert, Nml Affect  LE: No edema       DATA REVIEW      09/06/22    ECHO ADULT COMPLETE 09/09/2022 9/9/2022    Interpretation Summary  Formatting of this result is different from the original.      Left Ventricle: Hyperdynamic left ventricular systolic function with a visually estimated EF of 65 - 70%. Left ventricle size is normal. Normal wall thickness. Normal wall motion. Normal diastolic function. Right Ventricle: Not well visualized. Right ventricle size is normal. Normal wall thickness. Aortic Valve: Mild sclerosis of the aortic valve cusp. Signed by: Melani Li DO on 9/9/2022  3:19 PM  '    Cardiac monitor: [address]        Laboratory and Imaging have been reviewed by me and are notable for  No results for input(s): CPK, CKMB, TROIQ in the last 72 hours.   Recent Labs     09/19/22  0354 09/18/22  0308 09/17/22  1628 09/17/22  0752 09/17/22  0324   * 135*  --  134*  --    K 3.7 3.7  --  3.9  --    CO2 25 28  --  28  -- BUN 57* 45*  --  36*  --    CREA 7.04* 5.57*  --  4.29*  --    GLU 87 111*  --  153*  --    PHOS 5.0* 4.2  --  3.4  --    MG 2.2 2.2  --  2.3  --    WBC 11.2* 14.8*  --   --  15.8*   HGB 7.2* 7.4* 7.3*  --  6.1*   HCT 21.5* 21.8* 21.1*  --  18.5*    288  --   --  246

## 2022-09-19 NOTE — PROGRESS NOTES
0700 Bedside and Verbal shift change report given to Jordan Stuart RN (oncoming nurse) by Cooper Mcintosh RN (offgoing nurse). Report included the following information SBAR, ED Summary, Intake/Output, MAR, Recent Results, Med Rec Status, and Cardiac Rhythm NSR/tachy . Primary Nurse Douglas Garcia RN and Cooper Mcintosh RN performed a dual skin assessment on this patient No impairment noted  David score is see flowsheet. 0820 Meds given. Lispro and lantus held for BG of 87, MD aware. Carvedilol held for dialysis. Patient assessed, see flowsheet. Patient passed swallow screen, MD changing to regular diet. Intensivist agreed to make patient step down. Pupils are unequal, no other neuro deficits, MD aware, no new orders at this time. 0845 Dialysis at bedside. 1130 Lispro held for BG of 126. Patient reassessed, no change, see flowsheet. 1603 Meds given. Patient reassessed, no change, see flowsheet. 1630 Lispro held for BG of 138.     1750 Patient's EKG alarms going off saying line disconnected. Checked room and patient had slid to the floor off the bed, stated \"I wanted to go to the chair and didn't want to wait for physical therapy\". Patient placed back in bed, bed alarm on.     550 [facility] Hospitalist notified, ordered to monitor for any changes. No CT ordered at this time. Family notified. Post Fall Documentation      Adelia Hawk witnessed/unwitnessed fall occurred on 9/19/22 (Date) at 56   (Time). The answers to the following questions summarize the fall: In the patient's own words,:  What were you attempting to do when you fell? \"Crawl to the chair. \"  Do you know why you fell? \"I didn't fall, I was crawling. \"  Do you have any pain/discomfort or any other complaints? \"No.\"  Which part of your body made contact with the floor or other object? \"My knees. \"    Nurse:  Was this an assisted fall? no  Was fall witnessed? No  If witnessed, what part of the body made contact with the floor or other object? Hands and knees  Patients mental status after the fall/when found: Agitated and Confused  Any apparent injury:  No apparent injury  Immediate interventions for injury/suspected injury? No interventions needed  Patient assisted back to bed? Assist X2  Name of provider notified and time, any comments? Dr. Ciro Garcia [phone], \"continue to monitor\". Name of family member notified and time: Pricilla Beavers (sister) [phone]      Immediate VS and physical assessment documented in flow sheets. Neuro assessment every hour x 4 (for potential head injury or unwitnessed fall) documented in flow sheets. Cuba Fairbanks RN    7299 Bedside and Verbal shift change report given to Sonny Flanagan RN (oncoming nurse) by Anny Tobin RN (offgoing nurse). Report included the following information SBAR, ED Summary, Intake/Output, MAR, Recent Results, Med Rec Status, and Cardiac Rhythm NSR/sinus tachy .

## 2022-09-19 NOTE — PROGRESS NOTES
[id number]  YOB: 1960          Assessment & Plan:     Severe oliguric LAVINIA on CVVH then transition to IHD from 9/12  Severe lactic acidosis  Ketoacidosis  Resp failure s/p intubation and extubation   DM2  Thrombocytopenia  Hypophosphatemia     Rec:  HD MWF schedule   He is receiving HD now   Plan for next HD Wednesday   Avoid nephrotoxins   ICU support       Subjective:   CC: LAVINIA  HPI: Seen, feels better , edema better   ROS: unable to obtain due to pt condition  Current Facility-Administered Medications   Medication Dose Route Frequency    [START ON 9/20/2022] insulin glargine (LANTUS) injection 7 Units  7 Units SubCUTAneous DAILY    carvediloL (COREG) tablet 6.25 mg  6.25 mg Oral BID WITH MEALS    miconazole (MICOTIN) 2 % powder   Topical BID    0.9% sodium chloride infusion 250 mL  250 mL IntraVENous PRN    polyethylene glycol (MIRALAX) packet 17 g  17 g Oral DAILY PRN    insulin lispro (HUMALOG) injection   SubCUTAneous AC&HS    QUEtiapine (SEROquel) tablet 50 mg  50 mg Oral TID    albumin human 25% (BUMINATE) solution 25 g  25 g IntraVENous DIALYSIS PRN    albuterol-ipratropium (DUO-NEB) 2.5 MG-0.5 MG/3 ML  3 mL Nebulization Q6H RT    albuterol-ipratropium (DUO-NEB) 2.5 MG-0.5 MG/3 ML  3 mL Nebulization Q4H PRN    sennosides (SENOKOT) 8.8 mg/5 mL syrup 8.8 mg  5 mL Oral DAILY    hydrALAZINE (APRESOLINE) 20 mg/mL injection 10 mg  10 mg IntraVENous Q6H PRN    acetaminophen (TYLENOL) tablet 650 mg  650 mg Oral Q6H PRN    OLANZapine (ZyPREXA) tablet 5 mg  5 mg Per NG tube BID PRN    folic acid (FOLVITE) tablet 1 mg  1 mg Oral DAILY    cyanocobalamin (VITAMIN B12) tablet 500 mcg  500 mcg Per NG tube DAILY    dextrose 10% infusion 0-250 mL  0-250 mL IntraVENous PRN    levothyroxine (SYNTHROID) tablet 100 mcg  100 mcg Oral ACB    glucose chewable tablet 16 g  4 Tablet Oral PRN    glucagon (GLUCAGEN) injection 1 mg  1 mg IntraMUSCular PRN    [Held by provider] heparin (porcine) injection 5,000 Units  5,000 Units SubCUTAneous Q12H          Objective:     Vitals:  Blood pressure 120/62, pulse (!) 117, temperature 98.2 °F (36.8 °C), temperature source Oral, resp. rate 24, height 5' 6\" (1.676 m), weight 84.7 kg (186 lb 11.2 oz), SpO2 95 %. Temp (24hrs), Av.3 °F (36.8 °C), Min:97.8 °F (36.6 °C), Max:98.9 °F (37.2 °C)      Intake and Output:   07 - 1900  In: 240 [P.O.:240]  Out: 1500   1901 -  0700  In: 353 [I.V.:353]  Out: 200 [Urine:200]    Physical Exam:               GENERAL ASSESSMENT: NAD  HEENT: peerla   CHEST: diminished BS +  HEART: S1S2  ABDOMEN: Soft,NT  : +Vega:   EXTREMITY: + EDEMA        ECG/rhythm:    Data Review      No results for input(s): TNIPOC in the last 72 hours. No lab exists for component: ITNL     No results for input(s): CPK, CKMB, TROIQ in the last 72 hours. Recent Labs     22  0354 22  0308 22  1628 22  0752 22  0324   * 135*  --  134*  --    K 3.7 3.7  --  3.9  --    CL 98 98  --  99  --    CO2 25 28  --  28  --    BUN 57* 45*  --  36*  --    CREA 7.04* 5.57*  --  4.29*  --    GLU 87 111*  --  153*  --    PHOS 5.0* 4.2  --  3.4  --    MG 2.2 2.2  --  2.3  --    CA 7.9* 7.9*  --  8.2*  --    WBC 11.2* 14.8*  --   --  15.8*   HGB 7.2* 7.4* 7.3*  --  6.1*   HCT 21.5* 21.8* 21.1*  --  18.5*    288  --   --  246      No results for input(s): INR, PTP, APTT, INREXT, INREXT in the last 72 hours. Needs: urine analysis, urine sodium, protein and creatinine  No results found for: [place]        : Tyrone Carter MD  2022        [facility]:  [email or web address]  [place] River[place] office:  [address] [address],[address]  [address]  Phone: [phone]  Fax :     [phone]    [place] office:  [address] [facility]  Phone - [phone]  Fax - [phone]

## 2022-09-19 NOTE — PROGRESS NOTES
Problem: Falls - Risk of  Goal: *Absence of Falls  Description: Document Elton Gao Fall Risk and appropriate interventions in the flowsheet. Outcome: Progressing Towards Goal  Note: Fall Risk Interventions:  Mobility Interventions: Bed/chair exit alarm, PT Consult for mobility concerns, PT Consult for assist device competence    Mentation Interventions: Adequate sleep, hydration, pain control, Bed/chair exit alarm, Evaluate medications/consider consulting pharmacy, More frequent rounding, Room close to nurse's station, Update white board    Medication Interventions: Bed/chair exit alarm, Evaluate medications/consider consulting pharmacy    Elimination Interventions: Bed/chair exit alarm, Call light in reach, Patient to call for help with toileting needs, Toileting schedule/hourly rounds    Problem: Pressure Injury - Risk of  Goal: *Prevention of pressure injury  Description: Document David Scale and appropriate interventions in the flowsheet. Outcome: Progressing Towards Goal  Note: Pressure Injury Interventions:  Sensory Interventions: Assess changes in LOC, Keep linens dry and wrinkle-free, Maintain/enhance activity level, Minimize linen layers, Monitor skin under medical devices, Pad between skin to skin, Pressure redistribution bed/mattress (bed type), Turn and reposition approx. every two hours (pillows and wedges if needed)    Moisture Interventions: Absorbent underpads, Check for incontinence Q2 hours and as needed, Minimize layers, Moisture barrier    Activity Interventions: Increase time out of bed, Pressure redistribution bed/mattress(bed type), PT/OT evaluation    Mobility Interventions: Float heels, HOB 30 degrees or less, Pressure redistribution bed/mattress (bed type), PT/OT evaluation, Turn and reposition approx.  every two hours(pillow and wedges)    Nutrition Interventions: Document food/fluid/supplement intake    Friction and Shear Interventions: Foam dressings/transparent film/skin sealants, HOB 30 degrees or less, Minimize layers, Transferring/repositioning devices

## 2022-09-19 NOTE — PROGRESS NOTES
Occupational Therapy Note:  Chart reviewed. Patient is currently on dialysis and unavailable for OT interventions. Will continue to follow.   Nohemi Bullard OTR/L

## 2022-09-19 NOTE — PROGRESS NOTES
Follow up spiritual care assessment with Mr. Appiah Georgi in ICU. Pt is now extubated and talking fine since last visit. His 2 sisters, Manish George and Ginny are visiting. 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 He is not that religous of a person he says but would consider himself Jewish. 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 He expressed much gratitude and welcomes further visits.     KALANI Cox.Div.  [phone])

## 2022-09-19 NOTE — PROGRESS NOTES
Progress Note  Date:2022       Room:[address] [place] [address]  Patient Kendra Da Silva     YOB: 1960     Age:62 y.o. Subjective      Symptoms:  No shortness of breath or chest pain. awake, improved intake; denied pain; remains on amio  Review of Systems   Constitutional:  Positive for appetite change. Negative for chills, diaphoresis, fatigue and fever. HENT:  Negative for nosebleeds. Respiratory:  Negative for shortness of breath and wheezing. Cardiovascular:  Positive for leg swelling. Negative for chest pain and palpitations. Gastrointestinal:  Negative for abdominal distention, abdominal pain and anal bleeding. Objective         Vitals Last 24 Hours:  TEMPERATURE:  Temp  Av.5 °F (36.9 °C)  Min: 97.8 °F (36.6 °C)  Max: 98.9 °F (37.2 °C)  RESPIRATIONS RANGE: Resp  Av.6  Min: 10  Max: 27  PULSE OXIMETRY RANGE: SpO2  Av.5 %  Min: 87 %  Max: 96 %  PULSE RANGE: Pulse  Av  Min: 93  Max: 112  BLOOD PRESSURE RANGE: Systolic (87ZZE), EHK:261 , Min:124 , HZO:058   ; Diastolic (52ZTG), NWA:70, Min:75, Max:96    I/O (24Hr):     Intake/Output Summary (Last 24 hours) at 2022 0920  Last data filed at 2022 0600  Gross per 24 hour   Intake 33 ml   Output 200 ml   Net -167 ml     Objective  Exam facilitated by use of telemedicine platform and with assistance from in house team  Gen - Awake and interactive; no acute distress; full sentences  Head - nc/at  Eyes - anicteric sclera  Ent - normal external anatomy  Cvs - sinus rhythm without external evidence of hypoperfusion  Pulm - normal WOB and adequate SaO2 on NC  GI-no evidence of tenderness with passive movement; no bruising or ecchymosis  Ext - no c/c/e  Msk - normal bulk  Neuro - oriented, no deficits in strength noted; no tremor; follows commands  Labs/Imaging/Diagnostics    Labs:  CBC:  Recent Labs     22  0354 22  0308 22  1628 22  0324   WBC 11.2* 14.8*  -- 15.8*   RBC 2.21* 2.32*  --  1.89*   HGB 7.2* 7.4* 7.3* 6.1*   HCT 21.5* 21.8* 21.1* 18.5*   MCV 97.3 94.0  --  97.9   RDW 13.7 13.6  --  13.4    288  --  246     CHEMISTRIES:  Recent Labs     09/19/22  0354 09/18/22  0308 09/17/22  0752   * 135* 134*   K 3.7 3.7 3.9   CL 98 98 99   CO2 25 28 28   BUN 57* 45* 36*   CA 7.9* 7.9* 8.2*   PHOS 5.0* 4.2 3.4   MG 2.2 2.2 2.3   PT/INR:No results for input(s): INR, INREXT in the last 72 hours. No lab exists for component: PROTIME  APTT:No results for input(s): APTT in the last 72 hours. LIVER PROFILE:No results for input(s): AST, ALT in the last 72 hours. No lab exists for component: [facility], ALKPHOS  Lab Results   Component Value Date/Time    ALT (SGPT) 12 09/09/2022 03:39 PM    AST (SGOT) 27 09/09/2022 03:39 PM    Alk. phosphatase 63 09/09/2022 03:39 PM    Bilirubin, direct 0.3 (H) 09/09/2022 03:39 PM    Bilirubin, total 0.6 09/09/2022 03:39 PM       Imaging Last 24 Hours:  No results found.   Assessment//Plan   Principal Problem:    Severe sepsis ([facility] (9/6/2022)    Active Problems:    LAVINIA (acute kidney injury) ([facility] (9/6/2022)      Melanoma ([facility] (9/6/2022)      HTN (hypertension), benign (9/6/2022)      DM (diabetes mellitus), type 2 ([facility] (9/6/2022)      Hyperlipidemia (9/6/2022)      Delirium (9/14/2022)      Shock ([facility] (9/14/2022)      PAF (paroxysmal atrial fibrillation) ([address] (9/14/2022)      Thrombocytopenia ([address] (9/14/2022)      Abdominal distension (9/16/2022)      Assessment & Plan    Neuro - initial metabolic encephalopathy subsequently complicated by delirium; with supportive care and multi-modal neuropsych regimen has shown improvement and the latter was weaned; cont Seroquel for now     - Possible seizure-like activity noted earlier but the EEG was negative for epileptic activity  - CT brain demonstrated no acute pathology  - PT consulted, patient expressed motivation to move     Cvs-Likely mixed etiology for initial shock with hypovolemia in the setting of dehydration and DKA  -suspect that atrial fibrillation with RVR was triggered secondary to above but this does increase his long-term risk of A. fib - converted to sinus and has come off amio gtt; continued with enteral BB  - AC has been on hold given low day-to-day cva risk and nongoing transfusion requirements  - Echo demonstrated ejection fraction 65-70% with hyperdynamic LV and no evidence of diastolic dysfunction        Pulm-improved resp status and has normal WOB and adequate SaO2 on minimal support     Gi-no immediate need for TORI proph  -prior CT imaging unrevealing     Gu-presented with oliguric acute kidney injury with significant anion gap metabolic acidosis/lactic acidosis; initially required CVVHD but since transitioned to, and tolerating conventional HD     Heme-thrombocytopenia persists - ROBERTO negative; has required transfusions but no overt hemorrhage noted; ?silvana influenced by renal disease, dilution, and phlebotomy  -diagnosed with melanoma and will require follow-up post hospitalization re: treatment     id - s/p earlier course of empiric abx coverage but cultures unrevealing and no evidence of acute infection     Endo - DKA resolved and on sub cut regimen; follow BS and adjust regimen accordingly (dropped dose this am)  - prior A1c 6.6% and no reported blood transfusions around that time - ?triggered event  - h/o hypothyroidism and on synthroid        CCT 35minutes excluding teaching and procedures     I performed all aspects of the physical examination via Telemedicine associated with two way audio and video communication and with the on-site assistance of the bedside nurse. I am located in [place] and the patient is located in [place] at [address] [place] [address]   The patient is critically ill in the ICU. I  personally  reviewed the pertinent medical records, laboratory/ pathology data and radiographic images.   The decision making regarding this patient is as documented above, which was generated  following  discussion  with the multidisciplinary ICU team and creation of a treatment plan for  the patient. We discussed the patient's interval history and future coordination of care and  plans. The patient's medications  were reviewed and changes made as stipulated above. Due to  critical illness impairing one or more vital organs of this patient resulting in life threatening clinical situation  I have provided direct, frequent personal  assessment and manipulation in management plan.   Electronically signed by Yareli Owens MD on 9/19/2022 at 9:20 AM

## 2022-09-20 LAB
ANION GAP SERPL CALC-SCNC: 5 MMOL/L (ref 5–15)
BASOPHILS # BLD: 0 K/UL (ref 0–0.1)
BASOPHILS NFR BLD: 1 % (ref 0–1)
BUN SERPL-MCNC: 33 MG/DL (ref 6–20)
BUN/CREAT SERPL: 7 (ref 12–20)
CALCIUM SERPL-MCNC: 7.9 MG/DL (ref 8.5–10.1)
CHLORIDE SERPL-SCNC: 102 MMOL/L (ref 97–108)
CO2 SERPL-SCNC: 31 MMOL/L (ref 21–32)
CREAT SERPL-MCNC: 5.01 MG/DL (ref 0.7–1.3)
DIFFERENTIAL METHOD BLD: ABNORMAL
EOSINOPHIL # BLD: 0.1 K/UL (ref 0–0.4)
EOSINOPHIL NFR BLD: 1 % (ref 0–7)
ERYTHROCYTE [DISTWIDTH] IN BLOOD BY AUTOMATED COUNT: 14.1 % (ref 11.5–14.5)
GLUCOSE BLD STRIP.AUTO-MCNC: 128 MG/DL (ref 65–117)
GLUCOSE BLD STRIP.AUTO-MCNC: 130 MG/DL (ref 65–117)
GLUCOSE BLD STRIP.AUTO-MCNC: 132 MG/DL (ref 65–117)
GLUCOSE BLD STRIP.AUTO-MCNC: 168 MG/DL (ref 65–117)
GLUCOSE SERPL-MCNC: 76 MG/DL (ref 65–100)
HCT VFR BLD AUTO: 20.9 % (ref 36.6–50.3)
HGB BLD-MCNC: 7 G/DL (ref 12.1–17)
IMM GRANULOCYTES # BLD AUTO: 0 K/UL (ref 0–0.04)
IMM GRANULOCYTES NFR BLD AUTO: 1 % (ref 0–0.5)
LYMPHOCYTES # BLD: 1.3 K/UL (ref 0.8–3.5)
LYMPHOCYTES NFR BLD: 16 % (ref 12–49)
MAGNESIUM SERPL-MCNC: 2.1 MG/DL (ref 1.6–2.4)
MCH RBC QN AUTO: 32.1 PG (ref 26–34)
MCHC RBC AUTO-ENTMCNC: 33.5 G/DL (ref 30–36.5)
MCV RBC AUTO: 95.9 FL (ref 80–99)
MONOCYTES # BLD: 0.7 K/UL (ref 0–1)
MONOCYTES NFR BLD: 8 % (ref 5–13)
NEUTS SEG # BLD: 6.3 K/UL (ref 1.8–8)
NEUTS SEG NFR BLD: 73 % (ref 32–75)
NRBC # BLD: 0 K/UL (ref 0–0.01)
NRBC BLD-RTO: 0 PER 100 WBC
PHOSPHATE SERPL-MCNC: 3.4 MG/DL (ref 2.6–4.7)
PLATELET # BLD AUTO: 380 K/UL (ref 150–400)
PMV BLD AUTO: 9.2 FL (ref 8.9–12.9)
POTASSIUM SERPL-SCNC: 4 MMOL/L (ref 3.5–5.1)
RBC # BLD AUTO: 2.18 M/UL (ref 4.1–5.7)
SERVICE CMNT-IMP: ABNORMAL
SODIUM SERPL-SCNC: 138 MMOL/L (ref 136–145)
WBC # BLD AUTO: 8.5 K/UL (ref 4.1–11.1)

## 2022-09-20 PROCEDURE — 97116 GAIT TRAINING THERAPY: CPT

## 2022-09-20 PROCEDURE — 85025 COMPLETE CBC W/AUTO DIFF WBC: CPT

## 2022-09-20 PROCEDURE — 74011250636 HC RX REV CODE- 250/636: Performed by: INTERNAL MEDICINE

## 2022-09-20 PROCEDURE — 74011250637 HC RX REV CODE- 250/637: Performed by: INTERNAL MEDICINE

## 2022-09-20 PROCEDURE — 97535 SELF CARE MNGMENT TRAINING: CPT

## 2022-09-20 PROCEDURE — 82962 GLUCOSE BLOOD TEST: CPT

## 2022-09-20 PROCEDURE — 97530 THERAPEUTIC ACTIVITIES: CPT

## 2022-09-20 PROCEDURE — 84100 ASSAY OF PHOSPHORUS: CPT

## 2022-09-20 PROCEDURE — 94761 N-INVAS EAR/PLS OXIMETRY MLT: CPT

## 2022-09-20 PROCEDURE — 65270000046 HC RM TELEMETRY

## 2022-09-20 PROCEDURE — 74011000250 HC RX REV CODE- 250: Performed by: INTERNAL MEDICINE

## 2022-09-20 PROCEDURE — 2709999900 HC NON-CHARGEABLE SUPPLY

## 2022-09-20 PROCEDURE — 80048 BASIC METABOLIC PNL TOTAL CA: CPT

## 2022-09-20 PROCEDURE — 74011250637 HC RX REV CODE- 250/637: Performed by: SPECIALIST

## 2022-09-20 PROCEDURE — 94640 AIRWAY INHALATION TREATMENT: CPT

## 2022-09-20 PROCEDURE — 83735 ASSAY OF MAGNESIUM: CPT

## 2022-09-20 PROCEDURE — 36415 COLL VENOUS BLD VENIPUNCTURE: CPT

## 2022-09-20 PROCEDURE — 74011636637 HC RX REV CODE- 636/637: Performed by: INTERNAL MEDICINE

## 2022-09-20 RX ADMIN — QUETIAPINE FUMARATE 50 MG: 25 TABLET ORAL at 21:57

## 2022-09-20 RX ADMIN — INSULIN GLARGINE 7 UNITS: 100 INJECTION, SOLUTION SUBCUTANEOUS at 08:15

## 2022-09-20 RX ADMIN — CARVEDILOL 6.25 MG: 6.25 TABLET, FILM COATED ORAL at 08:16

## 2022-09-20 RX ADMIN — FOLIC ACID 1 MG: 1 TABLET ORAL at 08:16

## 2022-09-20 RX ADMIN — LEVOTHYROXINE SODIUM 100 MCG: 0.1 TABLET ORAL at 08:16

## 2022-09-20 RX ADMIN — QUETIAPINE FUMARATE 50 MG: 25 TABLET ORAL at 17:27

## 2022-09-20 RX ADMIN — CYANOCOBALAMIN TAB 500 MCG 500 MCG: 500 TAB at 08:16

## 2022-09-20 RX ADMIN — QUETIAPINE FUMARATE 50 MG: 25 TABLET ORAL at 08:16

## 2022-09-20 RX ADMIN — IPRATROPIUM BROMIDE AND ALBUTEROL SULFATE 3 ML: .5; 2.5 SOLUTION RESPIRATORY (INHALATION) at 02:20

## 2022-09-20 RX ADMIN — IPRATROPIUM BROMIDE AND ALBUTEROL SULFATE 3 ML: .5; 2.5 SOLUTION RESPIRATORY (INHALATION) at 07:30

## 2022-09-20 RX ADMIN — IPRATROPIUM BROMIDE AND ALBUTEROL SULFATE 3 ML: .5; 2.5 SOLUTION RESPIRATORY (INHALATION) at 19:52

## 2022-09-20 RX ADMIN — HEPARIN SODIUM 5000 UNITS: 5000 INJECTION INTRAVENOUS; SUBCUTANEOUS at 12:10

## 2022-09-20 RX ADMIN — IPRATROPIUM BROMIDE AND ALBUTEROL SULFATE 3 ML: .5; 2.5 SOLUTION RESPIRATORY (INHALATION) at 13:31

## 2022-09-20 RX ADMIN — MICONAZOLE NITRATE 2 % TOPICAL POWDER: at 08:21

## 2022-09-20 RX ADMIN — CARVEDILOL 6.25 MG: 6.25 TABLET, FILM COATED ORAL at 17:27

## 2022-09-20 RX ADMIN — HEPARIN SODIUM 5000 UNITS: 5000 INJECTION INTRAVENOUS; SUBCUTANEOUS at 21:57

## 2022-09-20 RX ADMIN — MICONAZOLE NITRATE 2 % TOPICAL POWDER: at 17:28

## 2022-09-20 NOTE — PROGRESS NOTES
Problem: Self Care Deficits Care Plan (Adult)  Goal: *Acute Goals and Plan of Care (Insert Text)  Description: FUNCTIONAL STATUS PRIOR TO ADMISSION: Patient was independent and active without use of DME. Pt cared for his mother. Drives. HOME SUPPORT: The patient lived with mother whom pt cares for. Occupational Therapy Goals  Initiated 9/18/2022  1. Patient will perform self-feeding with supervision/set-up within 7 day(s). 2.  Patient will perform grooming with supervision/set-up within 7 day(s). 3.  Patient will perform upper body dressing with supervision/set-up within 7 day(s). 4.  Patient will perform toilet transfers with moderate assistance  within 7 day(s). 5.  Patient will perform all aspects of toileting with moderate assistance  within 7 day(s). 6.  Patient will participate in upper extremity therapeutic exercise/activities with independence for 10 minutes within 7 day(s). 7.  Patient will utilize energy conservation techniques during functional activities with verbal and visual cues within 7 day(s). Outcome: Progressing Towards Goal   OCCUPATIONAL THERAPY TREATMENT  Patient: Sabina Bailey (87 y.o. male)  Date: 9/20/2022  Diagnosis: Severe sepsis ([facility] [A41.9, R65.20] Severe sepsis [facility])      Precautions: Fall, Skin, Aspiration  Chart, occupational therapy assessment, plan of care, and goals were reviewed. ASSESSMENT  Patient continues with skilled OT services and is progressing towards goals. Mr. Toya Banda was received in bed agreeable and eager for OT. Patient with poor insight into deficits, impulsive, and with poor safety awareness. Patient able to don socks from long sitting position with education for adaptive technique and min A. Patient was able to come to sitting EOB, perform ambulation, and ADL transfers with x1 person assist.  Patient fatigues easily and needs up to x2 person assist with ambulation when fatigued.   Patient setup for grooming and feeding from the chair with good tolerance. Patient is making good progress overall. Current Level of Function Impacting Discharge (ADLs): Patient required CGA to min A for bed mobility and min A overall for OOB transfers. Patient requires setup to feeding and grooming and min A for LB dressing. PLAN :  Patient continues to benefit from skilled intervention to address the above impairments. Continue treatment per established plan of care to address goals. Recommend with staff:   Recommend patient be OOB to chair as frequently as tolerated; Goal of 3x/day for all meals for 60 minutes at a time. For toileting needs, recommend transfers to/from [facility] with staff assist.    Encourage patient involvement in personal care as able. Recommend next OT session: Continue towards set OT goals. Recommendation for discharge: (in order for the patient to meet his/her long term goals)  To be determined: If discharging at current functional status would recommend SNF; If improves may be safe for return home with home health and family support    This discharge recommendation:  Has been made in collaboration with the attending provider and/or case management    IF patient discharges home will need the following DME: none       SUBJECTIVE:   Patient agreeable to OT tx. OBJECTIVE DATA SUMMARY:   Cognitive/Behavioral Status:  Neurologic State: Alert  Orientation Level: Oriented X4  Cognition: Follows commands;Decreased attention/concentration  Perception: Appears intact  Perseveration: No perseveration noted  Safety/Judgement: Awareness of environment    Functional Mobility and Transfers for ADLs:  Bed Mobility:  Rolling: Contact guard assistance;Assist x1;Additional time  Supine to Sit: Contact guard assistance; Additional time;Assist x1  Sit to Supine:  (pt remained up at end of tx)  Scooting: Minimum assistance;Assist x1;Additional time    Transfers:  Sit to Stand: Minimum assistance;Assist x1;Additional time  Bed to Chair: Minimum assistance;Assist x1;Additional time    Balance:  Sitting: Intact; High guard  Sitting - Static: Good (unsupported)  Sitting - Dynamic: Good (unsupported)  Standing: Impaired; With support  Standing - Static: Fair  Standing - Dynamic : Fair    ADL Intervention:  Feeding  Feeding Assistance: Set-up  Container Management: Set-up  Cutting Food: Set-up  Utensil Management: Set-up  Food to Mouth: Set-up  Drink to Mouth: Set-up  Cues: Verbal cues provided    Grooming  Grooming Assistance: Set-up; Supervision  Position Performed: Seated in chair  Washing Face: Set-up; Supervision  Washing Hands: Set-up; Supervision    Lower Body Dressing Assistance  Dressing Assistance: Minimum assistance  Socks: Minimum assistance  Leg Crossed Method Used: Yes  Position Performed: Long sitting on bed  Cues: Roxanne Gresham    Cognitive Retraining  Safety/Judgement: Awareness of environment    Activity Tolerance:   Good    After treatment patient left in no apparent distress:   Sitting in chair, Call bell within reach, and Bed / chair alarm activated    COMMUNICATION/COLLABORATION:   The patients plan of care was discussed with: Physical therapist, Registered nurse, and patient .      DU Hurst/L  Time Calculation: 54 mins

## 2022-09-20 NOTE — ROUTINE PROCESS
Bedside and Verbal shift change report given to Pili Harvey RN (oncoming nurse) by Miguel Vale RN (offgoing nurse). Report included the following information SBAR, Kardex, MAR, and Cardiac Rhythm Sinus tach . 0700: Pt without complaints of pain. Requesting to work with physical therapy. Instructed patient to call for assistance before getting out of bed. Call light in reach. 1820: Pt resting in bedside chair. Call light in reach. Bedside and Verbal shift change report given to Tiana Liriano RN (oncoming nurse) by Pili Harvey RN (offgoing nurse). Report included the following information SBAR, Kardex, MAR, and Cardiac Rhythm Sinus rhythm/sinus tach .

## 2022-09-20 NOTE — PROGRESS NOTES
Problem: Falls - Risk of  Goal: *Absence of Falls  Description: Document Dianne Millard Fall Risk and appropriate interventions in the flowsheet. Outcome: Progressing Towards Goal  Note: Fall Risk Interventions:  Mobility Interventions: Communicate number of staff needed for ambulation/transfer, Bed/chair exit alarm, Patient to call before getting OOB    Mentation Interventions: Door open when patient unattended, Bed/chair exit alarm, Room close to nurse's station    Medication Interventions: Patient to call before getting OOB, Bed/chair exit alarm    Elimination Interventions: Call light in reach, Patient to call for help with toileting needs    History of Falls Interventions: Door open when patient unattended, Bed/chair exit alarm, Room close to nurse's station         Problem: Patient Education: Go to Patient Education Activity  Goal: Patient/Family Education  Outcome: Progressing Towards Goal     Problem: Pressure Injury - Risk of  Goal: *Prevention of pressure injury  Description: Document David Scale and appropriate interventions in the flowsheet. Outcome: Progressing Towards Goal  Note: Pressure Injury Interventions:  Sensory Interventions: Assess changes in LOC, Keep linens dry and wrinkle-free, Turn and reposition approx. every two hours (pillows and wedges if needed)    Moisture Interventions: Apply protective barrier, creams and emollients, Absorbent underpads, Minimize layers    Activity Interventions: PT/OT evaluation    Mobility Interventions: Turn and reposition approx.  every two hours(pillow and wedges)    Nutrition Interventions: Document food/fluid/supplement intake    Friction and Shear Interventions: Apply protective barrier, creams and emollients                Problem: Patient Education: Go to Patient Education Activity  Goal: Patient/Family Education  Outcome: Progressing Towards Goal     Problem: Nutrition Deficit  Goal: *Optimize nutritional status  Outcome: Progressing Towards Goal

## 2022-09-20 NOTE — PROGRESS NOTES
Transition of care note:    RUR 17%(moderate readmission risk score)    Today:  I discussed pt with PT who informed me pt will likely need SNF versus home health pending progress.     Meliton Shipman serve

## 2022-09-20 NOTE — PROGRESS NOTES
Music Therapy Assessment  Socrates Brown [phone]     1960  57 y.o.  male    Patient Telephone Number: [phone] (home)   [employer] Affiliation: No preference   Language: English   Patient Active Problem List    Diagnosis Date Noted    Abdominal distension 09/16/2022    Delirium 09/14/2022    Shock ([address] 09/14/2022    PAF (paroxysmal atrial fibrillation) ([address] 09/14/2022    Thrombocytopenia ([address] 09/14/2022    Severe sepsis ([facility] 09/06/2022    LAVINIA (acute kidney injury) ([facility] 09/06/2022    Melanoma ([facility] 09/06/2022    HTN (hypertension), benign 09/06/2022    DM (diabetes mellitus), type 2 ([facility] 09/06/2022    Hyperlipidemia 09/06/2022        Date: 9/20/2022            Total Time (in minutes): 30          SFM 3 INTENSIVE CARE    Mental Status:   [x] Alert [  ] Julian Mahmood [  ]  Confused  [  ] Minimally responsive  [  ] Sleeping    Communication Status: [  ] Impaired Speech [  ] Nonverbal -N/A    Physical Status:   [x] Oxygen in use  [  ] Hard of Hearing [  ] Vision Impaired  [  ] Ambulatory  [  ] Ambulatory with assistance [  ] Non-ambulatory     Music Preferences, Background: Country music. Clinical Problem addressed: Positive social interaction for pt/family. Goal(s) met in session:  Physical/Pain management (Scale of 1-10):    Pre-session rating: Pt denied pain. Post-session rating: Pt denied pain.   [  ] Increased relaxation   [  ] Affected breathing patterns  [  ] Decreased muscle tension   [  ] Decreased agitation  [  ] Affected heart rate    [  ] Increased alertness     Emotional/Psychological:  [  ] Increased self-expression   [  ] Decreased aggressive behavior   [  ] Decreased feelings of stress  [  ] Discussed healthy coping skills     [  ] Improved mood    [  ] Decreased withdrawn behavior     Social:  [  ] Decreased feelings of isolation/loneliness [x] Positive social interaction   [x] Provided support and/or comfort for family/friends    Spiritual:  [  ] Spiritual support    [  ] Expressed peace  [  ] Expressed sowmya    [  ] Discussed beliefs    Techniques Utilized (Check all that apply):   [  ] Procedural support MT [  ] Music for relaxation [x] Patient preferred music  [  ] Melanie analysis  [x] Song/artist choice  [  ] Music for validation  [  ] Entrainment  [  ] Movement to music [  ] Guided visualization  [  ] Yael Medal  [  ] Patient instrument playing [  ] Se[employer] CV writing  [  ] Sesar Bores along   [  ] Saúltrinidad Maurer  [  ] Sensory stimulation  [  ] Active Listening  [  ] Music for spiritual support [  ] Making of CDs as gifts    Session Observations:  Referral from [address]. Patient was alert sitting in a chair. One of his sisters was at bedside. This music therapist (MT) asked how they were doing, and specifically how the pt was feeling. MT then asked the pt about his music preferences and pt shared these. MT sat across from the pt and sang and played with [employer] the [employer] [place] and the [employer] song Good-Hearted Woman. Pt's affect brightened in response to these songs. He and his sister expressed enjoyment in the music and they requested a [employer]ument[employer].cl. MT sang and played [place] with [employer]. Pt and his sister thanked MT for MT's presence and for the session.     MEIR Del CastilloBC (Music Therapist-Board Certified)  Spiritual Care Department  Referral-based service

## 2022-09-20 NOTE — PROGRESS NOTES
[place] Colemanelsen isac [address]  [facility], [address] [place] [address]  [phone]      Hospitalist  Progress Note      NAME:         Grace Mike   :        1960  MRM:        [id number]    Date of service: 2022      Interval HPI: Patient weak but more awake and alert. Not confused. He was found on the floor yesterday to which he says he was trying to crawl out of bed. He does not have any pain. Wants to ambulate. Slight cough. No fever or chills. Objective:    Vital Signs:    Visit Vitals  /88   Pulse 99   Temp 97.8 °F (36.6 °C)   Resp 18   Ht 5' 6\" (1.676 m)   Wt 84.7 kg (186 lb 11.2 oz)   SpO2 93%   BMI 30.13 kg/m²        Intake/Output Summary (Last 24 hours) at 2022 0708  Last data filed at 2022 1200  Gross per 24 hour   Intake 240 ml   Output 1500 ml   Net -1260 ml          Physical Examination:    General:   Weak and ill looking but he is not distressed     Eyes:   pink conjunctivae, PERRLA with no discharge. ENT:   dry mucous membranes. No rhinorrhea   Neck: no masses, thyroid non-tender and trachea central.  Pulm: generally decreased breath sounds without crackles or wheezes  Card:  has atrial fibrillation, without thrills, bruits. + peripheral edema  Abd:  firm, distended, decreased bowel sounds   Musc:  No cyanosis, clubbing, atrophy or deformities. Skin:  No rashes, bruising or ulcers.    Neuro: Awake and alert, generalized weakness but non focal.    Psych:  Has little insight to his illness     Current Facility-Administered Medications   Medication Dose Route Frequency    insulin glargine (LANTUS) injection 7 Units  7 Units SubCUTAneous DAILY    carvediloL (COREG) tablet 6.25 mg  6.25 mg Oral BID WITH MEALS    miconazole (MICOTIN) 2 % powder   Topical BID    0.9% sodium chloride infusion 250 mL  250 mL IntraVENous PRN    polyethylene glycol (MIRALAX) packet 17 g  17 g Oral DAILY PRN insulin lispro (HUMALOG) injection   SubCUTAneous AC&HS    QUEtiapine (SEROquel) tablet 50 mg  50 mg Oral TID    albumin human 25% (BUMINATE) solution 25 g  25 g IntraVENous DIALYSIS PRN    albuterol-ipratropium (DUO-NEB) 2.5 MG-0.5 MG/3 ML  3 mL Nebulization Q6H RT    albuterol-ipratropium (DUO-NEB) 2.5 MG-0.5 MG/3 ML  3 mL Nebulization Q4H PRN    sennosides (SENOKOT) 8.8 mg/5 mL syrup 8.8 mg  5 mL Oral DAILY    hydrALAZINE (APRESOLINE) 20 mg/mL injection 10 mg  10 mg IntraVENous Q6H PRN    acetaminophen (TYLENOL) tablet 650 mg  650 mg Oral Q6H PRN    OLANZapine (ZyPREXA) tablet 5 mg  5 mg Per NG tube BID PRN    folic acid (FOLVITE) tablet 1 mg  1 mg Oral DAILY    cyanocobalamin (VITAMIN B12) tablet 500 mcg  500 mcg Per NG tube DAILY    dextrose 10% infusion 0-250 mL  0-250 mL IntraVENous PRN    levothyroxine (SYNTHROID) tablet 100 mcg  100 mcg Oral ACB    glucose chewable tablet 16 g  4 Tablet Oral PRN    glucagon (GLUCAGEN) injection 1 mg  1 mg IntraMUSCular PRN    [Held by provider] heparin (porcine) injection 5,000 Units  5,000 Units SubCUTAneous Q12H        Laboratory data and review:    Recent Labs     09/20/22  0033 09/19/22  0354 09/18/22  0308   WBC 8.5 11.2* 14.8*   HGB 7.0* 7.2* 7.4*   HCT 20.9* 21.5* 21.8*    347 288       Recent Labs     09/20/22  0033 09/19/22  0354 09/18/22  0308    134* 135*   K 4.0 3.7 3.7    98 98   CO2 31 25 28   GLU 76 87 111*   BUN 33* 57* 45*   CREA 5.01* 7.04* 5.57*   CA 7.9* 7.9* 7.9*   MG 2.1 2.2 2.2   PHOS 3.4 5.0* 4.2       No components found for: [place]    Diagnostics: Imaging studies have been reviewed    Telemetry reviewed by me:   normal sinus rhythm    Assessment and Plan:    LAVINIA (acute kidney injury) ([facility] [address] (9/6/2022) POA: has CKD stage III. He had severe acidemia on admission which has now resolved. Suspected to have been triggered by volume depletion, atypical DKA vs Metformin toxicity.  Seen by nephrology and had been on CVVH that was transitioned to [facility] on MWFs. He continues to tolerate this. Continue to monitor renal function. HD as per nephrology. Continue PT, OT and CM for discharge planning     Delirium (9/14/2022) POA: noted post extubation and has since resolved. CT scan head neg for acute changes. Continue Zyprexa, Seroquel and wean as tolerated. Ambulate as tolerated       PAF (paroxysmal atrial fibrillation) ([facility] [address] (9/14/2022) POA: has episodic RVR but now rate controlled. Recent Echo showed a normal LV function with EF 65-70%. Continue Coreg. No anticoagulation due to anemia. Cardiology following     HTN (hypertension), benign (9/6/2022) POA: BP variable. Continue Coreg. IV Hydralazine PRN      DM (diabetes mellitus), type 2 ([facility] [address] (9/6/2022) POA: A1c 6.6. BG stable. Continue Lantus, SSi per protocol. DM diet      Thrombocytopenia ([facility] [address] (9/14/2022) POA: unclear etiology but resolved. POOJA neg. Peripheral smear unremarkable. Monitor    Anemia - progressively worsening. Has low vitamin D32 and folic acid. Being repleted. Stool for occult blood neg. Transfused 1 unit pRBCs 9/17. Hgb 7 today. Monitor     Acute respiratory failure with hypoxia POA: intubated for airway protection due to severe acidosis. Extubated 9/11.  He has remained stable on RA     Hyperlipidemia (9/6/2022) POA: Pravastatin on hold                 Care Plan discussed with: Patient, Care Manager, Nursing Staff, and Consultant/Specialist    Discussed:  Care Plan    Prophylaxis:  Hep SQ    Anticipated Disposition:  SNF/LTC           ___________________________________________________    Attending Physician:   Dano Okeefe MD

## 2022-09-20 NOTE — PROGRESS NOTES
[address]  YOB: 1960          Assessment & Plan:     Severe oliguric LAVINIA on CVVH then transition to IHD from 9/12  Severe lactic acidosis  Ketoacidosis  Resp failure s/p intubation and extubation   DM2  Thrombocytopenia  Hypophosphatemia     Rec:  HD MWF schedule   Plan for next HD Wednesday   Avoid nephrotoxins   ICU support       Subjective:   CC: LAVINIA  HPI: Seen, feels better , edema better   ROS:   Current Facility-Administered Medications   Medication Dose Route Frequency    insulin glargine (LANTUS) injection 7 Units  7 Units SubCUTAneous DAILY    carvediloL (COREG) tablet 6.25 mg  6.25 mg Oral BID WITH MEALS    miconazole (MICOTIN) 2 % powder   Topical BID    0.9% sodium chloride infusion 250 mL  250 mL IntraVENous PRN    polyethylene glycol (MIRALAX) packet 17 g  17 g Oral DAILY PRN    insulin lispro (HUMALOG) injection   SubCUTAneous AC&HS    QUEtiapine (SEROquel) tablet 50 mg  50 mg Oral TID    albumin human 25% (BUMINATE) solution 25 g  25 g IntraVENous DIALYSIS PRN    albuterol-ipratropium (DUO-NEB) 2.5 MG-0.5 MG/3 ML  3 mL Nebulization Q6H RT    albuterol-ipratropium (DUO-NEB) 2.5 MG-0.5 MG/3 ML  3 mL Nebulization Q4H PRN    sennosides (SENOKOT) 8.8 mg/5 mL syrup 8.8 mg  5 mL Oral DAILY    hydrALAZINE (APRESOLINE) 20 mg/mL injection 10 mg  10 mg IntraVENous Q6H PRN    acetaminophen (TYLENOL) tablet 650 mg  650 mg Oral Q6H PRN    OLANZapine (ZyPREXA) tablet 5 mg  5 mg Per NG tube BID PRN    folic acid (FOLVITE) tablet 1 mg  1 mg Oral DAILY    cyanocobalamin (VITAMIN B12) tablet 500 mcg  500 mcg Per NG tube DAILY    dextrose 10% infusion 0-250 mL  0-250 mL IntraVENous PRN    levothyroxine (SYNTHROID) tablet 100 mcg  100 mcg Oral ACB    glucose chewable tablet 16 g  4 Tablet Oral PRN    glucagon (GLUCAGEN) injection 1 mg  1 mg IntraMUSCular PRN    heparin (porcine) injection 5,000 Units  5,000 Units SubCUTAneous Q12H          Objective:     Vitals:  Blood pressure (!) 146/84, pulse (!) 109, temperature 97.8 °F (36.6 °C), resp. rate 19, height 5' 6\" (1.676 m), weight 80.5 kg (177 lb 8 oz), SpO2 95 %. Temp (24hrs), Av.1 °F (36.7 °C), Min:97.8 °F (36.6 °C), Max:98.7 °F (37.1 °C)      Intake and Output:  No intake/output data recorded.  1901 -  0700  In: 240 [P.O.:240]  Out: 1700 [Urine:200]    Physical Exam:               GENERAL ASSESSMENT: NAD  HEENT: peerla   CHEST: diminished BS +  HEART: S1S2  ABDOMEN: Soft,NT  : +Vega:   EXTREMITY: trace  EDEMA        ECG/rhythm:    Data Review      No results for input(s): TNIPOC in the last 72 hours. No lab exists for component: ITNL     No results for input(s): CPK, CKMB, TROIQ in the last 72 hours. Recent Labs     22  0033 22  0354 22  0308    134* 135*   K 4.0 3.7 3.7    98 98   CO2 31 25 28   BUN 33* 57* 45*   CREA 5.01* 7.04* 5.57*   GLU 76 87 111*   PHOS 3.4 5.0* 4.2   MG 2.1 2.2 2.2   CA 7.9* 7.9* 7.9*   WBC 8.5 11.2* 14.8*   HGB 7.0* 7.2* 7.4*   HCT 20.9* 21.5* 21.8*    347 288      No results for input(s): INR, PTP, APTT, INREXT, INREXT in the last 72 hours. Needs: urine analysis, urine sodium, protein and creatinine  No results found for: Thaddeus Prajapati        : Tyrone Wolf MD  2022        [facility]:  [email or web address]. [employer]  Salazar Padilla office:  [address], [address] [place] [address] [address]  [place], [address] [facility]  Phone: [phone]  Fax :     [phone]    [place] office:  [address] [facility]  Tanna Mccormack  Phone - [phone]  Fax - [phone]

## 2022-09-20 NOTE — PROGRESS NOTES
Problem: Mobility Impaired (Adult and Pediatric)  Goal: *Acute Goals and Plan of Care (Insert Text)  Description: FUNCTIONAL STATUS PRIOR TO ADMISSION: Patient was independent and active without use of DME.    HOME SUPPORT PRIOR TO ADMISSION: The patient lived with his mother and assisted with her iADLs. He has children and siblings also in the local area. Physical Therapy Goals  Initiated 9/18/2022  1. Patient will move from supine to sit and sit to supine , scoot up and down, and roll side to side in bed with minimal assistance/contact guard assist within 7 day(s). 2.  Patient will transfer from bed to chair and chair to bed with minimal assistance/contact guard assist using the least restrictive device within 7 day(s). 3.  Patient will perform sit to stand with minimal assistance/contact guard assist within 7 day(s). 4.  Patient will ambulate with minimal assistance/contact guard assist for 50 feet with the least restrictive device within 7 day(s). Outcome: Progressing Towards Goal   PHYSICAL THERAPY TREATMENT  Patient: Cristino Christian (69 y.o. male)  Date: 9/20/2022  Diagnosis: Severe sepsis ([facility] [address] [A41.9, R65.20] Severe sepsis [facility])      Precautions: Fall, Skin, Aspiration  Chart, physical therapy assessment, plan of care and goals were reviewed. ASSESSMENT  Patient continues with skilled PT services and is progressing towards goals. Communicated with nurse cleared for therapy. Patent supine on bed when received. Rolled on the edge of bed CGA, supine to sit CGA, sit to stand CGA, ambulate with rolling walker in the room and out on the ICU hallway min assist decreased pace noted some leaning to the left side during ambulation and need verbal cues to focus on the task. Need to redirect at times. Assisted back to the room and OOB to chair as tolerated performed some active range of motion exercise on both LE all planes.  Educate and instructed patient to continue active range of motion exercise on both legs while up on chair or on bed multiple times. Recommend patient to be up on the chair at least 3 times a day every meal times as tolerated. Activated chair alarm and notified nurse who agreed to monitor patient. Communicated with nurse who agreed to monitor patient. Current Level of Function Impacting Discharge (mobility/balance): min assist with ambulation using a rolling walker. Other factors to consider for discharge: fall         PLAN :  Patient continues to benefit from skilled intervention to address the above impairments. Continue treatment per established plan of care. to address goals. Recommendation for discharge: (in order for the patient to meet his/her long term goals)  Physical therapy at least 2 days/week in the home vs. SNF TBD pending progress from therapy    This discharge recommendation:  Has been made in collaboration with the attending provider and/or case management    IF patient discharges home will need the following DME: patient owns DME required for discharge       SUBJECTIVE:   Patient stated [address]     OBJECTIVE DATA SUMMARY:   Critical Behavior:  Neurologic State: Alert  Orientation Level: Oriented X4  Cognition: Appropriate decision making, Appropriate for age attention/concentration, Appropriate safety awareness, Follows commands  Safety/Judgement: Decreased insight into deficits  Functional Mobility Training:  Bed Mobility:  Rolling: Contact guard assistance  Supine to Sit: Contact guard assistance  Sit to Supine: Contact guard assistance  Scooting: Contact guard assistance        Transfers:  Sit to Stand: Contact guard assistance  Stand to Sit: Contact guard assistance  Stand Pivot Transfers: Contact guard assistance     Bed to Chair: Contact guard assistance                    Balance:  Sitting: Intact; High guard  Sitting - Static: Good (unsupported)  Sitting - Dynamic: Good (unsupported)  Standing: Impaired; With support  Standing - Static: Fair  Standing - Dynamic : Fair  Ambulation/Gait Training:  Distance (ft): 60 Feet (ft)  Assistive Device: Walker, rolling;Gait belt  Ambulation - Level of Assistance: Minimal assistance     Gait Description (WDL): Exceptions to WDL  Gait Abnormalities: Path deviations; Step to gait        Base of Support: Widened     Speed/Hermila: Pace decreased (<100 feet/min)  Step Length: Right shortened;Left shortened                   Therapeutic Exercises:   Educate and instructed patient to continue active range of motion exercise on both legs while up on chair or on bed multiple times. Recommend patient to be up on the chair at least 3 times a day every meal times as tolerated. Pain Ratin/10    Activity Tolerance:   Good    After treatment patient left in no apparent distress:   Sitting in chair, Heels elevated for pressure relief, Call bell within reach, and Bed / chair alarm activated    COMMUNICATION/COLLABORATION:   The patients plan of care was discussed with: Occupational therapist, Registered nurse, and Case management. Hiram Frankel, PT,WCC.    Time Calculation: 35 mins

## 2022-09-20 NOTE — PROGRESS NOTES
Problem: Falls - Risk of  Goal: *Absence of Falls  Description: Document Malina Freeze Fall Risk and appropriate interventions in the flowsheet.   Outcome: Progressing Towards Goal  Note: Fall Risk Interventions:  Mobility Interventions: Communicate number of staff needed for ambulation/transfer, Bed/chair exit alarm, Patient to call before getting OOB    Mentation Interventions: Door open when patient unattended, Bed/chair exit alarm, Room close to nurse's station    Medication Interventions: Patient to call before getting OOB, Bed/chair exit alarm    Elimination Interventions: Call light in reach, Patient to call for help with toileting needs    History of Falls Interventions: Door open when patient unattended, Bed/chair exit alarm, Room close to nurse's station

## 2022-09-20 NOTE — PROGRESS NOTES
1930 Bedside and Verbal shift change report given to Cindi Breen RN. (oncoming nurse) by Toma Mendiola RN. (offgoing nurse). Report included the following information SBAR, Kardex, ED Summary, Procedure Summary, Intake/Output, MAR, and Recent Results. See assessment for David. Pt has wound to buttocks bilaterally. See wound care documentation.

## 2022-09-21 LAB
ANION GAP SERPL CALC-SCNC: 8 MMOL/L (ref 5–15)
BASOPHILS # BLD: 0.1 K/UL (ref 0–0.1)
BASOPHILS NFR BLD: 1 % (ref 0–1)
BUN SERPL-MCNC: 43 MG/DL (ref 6–20)
BUN/CREAT SERPL: 7 (ref 12–20)
CALCIUM SERPL-MCNC: 8.1 MG/DL (ref 8.5–10.1)
CHLORIDE SERPL-SCNC: 104 MMOL/L (ref 97–108)
CO2 SERPL-SCNC: 26 MMOL/L (ref 21–32)
CREAT SERPL-MCNC: 6.44 MG/DL (ref 0.7–1.3)
DIFFERENTIAL METHOD BLD: ABNORMAL
EOSINOPHIL # BLD: 0.1 K/UL (ref 0–0.4)
EOSINOPHIL NFR BLD: 2 % (ref 0–7)
ERYTHROCYTE [DISTWIDTH] IN BLOOD BY AUTOMATED COUNT: 13.9 % (ref 11.5–14.5)
GLUCOSE BLD STRIP.AUTO-MCNC: 104 MG/DL (ref 65–117)
GLUCOSE BLD STRIP.AUTO-MCNC: 113 MG/DL (ref 65–117)
GLUCOSE BLD STRIP.AUTO-MCNC: 114 MG/DL (ref 65–117)
GLUCOSE BLD STRIP.AUTO-MCNC: 170 MG/DL (ref 65–117)
GLUCOSE SERPL-MCNC: 119 MG/DL (ref 65–100)
HCT VFR BLD AUTO: 20.1 % (ref 36.6–50.3)
HGB BLD-MCNC: 6.7 G/DL (ref 12.1–17)
HISTORY CHECKED?,CKHIST: NORMAL
IMM GRANULOCYTES # BLD AUTO: 0.1 K/UL (ref 0–0.04)
IMM GRANULOCYTES NFR BLD AUTO: 1 % (ref 0–0.5)
LYMPHOCYTES # BLD: 1.4 K/UL (ref 0.8–3.5)
LYMPHOCYTES NFR BLD: 20 % (ref 12–49)
MCH RBC QN AUTO: 32.1 PG (ref 26–34)
MCHC RBC AUTO-ENTMCNC: 33.3 G/DL (ref 30–36.5)
MCV RBC AUTO: 96.2 FL (ref 80–99)
MONOCYTES # BLD: 0.6 K/UL (ref 0–1)
MONOCYTES NFR BLD: 8 % (ref 5–13)
NEUTS SEG # BLD: 4.8 K/UL (ref 1.8–8)
NEUTS SEG NFR BLD: 69 % (ref 32–75)
NRBC # BLD: 0 K/UL (ref 0–0.01)
NRBC BLD-RTO: 0 PER 100 WBC
PLATELET # BLD AUTO: 412 K/UL (ref 150–400)
PMV BLD AUTO: 8.9 FL (ref 8.9–12.9)
POTASSIUM SERPL-SCNC: 3.7 MMOL/L (ref 3.5–5.1)
RBC # BLD AUTO: 2.09 M/UL (ref 4.1–5.7)
SERVICE CMNT-IMP: ABNORMAL
SERVICE CMNT-IMP: NORMAL
SODIUM SERPL-SCNC: 138 MMOL/L (ref 136–145)
WBC # BLD AUTO: 6.9 K/UL (ref 4.1–11.1)

## 2022-09-21 PROCEDURE — 74011250637 HC RX REV CODE- 250/637: Performed by: SPECIALIST

## 2022-09-21 PROCEDURE — 86923 COMPATIBILITY TEST ELECTRIC: CPT

## 2022-09-21 PROCEDURE — 82962 GLUCOSE BLOOD TEST: CPT

## 2022-09-21 PROCEDURE — P9016 RBC LEUKOCYTES REDUCED: HCPCS

## 2022-09-21 PROCEDURE — 74011250636 HC RX REV CODE- 250/636: Performed by: INTERNAL MEDICINE

## 2022-09-21 PROCEDURE — 74011636637 HC RX REV CODE- 636/637: Performed by: INTERNAL MEDICINE

## 2022-09-21 PROCEDURE — 90935 HEMODIALYSIS ONE EVALUATION: CPT

## 2022-09-21 PROCEDURE — 74011000250 HC RX REV CODE- 250: Performed by: INTERNAL MEDICINE

## 2022-09-21 PROCEDURE — 65270000046 HC RM TELEMETRY

## 2022-09-21 PROCEDURE — 74011250637 HC RX REV CODE- 250/637: Performed by: INTERNAL MEDICINE

## 2022-09-21 PROCEDURE — 80048 BASIC METABOLIC PNL TOTAL CA: CPT

## 2022-09-21 PROCEDURE — 36415 COLL VENOUS BLD VENIPUNCTURE: CPT

## 2022-09-21 PROCEDURE — 86900 BLOOD TYPING SEROLOGIC ABO: CPT

## 2022-09-21 PROCEDURE — 36430 TRANSFUSION BLD/BLD COMPNT: CPT

## 2022-09-21 PROCEDURE — 74011250636 HC RX REV CODE- 250/636: Performed by: NURSE PRACTITIONER

## 2022-09-21 PROCEDURE — 97116 GAIT TRAINING THERAPY: CPT

## 2022-09-21 PROCEDURE — 97530 THERAPEUTIC ACTIVITIES: CPT

## 2022-09-21 PROCEDURE — 86704 HEP B CORE ANTIBODY TOTAL: CPT

## 2022-09-21 PROCEDURE — 86706 HEP B SURFACE ANTIBODY: CPT

## 2022-09-21 PROCEDURE — 74011000250 HC RX REV CODE- 250: Performed by: NURSE PRACTITIONER

## 2022-09-21 PROCEDURE — P9047 ALBUMIN (HUMAN), 25%, 50ML: HCPCS | Performed by: INTERNAL MEDICINE

## 2022-09-21 PROCEDURE — 94640 AIRWAY INHALATION TREATMENT: CPT

## 2022-09-21 PROCEDURE — 85025 COMPLETE CBC W/AUTO DIFF WBC: CPT

## 2022-09-21 RX ORDER — SODIUM CHLORIDE 9 MG/ML
250 INJECTION, SOLUTION INTRAVENOUS AS NEEDED
Status: DISCONTINUED | OUTPATIENT
Start: 2022-09-21 | End: 2022-09-23 | Stop reason: HOSPADM

## 2022-09-21 RX ORDER — LANOLIN ALCOHOL/MO/W.PET/CERES
1000 CREAM (GRAM) TOPICAL DAILY
Status: DISCONTINUED | OUTPATIENT
Start: 2022-09-21 | End: 2022-09-23 | Stop reason: HOSPADM

## 2022-09-21 RX ORDER — QUETIAPINE FUMARATE 25 MG/1
25 TABLET, FILM COATED ORAL 3 TIMES DAILY
Status: DISCONTINUED | OUTPATIENT
Start: 2022-09-21 | End: 2022-09-22

## 2022-09-21 RX ADMIN — INSULIN GLARGINE 7 UNITS: 100 INJECTION, SOLUTION SUBCUTANEOUS at 08:33

## 2022-09-21 RX ADMIN — CYANOCOBALAMIN TAB 500 MCG 1000 MCG: 500 TAB at 08:36

## 2022-09-21 RX ADMIN — CARVEDILOL 6.25 MG: 6.25 TABLET, FILM COATED ORAL at 16:41

## 2022-09-21 RX ADMIN — MICONAZOLE NITRATE 2 % TOPICAL POWDER: at 18:00

## 2022-09-21 RX ADMIN — FOLIC ACID 1 MG: 1 TABLET ORAL at 08:33

## 2022-09-21 RX ADMIN — ALBUMIN (HUMAN) 25 G: 0.25 INJECTION, SOLUTION INTRAVENOUS at 09:00

## 2022-09-21 RX ADMIN — ALTEPLASE 2.6 MG: 2.2 INJECTION, POWDER, LYOPHILIZED, FOR SOLUTION INTRAVENOUS at 01:25

## 2022-09-21 RX ADMIN — CARVEDILOL 6.25 MG: 6.25 TABLET, FILM COATED ORAL at 07:44

## 2022-09-21 RX ADMIN — QUETIAPINE FUMARATE 25 MG: 25 TABLET ORAL at 16:41

## 2022-09-21 RX ADMIN — MICONAZOLE NITRATE 2 % TOPICAL POWDER: at 08:38

## 2022-09-21 RX ADMIN — HEPARIN SODIUM 5000 UNITS: 5000 INJECTION INTRAVENOUS; SUBCUTANEOUS at 21:32

## 2022-09-21 RX ADMIN — QUETIAPINE FUMARATE 25 MG: 25 TABLET ORAL at 21:32

## 2022-09-21 RX ADMIN — IPRATROPIUM BROMIDE AND ALBUTEROL SULFATE 3 ML: .5; 2.5 SOLUTION RESPIRATORY (INHALATION) at 07:21

## 2022-09-21 RX ADMIN — IPRATROPIUM BROMIDE AND ALBUTEROL SULFATE 3 ML: .5; 2.5 SOLUTION RESPIRATORY (INHALATION) at 14:25

## 2022-09-21 RX ADMIN — LEVOTHYROXINE SODIUM 100 MCG: 0.1 TABLET ORAL at 07:44

## 2022-09-21 RX ADMIN — IPRATROPIUM BROMIDE AND ALBUTEROL SULFATE 3 ML: .5; 2.5 SOLUTION RESPIRATORY (INHALATION) at 19:39

## 2022-09-21 RX ADMIN — IPRATROPIUM BROMIDE AND ALBUTEROL SULFATE 3 ML: .5; 2.5 SOLUTION RESPIRATORY (INHALATION) at 02:00

## 2022-09-21 RX ADMIN — QUETIAPINE FUMARATE 25 MG: 25 TABLET ORAL at 08:33

## 2022-09-21 NOTE — PROGRESS NOTES
Transition of care note:    RUR 18%(moderate readmission risk score)    LOS 15 days,GLOS 3.8    Today:  I discussed pt with nephrologist who informed me pt will need outpatient dialysis. I updated attending. I met with pt & his sister to discuss preferences for outpatient dialysis centers. [facility] choice [facility] due to proximity of where pt lives. [facility] for pt is [facility] in case home health is needed. Order with clinicals sent to [facility]. Results of hepatitis B surface antigen sent to [facility]. Nephrologist:please order the other two hepatitis panels required by outpatient dialysis    Hepatitis B surface antibody (HepBsAb)  Hepatitis B core antibody total (HepBcAB total)    I also discussed pt with PT who is recommending SNF versus home health pending pt.'s progression. I discussed SNF choices with pt and sister. Both prefer home health and prefer that I wait to send clinicals to SNF only if that is the disposition needed when discharged. Pt has no preferences for home health. Once pt has been set up for outpatient HD,I will document.     Alma Shipman Serve

## 2022-09-21 NOTE — PROGRESS NOTES
[facility]  [address] [facility], [address] [place] [address]  [phone]      Hospitalist  Progress Note      NAME:         Hanna Oviedo   :        1960  MRM:        [id number]    Date of service: 2022      Interval HPI: Patient remains weak but overall better. He denies any cough, chest pain but mild SOB. No fever or chills. No bleeding. Objective:    Vital Signs:    Visit Vitals  /68   Pulse 100   Temp 98.6 °F (37 °C)   Resp 18   Ht 5' 6\" (1.676 m)   Wt 80.5 kg (177 lb 8 oz)   SpO2 90%   BMI 28.65 kg/m²        Intake/Output Summary (Last 24 hours) at 2022 7657  Last data filed at 2022 0600  Gross per 24 hour   Intake --   Output 250 ml   Net -250 ml          Physical Examination:    General:   Weak and ill looking but remains stable     Eyes:   pink conjunctivae, PERRLA with no discharge. ENT:   dry mucous membranes. No rhinorrhea   Pulm: generally decreased breath sounds with scattered crackles. No wheezes  Card:  has atrial fibrillation, without thrills, bruits. + peripheral edema  Abd:  firm, distended, decreased bowel sounds   Musc:  No cyanosis, clubbing, atrophy or deformities. Skin:  No rashes, bruising or ulcers.    Neuro: Awake and alert, generalized weakness and has a non focal exam     Psych:  Has little insight to his illness     Current Facility-Administered Medications   Medication Dose Route Frequency    alteplase (CATHFLO) 3 mg in sterile water (preservative free) 3 mL injection  3 mg InterCATHeter ONCE    0.9% sodium chloride infusion 250 mL  250 mL IntraVENous PRN    insulin glargine (LANTUS) injection 7 Units  7 Units SubCUTAneous DAILY    carvediloL (COREG) tablet 6.25 mg  6.25 mg Oral BID WITH MEALS    miconazole (MICOTIN) 2 % powder   Topical BID    0.9% sodium chloride infusion 250 mL  250 mL IntraVENous PRN    polyethylene glycol (MIRALAX) packet 17 g  17 g Oral DAILY PRN    insulin lispro (HUMALOG) injection   SubCUTAneous AC&HS    QUEtiapine (SEROquel) tablet 50 mg  50 mg Oral TID    albumin human 25% (BUMINATE) solution 25 g  25 g IntraVENous DIALYSIS PRN    albuterol-ipratropium (DUO-NEB) 2.5 MG-0.5 MG/3 ML  3 mL Nebulization Q6H RT    albuterol-ipratropium (DUO-NEB) 2.5 MG-0.5 MG/3 ML  3 mL Nebulization Q4H PRN    sennosides (SENOKOT) 8.8 mg/5 mL syrup 8.8 mg  5 mL Oral DAILY    hydrALAZINE (APRESOLINE) 20 mg/mL injection 10 mg  10 mg IntraVENous Q6H PRN    acetaminophen (TYLENOL) tablet 650 mg  650 mg Oral Q6H PRN    OLANZapine (ZyPREXA) tablet 5 mg  5 mg Per NG tube BID PRN    folic acid (FOLVITE) tablet 1 mg  1 mg Oral DAILY    cyanocobalamin (VITAMIN B12) tablet 500 mcg  500 mcg Per NG tube DAILY    dextrose 10% infusion 0-250 mL  0-250 mL IntraVENous PRN    levothyroxine (SYNTHROID) tablet 100 mcg  100 mcg Oral ACB    glucose chewable tablet 16 g  4 Tablet Oral PRN    glucagon (GLUCAGEN) injection 1 mg  1 mg IntraMUSCular PRN    heparin (porcine) injection 5,000 Units  5,000 Units SubCUTAneous Q12H        Laboratory data and review:    Recent Labs     09/21/22  0518 09/20/22  0033 09/19/22  0354   WBC 6.9 8.5 11.2*   HGB 6.7* 7.0* 7.2*   HCT 20.1* 20.9* 21.5*   * 380 347       Recent Labs     09/21/22  0518 09/20/22  0033 09/19/22  0354    138 134*   K 3.7 4.0 3.7    102 98   CO2 26 31 25   * 76 87   BUN 43* 33* 57*   CREA 6.44* 5.01* 7.04*   CA 8.1* 7.9* 7.9*   MG  --  2.1 2.2   PHOS  --  3.4 5.0*       No components found for: [place]    Diagnostics: Imaging studies have been reviewed    Telemetry reviewed by me:   normal sinus rhythm    Assessment and Plan:    LAVINIA (acute kidney injury) ([facility] [address] (9/6/2022) POA: has CKD stage III. He had severe acidemia on admission which has now resolved. Suspected to have been triggered by volume depletion, atypical DKA vs Metformin toxicity.  Seen by nephrology and had been on CVVH that was transitioned to HD on MWFs. He continues to tolerate this well. Continue to monitor renal function. HD as per nephrology who will guide on out patient treatments. Continue PT, OT and CM for discharge planning     PAF (paroxysmal atrial fibrillation) ([facility] [address] (9/14/2022) POA: has episodic RVR but now rate controlled. Recent Echo showed a normal LV function with EF 65-70%. Seen and followed by cardiology. Continue Coreg. No anticoagulation due to anemia. HTN (hypertension), benign (9/6/2022) POA: BP variable but overall stable. Continue Coreg. IV Hydralazine PRN      DM (diabetes mellitus), type 2 ([facility] [address] (9/6/2022) POA: A1c 6.6. BG stable. Continue Lantus, SSi per protocol. DM diet as tolerated     Thrombocytopenia ([facility] [address] (9/14/2022) POA: unclear etiology but resolved. POOJA neg. Peripheral smear unremarkable. Monitor    Anemia - progressively worsening. Has low vitamin F88 and folic acid. Being repleted. Stool for occult blood neg. Transfused 1 unit pRBCs 9/17. Hgb 6.6. Give another unit of pRBCs. Monitor     Acute respiratory failure with hypoxia POA: intubated for airway protection due to severe acidosis. Extubated 9/11. He has remained stable on RA     Hyperlipidemia (9/6/2022) POA: Pravastatin on hold    Delirium (9/14/2022) POA: noted post extubation and has since resolved. CT scan head neg for acute changes. Continue Zyprexa, Seroquel (decrease dose today) and wean as tolerated.  Ambulate as tolerated                  Care Plan discussed with: Patient, Care Manager, Nursing Staff, and Consultant/Specialist    Discussed:  Care Plan    Prophylaxis:  Hep SQ    Anticipated Disposition:  SNF/LTC vs home health           ___________________________________________________    Attending Physician:   Koki Peña MD

## 2022-09-21 NOTE — PROGRESS NOTES
..Bedside shift change report given to Freda Taveras RN (oncoming nurse) by Chayo Ca RN (offgoing nurse). Report included the following information SBAR, Kardex, Intake/Output, MAR, and Cardiac Rhythm NSR/Sinus tachy . Everett Mccarty .Primary Nurse Ирина Mina RN and Chayo Ca RN performed a dual skin assessment on this patient Impairment noted- see wound doc flow sheet  David score is see flowsheet     1945- Assessment complete--see flowsheet. Pt able to turn self     0000- Reassessment complete. No changes noted from prior assessment     0400- Reassessment complete. No changes noted from prior assessment     . Everett Mccarty Bedside shift change report given to KVNG Parker (oncoming nurse) by Freda Taveras RN (offgoing nurse). Report included the following information SBAR, Kardex, Intake/Output, MAR, and Cardiac Rhythm NSR .

## 2022-09-21 NOTE — PROGRESS NOTES
0700 Bedside and Verbal shift change report given to [address] Rn (oncoming nurse) by Philly Davis (offgoing nurse). Report included the following information SBAR, Kardex, ED Summary, Procedure Summary, Intake/Output, MAR, Recent Results, Med Rec Status, Cardiac Rhythm Sinus arhythmia, Alarm Parameters , Quality Measures, and Dual Neuro Assessment. Primary Nurse Loly Giron RN and Lázaro Bryant RN performed a dual skin assessment on this patient Impairment noted- see wound doc flow sheet  David score, see flowsheet. 0800 Pt assessed, see flowsheet. Dialysis Rn at bedside for HD - given albumin to infuse. Type and screen sent for blood transfusion. 1234 Dr. Jude Mayfield at bedside. Updated regarding pt assessment, labs, VS, medications and I&Os. Orders to hold SQ heparin for hgb 6.7. See MAR.   9120 Verified blood transfusion with dialysis RN, vitals being monitored and verified by dialysis RN. Transfusion started. 1055 Dialysis complete. 1.1L removed. Reheated food for patient. 1105 PT at bedside. Pt up to chair 1 assist.     1900 Bedside and Verbal shift change report given to Juju Fenton RN (oncoming nurse) by [address], RN (offgoing nurse). Report included the following information SBAR, Kardex, ED Summary, Procedure Summary, Intake/Output, MAR, Recent Results, Med Rec Status, Cardiac Rhythm NSR sinus tach, Alarm Parameters , Quality Measures, and Dual Neuro Assessment.

## 2022-09-21 NOTE — PROGRESS NOTES
Problem: Mobility Impaired (Adult and Pediatric)  Goal: *Acute Goals and Plan of Care (Insert Text)  Description: FUNCTIONAL STATUS PRIOR TO ADMISSION: Patient was independent and active without use of DME.    HOME SUPPORT PRIOR TO ADMISSION: The patient lived with his mother and assisted with her iADLs. He has children and siblings also in the local area. Physical Therapy Goals  Initiated 9/18/2022  1. Patient will move from supine to sit and sit to supine , scoot up and down, and roll side to side in bed with minimal assistance/contact guard assist within 7 day(s). 2.  Patient will transfer from bed to chair and chair to bed with minimal assistance/contact guard assist using the least restrictive device within 7 day(s). 3.  Patient will perform sit to stand with minimal assistance/contact guard assist within 7 day(s). 4.  Patient will ambulate with minimal assistance/contact guard assist for 50 feet with the least restrictive device within 7 day(s). Outcome: Progressing Towards Goal   PHYSICAL THERAPY TREATMENT  Patient: Nohemy Messer (80 y.o. male)  Date: 9/21/2022  Diagnosis: Severe sepsis ([facility] [address] [A41.9, R65.20] Severe sepsis [facility])      Precautions: Fall, Skin, Aspiration  Chart, physical therapy assessment, plan of care and goals were reviewed. ASSESSMENT  Patient continues with skilled PT services and is progressing towards goals. Communicated with nurse cleared for therapy patient just finished with dialysis and wants to get up. Patient supine on bed spouse at bedside agreed to all goals set for the patient. Rolled on the edge of bed CGA, supine to sit CGA, sit to stand CGA, ambulate with rolling walker towards the recliner CGA, slow pace gait no loss of balance steady sitting and standing. Offered to ambulate out on the ICU hallway patient stated he feels tired after dialysis and just want to sit up on the chair for lunch.  OOB to chair as tolerated performed some active range  of motion exercise on both LE all planes. Educate and instructed patient to continue active range of motion exercise on both legs while up on chair or on bed multiple times. Recommend patient to be up on the chair at least 3 times a day every meal times as tolerated. Activated chair alarm and notified nurse who agreed to monitor patient. Current Level of Function Impacting Discharge (mobility/balance): CGA with transfers and ambulation using a rolling walker. Other factors to consider for discharge: fall         PLAN :  Patient continues to benefit from skilled intervention to address the above impairments. Continue treatment per established plan of care. to address goals. Recommendation for discharge: (in order for the patient to meet his/her long term goals)  Physical therapy at least 2 days/week in the home vs. SNF to be determine pending progress from therapy    This discharge recommendation:  Has been made in collaboration with the attending provider and/or case management    IF patient discharges home will need the following DME: patient owns DME required for discharge       SUBJECTIVE:   Patient stated I want to eat my lunch on the chair.     OBJECTIVE DATA SUMMARY:   Critical Behavior:  Neurologic State: Alert, Eyes open spontaneously  Orientation Level: Oriented X4  Cognition: Appropriate decision making, Appropriate for age attention/concentration, Follows commands  Safety/Judgement: Awareness of environment  Functional Mobility Training:  Bed Mobility:  Rolling: Contact guard assistance  Supine to Sit: Contact guard assistance  Sit to Supine: Contact guard assistance  Scooting: Contact guard assistance        Transfers:  Sit to Stand: Contact guard assistance  Stand to Sit: Contact guard assistance  Stand Pivot Transfers: Contact guard assistance     Bed to Chair: Contact guard assistance                    Balance:  Sitting: Intact; High guard  Sitting - Static: Good (unsupported)  Sitting - Dynamic: Good (unsupported)  Standing: Impaired; With support  Standing - Static: Fair  Standing - Dynamic : Fair  Ambulation/Gait Training:  Distance (ft): 10 Feet (ft)  Assistive Device: Walker, rolling;Gait belt  Ambulation - Level of Assistance: Contact guard assistance     Gait Description (WDL): Exceptions to WDL  Gait Abnormalities: Path deviations; Step to gait        Base of Support: Widened     Speed/Hermila: Pace decreased (<100 feet/min)  Step Length: Right shortened;Left shortened                      Therapeutic Exercises:   Educate and instructed patient to continue active range of motion exercise on both legs while up on chair or on bed multiple times. Recommend patient to be up on the chair at least 3 times a day every meal times as tolerated. Pain Rating:  3/10    Activity Tolerance:   Good    After treatment patient left in no apparent distress:   Sitting in chair, Heels elevated for pressure relief, Call bell within reach, Bed / chair alarm activated, and Caregiver / family present    COMMUNICATION/COLLABORATION:   The patients plan of care was discussed with: Registered nurse and Case management. Gypsy Lynn PT,WCC.    Time Calculation: 24 mins

## 2022-09-21 NOTE — PROGRESS NOTES
Occupational Therapy Contact Note  09/21/22  103   Chart reviewed and spoke with RN, patient is currently on HD at bedside and is unavailable to participate in therapy interventions at this time. Will follow up with patient to complete OT tx as able.     Cortez Augustine, JACQUELINE, OTR/L

## 2022-09-21 NOTE — PROCEDURES
Hemodialysis / [phone]    Vitals Pre Post Assessment Pre Post   BP BP: 128/78 (09/21/22 0744) 134/84 LOC A&Ox4 A&Ox4   HR Pulse (Heart Rate): (!) 101 (09/21/22 0744)    98 Lungs CTA CTA   Resp Resp Rate: 26 (09/21/22 0742) 22 Cardiac Tachycardic Tachycardic   Temp Temp: 98.4 °F (36.9 °C) (09/21/22 0730) 98.7 Skin Warm and dry Warm and dry   Weight  N/a N/a Edema Generalized Generalized    Tele status Monitor  Monitor  Pain Pain Intensity 1: 0 (09/21/22 0400) 0     Orders   Duration: Start: 8822 End: 2009 Total: 3 hrs   Dialyzer: Dialyzer/Set Up Inspection: Revaclear (09/21/22 0742)   K Bath: Dialysate K (mEq/L): 4 (09/21/22 0742)   Ca Bath: Dialysate CA (mEq/L): 2.5 (09/21/22 0742)   Na: Dialysate NA (mEq/L): 138 (09/21/22 0742)   Bicarb: Dialysate HCO3 (mEq/L): 35 (09/21/22 0742)   Target Fluid Removal: Goal/Amount of Fluid to Remove (mL): 2000 mL (09/21/22 0742)     Access   Type & Location:    Comments:                                   Deepti Waller / PC : Dressing CDI. No s/s of infection. Both lumens aspirate & flush well. Running well at .       Labs   HBsAg (Antigen) / date:           Neg 9/8/22                                    HBsAb (Antibody) / date: Lucy 9/8/22   Source: Epic    Obtained/Reviewed  Critical Results Called HGB   Date Value Ref Range Status   09/21/2022 6.7 (L) 12.1 - 17.0 g/dL Final     Potassium   Date Value Ref Range Status   09/21/2022 3.7 3.5 - 5.1 mmol/L Final     Calcium   Date Value Ref Range Status   09/21/2022 8.1 (L) 8.5 - 10.1 MG/DL Final     BUN   Date Value Ref Range Status   09/21/2022 43 (H) 6 - 20 MG/DL Final     Creatinine   Date Value Ref Range Status   09/21/2022 6.44 (H) 0.70 - 1.30 MG/DL Final        Meds Given   Name Dose Route   Albumin 25% 25g HD               Adequacy / Fluid    Total Liters Process: 52L   Net Fluid Removed: 1100 ml      Comments   Time Out Done:   (Time) 5640   Admitting Diagnosis: Sepsis   Consent obtained/signed: Informed Consent Verified: Yes (09/21/22 6873)   Machine / RO # Machine Number: [id number] (09/21/22 5628)   Primary Nurse Rpt Pre: Jocelyne Foy RN   Primary Nurse Rpt Post: Jocelyne Foy, RN   Pt Education: Access care   Care Plan: To continue hd   Pts outpatient clinic: TBD     Tx Summary   Comments:                           SBAR received from Primary RN. Pt at ICU A&Ox4. Consent signed & on file. 0800: Each catheter limb disinfected per p&p, caps removed, hubs disinfected per p&p. Each lumen aspirated for blood return and flushed with Normal Saline per policy. Labs drawn per request/ order. VSS. Dialysis Tx initiated. 0915: pts bp at 90's but asymptomatic; 50cc saline given and uf turned off; albumin running; will recheck bp.    0915: bp at 100's: uf turned back on    0929: bp in the 80's; uf turned off; Dr. Shon Kay texted    0945: bp at 100's : uf turned on    1005: Dr. Shon Kay came in and ordered to turn the uf off until the end    1045: Tx ended. VSS. Each dialysis catheter limb disinfected per p&p, all possible blood returned per p&p, and each dialysis hub disinfected per p&p. Each lumen flushed, post dialysis catheter Heparin dwell instilled per order, and caps applied. Bed locked and in the lowest position, call bell and belongings in reach. SBAR given to Primary, RN. Patient is stable at time of their/ my departure. All Dialysis related medications have been reviewed.

## 2022-09-21 NOTE — DIALYSIS
Hemodialysis / [phone]    Vitals Pre Post Assessment Pre Post   BP 96/56 82/54 LOC A&O x4 A&O x4   HR     Lungs Clear Clear   Resp   Cardiac NSR NSR   Temp   Skin cdi cdi   Weight    Edema Trace generalized Trace generalized   Tele status   Pain       Orders   Duration: Start: 0038 End: 0045 Total:    Dialyzer: Reveclear 300   K Bath: 3K (per Damon Rojas NP)   Ca Bath: 2.5 Ca   Na: 138   Bicarb: 35   Target Fluid Removal: 2000     Access   Type & Location: [facility] CVC   Comments:                                        Labs   HBsAg (Antigen) / date:      Negative 9/8/22                                         HBsAb (Antibody) / date:      Immune 9/8/22   Source:      Epic   Obtained/Reviewed  Critical Results Called HGB   Date Value Ref Range Status   09/21/2022 6.7 (L) 12.1 - 17.0 g/dL Final     Potassium   Date Value Ref Range Status   09/21/2022 3.7 3.5 - 5.1 mmol/L Final     Calcium   Date Value Ref Range Status   09/21/2022 8.1 (L) 8.5 - 10.1 MG/DL Final     BUN   Date Value Ref Range Status   09/21/2022 43 (H) 6 - 20 MG/DL Final     Creatinine   Date Value Ref Range Status   09/21/2022 6.44 (H) 0.70 - 1.30 MG/DL Final        Meds Given   Name Dose Route   Cathflo  1.3 mg & 1.3 mg CVC dwell               Adequacy / Fluid    Total Liters Process:    Net Fluid Removed:       Comments   Time Out Done:   (Time) 0036   Admitting Diagnosis: Sepsis   Consent obtained/signed: Informed Consent Verified: Yes (09/21/22 1585)   Machine / RO # Machine Number: [id number] (09/21/22 9565)   Primary Nurse Rpt Pre: Emilio Alfaro RN   Primary Nurse Rpt Post: Emilio Alfaro RN   Pt Education: CVC care/Alteplase education   Care Plan: On going   Pts outpatient clinic:      Tx Summary   Comments:   RIJ CVC: Dressing CDI. No s/s of infection. Both lumens with sluggish aspiration.      SBAR received from Primary RN. Arrived to room, patient A&Ox4. Consent signed & on file.    0038: Each catheter limb disinfected per p&p, caps removed, hubs disinfected per p&p. Each lumen aspirated for blood with sluggish return and flushed with Normal Saline per policy. VSS. Dialysis Tx initiated. 0040: Extremely high Access Pressures. Lines flushed and reversed. Pressure still in the 300's. Second aspiration and flush attempted. Return even more sluggish. BP 82/54.     0045: Patient rinsed back with approximately 150 mls NS. Deepak Gan NP contacted. Orders for Cathflo 2.6 mg.    0125: Cathflo administered. 1.3 mg in each port. 0315: Cathflo removed. Strong aspiration from both ports. Patient requested not to run at this time due to all the delays and lack of sleep. Bed locked and in the lowest position, call bell and belongings in reach. SBAR given to Margret, RN. Patient is stable at time of my departure. All Dialysis related medications have been reviewed.

## 2022-09-21 NOTE — PROGRESS NOTES
[address]  YOB: 1960          Assessment & Plan:     Severe oliguric LAVINIA was on CVVH then transition to IHD from 9/12   Severe lactic acidosis  Ketoacidosis  Resp failure s/p intubation and extubation   DM2  Thrombocytopenia  Hypophosphatemia     Rec:  He is receiving HD now (MWF)  Plan for next HD Friday  No sign of renal recovery  He will needs outpatient HD support  Check hep b c ab and hep b s ab  Avoid nephrotoxins   CM consult for outpatient HD set up       Subjective:   CC: LAVINIA  HPI: Seen, feels better , edema better   ROS:   Current Facility-Administered Medications   Medication Dose Route Frequency    alteplase (CATHFLO) 3 mg in sterile water (preservative free) 3 mL injection  3 mg InterCATHeter ONCE    0.9% sodium chloride infusion 250 mL  250 mL IntraVENous PRN    cyanocobalamin (VITAMIN B12) tablet 1,000 mcg  1,000 mcg Oral DAILY    QUEtiapine (SEROquel) tablet 25 mg  25 mg Oral TID    insulin glargine (LANTUS) injection 7 Units  7 Units SubCUTAneous DAILY    carvediloL (COREG) tablet 6.25 mg  6.25 mg Oral BID WITH MEALS    miconazole (MICOTIN) 2 % powder   Topical BID    polyethylene glycol (MIRALAX) packet 17 g  17 g Oral DAILY PRN    insulin lispro (HUMALOG) injection   SubCUTAneous AC&HS    albumin human 25% (BUMINATE) solution 25 g  25 g IntraVENous DIALYSIS PRN    albuterol-ipratropium (DUO-NEB) 2.5 MG-0.5 MG/3 ML  3 mL Nebulization Q6H RT    albuterol-ipratropium (DUO-NEB) 2.5 MG-0.5 MG/3 ML  3 mL Nebulization Q4H PRN    sennosides (SENOKOT) 8.8 mg/5 mL syrup 8.8 mg  5 mL Oral DAILY    hydrALAZINE (APRESOLINE) 20 mg/mL injection 10 mg  10 mg IntraVENous Q6H PRN    acetaminophen (TYLENOL) tablet 650 mg  650 mg Oral Q6H PRN    OLANZapine (ZyPREXA) tablet 5 mg  5 mg Per NG tube BID PRN    folic acid (FOLVITE) tablet 1 mg  1 mg Oral DAILY    dextrose 10% infusion 0-250 mL  0-250 mL IntraVENous PRN levothyroxine (SYNTHROID) tablet 100 mcg  100 mcg Oral ACB    glucose chewable tablet 16 g  4 Tablet Oral PRN    glucagon (GLUCAGEN) injection 1 mg  1 mg IntraMUSCular PRN    heparin (porcine) injection 5,000 Units  5,000 Units SubCUTAneous Q12H          Objective:     Vitals:  Blood pressure 134/84, pulse 98, temperature 98.7 °F (37.1 °C), resp. rate 22, height 5' 6\" (1.676 m), weight 80.5 kg (177 lb 8 oz), SpO2 93 %. Temp (24hrs), Av.7 °F (37.1 °C), Min:98.4 °F (36.9 °C), Max:98.9 °F (37.2 °C)      Intake and Output:   07 -  190  In: 310   Out: 1100   1901 -  07  In: -   Out: 250 [Urine:250]    Physical Exam:               GENERAL ASSESSMENT: NAD  HEENT: peerla   CHEST: diminished BS +  HEART: S1S2  ABDOMEN: Soft,NT  : +Vega:   EXTREMITY: trace  EDEMA        ECG/rhythm:    Data Review      No results for input(s): TNIPOC in the last 72 hours. No lab exists for component: ITNL     No results for input(s): CPK, CKMB, TROIQ in the last 72 hours. Recent Labs     22  0518 22  0033 22  0354    138 134*   K 3.7 4.0 3.7    102 98   CO2 26 31 25   BUN 43* 33* 57*   CREA 6.44* 5.01* 7.04*   * 76 87   PHOS  --  3.4 5.0*   MG  --  2.1 2.2   CA 8.1* 7.9* 7.9*   WBC 6.9 8.5 11.2*   HGB 6.7* 7.0* 7.2*   HCT 20.1* 20.9* 21.5*   * 380 347      No results for input(s): INR, PTP, APTT, INREXT, INREXT in the last 72 hours. Needs: urine analysis, urine sodium, protein and creatinine  No results found for: Relda Rounds        : Tyrone Casanova MD  2022        [facility]:  [email or web address]  [place] office:  [address], [address] [place] [address],[address]  [place], [address] [place] [address]  Phone: [phone]  Fax :     [phone]    [place] office:  [address] [facility], [facility] [address]   Phone - [phone]  Fax - [phone]

## 2022-09-21 NOTE — PROGRESS NOTES
Update:  Chest xray faxed to [facility]. [facility] has a Dennise dialysis chair for pt. (8 am to 10:30 am)    Rosalba Katz

## 2022-09-22 ENCOUNTER — ANESTHESIA EVENT (OUTPATIENT)
Dept: SURGERY | Age: 62
DRG: 444 | End: 2022-09-22
Payer: MEDICAID

## 2022-09-22 ENCOUNTER — ANESTHESIA (OUTPATIENT)
Dept: SURGERY | Age: 62
DRG: 444 | End: 2022-09-22
Payer: MEDICAID

## 2022-09-22 LAB
ANION GAP SERPL CALC-SCNC: 7 MMOL/L (ref 5–15)
BASOPHILS # BLD: 0 K/UL (ref 0–0.1)
BASOPHILS NFR BLD: 1 % (ref 0–1)
BUN SERPL-MCNC: 25 MG/DL (ref 6–20)
BUN/CREAT SERPL: 5 (ref 12–20)
CALCIUM SERPL-MCNC: 8 MG/DL (ref 8.5–10.1)
CHLORIDE SERPL-SCNC: 105 MMOL/L (ref 97–108)
CO2 SERPL-SCNC: 28 MMOL/L (ref 21–32)
CREAT SERPL-MCNC: 4.74 MG/DL (ref 0.7–1.3)
DIFFERENTIAL METHOD BLD: ABNORMAL
EOSINOPHIL # BLD: 0.1 K/UL (ref 0–0.4)
EOSINOPHIL NFR BLD: 2 % (ref 0–7)
ERYTHROCYTE [DISTWIDTH] IN BLOOD BY AUTOMATED COUNT: 14.6 % (ref 11.5–14.5)
GLUCOSE BLD STRIP.AUTO-MCNC: 101 MG/DL (ref 65–117)
GLUCOSE BLD STRIP.AUTO-MCNC: 109 MG/DL (ref 65–117)
GLUCOSE BLD STRIP.AUTO-MCNC: 129 MG/DL (ref 65–117)
GLUCOSE BLD STRIP.AUTO-MCNC: 149 MG/DL (ref 65–117)
GLUCOSE BLD-MCNC: 91 MG/DL (ref 65–100)
GLUCOSE SERPL-MCNC: 167 MG/DL (ref 65–100)
HBV SURFACE AB SER QL: NONREACTIVE
HBV SURFACE AB SER-ACNC: <3.1 MIU/ML
HCT VFR BLD AUTO: 23.4 % (ref 36.6–50.3)
HGB BLD-MCNC: 7.9 G/DL (ref 12.1–17)
IMM GRANULOCYTES # BLD AUTO: 0 K/UL (ref 0–0.04)
IMM GRANULOCYTES NFR BLD AUTO: 1 % (ref 0–0.5)
LYMPHOCYTES # BLD: 1.2 K/UL (ref 0.8–3.5)
LYMPHOCYTES NFR BLD: 19 % (ref 12–49)
MAGNESIUM SERPL-MCNC: 2.1 MG/DL (ref 1.6–2.4)
MCH RBC QN AUTO: 31.5 PG (ref 26–34)
MCHC RBC AUTO-ENTMCNC: 33.8 G/DL (ref 30–36.5)
MCV RBC AUTO: 93.2 FL (ref 80–99)
MONOCYTES # BLD: 0.6 K/UL (ref 0–1)
MONOCYTES NFR BLD: 10 % (ref 5–13)
NEUTS SEG # BLD: 4.4 K/UL (ref 1.8–8)
NEUTS SEG NFR BLD: 67 % (ref 32–75)
NRBC # BLD: 0 K/UL (ref 0–0.01)
NRBC BLD-RTO: 0 PER 100 WBC
PHOSPHATE SERPL-MCNC: 3.3 MG/DL (ref 2.6–4.7)
PLATELET # BLD AUTO: 383 K/UL (ref 150–400)
PMV BLD AUTO: 8.6 FL (ref 8.9–12.9)
POTASSIUM SERPL-SCNC: 3.8 MMOL/L (ref 3.5–5.1)
RBC # BLD AUTO: 2.51 M/UL (ref 4.1–5.7)
SERVICE CMNT-IMP: ABNORMAL
SERVICE CMNT-IMP: ABNORMAL
SERVICE CMNT-IMP: NORMAL
SODIUM SERPL-SCNC: 140 MMOL/L (ref 136–145)
WBC # BLD AUTO: 6.5 K/UL (ref 4.1–11.1)

## 2022-09-22 PROCEDURE — 74011250636 HC RX REV CODE- 250/636: Performed by: INTERNAL MEDICINE

## 2022-09-22 PROCEDURE — 77030008684 HC TU ET CUF COVD -B: Performed by: ANESTHESIOLOGY

## 2022-09-22 PROCEDURE — 74011636637 HC RX REV CODE- 636/637: Performed by: INTERNAL MEDICINE

## 2022-09-22 PROCEDURE — 77030040922 HC BLNKT HYPOTHRM STRY -A

## 2022-09-22 PROCEDURE — 77030019908 HC STETH ESOPH SIMS -A: Performed by: ANESTHESIOLOGY

## 2022-09-22 PROCEDURE — 77030008603 HC TRCR ENDOSC EPATH J&J -C

## 2022-09-22 PROCEDURE — 74011000250 HC RX REV CODE- 250: Performed by: INTERNAL MEDICINE

## 2022-09-22 PROCEDURE — 76060000033 HC ANESTHESIA 1 TO 1.5 HR

## 2022-09-22 PROCEDURE — 74011000250 HC RX REV CODE- 250

## 2022-09-22 PROCEDURE — 74011250637 HC RX REV CODE- 250/637

## 2022-09-22 PROCEDURE — 77030002966 HC SUT PDS J&J -A

## 2022-09-22 PROCEDURE — 94640 AIRWAY INHALATION TREATMENT: CPT

## 2022-09-22 PROCEDURE — 74011250636 HC RX REV CODE- 250/636

## 2022-09-22 PROCEDURE — 74011250637 HC RX REV CODE- 250/637: Performed by: INTERNAL MEDICINE

## 2022-09-22 PROCEDURE — 80048 BASIC METABOLIC PNL TOTAL CA: CPT

## 2022-09-22 PROCEDURE — 76210000006 HC OR PH I REC 0.5 TO 1 HR

## 2022-09-22 PROCEDURE — 0WHG43Z INSERTION OF INFUSION DEVICE INTO PERITONEAL CAVITY, PERCUTANEOUS ENDOSCOPIC APPROACH: ICD-10-PCS

## 2022-09-22 PROCEDURE — C1750 CATH, HEMODIALYSIS,LONG-TERM: HCPCS

## 2022-09-22 PROCEDURE — 97116 GAIT TRAINING THERAPY: CPT

## 2022-09-22 PROCEDURE — 36415 COLL VENOUS BLD VENIPUNCTURE: CPT

## 2022-09-22 PROCEDURE — 85025 COMPLETE CBC W/AUTO DIFF WBC: CPT

## 2022-09-22 PROCEDURE — 77030026438 HC STYL ET INTUB CARD -A: Performed by: ANESTHESIOLOGY

## 2022-09-22 PROCEDURE — 83735 ASSAY OF MAGNESIUM: CPT

## 2022-09-22 PROCEDURE — 74011250637 HC RX REV CODE- 250/637: Performed by: SPECIALIST

## 2022-09-22 PROCEDURE — 77030002933 HC SUT MCRYL J&J -A

## 2022-09-22 PROCEDURE — 74011000250 HC RX REV CODE- 250: Performed by: ANESTHESIOLOGY

## 2022-09-22 PROCEDURE — 97530 THERAPEUTIC ACTIVITIES: CPT

## 2022-09-22 PROCEDURE — 77030004495 HC ADPT PERI DLYS BAXT -C

## 2022-09-22 PROCEDURE — 77030040361 HC SLV COMPR DVT MDII -B

## 2022-09-22 PROCEDURE — 2709999900 HC NON-CHARGEABLE SUPPLY

## 2022-09-22 PROCEDURE — 84100 ASSAY OF PHOSPHORUS: CPT

## 2022-09-22 PROCEDURE — 82962 GLUCOSE BLOOD TEST: CPT

## 2022-09-22 PROCEDURE — 77030031139 HC SUT VCRL2 J&J -A

## 2022-09-22 PROCEDURE — 77030013079 HC BLNKT BAIR HGGR 3M -A: Performed by: ANESTHESIOLOGY

## 2022-09-22 PROCEDURE — 76010000149 HC OR TIME 1 TO 1.5 HR

## 2022-09-22 PROCEDURE — 77030010507 HC ADH SKN DERMBND J&J -B

## 2022-09-22 PROCEDURE — 77030008517 HC TBNG INSUF ENDO STOR -B

## 2022-09-22 PROCEDURE — 74011250636 HC RX REV CODE- 250/636: Performed by: NURSE ANESTHETIST, CERTIFIED REGISTERED

## 2022-09-22 PROCEDURE — 74011000250 HC RX REV CODE- 250: Performed by: NURSE ANESTHETIST, CERTIFIED REGISTERED

## 2022-09-22 PROCEDURE — 77030008602 HC TRCR ENDOSC EPATH J&J -B

## 2022-09-22 PROCEDURE — 65270000046 HC RM TELEMETRY

## 2022-09-22 DEVICE — PERITONEAL DIALYSIS CATHETER SWAN NECK CURL CATH, 2 CUFFS LEFT
Type: IMPLANTABLE DEVICE | Site: ABDOMEN | Status: FUNCTIONAL
Brand: ARGYLE

## 2022-09-22 RX ORDER — QUETIAPINE FUMARATE 25 MG/1
25 TABLET, FILM COATED ORAL 2 TIMES DAILY
Status: DISCONTINUED | OUTPATIENT
Start: 2022-09-23 | End: 2022-09-23 | Stop reason: HOSPADM

## 2022-09-22 RX ORDER — SODIUM CHLORIDE 0.9 % (FLUSH) 0.9 %
5-40 SYRINGE (ML) INJECTION AS NEEDED
Status: DISCONTINUED | OUTPATIENT
Start: 2022-09-22 | End: 2022-09-23 | Stop reason: HOSPADM

## 2022-09-22 RX ORDER — SODIUM CHLORIDE 0.9 % (FLUSH) 0.9 %
5-40 SYRINGE (ML) INJECTION EVERY 8 HOURS
Status: DISCONTINUED | OUTPATIENT
Start: 2022-09-22 | End: 2022-09-23 | Stop reason: HOSPADM

## 2022-09-22 RX ORDER — FLUMAZENIL 0.1 MG/ML
0.2 INJECTION INTRAVENOUS
Status: DISCONTINUED | OUTPATIENT
Start: 2022-09-22 | End: 2022-09-23 | Stop reason: HOSPADM

## 2022-09-22 RX ORDER — SODIUM CHLORIDE 9 MG/ML
25 INJECTION, SOLUTION INTRAVENOUS AS NEEDED
Status: DISCONTINUED | OUTPATIENT
Start: 2022-09-22 | End: 2022-09-23 | Stop reason: HOSPADM

## 2022-09-22 RX ORDER — PROPOFOL 10 MG/ML
INJECTION, EMULSION INTRAVENOUS AS NEEDED
Status: DISCONTINUED | OUTPATIENT
Start: 2022-09-22 | End: 2022-09-22 | Stop reason: HOSPADM

## 2022-09-22 RX ORDER — PHENYLEPHRINE HCL IN 0.9% NACL 0.4MG/10ML
SYRINGE (ML) INTRAVENOUS
Status: DISCONTINUED | OUTPATIENT
Start: 2022-09-22 | End: 2022-09-22 | Stop reason: HOSPADM

## 2022-09-22 RX ORDER — LIDOCAINE HYDROCHLORIDE 10 MG/ML
0.1 INJECTION, SOLUTION EPIDURAL; INFILTRATION; INTRACAUDAL; PERINEURAL AS NEEDED
Status: DISCONTINUED | OUTPATIENT
Start: 2022-09-22 | End: 2022-09-23 | Stop reason: HOSPADM

## 2022-09-22 RX ORDER — NALOXONE HYDROCHLORIDE 0.4 MG/ML
0.4 INJECTION, SOLUTION INTRAMUSCULAR; INTRAVENOUS; SUBCUTANEOUS
Status: DISCONTINUED | OUTPATIENT
Start: 2022-09-22 | End: 2022-09-23 | Stop reason: HOSPADM

## 2022-09-22 RX ORDER — FENTANYL CITRATE 50 UG/ML
INJECTION, SOLUTION INTRAMUSCULAR; INTRAVENOUS AS NEEDED
Status: DISCONTINUED | OUTPATIENT
Start: 2022-09-22 | End: 2022-09-22 | Stop reason: HOSPADM

## 2022-09-22 RX ORDER — SODIUM CHLORIDE, SODIUM LACTATE, POTASSIUM CHLORIDE, CALCIUM CHLORIDE 600; 310; 30; 20 MG/100ML; MG/100ML; MG/100ML; MG/100ML
125 INJECTION, SOLUTION INTRAVENOUS CONTINUOUS
Status: DISCONTINUED | OUTPATIENT
Start: 2022-09-22 | End: 2022-09-23

## 2022-09-22 RX ORDER — SODIUM CHLORIDE 9 MG/ML
INJECTION, SOLUTION INTRAVENOUS
Status: DISCONTINUED | OUTPATIENT
Start: 2022-09-22 | End: 2022-09-22 | Stop reason: HOSPADM

## 2022-09-22 RX ORDER — SENNOSIDES 8.6 MG/1
1 TABLET ORAL DAILY
Status: DISCONTINUED | OUTPATIENT
Start: 2022-09-22 | End: 2022-09-23 | Stop reason: HOSPADM

## 2022-09-22 RX ORDER — SODIUM CHLORIDE 9 MG/ML
25 INJECTION, SOLUTION INTRAVENOUS CONTINUOUS
Status: DISCONTINUED | OUTPATIENT
Start: 2022-09-22 | End: 2022-09-23

## 2022-09-22 RX ORDER — ROCURONIUM BROMIDE 10 MG/ML
INJECTION, SOLUTION INTRAVENOUS AS NEEDED
Status: DISCONTINUED | OUTPATIENT
Start: 2022-09-22 | End: 2022-09-22 | Stop reason: HOSPADM

## 2022-09-22 RX ADMIN — SENNOSIDES 8.6 MG: 8.6 TABLET, COATED ORAL at 10:16

## 2022-09-22 RX ADMIN — FOLIC ACID 1 MG: 1 TABLET ORAL at 08:32

## 2022-09-22 RX ADMIN — SODIUM CHLORIDE: 9 INJECTION, SOLUTION INTRAVENOUS at 17:00

## 2022-09-22 RX ADMIN — MICONAZOLE NITRATE 2 % TOPICAL POWDER: at 08:33

## 2022-09-22 RX ADMIN — CEFAZOLIN SODIUM 2 G: 1 POWDER, FOR SOLUTION INTRAMUSCULAR; INTRAVENOUS at 17:18

## 2022-09-22 RX ADMIN — IPRATROPIUM BROMIDE AND ALBUTEROL SULFATE 3 ML: .5; 2.5 SOLUTION RESPIRATORY (INHALATION) at 02:43

## 2022-09-22 RX ADMIN — FENTANYL CITRATE 50 MCG: 50 INJECTION, SOLUTION INTRAMUSCULAR; INTRAVENOUS at 17:10

## 2022-09-22 RX ADMIN — SODIUM CHLORIDE 25 ML/HR: 9 INJECTION, SOLUTION INTRAVENOUS at 16:34

## 2022-09-22 RX ADMIN — INSULIN GLARGINE 7 UNITS: 100 INJECTION, SOLUTION SUBCUTANEOUS at 08:31

## 2022-09-22 RX ADMIN — ROCURONIUM BROMIDE 20 MG: 10 INJECTION INTRAVENOUS at 17:10

## 2022-09-22 RX ADMIN — SODIUM CHLORIDE, PRESERVATIVE FREE 10 ML: 5 INJECTION INTRAVENOUS at 21:01

## 2022-09-22 RX ADMIN — FENTANYL CITRATE 50 MCG: 50 INJECTION, SOLUTION INTRAMUSCULAR; INTRAVENOUS at 17:03

## 2022-09-22 RX ADMIN — Medication 50 MCG/MIN: at 17:11

## 2022-09-22 RX ADMIN — PROPOFOL 150 MG: 10 INJECTION, EMULSION INTRAVENOUS at 17:10

## 2022-09-22 RX ADMIN — CYANOCOBALAMIN TAB 500 MCG 1000 MCG: 500 TAB at 08:32

## 2022-09-22 RX ADMIN — IPRATROPIUM BROMIDE AND ALBUTEROL SULFATE 3 ML: .5; 2.5 SOLUTION RESPIRATORY (INHALATION) at 07:52

## 2022-09-22 RX ADMIN — CARVEDILOL 6.25 MG: 6.25 TABLET, FILM COATED ORAL at 08:32

## 2022-09-22 RX ADMIN — IPRATROPIUM BROMIDE AND ALBUTEROL SULFATE 3 ML: .5; 2.5 SOLUTION RESPIRATORY (INHALATION) at 19:47

## 2022-09-22 RX ADMIN — IPRATROPIUM BROMIDE AND ALBUTEROL SULFATE 3 ML: .5; 2.5 SOLUTION RESPIRATORY (INHALATION) at 13:30

## 2022-09-22 RX ADMIN — QUETIAPINE FUMARATE 25 MG: 25 TABLET ORAL at 08:32

## 2022-09-22 RX ADMIN — PROPOFOL 50 MG: 10 INJECTION, EMULSION INTRAVENOUS at 17:11

## 2022-09-22 RX ADMIN — HEPARIN SODIUM 5000 UNITS: 5000 INJECTION INTRAVENOUS; SUBCUTANEOUS at 21:01

## 2022-09-22 RX ADMIN — ACETAMINOPHEN 650 MG: 325 TABLET ORAL at 20:59

## 2022-09-22 RX ADMIN — HEPARIN SODIUM 5000 UNITS: 5000 INJECTION INTRAVENOUS; SUBCUTANEOUS at 08:31

## 2022-09-22 RX ADMIN — LEVOTHYROXINE SODIUM 100 MCG: 0.1 TABLET ORAL at 08:32

## 2022-09-22 NOTE — PROGRESS NOTES
Pt has been approved clinically and financially for outpatient PD @ the [facility]. Per Cristobal,the first treatment is currently on the schedule for 9/29/22.       Harshal Siddiqi

## 2022-09-22 NOTE — PROGRESS NOTES
Occupational Therapy:  09/22/22    Chart reviewed, OT treatment deferred. Pt currently VI and unavailable to participate. Will follow up tomorrow.      Thank you,  Betsy Dey, OTR/L

## 2022-09-22 NOTE — PROGRESS NOTES
Problem: Falls - Risk of  Goal: *Absence of Falls  Description: Document Zane Garcia Fall Risk and appropriate interventions in the flowsheet. Outcome: Progressing Towards Goal  Note: Fall Risk Interventions:  Mobility Interventions: Communicate number of staff needed for ambulation/transfer, Patient to call before getting OOB, Strengthening exercises (ROM-active/passive)    Mentation Interventions: Adequate sleep, hydration, pain control, Door open when patient unattended, More frequent rounding, Room close to nurse's station, Toileting rounds, Update white board    Medication Interventions: Evaluate medications/consider consulting pharmacy, Patient to call before getting OOB, Teach patient to arise slowly    Elimination Interventions: Call light in reach, Patient to call for help with toileting needs, Toileting schedule/hourly rounds    History of Falls Interventions: Vital signs minimum Q4HRs X 24 hrs (comment for end date), Room close to nurse's station, Evaluate medications/consider consulting pharmacy, Door open when patient unattended         Problem: Patient Education: Go to Patient Education Activity  Goal: Patient/Family Education  Outcome: Progressing Towards Goal     Problem: Pressure Injury - Risk of  Goal: *Prevention of pressure injury  Description: Document David Scale and appropriate interventions in the flowsheet. Outcome: Progressing Towards Goal  Note: Pressure Injury Interventions:  Sensory Interventions: Assess changes in LOC, Assess need for specialty bed, Avoid rigorous massage over bony prominences, Keep linens dry and wrinkle-free, Maintain/enhance activity level, Minimize linen layers, Turn and reposition approx.  every two hours (pillows and wedges if needed)    Moisture Interventions: Absorbent underpads, Check for incontinence Q2 hours and as needed, Minimize layers, Offer toileting Q_hr    Activity Interventions: Assess need for specialty bed, Pressure redistribution bed/mattress(bed type)    Mobility Interventions: Assess need for specialty bed, Pressure redistribution bed/mattress (bed type), Turn and reposition approx. every two hours(pillow and wedges)    Nutrition Interventions: Document food/fluid/supplement intake    Friction and Shear Interventions: Transferring/repositioning devices, Minimize layers                Problem: Patient Education: Go to Patient Education Activity  Goal: Patient/Family Education  Outcome: Progressing Towards Goal     Problem: Nutrition Deficit  Goal: *Optimize nutritional status  Outcome: Progressing Towards Goal     Problem: Diabetes Self-Management  Goal: *Disease process and treatment process  Description: Define diabetes and identify own type of diabetes; list 3 options for treating diabetes. Outcome: Progressing Towards Goal  Goal: *Incorporating nutritional management into lifestyle  Description: Describe effect of type, amount and timing of food on blood glucose; list 3 methods for planning meals. Outcome: Progressing Towards Goal  Goal: *Incorporating physical activity into lifestyle  Description: State effect of exercise on blood glucose levels. Outcome: Progressing Towards Goal  Goal: *Developing strategies to promote health/change behavior  Description: Define the ABC's of diabetes; identify appropriate screenings, schedule and personal plan for screenings. Outcome: Progressing Towards Goal  Goal: *Using medications safely  Description: State effect of diabetes medications on diabetes; name diabetes medication taking, action and side effects. Outcome: Progressing Towards Goal  Goal: *Monitoring blood glucose, interpreting and using results  Description: Identify recommended blood glucose targets  and personal targets.   Outcome: Progressing Towards Goal  Goal: *Prevention, detection, treatment of acute complications  Description: List symptoms of hyper- and hypoglycemia; describe how to treat low blood sugar and actions for lowering  high blood glucose level. Outcome: Progressing Towards Goal  Goal: *Prevention, detection and treatment of chronic complications  Description: Define the natural course of diabetes and describe the relationship of blood glucose levels to long term complications of diabetes.   Outcome: Progressing Towards Goal  Goal: *Developing strategies to address psychosocial issues  Description: Describe feelings about living with diabetes; identify support needed and support network  Outcome: Progressing Towards Goal  Goal: *Insulin pump training  Outcome: Progressing Towards Goal  Goal: *Sick day guidelines  Outcome: Progressing Towards Goal  Goal: *Patient Specific Goal (EDIT GOAL, INSERT TEXT)  Outcome: Progressing Towards Goal     Problem: Patient Education: Go to Patient Education Activity  Goal: Patient/Family Education  Outcome: Progressing Towards Goal     Problem: Delirium Treatment  Goal: *Level of consciousness restored to baseline  Outcome: Progressing Towards Goal  Goal: *Level of environmental perceptions restored to baseline  Outcome: Progressing Towards Goal  Goal: *Sensory perception restored to baseline  Outcome: Progressing Towards Goal  Goal: *Emotional stability restored to baseline  Outcome: Progressing Towards Goal  Goal: *Functional assessment restored to baseline  Outcome: Progressing Towards Goal  Goal: *Absence of falls  Outcome: Progressing Towards Goal  Goal: *Will remain free of delirium, CAM Score negative  Outcome: Progressing Towards Goal  Goal: *Cognitive status will be restored to baseline  Outcome: Progressing Towards Goal  Goal: Interventions  Outcome: Progressing Towards Goal     Problem: Patient Education: Go to Patient Education Activity  Goal: Patient/Family Education  Outcome: Progressing Towards Goal     Problem: Patient Education: Go to Patient Education Activity  Goal: Patient/Family Education  Outcome: Progressing Towards Goal     Problem: Patient Education: Go to Patient Education Activity  Goal: Patient/Family Education  Outcome: Progressing Towards Goal     Problem: Patient Education: Go to Patient Education Activity  Goal: Patient/Family Education  Outcome: Progressing Towards Goal

## 2022-09-22 NOTE — PROGRESS NOTES
Comprehensive Nutrition Assessment    Type and Reason for Visit: Reassess    Nutrition Recommendations/Plan:   Continue regular, low K, low phos  Provide Nepro once daily to increase kcal/protein intake (420 kcal, 38 g Carbs, 19 g Protein)      Malnutrition Assessment:  Malnutrition Status:  Mild malnutrition (09/22/22 1443)    Context:  Acute illness     Findings of the 6 clinical characteristics of malnutrition:   Energy Intake:  No significant decrease in energy intake  Weight Loss:  No significant weight loss     Body Fat Loss:  No significant body fat loss,     Muscle Mass Loss:  No significant muscle mass loss,    Fluid Accumulation:  Moderate to severe,     Strength:  Not performed     Nutrition Assessment:     9/22: Follow up. Edema has worsened but weight is trending downwards. Per discussion with nursing, plan for PD outpatient rather than HD - estimated needs recalculated for this. Patient is NPO today for placement of PD cath. No N/V/D per patient. Denies chewing/swallowing problems. Enjoying ONS. Sister has been providing some snacks from outside. Last 3 Recorded Weights in this Encounter    09/18/22 1215 09/20/22 0900 09/22/22 0524   Weight: 84.7 kg (186 lb 11.2 oz) 80.5 kg (177 lb 8 oz) 79.9 kg (176 lb 2.4 oz)     Patient Vitals for the past 168 hrs:   % Diet Eaten   09/22/22 0800 26 - 50%   09/19/22 0800 51 - 75%       Nutrition Related Findings:      Wound Type: None  Last Bowel Movement Date: 09/22/22  Stool Appearance: Loose  Abdominal Assessment: Semi-soft  Appetite: Good  Bowel Sounds: Active   Edema:LLE: Pitting; 4+ (9/22/2022  8:00 AM)  LUE: Trace (9/21/2022  7:45 PM)  RLE: Pitting; 4+ (9/22/2022  8:00 AM)  RUE: Trace (9/21/2022  7:45 PM)      Nutr.  Labs:    Lab Results   Component Value Date/Time    GFR est AA 15 (L) 09/22/2022 12:13 AM    GFR est non-AA 12 (L) 09/22/2022 12:13 AM    Creatinine 4.74 (H) 09/22/2022 12:13 AM    BUN 25 (H) 09/22/2022 12:13 AM    Sodium 140 09/22/2022 12:13 AM    Potassium 3.8 09/22/2022 12:13 AM    Chloride 105 09/22/2022 12:13 AM    CO2 28 09/22/2022 12:13 AM       Lab Results   Component Value Date/Time    Glucose 167 (H) 09/22/2022 12:13 AM    Glucose (POC) 149 (H) 09/22/2022 11:37 AM       Lab Results   Component Value Date/Time    Hemoglobin A1c 6.6 (H) 09/07/2022 08:11 AM     Magnesium   Date Value Ref Range Status   09/22/2022 2.1 1.6 - 2.4 mg/dL Final   09/20/2022 2.1 1.6 - 2.4 mg/dL Final   09/19/2022 2.2 1.6 - 2.4 mg/dL Final   09/18/2022 2.2 1.6 - 2.4 mg/dL Final   09/17/2022 2.3 1.6 - 2.4 mg/dL Final     Lab Results   Component Value Date/Time    Calcium 8.0 (L) 09/22/2022 12:13 AM    Phosphorus 3.3 09/22/2022 12:13 AM     Nutr. Meds:  Coreg, Vit B12, folvite, heparin, lantus, humalog, synthroid, miralax PRN, senna       Current Nutrition Intake & Therapies:  Average Meal Intake: 26-50%  Average Supplement Intake: 51-75%  ADULT ORAL NUTRITION SUPPLEMENT Dinner; Renal Supplement  DIET NPO    Anthropometric Measures:  Height: 5' 6\" (167.6 cm)  Ideal Body Weight (IBW): 142 lbs (65 kg)  Admission Body Weight: 189 lb 9.5 oz  Current Body Wt:  79.9 kg (176 lb 2.4 oz), 124 % IBW. Bed scale  Current BMI (kg/m2): 28.4        Weight Adjustment: No adjustment                 BMI Category: Overweight (BMI 25.0-29. 9)    Estimated Daily Nutrient Needs:  Energy Requirements Based On: Kcal/kg  Weight Used for Energy Requirements: Current  Energy (kcal/day): 2397 (30 kcal/kg)  Weight Used for Protein Requirements: Current  Protein (g/day):  (1.2-1.3 g/kg)  Method Used for Fluid Requirements: Standard renal  Fluid (ml/day): 1500 or per MD    Nutrition Diagnosis:   Increased nutrient needs related to renal dysfunction as evidenced by dialysis - PD    Nutrition Interventions:   Food and/or Nutrient Delivery: Continue current diet, Continue oral nutrition supplement  Nutrition Education/Counseling: No recommendations at this time  Coordination of Nutrition Care: Continue to monitor while inpatient  Plan of Care discussed with: Nursing    Goals:  Previous Goal Met: Goal(s) achieved  Goals: Meet at least 75% of estimated needs, by next RD assessment       Nutrition Monitoring and Evaluation:   Behavioral-Environmental Outcomes: None identified  Food/Nutrient Intake Outcomes: Food and nutrient intake, Supplement intake  Physical Signs/Symptoms Outcomes: Biochemical data, Hemodynamic status, Weight    Discharge Planning:    Continue oral nutrition supplement, Recommend pursue outpatient nutrition counseling    Thaddeus Carolina MS, RD  Contact: Ext: [phone], or via [employer]

## 2022-09-22 NOTE — PROGRESS NOTES
Home health orders received from attending and sebt through cc link to [facility]. Waiting on acceptance.     Nakia Hinkle

## 2022-09-22 NOTE — ANESTHESIA POSTPROCEDURE EVALUATION
Procedure(s):  PERITONEAL CATHETER INSERTION LAPAROSCOPIC. general    Anesthesia Post Evaluation      Multimodal analgesia: multimodal analgesia not used between 6 hours prior to anesthesia start to PACU discharge  Patient location during evaluation: PACU  Patient participation: complete - patient participated  Level of consciousness: awake and alert  Pain score: 2  Pain management: satisfactory to patient  Airway patency: patent  Anesthetic complications: no  Cardiovascular status: acceptable  Respiratory status: acceptable  Hydration status: acceptable  Post anesthesia nausea and vomiting:  none  Final Post Anesthesia Temperature Assessment:  Normothermia (36.0-37.5 degrees C)      INITIAL Post-op Vital signs:   Vitals Value Taken Time   /68 09/22/22 1820   Temp 36.6 °C (97.8 °F) 09/22/22 1819   Pulse 91 09/22/22 1823   Resp 20 09/22/22 1823   SpO2 98 % 09/22/22 1823   Vitals shown include unvalidated device data.

## 2022-09-22 NOTE — PROGRESS NOTES
Update:    Nephrologist met with pt,his sister and son to further discuss PD and answer their questions regarding PD. Pt and family have decided to go to PD @ [facility] for in-house PD . I updated [facility] who has accepted pt pending insurance clearance. Cristobal will let me know days of the week and times once all has been worked out @ the PD center.     Alma Shipman Serve

## 2022-09-22 NOTE — PROGRESS NOTES
Problem: Mobility Impaired (Adult and Pediatric)  Goal: *Acute Goals and Plan of Care (Insert Text)  Description: FUNCTIONAL STATUS PRIOR TO ADMISSION: Patient was independent and active without use of DME.    HOME SUPPORT PRIOR TO ADMISSION: The patient lived with his mother and assisted with her iADLs. He has children and siblings also in the local area. Physical Therapy Goals  Initiated 9/18/2022  1. Patient will move from supine to sit and sit to supine , scoot up and down, and roll side to side in bed with minimal assistance/contact guard assist within 7 day(s). 2.  Patient will transfer from bed to chair and chair to bed with minimal assistance/contact guard assist using the least restrictive device within 7 day(s). 3.  Patient will perform sit to stand with minimal assistance/contact guard assist within 7 day(s). 4.  Patient will ambulate with minimal assistance/contact guard assist for 50 feet with the least restrictive device within 7 day(s). Outcome: Progressing Towards Goal   PHYSICAL THERAPY TREATMENT  Patient: Savannah Webber (2 y.o. male)  Date: 9/22/2022  Diagnosis: Severe sepsis ([address] [A41.9, R65.20] Severe sepsis ([address]  Procedure(s) (LRB):  PERITONEAL CATHETER INSERTION LAPAROSCOPIC (N/A) Day of Surgery  Precautions: Fall, Skin, Aspiration  Chart, physical therapy assessment, plan of care and goals were reviewed. ASSESSMENT  Patient continues with skilled PT services and is progressing towards goals. Communicated with nurse cleared for therapy. Patient up on the chair when received son at bedside agreed with all goals set for the patient. Sit to stand SBA, ambulate with rolling walker CGA in the room and out on the ICU hallway decreased pace no loss of balance steady sitting and standing. Need some verbal cues to slow down when turning. OOB to chair as tolerated performed some active range of motion exercise on both LE all planes.  Educate and instructed patient to continue active range of motion exercise on both legs while up on chair or on bed multiple times. Recommend patient to be up on the chair at least 3 times a day every meal times as tolerated. Communicated with nurse who agreed to monitor patient. Current Level of Function Impacting Discharge (mobility/balance): CGA with transfers and ambulation using a rolling walker    Other factors to consider for discharge: fall         PLAN :  Patient continues to benefit from skilled intervention to address the above impairments. Continue treatment per established plan of care. to address goals. Recommendation for discharge: (in order for the patient to meet his/her long term goals)  Physical therapy at least 2 days/week in the home     This discharge recommendation:  Has been made in collaboration with the attending provider and/or case management    IF patient discharges home will need the following DME: patient owns DME required for discharge       SUBJECTIVE:   Patient stated I want to go home.     OBJECTIVE DATA SUMMARY:   Critical Behavior:  Neurologic State: Alert, Eyes open spontaneously  Orientation Level: Oriented X4  Cognition: Appropriate decision making, Appropriate for age attention/concentration, Appropriate safety awareness, Follows commands  Safety/Judgement: Awareness of environment  Functional Mobility Training:  Bed Mobility:  Rolling:  (already up on the chair)                 Transfers:  Sit to Stand: Stand-by assistance  Stand to Sit: Stand-by assistance  Stand Pivot Transfers: Stand-by assistance     Bed to Chair: Stand-by assistance                    Balance:  Sitting: Intact  Sitting - Static: Good (unsupported)  Sitting - Dynamic: Good (unsupported)  Standing: Impaired; With support  Standing - Static: Fair  Standing - Dynamic : Fair  Ambulation/Gait Training:  Distance (ft): 180 Feet (ft)  Assistive Device: Walker, rolling;Gait belt  Ambulation - Level of Assistance: Contact guard assistance     Gait Description (WDL): Exceptions to WDL  Gait Abnormalities: Path deviations        Base of Support: Widened     Speed/Hermila: Pace decreased (<100 feet/min)  Step Length: Right shortened;Left shortened                      Therapeutic Exercises:   Educate and instructed patient to continue active range of motion exercise on both legs while up on chair or on bed multiple times. Recommend patient to be up on the chair at least 3 times a day every meal times as tolerated. Pain Ratin/10    Activity Tolerance:   Good    After treatment patient left in no apparent distress:   Sitting in chair, Heels elevated for pressure relief, Call bell within reach, Bed / chair alarm activated, and Caregiver / family present    COMMUNICATION/COLLABORATION:   The patients plan of care was discussed with: Occupational therapist, Registered nurse, Physician, and Case management. Merla Boast, PT,WCC.    Time Calculation: 24 mins

## 2022-09-22 NOTE — ANESTHESIA PREPROCEDURE EVALUATION
Relevant Problems   NEUROLOGY   (+) Delirium      CARDIOVASCULAR   (+) HTN (hypertension), benign   (+) PAF (paroxysmal atrial fibrillation) (HCC)      RENAL FAILURE   (+) LAVINIA (acute kidney injury) ([facility]      ENDOCRINE   (+) DM (diabetes mellitus), type 2 (HCC)      PERSONAL HX & FAMILY HX OF CANCER   (+) Melanoma (HCC)       Anesthetic History   No history of anesthetic complications            Review of Systems / Medical History  Patient summary reviewed and pertinent labs reviewed    Pulmonary    COPD               Neuro/Psych   Within defined limits           Cardiovascular    Hypertension        Dysrhythmias            GI/Hepatic/Renal         Renal disease: ARF       Endo/Other    Diabetes  Hypothyroidism       Other Findings   Comments: Recent sepsis with acute renal impairment, and respiratory failure and diabetic ketoacidosis.          Physical Exam    Airway  Mallampati: II  TM Distance: 4 - 6 cm  Neck ROM: normal range of motion   Mouth opening: Normal     Cardiovascular    Rhythm: regular  Rate: normal         Dental    Dentition: Poor dentition     Pulmonary                 Abdominal         Other Findings            Anesthetic Plan    ASA: 4  Anesthesia type: general          Induction: Intravenous  Anesthetic plan and risks discussed with: Patient

## 2022-09-22 NOTE — BRIEF OP NOTE
Brief Postoperative Note    Patient: Thania Condon  YOB: 1960  MRN: [id number]    Date of Procedure: 9/22/2022     Pre-Op Diagnosis: End Stage Renal Disease    Post-Op Diagnosis: Same as preoperative diagnosis. Procedure(s):  PERITONEAL CATHETER INSERTION LAPAROSCOPIC    Surgeon(s): Lazarus Martini, MD    Surgical Assistant: Surg Asst-1: Jing Rivas    Anesthesia: General     Estimated Blood Loss (mL): Minimal    Complications: None    Specimens: * No specimens in log *     Implants:   Implant Name Type Inv.  Item Serial No.  Lot No. LRB No. Used Action   Peritoneal Dialysis Catheter   NA [employer] [id number] N/A 1 Implanted       Drains:   [REMOVED] Nasogastric Tube 09/06/22 (Removed)   Site Assessment Clean, dry, & intact 09/16/22 1200   Securement Device Adhesive-based marquez 09/16/22 1200   G Port Status Infusing 09/16/22 1200   External Insertion Jesse (cms) 75 cms 09/16/22 1200   Action Taken Repositioned 09/16/22 1200   Drainage Description Green 09/16/22 1200   Gastric Residual (mL) 10 ml 09/15/22 0700   Tube Feeding/Formula Options Renal 09/16/22 1200   Modular Nutrients Protein 09/16/22 1200   Tube Feeding/Verify Rate (mL/hr) 56 09/16/22 1400   Water Flush Volume (mL) 80 mL 09/16/22 1400   Intake (ml) 136 ml 09/16/22 1400   Medication Volume 120 ml 09/14/22 2000   Drainage Chamber Level (ml) 300 ml 09/12/22 0400   Output (ml) 100 ml 09/12/22 0400       [REMOVED] Fecal Management (Removed)   Stool Consistency Semi-liquid 09/18/22 1754   Position of Indicator Line Visible 09/18/22 1754   Signal Indicator Bubble Appropriate 09/18/22 1754   Skin Assessment of the Anal Area Denuded/excoriated 09/18/22 1754   Tube Irrigated No 09/18/22 1754   Drainage Bag Level (mL) 500 09/18/22 1754   Output (ml) 0 ml 09/18/22 0400       Findings: 0    Electronically Signed by Amber Mathis MD on 9/22/2022 at 5:48 PM

## 2022-09-22 NOTE — PROGRESS NOTES
0700 Bedside and Verbal shift change report given to KVNG Parker (oncoming nurse) by Sandee Miller RN (offgoing nurse). Report included the following information SBAR, Kardex, ED Summary, Intake/Output, MAR, Recent Results, and Cardiac Rhythm SR, ST .Primary Nurse Khang Schreiber RN and Sandee Miller RN, RN performed a dual skin assessment on this patient Impairment noted- see wound doc flow sheet  David score is 19     0800 Pt assessed. A&O x4. Follows commands. Generalized weakness. OOB to chair with nurse standby. Steady gait. Breath sounds diminished and course throughout A&P. No cough, SOB. Room air. SR, ST. BP stable. +4 pitting edema to bilateral LE's. No CP. Abd semisoft. BM today. BS active x4. No N&V, abd pain. Voiding, oliguric. BPPP. See assessment. 3690 Dr. Dee Shipman at bedside, assessed pt. Updated pt regarding kidney function and plan of care or permacath and outpatient dialysis  3xweek. 21  Dr Dee Shipman discontinued permacath order and changed to PD cath by Dr. Barrett Litten. Dr. Barrett Litten made aware. Hold Heparin, NPO and procedure later today. Pt made aware. Verbalized understanding. 1040 PT in for therapy. Pt walking the halls with walker. Tolerating well. 1200 Pt reassessed. Assessment unchanged. Family visiting  1400 Pt resting. Family visiting. 1500 Pre op at bedside to prepare for surgery. 1600 Pt reassessed. Assessment unchanged. Family visiting. 1615 Pre op nurses transported pt to OR via bed. 1850 Pt returned from PACU. LLQ PD cath intact. Dsg D/I. Awake, alert. No complaints. 1900 Bedside and Verbal shift change report given to Otis Saenz RN (oncoming nurse) by KVNG Parker (offgoing nurse). Report included the following information SBAR, Kardex, OR Summary, Intake/Output, MAR, Recent Results, and Cardiac Rhythm sr .

## 2022-09-22 NOTE — PROGRESS NOTES
Transition of care note:    RUR 17%(moderate risk for future readmission)    LOS 15 days,GLOS 3.8    Today:  I discussed pt with nephrologist.  Pt will be transitioning pt to PD. PD will be supervised out of the [facility] per Dr Essie Rbobins. [place] [facility] Correct address for pt is [address] [place] [address] in case home health is needed as home health will be needed. I talked with pt and son who state transportation to & from dialysis is not a problem. Son works night shift so is available to help with transportation plus pt.'s sister will help also. Son lives with pt. Nephrologist has discontinued pt.'s permacath order and changed the order to PD catheter. Pt worked with PT today. Per discussion with pt,son and yesterday with pt.'s sister,they all prefer for pt to return home with services when stable for discharge. I updated attending that pt will benefit from home health PT and OT for evaluation & treatment and skilled nursing for wound care management and acute/chronic disease management.     Radhames Del Toro  Perfect Serve

## 2022-09-22 NOTE — WOUND CARE
Wound Consult: Follow Up Patient Visit  Consulted for: right buttock skin injury  Patient is up in chair, stands without assist and maintains balance, has a [facility] In Touch bed; hercules system in place. David score: 16  Patient is alert and oriented x 4; most pleasant and cooperative, patient's nurse Luis Sessions at bedside. Assessment:  Right buttock area resurfaced, pink. Has flaky, dry skin throughout both inner buttocks/gluteal cleft; pink blanching intact skin surrounding; appears to be recovering from IAD; has FMS with loose stool noted through Sunday. Now continent, wearing pants. Left inner nare - small light colored scab remaining, no redness. No longer with any nasal tubes. Treatment:  Used no sting barrier to skin, placed sacral foam dressing. Brought in waffle cushion and patient voiced comfort sitting on this in chair. Wound Recommendations:  Continue sacral foam dressing, waffle cushion for sitting. Skin and PI Prevention Recommendations:  Use waffle cushion while in chair. Have him reposition in chair routinely and bed. Manage moisture - intentional toileting. Continue to monitor risk assessment with David score; Dual skin assessment and changes in condition. Promote nutrition; consult if needed. Plan:  Hand off to Dr. Reino Apley.  Orders proposed and added. We will continue to reassess weekly. Please re-consult should any concerns arise prior to next visit. Sapphire Garduno RN,[facility]    Wound and Ostomy Department.   [phone] wound nurse or Perfect Serve

## 2022-09-22 NOTE — PROGRESS NOTES
[facility]  [address] [facility], [address] [place] [address]  [phone]      Hospitalist Progress Note      NAME: Adelia Hawk   :  1960  MRM:  [id number]    Date/Time of service: 2022  2:26 PM       Subjective:     Chief Complaint:  Patient was personally seen and examined by me during this time period. Chart reviewed. No chest pain, SOB. Family at bedside        Objective:       Vitals:       Last 24hrs VS reviewed since prior progress note.  Most recent are:    Visit Vitals  BP (!) 140/83   Pulse 92   Temp 98.6 °F (37 °C)   Resp 10   Ht 5' 6\" (1.676 m)   Wt 79.9 kg (176 lb 2.4 oz)   SpO2 95%   BMI 28.43 kg/m²     SpO2 Readings from Last 6 Encounters:   22 95%    O2 Flow Rate (L/min): 2 l/min     Intake/Output Summary (Last 24 hours) at 2022 1426  Last data filed at 2022 0800  Gross per 24 hour   Intake 120 ml   Output --   Net 120 ml        Exam:     Physical Exam:    Gen:  frail, ill-appearing, NAD  HEENT:  Pink conjunctivae, PERRL, hearing intact to voice, moist mucous membranes  Neck:  Supple, without masses, thyroid non-tender  Resp:  No accessory muscle use, scattered rhonchi, crackles   Card:  No murmurs, normal S1, S2 without thrills, +edema  Abd:  Soft, non-tender, non-distended, normoactive bowel sounds are present  Musc:  No cyanosis or clubbing  Skin:  No rashes   Neuro:  Cranial nerves 3-12 are grossly intact, generalized weakness, follows commands appropriately  Psych:  fair insight, oriented to person, place and time, alert    Medications Reviewed: (see below)    Lab Data Reviewed: (see below)    ______________________________________________________________________    Medications:     Current Facility-Administered Medications   Medication Dose Route Frequency    senna (SENOKOT) tablet 8.6 mg  1 Tablet Oral DAILY    0.9% sodium chloride infusion 250 mL  250 mL IntraVENous PRN    cyanocobalamin (VITAMIN B12) tablet 1,000 mcg  1,000 mcg Oral DAILY    QUEtiapine (SEROquel) tablet 25 mg  25 mg Oral TID    insulin glargine (LANTUS) injection 7 Units  7 Units SubCUTAneous DAILY    carvediloL (COREG) tablet 6.25 mg  6.25 mg Oral BID WITH MEALS    miconazole (MICOTIN) 2 % powder   Topical BID    polyethylene glycol (MIRALAX) packet 17 g  17 g Oral DAILY PRN    insulin lispro (HUMALOG) injection   SubCUTAneous AC&HS    albumin human 25% (BUMINATE) solution 25 g  25 g IntraVENous DIALYSIS PRN    albuterol-ipratropium (DUO-NEB) 2.5 MG-0.5 MG/3 ML  3 mL Nebulization Q6H RT    albuterol-ipratropium (DUO-NEB) 2.5 MG-0.5 MG/3 ML  3 mL Nebulization Q4H PRN    hydrALAZINE (APRESOLINE) 20 mg/mL injection 10 mg  10 mg IntraVENous Q6H PRN    acetaminophen (TYLENOL) tablet 650 mg  650 mg Oral Q6H PRN    OLANZapine (ZyPREXA) tablet 5 mg  5 mg Per NG tube BID PRN    folic acid (FOLVITE) tablet 1 mg  1 mg Oral DAILY    dextrose 10% infusion 0-250 mL  0-250 mL IntraVENous PRN    levothyroxine (SYNTHROID) tablet 100 mcg  100 mcg Oral ACB    glucose chewable tablet 16 g  4 Tablet Oral PRN    glucagon (GLUCAGEN) injection 1 mg  1 mg IntraMUSCular PRN    heparin (porcine) injection 5,000 Units  5,000 Units SubCUTAneous Q12H          Lab Review:     Recent Labs     09/22/22  0013 09/21/22  0518 09/20/22  0033   WBC 6.5 6.9 8.5   HGB 7.9* 6.7* 7.0*   HCT 23.4* 20.1* 20.9*    412* 380     Recent Labs     09/22/22  0013 09/21/22  0518 09/20/22  0033    138 138   K 3.8 3.7 4.0    104 102   CO2 28 26 31   * 119* 76   BUN 25* 43* 33*   CREA 4.74* 6.44* 5.01*   CA 8.0* 8.1* 7.9*   MG 2.1  --  2.1   PHOS 3.3  --  3.4     Lab Results   Component Value Date/Time    Glucose (POC) 149 (H) 09/22/2022 11:37 AM    Glucose (POC) 129 (H) 09/22/2022 08:30 AM    Glucose (POC) 170 (H) 09/21/2022 09:30 PM    Glucose (POC) 113 09/21/2022 03:50 PM    Glucose (POC) 104 09/21/2022 10:54 AM          Assessment / Plan:     56 yo hx of HTN, DM, CKD 3, melanoma, presented w/ resp failure, DKA, LAVINIA. Hospital course significant for new dialysis on 09/07, afib RVR on 09/15, delirium    1) LAVINIA/CKD 3: likely has ESRD. HD was started on 09/07. Renal will transition to PD as an outpatient. CM working on logistics, home health     2) Afib RVR: hx of Pafib. Occurred on 09/15, now resolved. Echo with EF 65-70%. Cont coreg. No anticoagulation due to anemia. Cards was following    3) Acute resp failure/hypoxia: now resolved. Due to volume overload, DKA. Intubated on admission, extubated on 09/11    4) Acute met encephalopathy: delirium likely from hypoxia, acidosis. Now much improved. Cont seroquel     5) HTN: cont coreg. Use IV hydralazine prn     6) DM type 2 w/ ESRD: A1C 6.6%. cont Lantus, SSI    7) Anemia: due to ESRD and low Vit B12/folate. S/p 2u RBCs. Cont vitamins.   Monitor Hgb closely    Total time spent with patient care: 40 Minutes **I personally saw and examined the patient during this time period**                 Care Plan discussed with: Patient, nursing, family     Discussed:  Care Plan    Prophylaxis:  SCD's    Disposition:   PT, OT, RN           ___________________________________________________    Attending Physician: Jarad Stein MD

## 2022-09-22 NOTE — PERIOP NOTES
TRANSFER - OUT REPORT:    Verbal report given to KVNG Gruber Franco  being transferred to [address] [place] [address] routine post - op       Report consisted of patients Situation, Background, Assessment and   Recommendations(SBAR). Information from the following report(s) SBAR, Procedure Summary, Intake/Output, MAR, and Cardiac Rhythm nsr  was reviewed with the receiving nurse. Lines:   Peripheral IV 09/06/22 Right; Anterior Forearm (Active)   Site Assessment Clean, dry, & intact; Intact 09/22/22 1850   Phlebitis Assessment 0 09/22/22 1850   Infiltration Assessment 0 09/22/22 1850   Dressing Status Clean, dry, & intact; Occlusive 09/22/22 1850   Dressing Type Tape;Transparent 09/22/22 1850   Hub Color/Line Status Pink; Infusing 09/22/22 1850   Action Taken Open ports on tubing capped 09/22/22 0400   Alcohol Cap Used Yes 09/22/22 1600       Extended Dwell Peripheral IV (Active)   Criteria for Appropriate Use Limited/no vessel suitable for conventional peripheral access 09/22/22 1600   Site Assessment Clean, dry, & intact 09/22/22 1600   Phlebitis Assessment 0 09/22/22 1600   Infiltration Assessment 0 09/22/22 1600   Line Status Capped 09/22/22 0400   Line Care Connections checked and tightened 09/22/22 0400   Alcohol Cap Used Yes 09/22/22 1600   Date of Last Dressing Change 09/17/22 09/22/22 0400   Dressing Type Transparent 09/22/22 1600   Dressing Status Clean, dry, & intact 09/22/22 1600        Opportunity for questions and clarification was provided.       Patient transported with:   O2 @ 3 liters  Registered Nurse

## 2022-09-23 ENCOUNTER — TELEPHONE (OUTPATIENT)
Dept: FAMILY MEDICINE CLINIC | Age: 62
End: 2022-09-23

## 2022-09-23 ENCOUNTER — HOME HEALTH ADMISSION (OUTPATIENT)
Dept: HOME HEALTH SERVICES | Facility: HOME HEALTH | Age: 62
End: 2022-09-23
Payer: MEDICAID

## 2022-09-23 VITALS
SYSTOLIC BLOOD PRESSURE: 143 MMHG | WEIGHT: 170.19 LBS | OXYGEN SATURATION: 95 % | DIASTOLIC BLOOD PRESSURE: 90 MMHG | HEART RATE: 96 BPM | TEMPERATURE: 98.7 F | RESPIRATION RATE: 15 BRPM | HEIGHT: 66 IN | BODY MASS INDEX: 27.35 KG/M2

## 2022-09-23 LAB
ABO + RH BLD: NORMAL
BLD PROD TYP BPU: NORMAL
BLOOD GROUP ANTIBODIES SERPL: NORMAL
BPU ID: NORMAL
CROSSMATCH RESULT,%XM: NORMAL
GLUCOSE BLD STRIP.AUTO-MCNC: 99 MG/DL (ref 65–117)
HBV CORE AB SERPL QL IA: NEGATIVE
SERVICE CMNT-IMP: NORMAL
SPECIMEN EXP DATE BLD: NORMAL
STATUS OF UNIT,%ST: NORMAL
UNIT DIVISION, %UDIV: 0

## 2022-09-23 PROCEDURE — 74011000250 HC RX REV CODE- 250

## 2022-09-23 PROCEDURE — 90935 HEMODIALYSIS ONE EVALUATION: CPT

## 2022-09-23 PROCEDURE — 74011250637 HC RX REV CODE- 250/637

## 2022-09-23 PROCEDURE — 74011000250 HC RX REV CODE- 250: Performed by: ANESTHESIOLOGY

## 2022-09-23 PROCEDURE — 94640 AIRWAY INHALATION TREATMENT: CPT

## 2022-09-23 PROCEDURE — 94762 N-INVAS EAR/PLS OXIMTRY CONT: CPT

## 2022-09-23 PROCEDURE — 77010033678 HC OXYGEN DAILY

## 2022-09-23 PROCEDURE — 74011636637 HC RX REV CODE- 636/637

## 2022-09-23 PROCEDURE — 82962 GLUCOSE BLOOD TEST: CPT

## 2022-09-23 RX ORDER — FOLIC ACID 1 MG/1
1 TABLET ORAL DAILY
Qty: 30 TABLET | Refills: 0 | Status: SHIPPED | OUTPATIENT
Start: 2022-09-23

## 2022-09-23 RX ORDER — LANOLIN ALCOHOL/MO/W.PET/CERES
1000 CREAM (GRAM) TOPICAL DAILY
Qty: 30 TABLET | Refills: 0 | Status: SHIPPED | OUTPATIENT
Start: 2022-09-23

## 2022-09-23 RX ORDER — CARVEDILOL 6.25 MG/1
6.25 TABLET ORAL 2 TIMES DAILY WITH MEALS
Qty: 60 TABLET | Refills: 0 | Status: SHIPPED | OUTPATIENT
Start: 2022-09-23 | End: 2022-10-19 | Stop reason: SDUPTHER

## 2022-09-23 RX ORDER — INSULIN GLARGINE 100 [IU]/ML
5 INJECTION, SOLUTION SUBCUTANEOUS DAILY
Qty: 1 PEN | Refills: 1 | Status: SHIPPED | OUTPATIENT
Start: 2022-09-23

## 2022-09-23 RX ADMIN — QUETIAPINE FUMARATE 25 MG: 25 TABLET ORAL at 09:05

## 2022-09-23 RX ADMIN — SODIUM CHLORIDE, PRESERVATIVE FREE 10 ML: 5 INJECTION INTRAVENOUS at 06:18

## 2022-09-23 RX ADMIN — IPRATROPIUM BROMIDE AND ALBUTEROL SULFATE 3 ML: .5; 2.5 SOLUTION RESPIRATORY (INHALATION) at 02:48

## 2022-09-23 RX ADMIN — MICONAZOLE NITRATE 2 % TOPICAL POWDER: at 09:05

## 2022-09-23 RX ADMIN — CYANOCOBALAMIN TAB 500 MCG 1000 MCG: 500 TAB at 09:04

## 2022-09-23 RX ADMIN — FOLIC ACID 1 MG: 1 TABLET ORAL at 09:04

## 2022-09-23 RX ADMIN — SENNOSIDES 8.6 MG: 8.6 TABLET, COATED ORAL at 09:04

## 2022-09-23 RX ADMIN — LEVOTHYROXINE SODIUM 100 MCG: 0.1 TABLET ORAL at 07:45

## 2022-09-23 RX ADMIN — CARVEDILOL 6.25 MG: 6.25 TABLET, FILM COATED ORAL at 07:45

## 2022-09-23 RX ADMIN — IPRATROPIUM BROMIDE AND ALBUTEROL SULFATE 3 ML: .5; 2.5 SOLUTION RESPIRATORY (INHALATION) at 07:46

## 2022-09-23 RX ADMIN — INSULIN GLARGINE 7 UNITS: 100 INJECTION, SOLUTION SUBCUTANEOUS at 09:05

## 2022-09-23 NOTE — DISCHARGE INSTRUCTIONS
HOSPITALIST DISCHARGE INSTRUCTIONS  NAME: Kenneth Massey   :  1960   MRN:  [id number]     Date/Time:  2022 9:00 AM    ADMIT DATE: 2022     DISCHARGE DATE: 2022     ADMITTING DIAGNOSIS:  Acute renal failure leading to ESRD on dialysis, afib with RVR    DISCHARGE DIAGNOSIS:  same    MEDICATIONS:  See after visit summary       It is important that you take the medication exactly as they are prescribed. Keep your medication in the bottles provided by the pharmacist and keep a list of the medication names, dosages, and times to be taken in your wallet. Do not take other medications without consulting your doctor     Pain Management: per above medications    What to do at Home    Recommended diet:  Diabetic Diet    Recommended activity: Activity as tolerated    1) Return to the hospital if you feel worse    2) If you experience any of the following symptoms then please call your primary care physician or return to the emergency room if you cannot get hold of your doctor:  Fever, chills, nausea, vomiting, diarrhea, change in mentation, falling, bleeding, shortness of breath, chest pain, severe headache, severe abdominal pain,     3) Follows nephrology instructions regarding peritoneal dialysis     Follow Up:  David Porras, [address]  1007 [facility]  [phone]    Follow up on 10/21/2022  9:40 am    None  None (395) Patient stated that they have no PCP          Cuco Bell MD  [address] [place] [address] [id number]    Schedule an appointment as soon as possible for a visit    . Information obtained by :  I understand that if any problems occur once I am at home I am to contact my physician. I understand and acknowledge receipt of the instructions indicated above.                                                                                                                                            Physician's or R.N.'s Signature                                                                  Date/Time                                                                                                                                              Patient or Representative Signature                                                          Date/Time          Learning About Peritoneal Dialysis  What is peritoneal dialysis? Dialysis does the work of your kidneys when you have kidney failure. It filters wastes and removes extra fluid. And it works to restore the right balance of chemicals in the blood. Peritoneal dialysis (say \"ejhe-eh-usj-NEE-forrest tv-MY-qs-villa\") uses the lining of your belly to filter your blood. This lining is called the peritoneal membrane. Before you can start this type of dialysis, a doctor has to make a dialysis access in your belly. This is the place where the fluid (dialysis solution) flows into and out of your body. This is most often done 10 to 14 days before dialysis starts. You don't need to go to a dialysis center for peritoneal dialysis. Instead, you will do your own treatments at home or in any clean place. You may be able to do it when you sleep. You can do peritoneal dialysis yourself or have a machine help you. What happens in peritoneal dialysis? The process of doing peritoneal dialysis is called an exchange. Each exchange has three steps: fill, dwell, and drain. Fill:  Dialysis fluid enters your belly through the catheter. The fluid is a mix of sugar, water, and electrolytes. Electrolytes are minerals such as sodium, potassium, and calcium. Dwell: While the fluid is in your belly, extra fluid and waste travel into the dialysis fluid. Drain:  The fluid is drained and then replaced with new fluid. How do you do peritoneal dialysis? How the exchange is done, how often you do it, and how long it takes depend on the type of peritoneal dialysis you use.   Continuous ambulatory peritoneal dialysis (CAPD)  You attach the bag of fluid to the catheter and let it flow into your belly. The fluid stays in your belly for about 4 to 6 hours. During this time you can move around and do most of your normal activities. After this time, you drain the fluid out of your belly. You then put fresh fluid in your belly. You need to do this about 4 times a day. It takes about 30 to 40 minutes to drain and refill your belly. Continuous cycling peritoneal dialysis (CCPD)  A machine fills and drains the fluid from your belly. This process takes about 8 to 12 hours. This means you can do CCPD at night while you sleep. Follow-up care is a key part of your treatment and safety. Be sure to make and go to all appointments, and call your doctor if you are having problems. It's also a good idea to know your test results and keep a list of the medicines you take. Where can you learn more? Go to http://www.gray.com/  Enter [id number] in the search box to learn more about \"Learning About Peritoneal Dialysis. \"  Current as of: September 8, 2021               Content Version: 13.2  © 4897-3813 Healthwise, Incorporated. Care instructions adapted under license by [employer] (which disclaims liability or warranty for this information). If you have questions about a medical condition or this instruction, always ask your healthcare professional. [place] [address] any warranty or liability for your use of this information.     Shannan Guajardo Monday Wednesday and Fridays @ 8:30 am,please arrive @ 8:15 am the first day

## 2022-09-23 NOTE — TELEPHONE ENCOUNTER
----- Message from Summer Eldridge sent at 9/23/2022  2:43 PM EDT -----  Subject: Appointment Request    Reason for Call: New Patient/New to Provider Appointment needed: New   Patient Request Appointment    QUESTIONS    Reason for appointment request? Available appointments did not meet   patient need     Additional Information for Provider? Pt would like to establish care   within office, had acute kidney failure in [facility] (ICU), and is   currently on dialysis. Would like a good doctor to establish care please   contact as soon as possible.  Sister referred pt.  ---------------------------------------------------------------------------  --------------  Luciana [facility]  [phone]; OK to leave message on voicemail  ---------------------------------------------------------------------------  --------------  SCRIPT ANSWERS  COVID Screen: Asia Gallardo

## 2022-09-23 NOTE — OP NOTES
[facility]  OPERATIVE REPORT    Name:  Cristino Zamarripa  MR#:  [id number]  :  1960  ACCOUNT #:  [de-identified]  DATE OF SERVICE:  2022    PREOPERATIVE DIAGNOSIS:  End-stage renal disease. POSTOPERATIVE DIAGNOSIS:  End-stage renal disease. PROCEDURE PERFORMED:  1. Exploratory laparoscopy. 2.  Laparoscopic placement of peritoneal dialysis catheter. SURGEON:  Babita Zamora MD    ASSISTANT:  None. ANESTHESIA:  General endotracheal anesthesia. COMPLICATIONS:  None. SPECIMENS REMOVED:  None. IMPLANTS:  Catheter. ESTIMATED BLOOD LOSS:  Minimal.    INDICATIONS FOR PROCEDURE:  The patient is a middle-aged male with end-stage renal disease electing for peritoneal dialysis. Decision was made to take him to the operating room for catheter placement. PROCEDURE IN DETAILS:  The patient was taken to the operating room. Once suitable level of anesthesia was induced, prepped and draped in sterile fashion. Decision was made in the right upper quadrant and the abdomen entered with a laparoscopic trocar under direct vision. The abdomen was insufflated. Inspection of the abdomen revealed no significant adhesions in the pelvis. Transverse incision was made in the left lower quadrant. Dissection carried out down the rectus sheath, which was divided transversely. The muscle was retracted and the abdomen entered under direct guidance. Peritoneal dialysis catheter was then passed into the pelvis with the passer, brought out through a separate stab incision superior on the rectus sheath and brought out through a separate stab incision in the abdominal wall. Rectus sheath was approximated with Vicryl suture and the wound closed in multiple layers. Inspection of the catheter revealed less than ideal placement. A second 5 mm trocar was placed and the grasper used to get the catheter deep into the pelvis.   The catheter then flushed and drained with gravity without difficulty. Wounds were irrigated thoroughly and closed in multiple layers. He tolerated the procedure well. Sponge and instrument counts were correct x2. He was awakened and transferred to the recovery area in good condition.       MD LINDA [id number]  D:  09/23/2022 10:11  T:  09/23/2022 10:35  JOB #:  [id number]

## 2022-09-23 NOTE — PROGRESS NOTES
Resulted hepatitis B surface antibody and hepatitis B core antibody,total faxed to Eboni Eastman through [employer]. Discharge summary and AVS also faxed to [facility]. Pt had HD early this am.    Pt has been accepted @ [facility] for PD starting this Monday @ 8:30 am.    Family will transport pt to and from PD. Pt walked independently and safely with staff on the unit this morning. [employer] contacted me and pt has been accepted by [facility] and will be followed by NP ,Real Dakin. I am requesting for the Case management specialist to make pt a follow-up appointment with MORGAN Andersen.     Elissa Plummer

## 2022-09-23 NOTE — DISCHARGE SUMMARY
[facility]  [address]  [phone]    Hospitalist Discharge Summary     Patient ID:  Guillermo Zamarripa  [id number]  38 y.o.  1960    Admit date: 9/6/2022    Discharge date and time: 9/23/2022 9:02 AM    Admission Diagnoses: Severe sepsis ([address] [A41.9, R65.20]    Discharge Diagnoses:  Principal Diagnosis LAVINIA (acute kidney injury) ([address]                                            Principal Problem:    LAVINIA (acute kidney injury) ([address] (9/6/2022)    Active Problems:    Severe sepsis ([address] (9/6/2022)      Melanoma ([address] (9/6/2022)      HTN (hypertension), benign (9/6/2022)      DM (diabetes mellitus), type 2 ([address] (9/6/2022)      Hyperlipidemia (9/6/2022)      Delirium (9/14/2022)      Shock ([address] (9/14/2022)      PAF (paroxysmal atrial fibrillation) ([address] (9/14/2022)      Thrombocytopenia ([address] (9/14/2022)      Abdominal distension (9/16/2022)           Hospital Course:     58 yo hx of HTN, DM, CKD 3, melanoma, presented w/ resp failure, DKA, LAVINIA. Hospital course significant for new dialysis on 09/07, afib RVR on 09/15, delirium     1) LAVINIA/CKD 3: likely has ESRD. HD was started on 09/07. PD cath placed on 09/22 by Vascular. Renal will transition to PD as an outpatient. CM working on logistics, home health      2) Afib RVR: hx of Pafib. Occurred on 09/15, now resolved. Echo with EF 65-70%. Cont coreg. No anticoagulation due to anemia. Cards was following     3) Acute resp failure/hypoxia: now resolved. Due to volume overload, DKA. Intubated on admission, extubated on 09/11     4) Acute met encephalopathy: delirium likely from hypoxia, acidosis. Now much improved. No need for seroquel on discharge     5) HTN: cont coreg     6) DM type 2 w/ ESRD: A1C 6.6%. stopped metformin. Cont Lantus     7) Anemia: due to ESRD and low Vit B12/folate. S/p 2u RBCs.   Cont vitamins    PCP: None     Consults:  Pulm, Renal, Cards    Significant Diagnostic Studies: intubation, HD catheter, PD catheter     Discharge Exam:  Physical Exam:    Gen:  frail, ill-appearing, NAD  HEENT:  Pink conjunctivae, PERRL, hearing intact to voice, moist mucous membranes  Neck:  Supple, without masses, thyroid non-tender  Resp:  No accessory muscle use, scattered rhonchi, crackles   Card:  No murmurs, normal S1, S2 without thrills, +edema  Abd:  Soft, non-tender, non-distended, normoactive bowel sounds are present, PD cath present   Musc:  No cyanosis or clubbing  Skin:  No rashes   Neuro:  Cranial nerves 3-12 are grossly intact, generalized weakness, follows commands appropriately  Psych:  fair insight, oriented to person, place and time, alert    Disposition: home  Discharge Condition: Stable    Patient Instructions:   Current Discharge Medication List        START taking these medications    Details   carvediloL (COREG) 6.25 mg tablet Take 1 Tablet by mouth two (2) times daily (with meals). Qty: 60 Tablet, Refills: 0      cyanocobalamin 1,000 mcg tablet Take 1 Tablet by mouth daily. Qty: 30 Tablet, Refills: 0      folic acid (FOLVITE) 1 mg tablet Take 1 Tablet by mouth daily. Qty: 30 Tablet, Refills: 0      insulin glargine (LANTUS,BASAGLAR) 100 unit/mL (3 mL) inpn 5 Units by SubCUTAneous route daily. Indications: type 2 diabetes mellitus  Qty: 1 Pen, Refills: 1      OTHER,NON-FORMULARY, Insulin pen needles. Use as directed  Qty: 100 Each, Refills: 0           CONTINUE these medications which have NOT CHANGED    Details   pravastatin (PRAVACHOL) 10 mg tablet Take 10 mg by mouth daily. levothyroxine (SYNTHROID) 100 mcg tablet Take 100 mcg by mouth Daily (before breakfast).            STOP taking these medications       metFORMIN (GLUMETZA ER) 500 mg TG24 24 hour tablet Comments:   Reason for Stopping:         enalapril (VASOTEC) 10 mg tablet Comments:   Reason for Stopping:         chlorthalidone (HYGROTON) 25 mg tablet Comments:   Reason for Stopping:         cloNIDine HCL (CATAPRES) 0.1 mg tablet Comments:   Reason for Stopping:         atenoloL (TENORMIN) 50 mg tablet Comments:   Reason for Stopping:         cyclobenzaprine (FLEXERIL) 10 mg tablet Comments:   Reason for Stopping:         gabapentin (NEURONTIN) 300 mg capsule Comments:   Reason for Stopping:             Activity: Activity as tolerated  Diet: Diabetic Diet  Wound Care: As directed    Follow-up with  Follow-up Information       Follow up With Specialties Details Why Contact Info    Judah Babcock MD Cardiovascular Disease Physician Follow up on 10/21/2022 9:40 am [address] [place] [address]  [phone]      None    None (395) Patient stated that they have no PCP      Juan Diane MD Nephrology, Hospitalist Schedule an appointment as soon as possible for a visit  [address]  [address] [facility]               Follow-up tests/labs none    Signed:  Micheal Velazquez MD  9/23/2022  9:02 AM  **I personally spent 45 min on discharge**

## 2022-09-23 NOTE — PROGRESS NOTES
Hospital Follow Up with [facility] Ethel Ramos NP  for 10/11/2022 at 10:00am     Janet Andersen NP [address] [facility] scheduling sent message to have nurse contact patient back to schedule follow up appoitnment.

## 2022-09-23 NOTE — PROGRESS NOTES
2059 Tylenol 650 mg po given for abdominal soreness. 0100 Patient on HD.  0330 HD completed, patient resting.  0600 Patient assisted up to commode , voided and had a BM.  8898 Patient assisted back in bed.  0700 Bedside shift change report given to [facility] .  Report included the following information SBAR, Kardex, ED Summary, OR Summary, Procedure Summary, Intake/Output, MAR, Accordion, Recent Results, Med Rec Status, and Cardiac Rhythm SR .

## 2022-09-23 NOTE — PROGRESS NOTES
[address]  YOB: 1960          Assessment & Plan:     Severe oliguric LAVINIA was on CVVH then transition to IHD from 9/12   Severe lactic acidosis  Ketoacidosis  Resp failure s/p intubation and extubation   DM2  Thrombocytopenia  Hypophosphatemia     Rec:  Patient is converted to HD to PD  PD catheter placed on 9/22 and plan for ICPD at [facility] MWF for 4-6 hr  Plan d/w patient, sister and CM  Patient will be follow up by Brent Stevens as outpatient   Avoid nephrotoxins          Subjective:   CC: LAVINIA  HPI: Seen, feels better , edema better, ready to go home  ROS:   Current Facility-Administered Medications   Medication Dose Route Frequency    senna (SENOKOT) tablet 8.6 mg  1 Tablet Oral DAILY    QUEtiapine (SEROquel) tablet 25 mg  25 mg Oral BID    lidocaine (PF) (XYLOCAINE) 10 mg/mL (1 %) injection 0.1 mL  0.1 mL SubCUTAneous PRN    sodium chloride (NS) flush 5-40 mL  5-40 mL IntraVENous Q8H    sodium chloride (NS) flush 5-40 mL  5-40 mL IntraVENous PRN    0.9% sodium chloride infusion 25 mL  25 mL IntraVENous PRN    naloxone (NARCAN) injection 0.4 mg  0.4 mg IntraVENous Multiple    flumazeniL (ROMAZICON) 0.1 mg/mL injection 0.2 mg  0.2 mg IntraVENous Multiple    lidocaine (PF) (XYLOCAINE) 10 mg/mL (1 %) injection 0.1 mL  0.1 mL SubCUTAneous PRN    sodium chloride (NS) flush 5-40 mL  5-40 mL IntraVENous Q8H    sodium chloride (NS) flush 5-40 mL  5-40 mL IntraVENous PRN    0.9% sodium chloride infusion 25 mL  25 mL IntraVENous PRN    naloxone (NARCAN) injection 0.4 mg  0.4 mg IntraVENous Multiple    flumazeniL (ROMAZICON) 0.1 mg/mL injection 0.2 mg  0.2 mg IntraVENous Multiple    0.9% sodium chloride infusion 250 mL  250 mL IntraVENous PRN    cyanocobalamin (VITAMIN B12) tablet 1,000 mcg  1,000 mcg Oral DAILY    insulin glargine (LANTUS) injection 7 Units  7 Units SubCUTAneous DAILY    carvediloL (COREG) tablet 6.25 mg 6.25 mg Oral BID WITH MEALS    miconazole (MICOTIN) 2 % powder   Topical BID    polyethylene glycol (MIRALAX) packet 17 g  17 g Oral DAILY PRN    insulin lispro (HUMALOG) injection   SubCUTAneous AC&HS    albumin human 25% (BUMINATE) solution 25 g  25 g IntraVENous DIALYSIS PRN    albuterol-ipratropium (DUO-NEB) 2.5 MG-0.5 MG/3 ML  3 mL Nebulization Q6H RT    albuterol-ipratropium (DUO-NEB) 2.5 MG-0.5 MG/3 ML  3 mL Nebulization Q4H PRN    hydrALAZINE (APRESOLINE) 20 mg/mL injection 10 mg  10 mg IntraVENous Q6H PRN    acetaminophen (TYLENOL) tablet 650 mg  650 mg Oral Q6H PRN    OLANZapine (ZyPREXA) tablet 5 mg  5 mg Per NG tube BID PRN    folic acid (FOLVITE) tablet 1 mg  1 mg Oral DAILY    dextrose 10% infusion 0-250 mL  0-250 mL IntraVENous PRN    levothyroxine (SYNTHROID) tablet 100 mcg  100 mcg Oral ACB    glucose chewable tablet 16 g  4 Tablet Oral PRN    glucagon (GLUCAGEN) injection 1 mg  1 mg IntraMUSCular PRN     Current Outpatient Medications   Medication Sig    carvediloL (COREG) 6.25 mg tablet Take 1 Tablet by mouth two (2) times daily (with meals). cyanocobalamin 1,000 mcg tablet Take 1 Tablet by mouth daily. folic acid (FOLVITE) 1 mg tablet Take 1 Tablet by mouth daily. insulin glargine (LANTUS,BASAGLAR) 100 unit/mL (3 mL) inpn 5 Units by SubCUTAneous route daily. Indications: type 2 diabetes mellitus    OTHER,NON-FORMULARY, Insulin pen needles. Use as directed    pravastatin (PRAVACHOL) 10 mg tablet Take 10 mg by mouth daily. levothyroxine (SYNTHROID) 100 mcg tablet Take 100 mcg by mouth Daily (before breakfast). Objective:     Vitals:  Blood pressure (!) 143/105, pulse 96, temperature 98.7 °F (37.1 °C), resp. rate 15, height 5' 6\" (1.676 m), weight 77.2 kg (170 lb 3.1 oz), SpO2 95 %. Temp (24hrs), Av.5 °F (36.9 °C), Min:97.6 °F (36.4 °C), Max:99.1 °F (37.3 °C)      Intake and Output:  No intake/output data recorded.   1 -  0700  In: 510 [P.O.:120; I.V.:390]  Out: 2200     Physical Exam:               GENERAL ASSESSMENT: NAD  HEENT: peerla   CHEST: diminished BS +  HEART: S1S2  ABDOMEN: Soft,NT    EXTREMITY: trace  EDEMA        ECG/rhythm:    Data Review      No results for input(s): TNIPOC in the last 72 hours. No lab exists for component: ITNL     No results for input(s): CPK, CKMB, TROIQ in the last 72 hours. Recent Labs     09/22/22  0013 09/21/22  0518    138   K 3.8 3.7    104   CO2 28 26   BUN 25* 43*   CREA 4.74* 6.44*   * 119*   PHOS 3.3  --    MG 2.1  --    CA 8.0* 8.1*   WBC 6.5 6.9   HGB 7.9* 6.7*   HCT 23.4* 20.1*    412*      No results for input(s): INR, PTP, APTT, INREXT, INREXT in the last 72 hours. Needs: urine analysis, urine sodium, protein and creatinine  No results found for: Maddie Valverde        : Tyrone Wing MD  9/23/2022        [facility]:  [email or web address]  Jonathon Benites office:  [address] [place] [address],[address]  [address]  Phone: [phone]  Fax :     [phone]    [place] office:  [address] [place], [place] [address]shandra Baca  Phone - [phone]  Fax - [phone]

## 2022-09-23 NOTE — PROGRESS NOTES
Update from [place]:  Pt can start PD on Monday,September 26 th @ 8:30 am .When nephrologist rounds,I will confirm with him that start date is acceptable.     Latha Speaks

## 2022-09-23 NOTE — PROGRESS NOTES
Transition of care note :    RUR 16%(moderate risk for readmission in the future)    LOS 16 days,GLOS 3.8    Today;  I contacted the liason with [address] to see if they are able to accept pt for services. [facility] informed me that they are waiting to hear back from pt.'s PCP as the physician needs to agree to follow pt for home health orders. The liason plans to call the PCP's office again this morning. I have also reached out to [facility] to see when they can accept pt with times ,days as yesterday they informed me of a later start date of 9/29/22. Once I find out all details,I will update pt.'s nurse,pt and his family.     Willis Shipman Serve

## 2022-09-23 NOTE — PROGRESS NOTES
I met with nephrologist with pt and his sister to review discharge instructions regarding PD and home health. All questions answered.     Meliton Shipman Serve

## 2022-09-23 NOTE — PROGRESS NOTES
0700- Bedside and Verbal shift change report given to [address] (oncoming nurse) by Gaetano Louis (offgoing nurse). Report included the following information SBAR, Kardex, ED Summary, OR Summary, Procedure Summary, Intake/Output, MAR, Accordion, Recent Results, Med Rec Status, Cardiac Rhythm NSR, and Alarm Parameters . 0800- Pt assessment performed. Pt bathed CHG bath. Medications administered. 1130- Pt discharged for routine progression of care. All discharge directions reviewed with family present. Discharge paperwork reviewed. Pt and family agreeable with POC. Lines removed. Hemostasis achieved. Last set of vitals VSS. No s/s of distress. Follow up appointments reviewed. Pt discharged with sisters and in wheelchair with tech. If you experience chest pain call 911. Do not drive yourself. Discussed with the patient and all questioned fully answered. He will call me if any problems arise. I have reviewed discharge instructions with the patient and caregiver. The patient and caregiver verbalized understanding.

## 2022-09-25 ENCOUNTER — HOME CARE VISIT (OUTPATIENT)
Dept: SCHEDULING | Facility: HOME HEALTH | Age: 62
End: 2022-09-25
Payer: MEDICAID

## 2022-09-25 VITALS
SYSTOLIC BLOOD PRESSURE: 140 MMHG | RESPIRATION RATE: 18 BRPM | TEMPERATURE: 97.4 F | DIASTOLIC BLOOD PRESSURE: 64 MMHG | OXYGEN SATURATION: 97 % | HEART RATE: 91 BPM | BODY MASS INDEX: 26.65 KG/M2 | WEIGHT: 165.13 LBS

## 2022-09-25 PROCEDURE — 400013 HH SOC

## 2022-09-25 PROCEDURE — G0299 HHS/HOSPICE OF RN EA 15 MIN: HCPCS

## 2022-09-26 ENCOUNTER — HOME CARE VISIT (OUTPATIENT)
Dept: HOME HEALTH SERVICES | Facility: HOME HEALTH | Age: 62
End: 2022-09-26
Payer: MEDICAID

## 2022-09-28 ENCOUNTER — ANESTHESIA EVENT (OUTPATIENT)
Dept: SURGERY | Age: 62
End: 2022-09-28
Payer: MEDICAID

## 2022-09-29 ENCOUNTER — ANESTHESIA (OUTPATIENT)
Dept: SURGERY | Age: 62
End: 2022-09-29
Payer: MEDICAID

## 2022-09-29 ENCOUNTER — HOSPITAL ENCOUNTER (OUTPATIENT)
Age: 62
Setting detail: OUTPATIENT SURGERY
Discharge: HOME | End: 2022-09-29
Payer: MEDICAID

## 2022-09-29 ENCOUNTER — HOME CARE VISIT (OUTPATIENT)
Dept: HOME HEALTH SERVICES | Facility: HOME HEALTH | Age: 62
End: 2022-09-29
Payer: MEDICAID

## 2022-09-29 VITALS
DIASTOLIC BLOOD PRESSURE: 84 MMHG | HEART RATE: 73 BPM | RESPIRATION RATE: 15 BRPM | SYSTOLIC BLOOD PRESSURE: 134 MMHG | TEMPERATURE: 97.9 F | OXYGEN SATURATION: 95 % | WEIGHT: 177 LBS | BODY MASS INDEX: 28.45 KG/M2 | HEIGHT: 66 IN

## 2022-09-29 DIAGNOSIS — N17.9 AKI (ACUTE KIDNEY INJURY) (HCC): Primary | ICD-10-CM

## 2022-09-29 LAB
ANION GAP SERPL CALC-SCNC: 7 MMOL/L (ref 5–15)
BASOPHILS # BLD: 0.1 K/UL (ref 0–0.1)
BASOPHILS NFR BLD: 1 % (ref 0–1)
BUN SERPL-MCNC: 32 MG/DL (ref 6–20)
BUN/CREAT SERPL: 5 (ref 12–20)
CALCIUM SERPL-MCNC: 8.8 MG/DL (ref 8.5–10.1)
CHLORIDE SERPL-SCNC: 104 MMOL/L (ref 97–108)
CO2 SERPL-SCNC: 31 MMOL/L (ref 21–32)
CREAT SERPL-MCNC: 6.3 MG/DL (ref 0.7–1.3)
DIFFERENTIAL METHOD BLD: ABNORMAL
EOSINOPHIL # BLD: 0.2 K/UL (ref 0–0.4)
EOSINOPHIL NFR BLD: 4 % (ref 0–7)
ERYTHROCYTE [DISTWIDTH] IN BLOOD BY AUTOMATED COUNT: 13.2 % (ref 11.5–14.5)
GLUCOSE BLD STRIP.AUTO-MCNC: 105 MG/DL (ref 65–117)
GLUCOSE BLD STRIP.AUTO-MCNC: 84 MG/DL (ref 65–117)
GLUCOSE SERPL-MCNC: 102 MG/DL (ref 65–100)
HCT VFR BLD AUTO: 26.1 % (ref 36.6–50.3)
HGB BLD-MCNC: 8.6 G/DL (ref 12.1–17)
IMM GRANULOCYTES # BLD AUTO: 0 K/UL (ref 0–0.04)
IMM GRANULOCYTES NFR BLD AUTO: 0 % (ref 0–0.5)
LYMPHOCYTES # BLD: 2.2 K/UL (ref 0.8–3.5)
LYMPHOCYTES NFR BLD: 34 % (ref 12–49)
MCH RBC QN AUTO: 31.5 PG (ref 26–34)
MCHC RBC AUTO-ENTMCNC: 33 G/DL (ref 30–36.5)
MCV RBC AUTO: 95.6 FL (ref 80–99)
MONOCYTES # BLD: 0.7 K/UL (ref 0–1)
MONOCYTES NFR BLD: 10 % (ref 5–13)
NEUTS SEG # BLD: 3.2 K/UL (ref 1.8–8)
NEUTS SEG NFR BLD: 51 % (ref 32–75)
NRBC # BLD: 0 K/UL (ref 0–0.01)
NRBC BLD-RTO: 0 PER 100 WBC
PLATELET # BLD AUTO: 361 K/UL (ref 150–400)
PMV BLD AUTO: 8.8 FL (ref 8.9–12.9)
POTASSIUM SERPL-SCNC: 3.1 MMOL/L (ref 3.5–5.1)
RBC # BLD AUTO: 2.73 M/UL (ref 4.1–5.7)
SERVICE CMNT-IMP: NORMAL
SERVICE CMNT-IMP: NORMAL
SODIUM SERPL-SCNC: 142 MMOL/L (ref 136–145)
WBC # BLD AUTO: 6.3 K/UL (ref 4.1–11.1)

## 2022-09-29 PROCEDURE — 74011250636 HC RX REV CODE- 250/636

## 2022-09-29 PROCEDURE — 74011250637 HC RX REV CODE- 250/637

## 2022-09-29 PROCEDURE — 77030008517 HC TBNG INSUF ENDO STOR -B

## 2022-09-29 PROCEDURE — 77030002933 HC SUT MCRYL J&J -A

## 2022-09-29 PROCEDURE — 80048 BASIC METABOLIC PNL TOTAL CA: CPT

## 2022-09-29 PROCEDURE — 77030002966 HC SUT PDS J&J -A

## 2022-09-29 PROCEDURE — 76060000033 HC ANESTHESIA 1 TO 1.5 HR

## 2022-09-29 PROCEDURE — 77030010507 HC ADH SKN DERMBND J&J -B

## 2022-09-29 PROCEDURE — 77030013079 HC BLNKT BAIR HGGR 3M -A: Performed by: ANESTHESIOLOGY

## 2022-09-29 PROCEDURE — 76210000021 HC REC RM PH II 0.5 TO 1 HR

## 2022-09-29 PROCEDURE — 36415 COLL VENOUS BLD VENIPUNCTURE: CPT

## 2022-09-29 PROCEDURE — 82962 GLUCOSE BLOOD TEST: CPT

## 2022-09-29 PROCEDURE — 74011000250 HC RX REV CODE- 250

## 2022-09-29 PROCEDURE — 77030008602 HC TRCR ENDOSC EPATH J&J -B

## 2022-09-29 PROCEDURE — 77030008684 HC TU ET CUF COVD -B: Performed by: ANESTHESIOLOGY

## 2022-09-29 PROCEDURE — 77030031139 HC SUT VCRL2 J&J -A

## 2022-09-29 PROCEDURE — 76010000149 HC OR TIME 1 TO 1.5 HR

## 2022-09-29 PROCEDURE — 77030008603 HC TRCR ENDOSC EPATH J&J -C

## 2022-09-29 PROCEDURE — 85025 COMPLETE CBC W/AUTO DIFF WBC: CPT

## 2022-09-29 PROCEDURE — 77030026438 HC STYL ET INTUB CARD -A: Performed by: ANESTHESIOLOGY

## 2022-09-29 PROCEDURE — 74011000258 HC RX REV CODE- 258

## 2022-09-29 PROCEDURE — 76210000006 HC OR PH I REC 0.5 TO 1 HR

## 2022-09-29 PROCEDURE — 2709999900 HC NON-CHARGEABLE SUPPLY

## 2022-09-29 PROCEDURE — 77030040922 HC BLNKT HYPOTHRM STRY -A

## 2022-09-29 PROCEDURE — 77030040361 HC SLV COMPR DVT MDII -B

## 2022-09-29 PROCEDURE — 77030004495 HC ADPT PERI DLYS BAXT -C

## 2022-09-29 PROCEDURE — C1750 CATH, HEMODIALYSIS,LONG-TERM: HCPCS

## 2022-09-29 RX ORDER — HYDROCODONE BITARTRATE AND ACETAMINOPHEN 5; 325 MG/1; MG/1
1 TABLET ORAL ONCE
Status: COMPLETED | OUTPATIENT
Start: 2022-09-29 | End: 2022-09-29

## 2022-09-29 RX ORDER — ONDANSETRON 2 MG/ML
4 INJECTION INTRAMUSCULAR; INTRAVENOUS AS NEEDED
Status: DISCONTINUED | OUTPATIENT
Start: 2022-09-29 | End: 2022-09-29 | Stop reason: HOSPADM

## 2022-09-29 RX ORDER — NEOSTIGMINE METHYLSULFATE 1 MG/ML
INJECTION, SOLUTION INTRAVENOUS AS NEEDED
Status: DISCONTINUED | OUTPATIENT
Start: 2022-09-29 | End: 2022-09-29 | Stop reason: HOSPADM

## 2022-09-29 RX ORDER — PHENYLEPHRINE HCL IN 0.9% NACL 0.4MG/10ML
SYRINGE (ML) INTRAVENOUS AS NEEDED
Status: DISCONTINUED | OUTPATIENT
Start: 2022-09-29 | End: 2022-09-29 | Stop reason: HOSPADM

## 2022-09-29 RX ORDER — HYDROMORPHONE HYDROCHLORIDE 1 MG/ML
0.5 INJECTION, SOLUTION INTRAMUSCULAR; INTRAVENOUS; SUBCUTANEOUS
Status: DISCONTINUED | OUTPATIENT
Start: 2022-09-29 | End: 2022-09-29 | Stop reason: HOSPADM

## 2022-09-29 RX ORDER — LIDOCAINE HYDROCHLORIDE 20 MG/ML
INJECTION, SOLUTION EPIDURAL; INFILTRATION; INTRACAUDAL; PERINEURAL AS NEEDED
Status: DISCONTINUED | OUTPATIENT
Start: 2022-09-29 | End: 2022-09-29 | Stop reason: HOSPADM

## 2022-09-29 RX ORDER — ROCURONIUM BROMIDE 10 MG/ML
INJECTION, SOLUTION INTRAVENOUS AS NEEDED
Status: DISCONTINUED | OUTPATIENT
Start: 2022-09-29 | End: 2022-09-29 | Stop reason: HOSPADM

## 2022-09-29 RX ORDER — GLYCOPYRROLATE 0.2 MG/ML
INJECTION INTRAMUSCULAR; INTRAVENOUS AS NEEDED
Status: DISCONTINUED | OUTPATIENT
Start: 2022-09-29 | End: 2022-09-29 | Stop reason: HOSPADM

## 2022-09-29 RX ORDER — FENTANYL CITRATE 50 UG/ML
INJECTION, SOLUTION INTRAMUSCULAR; INTRAVENOUS AS NEEDED
Status: DISCONTINUED | OUTPATIENT
Start: 2022-09-29 | End: 2022-09-29 | Stop reason: HOSPADM

## 2022-09-29 RX ORDER — HYDROCODONE BITARTRATE AND ACETAMINOPHEN 7.5; 325 MG/1; MG/1
1 TABLET ORAL
Qty: 20 TABLET | Refills: 0 | Status: SHIPPED | OUTPATIENT
Start: 2022-09-29 | End: 2022-10-02

## 2022-09-29 RX ORDER — DIPHENHYDRAMINE HYDROCHLORIDE 50 MG/ML
12.5 INJECTION, SOLUTION INTRAMUSCULAR; INTRAVENOUS AS NEEDED
Status: DISCONTINUED | OUTPATIENT
Start: 2022-09-29 | End: 2022-09-29 | Stop reason: HOSPADM

## 2022-09-29 RX ORDER — SODIUM CHLORIDE, SODIUM LACTATE, POTASSIUM CHLORIDE, CALCIUM CHLORIDE 600; 310; 30; 20 MG/100ML; MG/100ML; MG/100ML; MG/100ML
150 INJECTION, SOLUTION INTRAVENOUS CONTINUOUS
Status: DISCONTINUED | OUTPATIENT
Start: 2022-09-29 | End: 2022-09-29 | Stop reason: HOSPADM

## 2022-09-29 RX ORDER — ALBUTEROL SULFATE 0.83 MG/ML
2.5 SOLUTION RESPIRATORY (INHALATION) AS NEEDED
Status: DISCONTINUED | OUTPATIENT
Start: 2022-09-29 | End: 2022-09-29 | Stop reason: HOSPADM

## 2022-09-29 RX ORDER — SODIUM CHLORIDE 9 MG/ML
25 INJECTION, SOLUTION INTRAVENOUS CONTINUOUS
Status: DISCONTINUED | OUTPATIENT
Start: 2022-09-29 | End: 2022-09-29 | Stop reason: HOSPADM

## 2022-09-29 RX ORDER — SODIUM CHLORIDE, SODIUM LACTATE, POTASSIUM CHLORIDE, CALCIUM CHLORIDE 600; 310; 30; 20 MG/100ML; MG/100ML; MG/100ML; MG/100ML
125 INJECTION, SOLUTION INTRAVENOUS CONTINUOUS
Status: DISCONTINUED | OUTPATIENT
Start: 2022-09-29 | End: 2022-09-29 | Stop reason: HOSPADM

## 2022-09-29 RX ORDER — PROPOFOL 10 MG/ML
INJECTION, EMULSION INTRAVENOUS AS NEEDED
Status: DISCONTINUED | OUTPATIENT
Start: 2022-09-29 | End: 2022-09-29 | Stop reason: HOSPADM

## 2022-09-29 RX ORDER — LIDOCAINE HYDROCHLORIDE 10 MG/ML
0.1 INJECTION, SOLUTION EPIDURAL; INFILTRATION; INTRACAUDAL; PERINEURAL AS NEEDED
Status: DISCONTINUED | OUTPATIENT
Start: 2022-09-29 | End: 2022-09-29 | Stop reason: HOSPADM

## 2022-09-29 RX ORDER — SUCCINYLCHOLINE CHLORIDE 20 MG/ML
INJECTION INTRAMUSCULAR; INTRAVENOUS AS NEEDED
Status: DISCONTINUED | OUTPATIENT
Start: 2022-09-29 | End: 2022-09-29 | Stop reason: HOSPADM

## 2022-09-29 RX ADMIN — PROPOFOL 50 MG: 10 INJECTION, EMULSION INTRAVENOUS at 15:43

## 2022-09-29 RX ADMIN — CEFAZOLIN SODIUM 2 G: 1 POWDER, FOR SOLUTION INTRAMUSCULAR; INTRAVENOUS at 15:41

## 2022-09-29 RX ADMIN — HYDROCODONE BITARTRATE AND ACETAMINOPHEN 1 TABLET: 5; 325 TABLET ORAL at 17:36

## 2022-09-29 RX ADMIN — ROCURONIUM BROMIDE 5 MG: 10 INJECTION INTRAVENOUS at 15:35

## 2022-09-29 RX ADMIN — PROPOFOL 150 MG: 10 INJECTION, EMULSION INTRAVENOUS at 15:35

## 2022-09-29 RX ADMIN — FENTANYL CITRATE 50 MCG: 50 INJECTION, SOLUTION INTRAMUSCULAR; INTRAVENOUS at 16:33

## 2022-09-29 RX ADMIN — SODIUM CHLORIDE 25 ML/HR: 9 INJECTION, SOLUTION INTRAVENOUS at 14:14

## 2022-09-29 RX ADMIN — FENTANYL CITRATE 50 MCG: 50 INJECTION, SOLUTION INTRAMUSCULAR; INTRAVENOUS at 15:52

## 2022-09-29 RX ADMIN — Medication 80 MCG: at 15:46

## 2022-09-29 RX ADMIN — Medication 120 MCG: at 16:04

## 2022-09-29 RX ADMIN — SODIUM CHLORIDE: 9 INJECTION, SOLUTION INTRAVENOUS at 15:22

## 2022-09-29 RX ADMIN — ROCURONIUM BROMIDE 10 MG: 10 INJECTION INTRAVENOUS at 15:50

## 2022-09-29 RX ADMIN — NEOSTIGMINE METHYLSULFATE 3 MG: 1 INJECTION, SOLUTION INTRAVENOUS at 16:24

## 2022-09-29 RX ADMIN — PHENYLEPHRINE HYDROCHLORIDE 40 MCG/MIN: 10 INJECTION INTRAVENOUS at 15:45

## 2022-09-29 RX ADMIN — SUCCINYLCHOLINE CHLORIDE 100 MG: 20 INJECTION, SOLUTION INTRAMUSCULAR; INTRAVENOUS at 15:35

## 2022-09-29 RX ADMIN — ROCURONIUM BROMIDE 10 MG: 10 INJECTION INTRAVENOUS at 15:55

## 2022-09-29 RX ADMIN — GLYCOPYRROLATE 0.6 MG: 0.2 INJECTION INTRAMUSCULAR; INTRAVENOUS at 16:24

## 2022-09-29 RX ADMIN — ROCURONIUM BROMIDE 15 MG: 10 INJECTION INTRAVENOUS at 15:44

## 2022-09-29 RX ADMIN — LIDOCAINE HYDROCHLORIDE 80 MG: 20 INJECTION, SOLUTION EPIDURAL; INFILTRATION; INTRACAUDAL; PERINEURAL at 15:35

## 2022-09-29 NOTE — BRIEF OP NOTE
Brief Postoperative Note    Patient: Alise Morillo. YOB: 1960  MRN: [id number]    Date of Procedure: 9/29/2022     Pre-Op Diagnosis: END STAGE RENAL DISEASE    Post-Op Diagnosis: Same as preoperative diagnosis. Procedure(s):  PERITONEAL CATHETER LAPAROSCOPIC REPOSITIONING    Surgeon(s):   Daniele Crawford MD    Surgical Assistant: Surg Asst-1: Sandy Rojas    Anesthesia: General     Estimated Blood Loss (mL): Minimal    Complications: None    Specimens: * No specimens in log *     Implants: * No implants in log *    Drains: * No LDAs found *    Findings: 0    Electronically Signed by Mervin Fletcher MD on 9/29/2022 at 4:20 PM

## 2022-09-29 NOTE — H&P
Vascular Surgery History & Physical    Subjective:     Pascual Lai is a 57 y.o.  male with ESRD and a malfunctioning PD catheter     Past Medical History:   Diagnosis Date    Hypercholesteremia     Hypertension     Melanoma ([address]     skin    Thyroid activity decreased       Past Surgical History:   Procedure Laterality Date    HX ORTHOPAEDIC      IR INSERT NON TUNL CVC OVER 5 YRS  9/6/2022     History reviewed. No pertinent family history. Social History     Tobacco Use    Smoking status: Never    Smokeless tobacco: Never   Substance Use Topics    Alcohol use: Not Currently       Prior to Admission medications    Medication Sig Start Date End Date Taking? Authorizing Provider   acetaminophen (TYLENOL) 325 mg tablet Take 650 mg by mouth every six (6) hours as needed for Pain. Provider, Historical   carvediloL (COREG) 6.25 mg tablet Take 1 Tablet by mouth two (2) times daily (with meals). 9/23/22   , Corrinne Sierras B, MD   cyanocobalamin 1,000 mcg tablet Take 1 Tablet by mouth daily. 9/23/22   , Asia Okeefe MD   folic acid (FOLVITE) 1 mg tablet Take 1 Tablet by mouth daily. 9/23/22   , Asia Okeefe MD   insulin glargine (LANTUS,BASAGLAR) 100 unit/mL (3 mL) inpn 5 Units by SubCUTAneous route daily. Indications: type 2 diabetes mellitus 9/23/22   Asia Jackson MD   OTHER,NON-FORMULARY, Insulin pen needles. Use as directed 9/23/22   Yehuda Jackson MD   pravastatin (PRAVACHOL) 10 mg tablet Take 10 mg by mouth daily. Provider, Historical   levothyroxine (SYNTHROID) 100 mcg tablet Take 100 mcg by mouth Daily (before breakfast). Provider, Historical     Allergies   Allergen Reactions    Enalapril Maleate Other (comments)     Acute renal failure when combined with dehydration    Niacin Other (comments)        Review of Systems   All other systems reviewed and are negative.     Objective:     Patient Vitals for the past 24 hrs:   BP Temp Pulse SpO2 Height Weight   09/29/22 1331 (!) 162/99 98.1 °F (36.7 °C) 85 99 % 5' 6\" (1.676 m) 80.3 kg (177 lb)       Physical Exam  Cardiovascular:      Rate and Rhythm: Normal rate and regular rhythm. Pulmonary:      Breath sounds: Normal breath sounds. Data Review:   Recent Results (from the past 24 hour(s))   GLUCOSE, POC    Collection Time: 09/29/22  1:44 PM   Result Value Ref Range    Glucose (POC) 105 65 - 117 mg/dL    Performed by Kary Severance        Assessment/Plan: To OR for laparoscopic repositioning.      Signed By: Ayala Douglas MD     September 29, 2022

## 2022-09-29 NOTE — ANESTHESIA POSTPROCEDURE EVALUATION
Procedure(s):  PERITONEAL CATHETER LAPAROSCOPIC REPOSITIONING. general    Anesthesia Post Evaluation        Patient location during evaluation: bedside  Level of consciousness: awake  Pain management: satisfactory to patient  Airway patency: patent  Anesthetic complications: no  Cardiovascular status: acceptable  Respiratory status: acceptable  Hydration status: acceptable        INITIAL Post-op Vital signs:   Vitals Value Taken Time   /81 09/29/22 1740   Temp 36.4 °C (97.6 °F) 09/29/22 1641   Pulse 71 09/29/22 1741   Resp 14 09/29/22 1741   SpO2 94 % 09/29/22 1741   Vitals shown include unvalidated device data.

## 2022-09-29 NOTE — DISCHARGE INSTRUCTIONS
Patient Discharge Instructions    Baron Christianson / [id number] : 1960    Admitted 2022 Discharged: 2022     Take Home Medications     Current Discharge Medication List        START taking these medications    Details   HYDROcodone-acetaminophen (NORCO) 7.5-325 mg per tablet Take 1 Tablet by mouth every six (6) hours as needed for Pain for up to 3 days. Max Daily Amount: 4 Tablets. Qty: 20 Tablet, Refills: 0    Associated Diagnoses: LAVINIA (acute kidney injury) ([facility] [address]           CONTINUE these medications which have NOT CHANGED    Details   carvediloL (COREG) 6.25 mg tablet Take 1 Tablet by mouth two (2) times daily (with meals). Qty: 60 Tablet, Refills: 0      cyanocobalamin 1,000 mcg tablet Take 1 Tablet by mouth daily. Qty: 30 Tablet, Refills: 0      folic acid (FOLVITE) 1 mg tablet Take 1 Tablet by mouth daily. Qty: 30 Tablet, Refills: 0      insulin glargine (LANTUS,BASAGLAR) 100 unit/mL (3 mL) inpn 5 Units by SubCUTAneous route daily. Indications: type 2 diabetes mellitus  Qty: 1 Pen, Refills: 1      pravastatin (PRAVACHOL) 10 mg tablet Take 10 mg by mouth daily. acetaminophen (TYLENOL) 325 mg tablet Take 650 mg by mouth every six (6) hours as needed for Pain. OTHER,NON-FORMULARY, Insulin pen needles. Use as directed  Qty: 100 Each, Refills: 0      levothyroxine (SYNTHROID) 100 mcg tablet Take 100 mcg by mouth Daily (before breakfast). It is important that you take the medication exactly as they are prescribed. Keep your medication in the bottles provided by the pharmacist and keep a list of the medication names, dosages, and times to be taken in your wallet. Do not take other medications without consulting your doctor.        What to do at Home    Recommended diet: Regular Diet,     Recommended activity: Activity as tolerated,      Follow-up with Dr Sb Nguyen in 2 weeks        Information obtained by :  I understand that if any problems occur once I am at home I am to contact my physician. I understand and acknowledge receipt of the instructions indicated above. Physician's or R.N.'s Signature                                                                  Date/Time                                                                                                                                              Patient or Representative Signature                                                          Date/Time      . DISCHARGE SUMMARY from your Nurse      PATIENT INSTRUCTIONS    After general anesthesia or intravenous sedation, for 24 hours or while taking prescription Narcotics:  Limit your activities  Do not drive and operate hazardous machinery  Do not make important personal or business decisions  Do  not drink alcoholic beverages  If you have not urinated within 8 hours after discharge, please contact your surgeon on call. Report the following to your surgeon:  Excessive pain, swelling, redness or odor of or around the surgical area  Temperature over 100.5  Nausea and vomiting lasting longer than 4 hours or if unable to take medications  Any signs of decreased circulation or nerve impairment to extremity: change in color, persistent  numbness, tingling, coldness or increase pain  Any questions      GOOD HELP TO FIGHT AN INFECTION  Here are a few tip to help reduce the chance of getting an infection after surgery:  Wash Your Hands  Good handwashing is the most important thing you and your caregiver can do. Wash before and after caring for any wounds. Dry your hand with a clean towel. Wash with soap and water for at least 20 seconds. A TIP: sing the \"Happy Birthday\" song through one time while washing to help with the timing. Use a hand  in between washings.     Shower  When your surgeon says it is OK to take a shower, use a new bar of antibacterial soap (if that is what you use, and keep that bar of soap ONLY for your use), or antibacterial body wash. Use a clean wash cloth or sponge when you bathe. Dry off with a clean towel  after every bath - be careful around any wounds, skin staples, sutures or surgical glue over/on wounds. Do not enter swimming pools, hot tubs, lakes, rivers and/or ocean until wounds are healed and your doctor/surgeon says it is OK. Use Clean Sheets  Sleep on freshly laundered sheets after your surgery. Keep the surgery site covered with a clean, dry bandage (if instructed to do so). If the bandage becomes soiled, reapply a new, dry, clean bandage. Do not allow pets to sleep with you while your wound is healing. Lifestyle Modification and Controlling Your Blood Sugar  Smoking slows wound healing. Stop smoking and limit exposure to second-hand smoke. High blood sugar slows wound healing. Eat a well-balanced diet to provide proper nutrition while healing  Monitor your blood sugar (if you are a diabetic) and take your medications as you are suppose to so you can control you blood sugar after surgery. COUGH AND DEEP BREATHE    Breathing deeply and coughing are very important exercises to do after surgery. Deep breathing and coughing open the little air tubes and air sacks in your lungs. You take deep breaths every day. You may not even notice - it is just something you do when you sigh or yawn. It is a natural exercise you do to keep these air passages open. After surgery, take deep breaths and cough, on purpose. DIRECTIONS:  Take 10 to 15 slow deep breaths every hour while awake. Breathe in deeply, and hold it for 2 seconds. Exhale slowly through puckered lips, like blowing up a balloon. After every 4th or 5th deep breath, hug your pillow to your chest or belly and give a hard, deep cough. Yes, it will probably hurt.   But doing this exercise is a very important part of healing after surgery. Take your pain medicine to help you do this exercise without too much pain. Coughing and deep breathing help prevent bronchitis and pneumonia after surgery. If you had chest or belly surgery, use a pillow as a \"hug bill\" and hold it tightly to your chest or belly when you cough. ANKLE PUMPS    Ankle pumps increase the circulation of oxygenated blood to your lower extremities and decrease your risk for circulation problems such as blood clots. They also stretch the muscles, tendons and ligaments in your foot and ankle, and prevent joint contracture in the ankle and foot, especially after surgeries on the legs. It is important to do ankle pump exercises regularly after surgery because immobility increases your risk for developing a blood clot. Your doctor may also have you take an Aspirin for the next few days as well. If your doctor did not ask you to take an Aspirin, consult with him before starting Aspirin therapy on your own. The exercise is quite simple. Slowly point your foot forward, feeling the muscles on the top of your lower leg stretch, and hold this position for 5 seconds. Next, pull your foot back toward you as far as possible, stretching the calf muscles, and hold that position for 5 seconds. Repeat with the other foot. Perform 10 repetitions every hour while awake for both ankles if possible (down and then up with the foot once is one repetition). You should feel gentle stretching of the muscles in your lower leg when doing this exercise. If you feel pain, or your range of motion is limited, don't push too hard. Only go the limit your joint and muscles will let you go. If you have increasing pain, progressively worsening leg warmth or swelling, STOP the exercise and call your doctor.            MEDICATION AND   SIDE EFFECT GUIDE    The [facility] MEDICATION AND SIDE EFFECT GUIDE was provided to the PATIENT AND CARE PROVIDER. Information provided includes instruction about drug purpose and common side effects for the following medications:   Hydrocodone        These are general instructions for a healthy lifestyle:    *   Please give a list of your current medications to your Primary Care Provider. *   Please update this list whenever your medications are discontinued, doses are changed, or new medications (including over-the-counter products) are added. *   Please carry medication information at all times in case of emergency situations. About Smoking  No smoking / No tobacco products  Avoid exposure to second hand smoke     Surgeon General's Warning:  Quitting smoking now greatly reduces serious risk to your health. Obesity, smoking, and sedentary lifestyle greatly increases your risk for illness and disease. A healthy diet, regular physical exercise & weight monitoring are important for maintaining a healthy lifestyle. Congestive Heart Failure  You may be retaining fluid if you have a history of heart failure or if you experience any of the following symptoms:  Weight gain of 3 pounds or more overnight or 5 pounds in a week, increased swelling in your hands or feet or shortness of breath while lying flat in bed. Please call your doctor as soon as you notice any of these symptoms; do not wait until your next office visit. Recognize signs and symptoms of STROKE:  F -  Face looks uneven  A -  Arms unable to move or move evenly  S -  Speech slurred or non-existent  T -  Time-call 911 as soon as signs and symptoms begin-DO NOT go          back to bed or wait to see if you get better-TIME IS BRAIN. Warning Signs of HEART ATTACK   Call 911 if you have these symptoms:    Chest discomfort. Most heart attacks involve discomfort in the center of the chest that lasts more than a few minutes, or that goes away and comes back.  It can feel like uncomfortable pressure, squeezing, fullness, or pain.  Discomfort in other areas of the upper body. Symptoms can include pain or discomfort in one or both arms, the back, neck, jaw, or stomach. Shortness of breath with or without chest discomfort. Other signs may include breaking out in a cold sweat, nausea, or lightheadedness. Don't wait more than five minutes to call 911 - MINUTES MATTER! Fast action can save your life. Calling 911 is almost always the fastest way to get lifesaving treatment. Emergency Medical Services staff can begin treatment when they arrive -- up to an hour sooner than if someone gets to the hospital by car. Learning About Coronavirus [phone])  Coronavirus [phone]): Overview  What is coronavirus (COVID-19)? The coronavirus disease (COVID-19) is caused by a virus. It is an illness that was first found in [place], [place], in December 2019. It has since spread worldwide. The virus can cause fever, cough, and trouble breathing. In severe cases, it can cause pneumonia and make it hard to breathe without help. It can cause death. Coronaviruses are a large group of viruses. They cause the common cold. They also cause more serious illnesses like Middle East respiratory syndrome (MERS) and severe acute respiratory syndrome (SARS). COVID-19 is caused by a novel coronavirus. That means it's a new type that has not been seen in people before. This virus spreads person-to-person through droplets from coughing and sneezing. It can also spread when you are close to someone who is infected. And it can spread when you touch something that has the virus on it, such as a doorknob or a tabletop. What can you do to protect yourself from coronavirus (COVID-19)? The best way to protect yourself from getting sick is to: Avoid areas where there is an outbreak. Avoid contact with people who may be infected. Wash your hands often with soap or alcohol-based hand sanitizers.   Avoid crowds and try to stay at least 6 feet away from other people. Wash your hands often, especially after you cough or sneeze. Use soap and water, and scrub for at least 20 seconds. If soap and water aren't available, use an alcohol-based hand . Avoid touching your mouth, nose, and eyes. What can you do to avoid spreading the virus to others? To help avoid spreading the virus to others:  Cover your mouth with a tissue when you cough or sneeze. Then throw the tissue in the trash. Use a disinfectant to clean things that you touch often. Stay home if you are sick or have been exposed to the virus. Don't go to school, work, or public areas. And don't use public transportation. If you are sick:  Leave your home only if you need to get medical care. But call the doctor's office first so they know you're coming. And wear a face mask, if you have one. If you have a face mask, wear it whenever you're around other people. It can help stop the spread of the virus when you cough or sneeze. Clean and disinfect your home every day. Use household  and disinfectant wipes or sprays. Take special care to clean things that you grab with your hands. These include doorknobs, remote controls, phones, and handles on your refrigerator and microwave. And don't forget countertops, tabletops, bathrooms, and computer keyboards. When to call for help  Call 911 anytime you think you may need emergency care. For example, call if:  You have severe trouble breathing. (You can't talk at all.)  You have constant chest pain or pressure. You are severely dizzy or lightheaded. You are confused or can't think clearly. Your face and lips have a blue color. You pass out (lose consciousness) or are very hard to wake up. Call your doctor now if you develop symptoms such as:  Shortness of breath. Fever. Cough. If you need to get care, call ahead to the doctor's office for instructions before you go.  Make sure you wear a face mask, if you have one, to prevent exposing other people to the virus. Where can you get the latest information? The following health organizations are tracking and studying this virus. Their websites contain the most up-to-date information. Connor Truongeileen also learn what to do if you think you may have been exposed to the virus. U.S. Centers for Disease Control and Prevention (CDC): The CDC provides updated news about the disease and travel advice. The website also tells you how to prevent the spread of infection. www.cdc.gov  World Health Organization [facility]): WHO offers information about the virus outbreaks. WHO also has travel advice. www.who.int  Current as of: April 1, 2020               Content Version: 12.4  © 6186-3890 [employer], Incorporated. Care instructions adapted under license by your healthcare professional. If you have questions about a medical condition or this instruction, always ask your healthcare professional. [address] any warranty or liability for your use of this information. The discharge information has been reviewed with the Son. Any questions and concerns from the Son have been addressed. The [employer] verbalized understanding.         CONTENTS FOUND IN YOUR DISCHARGE ENVELOPE:  [x]     Surgeon and Hospital Discharge Instructions  [x]     [facility] Care Provider Card  [x]     Medication & Side Effect Guide            (your newly prescribed medications have been marked/highlighted showing the most common side effects from   the classes of drugs on your prescriptions)  [x]     Medication Prescription(s) x Hydrocodone ( [x] These have been sent electronically to your pharmacy by your surgeon,   - OR -       your surgeon has already provided these to you during a previous/pre-op office visit)  []     [address]  []     Physical Therapy Prescription  []     Follow-up Appointment Cards  []     Surgery-related Pictures/Media  []     Pain block and/or block with On-Q Catheter from Anesthesia Service (information included in your instructions above)  []     Medical device information sheets/pamphlets from their    []     School/work excuse note. []     /parent work excuse note. The following personal items collected during your admission are returned to you:   Dental Appliance: Dental Appliances: Uppers, Partials (PACU)  Vision: Visual Aid: None  Hearing Aid:    Jewelry: Jewelry: None  Clothing: Clothing: Pants, Shirt, Footwear, Socks (PACU locker)  Other Valuables:  Other Valuables: None  Valuables sent to safe: Personal Items Sent to Safe: NA

## 2022-09-29 NOTE — PERIOP NOTES
Pts son updated on post op status and plan for d/c.  States pts sister is here and will be his ride home

## 2022-09-29 NOTE — ANESTHESIA PREPROCEDURE EVALUATION
Relevant Problems   NEUROLOGY   (+) Delirium      CARDIOVASCULAR   (+) HTN (hypertension), benign   (+) PAF (paroxysmal atrial fibrillation) (HCC)      RENAL FAILURE   (+) LAVINIA (acute kidney injury) ([facility] [address]      ENDOCRINE   (+) DM (diabetes mellitus), type 2 (HCC)      PERSONAL HX & FAMILY HX OF CANCER   (+) Melanoma (HCC)       Anesthetic History   No history of anesthetic complications            Review of Systems / Medical History  Patient summary reviewed and pertinent labs reviewed    Pulmonary    COPD               Neuro/Psych   Within defined limits           Cardiovascular    Hypertension        Dysrhythmias            GI/Hepatic/Renal         Renal disease: ARF       Endo/Other    Diabetes  Hypothyroidism       Other Findings              Physical Exam    Airway  Mallampati: II  TM Distance: 4 - 6 cm  Neck ROM: normal range of motion   Mouth opening: Normal     Cardiovascular    Rhythm: regular  Rate: normal         Dental    Dentition: Poor dentition     Pulmonary                 Abdominal         Other Findings            Anesthetic Plan    ASA: 3  Anesthesia type: general          Induction: Intravenous  Anesthetic plan and risks discussed with: Patient

## 2022-09-30 NOTE — OP NOTES
[facility]  OPERATIVE REPORT    Name:  Satish Perkins  MR#:  [id number]  :  1960  ACCOUNT #:  [de-identified]  DATE OF SERVICE:  2022    PREOPERATIVE DIAGNOSIS:  End-stage renal disease. POSTOPERATIVE DIAGNOSIS:  End-stage renal disease. PROCEDURE PERFORMED:  Laparoscopic repositioning of peritoneal dialysis catheter. SURGEON:  Lorena Salcedo MD    ASSISTANT:  None. ANESTHESIA:  General tracheal anesthesia. COMPLICATIONS:  None. SPECIMENS REMOVED:  None. IMPLANTS:  None. ESTIMATED BLOOD LOSS:  Minimal.    INDICATION FOR PROCEDURE:  The patient is a middle-aged male with end-stage renal disease receiving peritoneal dialysis. He had a catheter placed last week which is not functioning well. Decision was made to take him to the operating room for laparoscopic exploration and repositioning. PROCEDURE IN DETAIL:  The patient was taken to the operating room. Once suitable level of anesthesia was induced, he was prepped and draped in sterile fashion. Incision was reopened and the exterior portion of the catheter removed. This was redirected superior and inferiorly so the external portion lie with more downward directing situation. The rectus sheath was closed with running Prolene suture. Next, a 5-mm trocar was inserted under direct guidance and a second 5-mm trocar placed using a grasper. The catheter was directed down into the right lower pelvis. Catheter was flushed without difficulty. There was no significant intraperitoneal adhesions noted. The catheter both irrigated and drained easily with gravity. Wounds were closed in multiple layers. He tolerated the procedure well. Sponge and instrument counts correct x2. He was awakened and transferred to the recovery room in good condition.         Devon Zavala MD      [id number]  D:  2022 9:56  T:  2022 10:59  JOB #:  [id number]

## 2022-10-04 ENCOUNTER — HOME CARE VISIT (OUTPATIENT)
Dept: SCHEDULING | Facility: HOME HEALTH | Age: 62
End: 2022-10-04
Payer: MEDICAID

## 2022-10-04 PROCEDURE — G0151 HHCP-SERV OF PT,EA 15 MIN: HCPCS

## 2022-10-06 ENCOUNTER — HOME CARE VISIT (OUTPATIENT)
Dept: SCHEDULING | Facility: HOME HEALTH | Age: 62
End: 2022-10-06
Payer: MEDICAID

## 2022-10-06 VITALS
OXYGEN SATURATION: 99 % | HEART RATE: 76 BPM | SYSTOLIC BLOOD PRESSURE: 164 MMHG | DIASTOLIC BLOOD PRESSURE: 62 MMHG | RESPIRATION RATE: 16 BRPM | TEMPERATURE: 97.8 F

## 2022-10-06 VITALS
RESPIRATION RATE: 17 BRPM | TEMPERATURE: 98.5 F | DIASTOLIC BLOOD PRESSURE: 70 MMHG | HEART RATE: 79 BPM | OXYGEN SATURATION: 96 % | SYSTOLIC BLOOD PRESSURE: 130 MMHG

## 2022-10-06 VITALS
DIASTOLIC BLOOD PRESSURE: 88 MMHG | RESPIRATION RATE: 14 BRPM | TEMPERATURE: 98 F | SYSTOLIC BLOOD PRESSURE: 138 MMHG | OXYGEN SATURATION: 96 % | HEART RATE: 81 BPM

## 2022-10-06 PROCEDURE — G0152 HHCP-SERV OF OT,EA 15 MIN: HCPCS

## 2022-10-06 PROCEDURE — G0300 HHS/HOSPICE OF LPN EA 15 MIN: HCPCS

## 2022-10-08 ENCOUNTER — ANESTHESIA EVENT (OUTPATIENT)
Dept: SURGERY | Age: 62
End: 2022-10-08
Payer: MEDICAID

## 2022-10-10 ENCOUNTER — HOSPITAL ENCOUNTER (OUTPATIENT)
Age: 62
Setting detail: OUTPATIENT SURGERY
Discharge: HOME | End: 2022-10-10
Payer: MEDICAID

## 2022-10-10 ENCOUNTER — ANESTHESIA (OUTPATIENT)
Dept: SURGERY | Age: 62
End: 2022-10-10
Payer: MEDICAID

## 2022-10-10 VITALS
OXYGEN SATURATION: 99 % | HEART RATE: 76 BPM | TEMPERATURE: 97.6 F | HEIGHT: 66 IN | RESPIRATION RATE: 16 BRPM | SYSTOLIC BLOOD PRESSURE: 173 MMHG | BODY MASS INDEX: 24.11 KG/M2 | DIASTOLIC BLOOD PRESSURE: 99 MMHG | WEIGHT: 150 LBS

## 2022-10-10 DIAGNOSIS — N17.9 AKI (ACUTE KIDNEY INJURY) (HCC): Primary | ICD-10-CM

## 2022-10-10 LAB
GLUCOSE BLD STRIP.AUTO-MCNC: 97 MG/DL (ref 65–117)
SERVICE CMNT-IMP: NORMAL

## 2022-10-10 PROCEDURE — 74011250636 HC RX REV CODE- 250/636: Performed by: NURSE ANESTHETIST, CERTIFIED REGISTERED

## 2022-10-10 PROCEDURE — 76210000006 HC OR PH I REC 0.5 TO 1 HR

## 2022-10-10 PROCEDURE — 77030040361 HC SLV COMPR DVT MDII -B

## 2022-10-10 PROCEDURE — 74011000250 HC RX REV CODE- 250

## 2022-10-10 PROCEDURE — 76010000138 HC OR TIME 0.5 TO 1 HR

## 2022-10-10 PROCEDURE — 2709999900 HC NON-CHARGEABLE SUPPLY

## 2022-10-10 PROCEDURE — 82962 GLUCOSE BLOOD TEST: CPT

## 2022-10-10 PROCEDURE — 77030031139 HC SUT VCRL2 J&J -A

## 2022-10-10 PROCEDURE — 76060000032 HC ANESTHESIA 0.5 TO 1 HR

## 2022-10-10 PROCEDURE — 76210000020 HC REC RM PH II FIRST 0.5 HR

## 2022-10-10 PROCEDURE — 77030040922 HC BLNKT HYPOTHRM STRY -A

## 2022-10-10 PROCEDURE — 77030010507 HC ADH SKN DERMBND J&J -B

## 2022-10-10 PROCEDURE — 74011250636 HC RX REV CODE- 250/636

## 2022-10-10 PROCEDURE — 77030002933 HC SUT MCRYL J&J -A

## 2022-10-10 RX ORDER — ONDANSETRON 2 MG/ML
4 INJECTION INTRAMUSCULAR; INTRAVENOUS AS NEEDED
Status: DISCONTINUED | OUTPATIENT
Start: 2022-10-10 | End: 2022-10-10 | Stop reason: HOSPADM

## 2022-10-10 RX ORDER — DIPHENHYDRAMINE HYDROCHLORIDE 50 MG/ML
12.5 INJECTION, SOLUTION INTRAMUSCULAR; INTRAVENOUS AS NEEDED
Status: DISCONTINUED | OUTPATIENT
Start: 2022-10-10 | End: 2022-10-10 | Stop reason: HOSPADM

## 2022-10-10 RX ORDER — LIDOCAINE HYDROCHLORIDE 10 MG/ML
INJECTION INFILTRATION; PERINEURAL AS NEEDED
Status: DISCONTINUED | OUTPATIENT
Start: 2022-10-10 | End: 2022-10-10 | Stop reason: HOSPADM

## 2022-10-10 RX ORDER — HYDROMORPHONE HYDROCHLORIDE 1 MG/ML
0.5 INJECTION, SOLUTION INTRAMUSCULAR; INTRAVENOUS; SUBCUTANEOUS
Status: DISCONTINUED | OUTPATIENT
Start: 2022-10-10 | End: 2022-10-10 | Stop reason: HOSPADM

## 2022-10-10 RX ORDER — LIDOCAINE HYDROCHLORIDE 10 MG/ML
0.1 INJECTION, SOLUTION EPIDURAL; INFILTRATION; INTRACAUDAL; PERINEURAL AS NEEDED
Status: DISCONTINUED | OUTPATIENT
Start: 2022-10-10 | End: 2022-10-10 | Stop reason: HOSPADM

## 2022-10-10 RX ORDER — MIDAZOLAM HYDROCHLORIDE 1 MG/ML
INJECTION, SOLUTION INTRAMUSCULAR; INTRAVENOUS AS NEEDED
Status: DISCONTINUED | OUTPATIENT
Start: 2022-10-10 | End: 2022-10-10 | Stop reason: HOSPADM

## 2022-10-10 RX ORDER — LEVOTHYROXINE SODIUM 100 UG/1
100 TABLET ORAL
COMMUNITY

## 2022-10-10 RX ORDER — PROPOFOL 10 MG/ML
INJECTION, EMULSION INTRAVENOUS
Status: DISCONTINUED | OUTPATIENT
Start: 2022-10-10 | End: 2022-10-10 | Stop reason: HOSPADM

## 2022-10-10 RX ORDER — ALBUTEROL SULFATE 0.83 MG/ML
2.5 SOLUTION RESPIRATORY (INHALATION) AS NEEDED
Status: DISCONTINUED | OUTPATIENT
Start: 2022-10-10 | End: 2022-10-10 | Stop reason: HOSPADM

## 2022-10-10 RX ORDER — SODIUM CHLORIDE 9 MG/ML
50 INJECTION, SOLUTION INTRAVENOUS CONTINUOUS
Status: DISCONTINUED | OUTPATIENT
Start: 2022-10-10 | End: 2022-10-10 | Stop reason: HOSPADM

## 2022-10-10 RX ORDER — FENTANYL CITRATE 50 UG/ML
INJECTION, SOLUTION INTRAMUSCULAR; INTRAVENOUS AS NEEDED
Status: DISCONTINUED | OUTPATIENT
Start: 2022-10-10 | End: 2022-10-10 | Stop reason: HOSPADM

## 2022-10-10 RX ORDER — HYDROCODONE BITARTRATE AND ACETAMINOPHEN 5; 325 MG/1; MG/1
1 TABLET ORAL
Qty: 20 TABLET | Refills: 0 | Status: SHIPPED | OUTPATIENT
Start: 2022-10-10 | End: 2022-10-13

## 2022-10-10 RX ADMIN — MIDAZOLAM HYDROCHLORIDE 2 MG: 1 INJECTION, SOLUTION INTRAMUSCULAR; INTRAVENOUS at 15:24

## 2022-10-10 RX ADMIN — PROPOFOL 50 MCG/KG/MIN: 10 INJECTION, EMULSION INTRAVENOUS at 15:31

## 2022-10-10 RX ADMIN — FENTANYL CITRATE 50 MCG: 50 INJECTION, SOLUTION INTRAMUSCULAR; INTRAVENOUS at 15:32

## 2022-10-10 RX ADMIN — CEFAZOLIN SODIUM 2 G: 1 POWDER, FOR SOLUTION INTRAMUSCULAR; INTRAVENOUS at 15:36

## 2022-10-10 RX ADMIN — FENTANYL CITRATE 50 MCG: 50 INJECTION, SOLUTION INTRAMUSCULAR; INTRAVENOUS at 15:24

## 2022-10-10 NOTE — ANESTHESIA PREPROCEDURE EVALUATION
Relevant Problems   NEUROLOGY   (+) Delirium      CARDIOVASCULAR   (+) HTN (hypertension), benign   (+) PAF (paroxysmal atrial fibrillation) (HCC)      RENAL FAILURE   (+) LAVINIA (acute kidney injury) ([facility] [address]      ENDOCRINE   (+) DM (diabetes mellitus), type 2 (HCC)      PERSONAL HX & FAMILY HX OF CANCER   (+) Melanoma (HCC)       Anesthetic History   No history of anesthetic complications            Review of Systems / Medical History  Patient summary reviewed and pertinent labs reviewed    Pulmonary    COPD               Neuro/Psych   Within defined limits           Cardiovascular    Hypertension        Dysrhythmias : atrial fibrillation        Comments: Episode of afib during acute illness in Sept   GI/Hepatic/Renal         Renal disease: ARF      Comments: Hospital admission in September for shock and renal failure required temporary dialysis. Has non-functioning peritoneal dialysis catheter that needs to be removed  Cr trending down from approx 6>4>2.  Urinating Endo/Other    Diabetes  Hypothyroidism  Anemia     Other Findings            Physical Exam    Airway  Mallampati: II  TM Distance: 4 - 6 cm  Neck ROM: normal range of motion   Mouth opening: Normal     Cardiovascular    Rhythm: regular  Rate: normal         Dental    Dentition: Poor dentition     Pulmonary  Breath sounds clear to auscultation               Abdominal  GI exam deferred       Other Findings            Anesthetic Plan    ASA: 3  Anesthesia type: MAC          Induction: Intravenous  Anesthetic plan and risks discussed with: Patient

## 2022-10-10 NOTE — ANESTHESIA POSTPROCEDURE EVALUATION
Procedure(s):  REMOVAL OF PERITONEAL DIALYSIS CATHETER. MAC    Anesthesia Post Evaluation      Multimodal analgesia: multimodal analgesia not used between 6 hours prior to anesthesia start to PACU discharge  Patient location during evaluation: PACU  Patient participation: complete - patient participated  Level of consciousness: awake  Pain management: adequate  Airway patency: patent  Anesthetic complications: no  Cardiovascular status: acceptable, blood pressure returned to baseline and hemodynamically stable  Respiratory status: acceptable  Hydration status: acceptable  Post anesthesia nausea and vomiting:  controlled  Final Post Anesthesia Temperature Assessment:  Normothermia (36.0-37.5 degrees C)      INITIAL Post-op Vital signs:   Vitals Value Taken Time   /96 10/10/22 1635   Temp 36.4 °C (97.6 °F) 10/10/22 1614   Pulse 75 10/10/22 1640   Resp 20 10/10/22 1640   SpO2 98 % 10/10/22 1640   Vitals shown include unvalidated device data.

## 2022-10-10 NOTE — H&P
Vascular Surgery History & Physical    Subjective:     Dmitry Ballesteros is a 57 y.o.  male with a PD cath no longer needed     Past Medical History:   Diagnosis Date    Hypercholesteremia     Hypertension     Melanoma ([address]     skin    Thyroid activity decreased       Past Surgical History:   Procedure Laterality Date    HX ORTHOPAEDIC      IR INSERT NON TUNL CVC OVER 5 YRS  9/6/2022     History reviewed. No pertinent family history. Social History     Tobacco Use    Smoking status: Never    Smokeless tobacco: Never   Substance Use Topics    Alcohol use: Not Currently       Prior to Admission medications    Medication Sig Start Date End Date Taking? Authorizing Provider   levothyroxine (Euthyrox) 100 mcg tablet Take 100 mcg by mouth Daily (before breakfast). Yes Provider, Historical   acetaminophen (TYLENOL) 325 mg tablet Take 650 mg by mouth every six (6) hours as needed for Pain. Yes Provider, Historical   carvediloL (COREG) 6.25 mg tablet Take 1 Tablet by mouth two (2) times daily (with meals). 9/23/22  Yes Yehuda Jackson MD   cyanocobalamin 1,000 mcg tablet Take 1 Tablet by mouth daily. 9/23/22  Yes Yehuda Jackson MD   folic acid (FOLVITE) 1 mg tablet Take 1 Tablet by mouth daily. 9/23/22  Yes Yehuda Jackson MD   insulin glargine (LANTUS,BASAGLAR) 100 unit/mL (3 mL) inpn 5 Units by SubCUTAneous route daily. Indications: type 2 diabetes mellitus 9/23/22  Yes Yehuda Jackson MD   pravastatin (PRAVACHOL) 10 mg tablet Take 10 mg by mouth daily. Yes Provider, Historical     Allergies   Allergen Reactions    Enalapril Maleate Other (comments)     Acute renal failure when combined with dehydration    Niacin Other (comments)        Review of Systems   All other systems reviewed and are negative.     Objective:     Patient Vitals for the past 24 hrs:   BP Temp Pulse Resp SpO2 Height Weight   10/10/22 1428 (!) 175/98 97.8 °F (36.6 °C) 88 18 100 % -- --   10/10/22 1420 -- -- -- -- -- 5' 6\" (1.676 m) 68 kg (150 lb) Physical Exam  Cardiovascular:      Rate and Rhythm: Normal rate and regular rhythm. Heart sounds: Normal heart sounds. Pulmonary:      Breath sounds: Normal breath sounds. Data Review:   No results found for this or any previous visit (from the past 24 hour(s)). Assessment/Plan:      To OR for removal    Signed By: Hernan Riley MD     October 10, 2022

## 2022-10-10 NOTE — BRIEF OP NOTE
Brief Postoperative Note    Patient: Becky Lynn. YOB: 1960  MRN: [id number]    Date of Procedure: 10/10/2022     Pre-Op Diagnosis: ESRD    Post-Op Diagnosis: Same as preoperative diagnosis. Procedure(s):  REMOVAL OF PERITONEAL DIALYSIS CATHETER    Surgeon(s):   Cindy Leslie MD    Surgical Assistant: Surg Asst-1: Sadia VALDES    Anesthesia: MAC     Estimated Blood Loss (mL): Minimal    Complications: None    Specimens: * No specimens in log *     Implants: * No implants in log *    Drains: * No LDAs found *    Findings: 0    Electronically Signed by Elfreda Gowers, MD on 10/10/2022 at 4:11 PM

## 2022-10-10 NOTE — DISCHARGE INSTRUCTIONS
Patient Discharge Instructions    Dusty Esquivel / [id number] : 1960    Admitted 10/10/2022 Discharged: 10/10/2022     Take Home Medications     Current Discharge Medication List        START taking these medications    Details   HYDROcodone-acetaminophen (NORCO) 5-325 mg per tablet Take 1 Tablet by mouth every four (4) hours as needed for Pain for up to 3 days. Max Daily Amount: 6 Tablets. Qty: 20 Tablet, Refills: 0    Associated Diagnoses: LAVINIA (acute kidney injury) ([facility] [address]           CONTINUE these medications which have NOT CHANGED    Details   levothyroxine (Euthyrox) 100 mcg tablet Take 100 mcg by mouth Daily (before breakfast). acetaminophen (TYLENOL) 325 mg tablet Take 650 mg by mouth every six (6) hours as needed for Pain. carvediloL (COREG) 6.25 mg tablet Take 1 Tablet by mouth two (2) times daily (with meals). Qty: 60 Tablet, Refills: 0      cyanocobalamin 1,000 mcg tablet Take 1 Tablet by mouth daily. Qty: 30 Tablet, Refills: 0      folic acid (FOLVITE) 1 mg tablet Take 1 Tablet by mouth daily. Qty: 30 Tablet, Refills: 0      insulin glargine (LANTUS,BASAGLAR) 100 unit/mL (3 mL) inpn 5 Units by SubCUTAneous route daily. Indications: type 2 diabetes mellitus  Qty: 1 Pen, Refills: 1      pravastatin (PRAVACHOL) 10 mg tablet Take 10 mg by mouth daily. It is important that you take the medication exactly as they are prescribed. Keep your medication in the bottles provided by the pharmacist and keep a list of the medication names, dosages, and times to be taken in your wallet. Do not take other medications without consulting your doctor. What to do at Home    Recommended diet: Regular Diet,     Recommended activity: Activity as tolerated,      Follow-up prn        Information obtained by :  I understand that if any problems occur once I am at home I am to contact my physician. I understand and acknowledge receipt of the instructions indicated above. Physician's or R.N.'s Signature                                                                  Date/Time                                                                                                                                              Patient or Representative Signature                                                          Date/Time     DISCHARGE SUMMARY from your Nurse      PATIENT INSTRUCTIONS    After general anesthesia or intravenous sedation, for 24 hours or while taking prescription Narcotics:  Limit your activities  Do not drive and operate hazardous machinery  Do not make important personal or business decisions  Do  not drink alcoholic beverages  If you have not urinated within 8 hours after discharge, please contact your surgeon on call. Report the following to your surgeon:  Excessive pain, swelling, redness or odor of or around the surgical area  Temperature over 100.5  Nausea and vomiting lasting longer than 4 hours or if unable to take medications  Any signs of decreased circulation or nerve impairment to extremity: change in color, persistent  numbness, tingling, coldness or increase pain  Any questions      GOOD HELP TO FIGHT AN INFECTION  Here are a few tip to help reduce the chance of getting an infection after surgery:  Wash Your Hands  Good handwashing is the most important thing you and your caregiver can do. Wash before and after caring for any wounds. Dry your hand with a clean towel. Wash with soap and water for at least 20 seconds. A TIP: sing the \"Happy Birthday\" song through one time while washing to help with the timing. Use a hand  in between washings.     Shower  When your surgeon says it is OK to take a shower, use a new bar of antibacterial soap (if that is what you use, and keep that bar of soap ONLY for your use), or antibacterial body wash. Use a clean wash cloth or sponge when you bathe. Dry off with a clean towel  after every bath - be careful around any wounds, skin staples, sutures or surgical glue over/on wounds. Do not enter swimming pools, hot tubs, lakes, rivers and/or ocean until wounds are healed and your doctor/surgeon says it is OK. Use Clean Sheets  Sleep on freshly laundered sheets after your surgery. Keep the surgery site covered with a clean, dry bandage (if instructed to do so). If the bandage becomes soiled, reapply a new, dry, clean bandage. Do not allow pets to sleep with you while your wound is healing. Lifestyle Modification and Controlling Your Blood Sugar  Smoking slows wound healing. Stop smoking and limit exposure to second-hand smoke. High blood sugar slows wound healing. Eat a well-balanced diet to provide proper nutrition while healing  Monitor your blood sugar (if you are a diabetic) and take your medications as you are suppose to so you can control you blood sugar after surgery. COUGH AND DEEP BREATHE    Breathing deeply and coughing are very important exercises to do after surgery. Deep breathing and coughing open the little air tubes and air sacks in your lungs. You take deep breaths every day. You may not even notice - it is just something you do when you sigh or yawn. It is a natural exercise you do to keep these air passages open. After surgery, take deep breaths and cough, on purpose. DIRECTIONS:  Take 10 to 15 slow deep breaths every hour while awake. Breathe in deeply, and hold it for 2 seconds. Exhale slowly through puckered lips, like blowing up a balloon. After every 4th or 5th deep breath, hug your pillow to your chest or belly and give a hard, deep cough. Yes, it will probably hurt. But doing this exercise is a very important part of healing after surgery.   Take your pain medicine to help you do this exercise without too much pain.    Coughing and deep breathing help prevent bronchitis and pneumonia after surgery. If you had chest or belly surgery, use a pillow as a \"hug bill\" and hold it tightly to your chest or belly when you cough. ANKLE PUMPS    Ankle pumps increase the circulation of oxygenated blood to your lower extremities and decrease your risk for circulation problems such as blood clots. They also stretch the muscles, tendons and ligaments in your foot and ankle, and prevent joint contracture in the ankle and foot, especially after surgeries on the legs. It is important to do ankle pump exercises regularly after surgery because immobility increases your risk for developing a blood clot. Your doctor may also have you take an Aspirin for the next few days as well. If your doctor did not ask you to take an Aspirin, consult with him before starting Aspirin therapy on your own. The exercise is quite simple. Slowly point your foot forward, feeling the muscles on the top of your lower leg stretch, and hold this position for 5 seconds. Next, pull your foot back toward you as far as possible, stretching the calf muscles, and hold that position for 5 seconds. Repeat with the other foot. Perform 10 repetitions every hour while awake for both ankles if possible (down and then up with the foot once is one repetition). You should feel gentle stretching of the muscles in your lower leg when doing this exercise. If you feel pain, or your range of motion is limited, don't push too hard. Only go the limit your joint and muscles will let you go. If you have increasing pain, progressively worsening leg warmth or swelling, STOP the exercise and call your doctor. MEDICATION AND   SIDE EFFECT GUIDE    The [employer] MEDICATION AND SIDE EFFECT GUIDE was provided to the PATIENT AND CARE PROVIDER.   Information provided includes instruction about drug purpose and common side effects for the following medications:   HYDROcodone-acetaminophen [facility])         These are general instructions for a healthy lifestyle:    *   Please give a list of your current medications to your Primary Care Provider. *   Please update this list whenever your medications are discontinued, doses are changed, or new medications (including over-the-counter products) are added. *   Please carry medication information at all times in case of emergency situations. About Smoking  No smoking / No tobacco products  Avoid exposure to second hand smoke     Surgeon General's Warning:  Quitting smoking now greatly reduces serious risk to your health. Obesity, smoking, and sedentary lifestyle greatly increases your risk for illness and disease. A healthy diet, regular physical exercise & weight monitoring are important for maintaining a healthy lifestyle. Congestive Heart Failure  You may be retaining fluid if you have a history of heart failure or if you experience any of the following symptoms:  Weight gain of 3 pounds or more overnight or 5 pounds in a week, increased swelling in your hands or feet or shortness of breath while lying flat in bed. Please call your doctor as soon as you notice any of these symptoms; do not wait until your next office visit. Recognize signs and symptoms of STROKE:  F -  Face looks uneven  A -  Arms unable to move or move evenly  S -  Speech slurred or non-existent  T -  Time-call 911 as soon as signs and symptoms begin-DO NOT go          back to bed or wait to see if you get better-TIME IS BRAIN. Warning Signs of HEART ATTACK   Call 911 if you have these symptoms:    Chest discomfort. Most heart attacks involve discomfort in the center of the chest that lasts more than a few minutes, or that goes away and comes back. It can feel like uncomfortable pressure, squeezing, fullness, or pain. Discomfort in other areas of the upper body.  Symptoms can include pain or discomfort in one or both arms, the back, neck, jaw, or stomach. Shortness of breath with or without chest discomfort. Other signs may include breaking out in a cold sweat, nausea, or lightheadedness. Don't wait more than five minutes to call 911 - MINUTES MATTER! Fast action can save your life. Calling 911 is almost always the fastest way to get lifesaving treatment. Emergency Medical Services staff can begin treatment when they arrive -- up to an hour sooner than if someone gets to the hospital by car. Learning About Coronavirus [phone])  Coronavirus [phone]): Overview  What is coronavirus (COVID-19)? The coronavirus disease (COVID-19) is caused by a virus. It is an illness that was first found in [place], [place], in December 2019. It has since spread worldwide. The virus can cause fever, cough, and trouble breathing. In severe cases, it can cause pneumonia and make it hard to breathe without help. It can cause death. Coronaviruses are a large group of viruses. They cause the common cold. They also cause more serious illnesses like [place] respiratory syndrome (MERS) and severe acute respiratory syndrome (SARS). COVID-19 is caused by a novel coronavirus. That means it's a new type that has not been seen in people before. This virus spreads person-to-person through droplets from coughing and sneezing. It can also spread when you are close to someone who is infected. And it can spread when you touch something that has the virus on it, such as a doorknob or a tabletop. What can you do to protect yourself from coronavirus (COVID-19)? The best way to protect yourself from getting sick is to: Avoid areas where there is an outbreak. Avoid contact with people who may be infected. Wash your hands often with soap or alcohol-based hand sanitizers. Avoid crowds and try to stay at least 6 feet away from other people. Wash your hands often, especially after you cough or sneeze.  Use soap and water, and scrub for at least 20 seconds. If soap and water aren't available, use an alcohol-based hand . Avoid touching your mouth, nose, and eyes. What can you do to avoid spreading the virus to others? To help avoid spreading the virus to others:  Cover your mouth with a tissue when you cough or sneeze. Then throw the tissue in the trash. Use a disinfectant to clean things that you touch often. Stay home if you are sick or have been exposed to the virus. Don't go to school, work, or public areas. And don't use public transportation. If you are sick:  Leave your home only if you need to get medical care. But call the doctor's office first so they know you're coming. And wear a face mask, if you have one. If you have a face mask, wear it whenever you're around other people. It can help stop the spread of the virus when you cough or sneeze. Clean and disinfect your home every day. Use household  and disinfectant wipes or sprays. Take special care to clean things that you grab with your hands. These include doorknobs, remote controls, phones, and handles on your refrigerator and microwave. And don't forget countertops, tabletops, bathrooms, and computer keyboards. When to call for help  Call 911 anytime you think you may need emergency care. For example, call if:  You have severe trouble breathing. (You can't talk at all.)  You have constant chest pain or pressure. You are severely dizzy or lightheaded. You are confused or can't think clearly. Your face and lips have a blue color. You pass out (lose consciousness) or are very hard to wake up. Call your doctor now if you develop symptoms such as:  Shortness of breath. Fever. Cough. If you need to get care, call ahead to the doctor's office for instructions before you go. Make sure you wear a face mask, if you have one, to prevent exposing other people to the virus. Where can you get the latest information?   The following health organizations are tracking and studying this virus. Their websites contain the most up-to-date information. Harlen Severin also learn what to do if you think you may have been exposed to the virus. U.S. Centers for Disease Control and Prevention (CDC): The CDC provides updated news about the disease and travel advice. The website also tells you how to prevent the spread of infection. www.cdc.gov  World Health Organization [facility]): WHO offers information about the virus outbreaks. WHO also has travel advice. www.who.int  Current as of: April 1, 2020               Content Version: 12.4  © 3836-5740 [employer]. Care instructions adapted under license by your healthcare professional. If you have questions about a medical condition or this instruction, always ask your healthcare professional. [address] any warranty or liability for your use of this information. The discharge information has been reviewed with the patient and caregiver. Any questions and concerns from the patient and caregiver have been addressed. The patient and caregiver verbalized understanding.         CONTENTS FOUND IN YOUR DISCHARGE ENVELOPE:  [x]     Surgeon and Hospital Discharge Instructions  [x]     [facility] Surgical Services Care Provider Card  []     Medication & Side Effect Guide            (your newly prescribed medications have been marked/highlighted showing the most common side effects from   the classes of drugs on your prescriptions)  []     Medication Prescription(s) x 1 ( [x] These have been sent electronically to your pharmacy by your surgeon,   - OR -       your surgeon has already provided these to you during a previous/pre-op office visit)  []     [address]  []     Physical Therapy Prescription  []     Follow-up Appointment Cards  []     Surgery-related Pictures/Media  []     Pain block and/or block with On-Q Catheter from Anesthesia Service (information included in your instructions above)  []     Medical device information sheets/pamphlets from their    []     School/work excuse note. []     /parent work excuse note. The following personal items collected during your admission are returned to you:   Dental Appliance: Dental Appliances: Uppers, With patient  Vision:    Hearing Aid:    Jewelry: Jewelry: None  Clothing: Clothing:  (Street clothing to locker)  Other Valuables: Other Valuables: None  Valuables sent to safe:   DISCHARGE SUMMARY from your Nurse      PATIENT INSTRUCTIONS    After general anesthesia or intravenous sedation, for 24 hours or while taking prescription Narcotics:  Limit your activities  Do not drive and operate hazardous machinery  Do not make important personal or business decisions  Do  not drink alcoholic beverages  If you have not urinated within 8 hours after discharge, please contact your surgeon on call. Report the following to your surgeon:  Excessive pain, swelling, redness or odor of or around the surgical area  Temperature over 100.5  Nausea and vomiting lasting longer than 4 hours or if unable to take medications  Any signs of decreased circulation or nerve impairment to extremity: change in color, persistent  numbness, tingling, coldness or increase pain  Any questions      GOOD HELP TO FIGHT AN INFECTION  Here are a few tip to help reduce the chance of getting an infection after surgery:  Wash Your Hands  Good handwashing is the most important thing you and your caregiver can do. Wash before and after caring for any wounds. Dry your hand with a clean towel. Wash with soap and water for at least 20 seconds. A TIP: sing the \"Happy Birthday\" song through one time while washing to help with the timing. Use a hand  in between washings.     Shower  When your surgeon says it is OK to take a shower, use a new bar of antibacterial soap (if that is what you use, and keep that bar of soap ONLY for your use), or antibacterial body wash.  Use a clean wash cloth or sponge when you bathe. Dry off with a clean towel  after every bath - be careful around any wounds, skin staples, sutures or surgical glue over/on wounds. Do not enter swimming pools, hot tubs, lakes, rivers and/or ocean until wounds are healed and your doctor/surgeon says it is OK. Use Clean Sheets  Sleep on freshly laundered sheets after your surgery. Keep the surgery site covered with a clean, dry bandage (if instructed to do so). If the bandage becomes soiled, reapply a new, dry, clean bandage. Do not allow pets to sleep with you while your wound is healing. Lifestyle Modification and Controlling Your Blood Sugar  Smoking slows wound healing. Stop smoking and limit exposure to second-hand smoke. High blood sugar slows wound healing. Eat a well-balanced diet to provide proper nutrition while healing  Monitor your blood sugar (if you are a diabetic) and take your medications as you are suppose to so you can control you blood sugar after surgery. COUGH AND DEEP BREATHE    Breathing deeply and coughing are very important exercises to do after surgery. Deep breathing and coughing open the little air tubes and air sacks in your lungs. You take deep breaths every day. You may not even notice - it is just something you do when you sigh or yawn. It is a natural exercise you do to keep these air passages open. After surgery, take deep breaths and cough, on purpose. DIRECTIONS:  Take 10 to 15 slow deep breaths every hour while awake. Breathe in deeply, and hold it for 2 seconds. Exhale slowly through puckered lips, like blowing up a balloon. After every 4th or 5th deep breath, hug your pillow to your chest or belly and give a hard, deep cough. Yes, it will probably hurt. But doing this exercise is a very important part of healing after surgery. Take your pain medicine to help you do this exercise without too much pain.     Coughing and deep breathing help prevent bronchitis and pneumonia after surgery. If you had chest or belly surgery, use a pillow as a \"hug bill\" and hold it tightly to your chest or belly when you cough. ANKLE PUMPS    Ankle pumps increase the circulation of oxygenated blood to your lower extremities and decrease your risk for circulation problems such as blood clots. They also stretch the muscles, tendons and ligaments in your foot and ankle, and prevent joint contracture in the ankle and foot, especially after surgeries on the legs. It is important to do ankle pump exercises regularly after surgery because immobility increases your risk for developing a blood clot. Your doctor may also have you take an Aspirin for the next few days as well. If your doctor did not ask you to take an Aspirin, consult with him before starting Aspirin therapy on your own. The exercise is quite simple. Slowly point your foot forward, feeling the muscles on the top of your lower leg stretch, and hold this position for 5 seconds. Next, pull your foot back toward you as far as possible, stretching the calf muscles, and hold that position for 5 seconds. Repeat with the other foot. Perform 10 repetitions every hour while awake for both ankles if possible (down and then up with the foot once is one repetition). You should feel gentle stretching of the muscles in your lower leg when doing this exercise. If you feel pain, or your range of motion is limited, don't push too hard. Only go the limit your joint and muscles will let you go. If you have increasing pain, progressively worsening leg warmth or swelling, STOP the exercise and call your doctor. MEDICATION AND   SIDE EFFECT GUIDE    The [employer] MEDICATION AND SIDE EFFECT GUIDE was provided to the PATIENT AND CARE PROVIDER.   Information provided includes instruction about drug purpose and common side effects for the following medications:   ***        These are general instructions for a healthy lifestyle:    *   Please give a list of your current medications to your Primary Care Provider. *   Please update this list whenever your medications are discontinued, doses are changed, or new medications (including over-the-counter products) are added. *   Please carry medication information at all times in case of emergency situations. About Smoking  No smoking / No tobacco products  Avoid exposure to second hand smoke     Surgeon General's Warning:  Quitting smoking now greatly reduces serious risk to your health. Obesity, smoking, and sedentary lifestyle greatly increases your risk for illness and disease. A healthy diet, regular physical exercise & weight monitoring are important for maintaining a healthy lifestyle. Congestive Heart Failure  You may be retaining fluid if you have a history of heart failure or if you experience any of the following symptoms:  Weight gain of 3 pounds or more overnight or 5 pounds in a week, increased swelling in your hands or feet or shortness of breath while lying flat in bed. Please call your doctor as soon as you notice any of these symptoms; do not wait until your next office visit. Recognize signs and symptoms of STROKE:  F -  Face looks uneven  A -  Arms unable to move or move evenly  S -  Speech slurred or non-existent  T -  Time-call 911 as soon as signs and symptoms begin-DO NOT go          back to bed or wait to see if you get better-TIME IS BRAIN. Warning Signs of HEART ATTACK   Call 911 if you have these symptoms:    Chest discomfort. Most heart attacks involve discomfort in the center of the chest that lasts more than a few minutes, or that goes away and comes back. It can feel like uncomfortable pressure, squeezing, fullness, or pain. Discomfort in other areas of the upper body.  Symptoms can include pain or discomfort in one or both arms, the back, neck, jaw, or stomach. Shortness of breath with or without chest discomfort. Other signs may include breaking out in a cold sweat, nausea, or lightheadedness. Don't wait more than five minutes to call 911 - MINUTES MATTER! Fast action can save your life. Calling 911 is almost always the fastest way to get lifesaving treatment. Emergency Medical Services staff can begin treatment when they arrive -- up to an hour sooner than if someone gets to the hospital by car. Learning About Coronavirus [phone])  Coronavirus [phone]): Overview  What is coronavirus (COVID-19)? The coronavirus disease (COVID-19) is caused by a virus. It is an illness that was first found in [place], [place], in December 2019. It has since spread worldwide. The virus can cause fever, cough, and trouble breathing. In severe cases, it can cause pneumonia and make it hard to breathe without help. It can cause death. Coronaviruses are a large group of viruses. They cause the common cold. They also cause more serious illnesses like [place] respiratory syndrome (MERS) and severe acute respiratory syndrome (SARS). COVID-19 is caused by a novel coronavirus. That means it's a new type that has not been seen in people before. This virus spreads person-to-person through droplets from coughing and sneezing. It can also spread when you are close to someone who is infected. And it can spread when you touch something that has the virus on it, such as a doorknob or a tabletop. What can you do to protect yourself from coronavirus (COVID-19)? The best way to protect yourself from getting sick is to: Avoid areas where there is an outbreak. Avoid contact with people who may be infected. Wash your hands often with soap or alcohol-based hand sanitizers. Avoid crowds and try to stay at least 6 feet away from other people. Wash your hands often, especially after you cough or sneeze. Use soap and water, and scrub for at least 20 seconds.  If soap and water aren't available, use an alcohol-based hand . Avoid touching your mouth, nose, and eyes. What can you do to avoid spreading the virus to others? To help avoid spreading the virus to others:  Cover your mouth with a tissue when you cough or sneeze. Then throw the tissue in the trash. Use a disinfectant to clean things that you touch often. Stay home if you are sick or have been exposed to the virus. Don't go to school, work, or public areas. And don't use public transportation. If you are sick:  Leave your home only if you need to get medical care. But call the doctor's office first so they know you're coming. And wear a face mask, if you have one. If you have a face mask, wear it whenever you're around other people. It can help stop the spread of the virus when you cough or sneeze. Clean and disinfect your home every day. Use household  and disinfectant wipes or sprays. Take special care to clean things that you grab with your hands. These include doorknobs, remote controls, phones, and handles on your refrigerator and microwave. And don't forget countertops, tabletops, bathrooms, and computer keyboards. When to call for help  Call 911 anytime you think you may need emergency care. For example, call if:  You have severe trouble breathing. (You can't talk at all.)  You have constant chest pain or pressure. You are severely dizzy or lightheaded. You are confused or can't think clearly. Your face and lips have a blue color. You pass out (lose consciousness) or are very hard to wake up. Call your doctor now if you develop symptoms such as:  Shortness of breath. Fever. Cough. If you need to get care, call ahead to the doctor's office for instructions before you go. Make sure you wear a face mask, if you have one, to prevent exposing other people to the virus. Where can you get the latest information? The following health organizations are tracking and studying this virus.  Their websites contain the most up-to-date information. [place] also learn what to do if you think you may have been exposed to the virus. U.S. Centers for Disease Control and Prevention (CDC): The CDC provides updated news about the disease and travel advice. The website also tells you how to prevent the spread of infection. www.cdc.gov  World Health Organization [facility]): WHO offers information about the virus outbreaks. WHO also has travel advice. www.who.int  Current as of: April 1, 2020               Content Version: 12.4  © 6364-3490 Health[employer], Incorporated. Care instructions adapted under license by your healthcare professional. If you have questions about a medical condition or this instruction, always ask your healthcare professional. [address] any warranty or liability for your use of this information. The discharge information has been reviewed with the {PATIENT PARENT GUARDIAN:07997}. Any questions and concerns from the {PATIENT PARENT GUARDIAN:39603} have been addressed. The {PATIENT PARENT GUARDIAN:63519} verbalized understanding.         CONTENTS FOUND IN YOUR DISCHARGE ENVELOPE:  [x]     Surgeon and Hospital Discharge Instructions  [x]     [facility] Care Provider Card  []     Medication & Side Effect Guide            (your newly prescribed medications have been marked/highlighted showing the most common side effects from   the classes of drugs on your prescriptions)  []     Medication Prescription(s) x *** ( [] These have been sent electronically to your pharmacy by your surgeon,   - OR -       your surgeon has already provided these to you during a previous/pre-op office visit)  []     [address]  []     Physical Therapy Prescription  []     Follow-up Appointment Cards  []     Surgery-related Pictures/Media  []     Pain block and/or block with On-Q Catheter from Anesthesia Service (information included in your instructions above)  []     Medical device information sheets/pamphlets from their    []     School/work excuse note. []     /parent work excuse note. The following personal items collected during your admission are returned to you:   Dental Appliance: Dental Appliances: Uppers, With patient  Vision:    Hearing Aid:    Jewelry: Jewelry: None  Clothing: Clothing:  (Street clothing to locker)  Other Valuables:  Other Valuables: None  Valuables sent to safe:

## 2022-10-11 ENCOUNTER — OFFICE VISIT (OUTPATIENT)
Dept: CARDIOLOGY CLINIC | Age: 62
End: 2022-10-11
Payer: MEDICAID

## 2022-10-11 ENCOUNTER — TELEPHONE (OUTPATIENT)
Dept: CARDIOLOGY CLINIC | Age: 62
End: 2022-10-11

## 2022-10-11 VITALS
WEIGHT: 150 LBS | DIASTOLIC BLOOD PRESSURE: 92 MMHG | BODY MASS INDEX: 24.11 KG/M2 | OXYGEN SATURATION: 97 % | SYSTOLIC BLOOD PRESSURE: 160 MMHG | HEIGHT: 66 IN | HEART RATE: 65 BPM

## 2022-10-11 DIAGNOSIS — I10 HTN (HYPERTENSION), BENIGN: Primary | ICD-10-CM

## 2022-10-11 PROBLEM — N18.6 ESRD (END STAGE RENAL DISEASE) (HCC): Status: ACTIVE | Noted: 2022-10-11

## 2022-10-11 PROBLEM — E78.00 HYPERCHOLESTEREMIA: Status: ACTIVE | Noted: 2022-10-11

## 2022-10-11 PROBLEM — D64.9 ANEMIA: Status: ACTIVE | Noted: 2022-10-11

## 2022-10-11 PROBLEM — N18.4 CHRONIC KIDNEY DISEASE (CKD), STAGE IV (SEVERE) (HCC): Status: ACTIVE | Noted: 2022-10-11

## 2022-10-11 PROCEDURE — 99215 OFFICE O/P EST HI 40 MIN: CPT | Performed by: SPECIALIST

## 2022-10-11 PROCEDURE — 93000 ELECTROCARDIOGRAM COMPLETE: CPT | Performed by: SPECIALIST

## 2022-10-11 RX ORDER — NIFEDIPINE 60 MG/1
60 TABLET, EXTENDED RELEASE ORAL DAILY
Qty: 90 TABLET | Refills: 3 | Status: SHIPPED | OUTPATIENT
Start: 2022-10-11

## 2022-10-11 NOTE — TELEPHONE ENCOUNTER
Enrolled with [employer] - Ordered and being shipped to patient's home address on file. ETA within 5-7 business days. event monitor  Received:  Today  MD Cecilio Szymanski  Can you please send a 30 day monitor to look for afib --- history of Afib     Thanks   SK

## 2022-10-11 NOTE — PROGRESS NOTES
Shankar Gomes MD. [address] [place] [address], [address] [place] [address] [phone]; Fax [phone]      Patient: [place] Dock[place]. : 1960      Today's Date: 10/11/2022      HISTORY OF PRESENT ILLNESS:     History of Present Illness:    Recent DC summary noted :  58 yo hx of HTN, DM, CKD 3, melanoma, presented w/ resp failure, DKA, LAVINIA on . Hospital course significant for new dialysis on 22, afib RVR on 09/15/22, delirium     1) LAVINIA/CKD 3: likely has ESRD. HD was started on . PD cath placed on  by Vascular. Renal will transition to PD as an outpatient. CM working on logistics, home health   2) Afib RVR: hx of Pafib. Occurred on 09/15, now resolved. Echo with EF 65-70%. Cont coreg. No anticoagulation due to anemia. Cards was following  3) Acute resp failure/hypoxia: now resolved. Due to volume overload, DKA. Intubated on admission, extubated on   4) Acute met encephalopathy: delirium likely from hypoxia, acidosis. Now much improved. No need for seroquel on discharge  5) HTN: cont coreg  6) DM type 2 w/ ESRD: A1C 6.6%. stopped metformin. Cont Lantus   7) Anemia: due to ESRD and low Vit B12/folate. S/p 2u RBCs. Cont vitamins      On 10/11/22 - says he feels fine - stomach sore from surgery. No CP or heart racing. Class 2 GORDILOL. PAST MEDICAL HISTORY:     Past Medical History:   Diagnosis Date    Atrial fibrillation ([address]     Chronic kidney disease (CKD), stage IV (severe) (HCC)     ESRD (end stage renal disease) ([address]     Hypercholesteremia     Hypertension     Melanoma ([address]     skin    Thyroid activity decreased            Past Surgical History:   Procedure Laterality Date    HX ORTHOPAEDIC      IR INSERT NON TUNL CVC OVER 5 YRS  2022               FAMILY HISTORY:     History reviewed. No pertinent family history.           SOCIAL HISTORY:     Social History     Tobacco Use    Smoking status: Never    Smokeless tobacco: Never   Substance Use Topics    Alcohol use: Not Currently    Drug use: Yes     Frequency: 6.0 times per week     Types: Marijuana               REVIEW OF SYMPTOMS:     Review of Symptoms:  Constitutional: Negative for fever, chills  HEENT: Negative for nosebleeds, tinnitus, and vision changes. Respiratory: Negative for cough, wheezing  Cardiovascular: Negative for orthopnea, claudication, syncope, and PND. Gastrointestinal: Negative for abdominal pain, diarrhea, melena. Genitourinary: Negative for dysuria  Musculoskeletal: Negative for myalgias. Skin: Negative for rash  Heme: No problems bleeding. Neurological: Negative for speech change and focal weakness. PHYSICAL EXAM:     Physical Exam:  Visit Vitals  BP (!) 160/92 (BP 1 Location: Left upper arm, BP Patient Position: Sitting)   Pulse 65   Ht 5' 6\" (1.676 m)   Wt 150 lb (68 kg)   SpO2 97%   BMI 24.21 kg/m²     Patient appears generally well, mood and affect are appropriate and pleasant. HEENT:  Hearing intact, non-icteric, normocephalic, atraumatic. Neck Exam: Supple  Lung Exam: Clear to auscultation, even breath sounds. Cardiac Exam: Regular rate and rhythm with no murmur or rub  Abdomen: Soft, non-tender  Extremities: Moves all ext well. No lower extremity edema.   MSKTL: Overall good ROM ext  Skin: No significant rashes  Psych: Appropriate affect  Neuro - Grossly intact            LABS / OTHER STUDIES reviewed:     Lab Results   Component Value Date/Time    Sodium 142 09/29/2022 01:55 PM    Potassium 3.1 (L) 09/29/2022 01:55 PM    Chloride 104 09/29/2022 01:55 PM    CO2 31 09/29/2022 01:55 PM    Anion gap 7 09/29/2022 01:55 PM    Glucose 102 (H) 09/29/2022 01:55 PM    BUN 32 (H) 09/29/2022 01:55 PM    Creatinine 6.30 (H) 09/29/2022 01:55 PM    BUN/Creatinine ratio 5 (L) 09/29/2022 01:55 PM    GFR est AA 11 (L) 09/29/2022 01:55 PM    GFR est non-AA 9 (L) 09/29/2022 01:55 PM    Calcium 8.8 09/29/2022 01:55 PM    Bilirubin, total 0.6 09/09/2022 03:39 PM    Alk. phosphatase 63 09/09/2022 03:39 PM    Protein, total 5.0 (L) 09/09/2022 03:39 PM    Albumin 2.0 (L) 09/09/2022 03:39 PM    Globulin 3.0 09/09/2022 03:39 PM    A-G Ratio 0.7 (L) 09/09/2022 03:39 PM    ALT (SGPT) 12 09/09/2022 03:39 PM    AST (SGOT) 27 09/09/2022 03:39 PM     Lab Results   Component Value Date/Time    WBC 6.3 09/29/2022 01:55 PM    HGB 8.6 (L) 09/29/2022 01:55 PM    HCT 26.1 (L) 09/29/2022 01:55 PM    PLATELET 398 26/65/5262 01:55 PM    MCV 95.6 09/29/2022 01:55 PM             CARDIAC DIAGNOSTICS:     Cardiac Evaluation Includes:    EKG 9/6: normal sinus  EKG 9/9: afib w/ RVR  EKG 9/9: sinus tach w/ PACs  Tele 9/15/22 - Afib with RVR  EKG 10/11/22 - NSR    ECHO 9/9/22: EF of 65 - 70%      ASSESSMENT AND PLAN:     Assessment and Plan:  56 yo hx of HTN, DM, CKD 3, melanoma, presented w/ resp failure, DKA, LAVINIA on 9/22. Hospital course significant for new dialysis on 09/07/22, afib RVR on 09/15/22, delirium      1) Afib with RVR during hospital stay 9/22in setting of acute illness   - Had transient Afib with RVR 9/9/22 (spont converted to NSR)   - On 9/15/22 - back into afib with RVR  - went back into NSR prior to DC 9/23/22  - He was not anticoagulated 9/22 due to anemia / bleeding risk   - is in sinus in office 10/11/22   - Will check a 30 day monitor to look for afib      2) Elevated troponin 9/22: Likely type 2 in s/o supply demand mismatch in setting of afib RVR -- can perform CAD testing later as needed      3) HTN  - BP high in office and home ---> add nifedipine 60 mg daily to regimen and follow at home   - reassess in one month      4) Severe CKD and ESRD   - He was on dialysis briefly - PD catheter removed 10/11/22 (was kinked) and off of dialysis now - Says he makes good urine   - follow labs     5) Anemia   - due to ESRD and low Vit B12/folate.   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 Son lives with him. Reginald Mandujano MD, [place] [address]  [address] [place] [address], [address]  [place] [id number] [address], [address]  Ph: [phone]    [phone]      Total time (preparing to see the patient, reviewing data, seeing patient face to face, taking history, examining patient, counseling patient, documenting information, coordinating care, etc) was  40  min.          ADDENDUM   11/20/2022    Event Monitor 10/19/22 - wore it 27 days - NSR, Avg HR 76 bpm     Will see patient soon

## 2022-10-11 NOTE — PROGRESS NOTES
No chief complaint on file. There were no vitals taken for this visit.   Chest pain denied   SOB denied   Palpitations denied   Swelling in hands/feet denied   Dizziness denied   Recent hospital stays denied   Refills denied     Vitals:    10/11/22 1448 10/11/22 1457   BP: (!) 160/100 (!) 160/92   BP 1 Location: Right upper arm Left upper arm   BP Patient Position: Sitting Sitting   Pulse: 65    Height: 5' 6\" (1.676 m)    Weight: 150 lb (68 kg)    SpO2: 97%

## 2022-10-12 ENCOUNTER — HOME CARE VISIT (OUTPATIENT)
Dept: SCHEDULING | Facility: HOME HEALTH | Age: 62
End: 2022-10-12
Payer: MEDICAID

## 2022-10-12 VITALS
HEART RATE: 82 BPM | SYSTOLIC BLOOD PRESSURE: 110 MMHG | TEMPERATURE: 97.7 F | OXYGEN SATURATION: 97 % | RESPIRATION RATE: 12 BRPM | DIASTOLIC BLOOD PRESSURE: 68 MMHG

## 2022-10-12 PROCEDURE — G0151 HHCP-SERV OF PT,EA 15 MIN: HCPCS

## 2022-10-12 NOTE — OP NOTES
[place] [facility]  OPERATIVE REPORT    Name:  Lisandro Villegas  MR#:  [id number]  :  1960  ACCOUNT #:  [de-identified]  DATE OF SERVICE:  10/10/2022      PREOPERATIVE DIAGNOSIS:  Renal insufficiency. POSTOPERATIVE DIAGNOSIS:  Renal insufficiency. PROCEDURE:  Removal of peritoneal dialysis catheter. SURGEON:  Diane Austin MD    ASSISTANT:  None. ANESTHESIA:  IV sedation local anesthesia. COMPLICATIONS:  None. SPECIMENS REMOVED:  Catheter. IMPLANTS:  None. ESTIMATED BLOOD LOSS:  Minimal.    INDICATION FOR PROCEDURE:  The patient is a 58-year-old male with chronic renal insufficiency. He had a peritoneal dialysis catheter placed for peritoneal dialysis, was no longer in need. Decision was made to take him to the operating room for removal.    TECHNICAL DETAILS:  The patient was taken to operating room. Once suitable level of anesthesia was introduced, prepped and draped in typical sterile fashion. Transverse incision was reopened and dissection carried out down to the catheter which dissected free of its soft tissue attachments. Catheter was divided and removed in its entirety with in all intact. Wound was irrigated thoroughly. The fascia was reapproximated with heavy Vicryl suture and the wound closed in multiple layers. He tolerated the procedure well. Sponge and instrument counts were correct x2. He was awakened and transferred to the recovery room in good condition.         MD LINDA [id number]  D:  10/12/2022 7:23  T:  10/12/2022 9:17  JOB #:  [id number]

## 2022-10-14 ENCOUNTER — HOME CARE VISIT (OUTPATIENT)
Dept: SCHEDULING | Facility: HOME HEALTH | Age: 62
End: 2022-10-14
Payer: MEDICAID

## 2022-10-14 PROCEDURE — G0300 HHS/HOSPICE OF LPN EA 15 MIN: HCPCS

## 2022-10-17 ENCOUNTER — TELEPHONE (OUTPATIENT)
Dept: CARDIOLOGY CLINIC | Age: 62
End: 2022-10-17

## 2022-10-17 RX ORDER — CLONIDINE HYDROCHLORIDE 0.1 MG/1
0.1 TABLET ORAL 2 TIMES DAILY
Qty: 60 TABLET | Refills: 1 | Status: SHIPPED | OUTPATIENT
Start: 2022-10-17

## 2022-10-17 NOTE — TELEPHONE ENCOUNTER
R/t call to pt,  Per Dr. Jojre Chavarria: \"Let's try clonidine 0.1 mg BID -- add that  to regimen - side effects could be fatigue and dry mouth - let us know if intolerance. \"    BP has been in 170's all week. Confirmed pharmacy & sxs to watch out for. They expressed understanding of plan.

## 2022-10-17 NOTE — TELEPHONE ENCOUNTER
Patient is calling to notify us that he took his BP this morning and it was 187/105. He would like some advice on what to do.      Glynis Bernheim     [phone]

## 2022-10-18 VITALS
DIASTOLIC BLOOD PRESSURE: 70 MMHG | RESPIRATION RATE: 16 BRPM | OXYGEN SATURATION: 99 % | SYSTOLIC BLOOD PRESSURE: 174 MMHG | HEART RATE: 84 BPM | TEMPERATURE: 97.4 F

## 2022-10-18 NOTE — DIALYSIS
[facility] / [phone]           Orders   Mode: CVVHD initiated @ 0110   Blood Flow Rate: 200 ml/min   Prismasol Dose: 25 ml/kg/hr (1800 ml/hr)   Prismasol Concentrate: 2K/3.5Ca   Blood Warmer Temp: 37*C   Net Fluid Removal:  0            Metrics   BP: 124/68   HR: 103   Access Pressure: -31   Filter Pressure: 101   Return Pressure: 75   TMP: 53   Pressure Drop: 23            Access   Type & Location: RIJ temporary CVC   Comments: Dressing CDI dated 9/6/22. +aspiration/+flush x2 ports                                           Labs   HBsAg (Antigen) / date: Pending                                        HBsAb (Antibody) / date: Pending   Source: [facility]            Safety:   Time Out Done:   (Time) 0105   Consent obtained/signed: Verified   Education: Pt intubated/sedated   Primary Nurse Rpt Pre: PAWAN Painter RN   Primary Nurse Rpt Post: PAWAN Painter RN      Comments / Plan:      New [id number] filter set-up, primed, tested and running well at this time. RIJ temporary CVC, dressing CDI with bio-patch dated 9/6/22. No signs of redness, drainage, or infection visualized. Each catheter limb disinfected for 60 seconds per limb with alcohol swabs. Caps removed, dialysis CVC hub scrubbed with Prevantics for 15 seconds, followed by a 5 second dry time per Hospital P&P. +aspiration/+flush x 2 ports. Lines visible and connections secure with blood warmer to return line at 37*C. Education & pre/post report given to primary RN. Pt called and ready to schedule egd can be reached [phone].

## 2022-10-19 RX ORDER — CARVEDILOL 6.25 MG/1
6.25 TABLET ORAL 2 TIMES DAILY WITH MEALS
Qty: 180 TABLET | Refills: 0 | Status: SHIPPED | OUTPATIENT
Start: 2022-10-19

## 2022-10-19 NOTE — TELEPHONE ENCOUNTER
Patient's sister Lavern Flynn is calling because the patient needs a refill on his Carvedilol 6.25 mg. Pharmacy is confirmed. Patient may need a new rx because he was given the medicine in the hosptial.Patient is completely out of the medications.     [phone] Lavern Flynn (sister)

## 2022-10-19 NOTE — TELEPHONE ENCOUNTER
Refill per VO of Dr. Jose David Barros  Last appt: 10/11/22  NOV needed x1 mos    Requested Prescriptions     Signed Prescriptions Disp Refills    carvediloL (COREG) 6.25 mg tablet 180 Tablet 0     Sig: Take 1 Tablet by mouth two (2) times daily (with meals).      Authorizing Provider: Tosha Jimenez     Ordering User: Wagner Degroot

## 2022-10-21 ENCOUNTER — HOME CARE VISIT (OUTPATIENT)
Dept: HOME HEALTH SERVICES | Facility: HOME HEALTH | Age: 62
End: 2022-10-21
Payer: MEDICAID

## 2022-10-26 ENCOUNTER — TELEPHONE (OUTPATIENT)
Dept: CARDIOLOGY CLINIC | Age: 62
End: 2022-10-26

## 2022-10-26 RX ORDER — FOLIC ACID 1 MG/1
1 TABLET ORAL DAILY
Qty: 30 TABLET | Refills: 0 | OUTPATIENT
Start: 2022-10-26

## 2022-10-26 NOTE — TELEPHONE ENCOUNTER
Refill per VO of Dr. Juarez Yadav  Last appt: 10/11/2022  Future Appointments   Date Time Provider Mary May   10/27/2022 To Be Determined [facility] [address]   11/3/2022 To Be Determined [facility] [address]   11/10/2022 To Be Determined [facility] [address]   11/17/2022 To Be Determined [facility] [address]   11/21/2022 To Be Determined Rosario Hernandez RN [facility]   11/30/2022  1:20 PM Bonny Recio MD [facility]       Requested Prescriptions     Pending Prescriptions Disp Refills    folic acid (FOLVITE) 1 mg tablet 30 Tablet 0     Sig: Take 1 Tablet by mouth daily.

## 2022-10-26 NOTE — TELEPHONE ENCOUNTER
Patient's sister Cherelle Daigle is calling because she has questions about the patient's prescriptions Vitamin B 12 Folic  acid 1 mg. Patient received these medications when he was in the hospital.pharmacy is confirmed.       [phone] Cherelle Daigle (sister)

## 2022-10-27 ENCOUNTER — HOME CARE VISIT (OUTPATIENT)
Dept: SCHEDULING | Facility: HOME HEALTH | Age: 62
End: 2022-10-27
Payer: MEDICAID

## 2022-10-27 VITALS
RESPIRATION RATE: 16 BRPM | OXYGEN SATURATION: 98 % | TEMPERATURE: 97.9 F | DIASTOLIC BLOOD PRESSURE: 76 MMHG | SYSTOLIC BLOOD PRESSURE: 138 MMHG | HEART RATE: 81 BPM

## 2022-10-27 PROCEDURE — 400013 HH SOC

## 2022-10-27 PROCEDURE — G0300 HHS/HOSPICE OF LPN EA 15 MIN: HCPCS

## 2022-10-27 NOTE — TELEPHONE ENCOUNTER
Vaccine Information Statement(s) for was given today. This has been reviewed, questions answered, and verbal consent given by Patient for injection(s) and administration of Influenza (Inactivated).     R/t call to pt's sister (HIPAA approved),  LVM; would be more appropriate for PCP to fill these meds as they are not cardiac.

## 2022-11-03 ENCOUNTER — HOME CARE VISIT (OUTPATIENT)
Dept: SCHEDULING | Facility: HOME HEALTH | Age: 62
End: 2022-11-03
Payer: MEDICAID

## 2022-11-03 VITALS
RESPIRATION RATE: 16 BRPM | SYSTOLIC BLOOD PRESSURE: 130 MMHG | DIASTOLIC BLOOD PRESSURE: 76 MMHG | WEIGHT: 151 LBS | BODY MASS INDEX: 24.37 KG/M2 | OXYGEN SATURATION: 98 % | TEMPERATURE: 97.1 F | HEART RATE: 74 BPM

## 2022-11-03 PROCEDURE — G0300 HHS/HOSPICE OF LPN EA 15 MIN: HCPCS

## 2022-11-07 ENCOUNTER — TRANSCRIBE ORDER (OUTPATIENT)
Dept: SCHEDULING | Age: 62
End: 2022-11-07

## 2022-11-07 DIAGNOSIS — S09.90XD INJURY OF HEAD, SUBSEQUENT ENCOUNTER: Primary | ICD-10-CM

## 2022-11-07 DIAGNOSIS — H57.02 PUPILS OF DIFFERENT SIZE: ICD-10-CM

## 2022-11-09 ENCOUNTER — HOME CARE VISIT (OUTPATIENT)
Dept: SCHEDULING | Facility: HOME HEALTH | Age: 62
End: 2022-11-09
Payer: MEDICAID

## 2022-11-09 VITALS
RESPIRATION RATE: 16 BRPM | HEART RATE: 60 BPM | OXYGEN SATURATION: 98 % | SYSTOLIC BLOOD PRESSURE: 140 MMHG | DIASTOLIC BLOOD PRESSURE: 64 MMHG | BODY MASS INDEX: 24.34 KG/M2 | WEIGHT: 150.8 LBS

## 2022-11-09 PROCEDURE — G0300 HHS/HOSPICE OF LPN EA 15 MIN: HCPCS

## 2022-11-17 ENCOUNTER — HOME CARE VISIT (OUTPATIENT)
Dept: SCHEDULING | Facility: HOME HEALTH | Age: 62
End: 2022-11-17
Payer: MEDICAID

## 2022-11-17 PROCEDURE — G0300 HHS/HOSPICE OF LPN EA 15 MIN: HCPCS

## 2022-11-22 ENCOUNTER — HOME CARE VISIT (OUTPATIENT)
Dept: SCHEDULING | Facility: HOME HEALTH | Age: 62
End: 2022-11-22
Payer: MEDICAID

## 2022-11-22 ENCOUNTER — HOME CARE VISIT (OUTPATIENT)
Dept: HOME HEALTH SERVICES | Facility: HOME HEALTH | Age: 62
End: 2022-11-22
Payer: MEDICAID

## 2022-12-13 ENCOUNTER — OFFICE VISIT (OUTPATIENT)
Dept: CARDIOLOGY CLINIC | Age: 62
End: 2022-12-13
Payer: MEDICAID

## 2022-12-13 VITALS
SYSTOLIC BLOOD PRESSURE: 140 MMHG | HEART RATE: 84 BPM | WEIGHT: 147 LBS | HEIGHT: 66 IN | BODY MASS INDEX: 23.63 KG/M2 | DIASTOLIC BLOOD PRESSURE: 78 MMHG

## 2022-12-13 DIAGNOSIS — N18.32 ANEMIA DUE TO STAGE 3B CHRONIC KIDNEY DISEASE (HCC): ICD-10-CM

## 2022-12-13 DIAGNOSIS — I48.0 PAF (PAROXYSMAL ATRIAL FIBRILLATION) (HCC): ICD-10-CM

## 2022-12-13 DIAGNOSIS — D63.1 ANEMIA DUE TO STAGE 3B CHRONIC KIDNEY DISEASE (HCC): ICD-10-CM

## 2022-12-13 DIAGNOSIS — I10 HTN (HYPERTENSION), BENIGN: Primary | ICD-10-CM

## 2022-12-13 PROCEDURE — 3074F SYST BP LT 130 MM HG: CPT | Performed by: SPECIALIST

## 2022-12-13 PROCEDURE — 3078F DIAST BP <80 MM HG: CPT | Performed by: SPECIALIST

## 2022-12-13 PROCEDURE — 99214 OFFICE O/P EST MOD 30 MIN: CPT | Performed by: SPECIALIST

## 2022-12-13 RX ORDER — CARVEDILOL 12.5 MG/1
12.5 TABLET ORAL 2 TIMES DAILY WITH MEALS
Qty: 180 TABLET | Refills: 3 | Status: SHIPPED | OUTPATIENT
Start: 2022-12-13

## 2022-12-13 RX ORDER — FUROSEMIDE 40 MG/1
TABLET ORAL 2 TIMES DAILY
COMMUNITY

## 2022-12-13 NOTE — PROGRESS NOTES
Visit Vitals  BP (!) 160/82   Pulse 84   Ht 5' 6\" (1.676 m)   Wt 147 lb (66.7 kg)   BMI 23.73 kg/m²

## 2022-12-13 NOTE — PROGRESS NOTES
Jazmine Molina MD. [facility] - [address] [place] [address], [address] [place] [address] [phone]; Fax [phone]      Patient: Brooke Hayden. : 1960      Today's Date: 2022      HISTORY OF PRESENT ILLNESS:     History of Present Illness:    Recent DC summary noted :  58 yo hx of HTN, DM, CKD 3, melanoma, presented w/ resp failure, DKA, LAVINIA on . Hospital course significant for new dialysis on 22, afib RVR on 09/15/22, delirium     1) LAVINIA/CKD 3: likely has ESRD. HD was started on . PD cath placed on  by Vascular. Renal will transition to PD as an outpatient. CM working on logistics, home health   2) Afib RVR: hx of Pafib. Occurred on 09/15, now resolved. Echo with EF 65-70%. Cont coreg. No anticoagulation due to anemia. Cards was following  3) Acute resp failure/hypoxia: now resolved. Due to volume overload, DKA. Intubated on admission, extubated on   4) Acute met encephalopathy: delirium likely from hypoxia, acidosis. Now much improved. No need for seroquel on discharge  5) HTN: cont coreg  6) DM type 2 w/ ESRD: A1C 6.6%. stopped metformin. Cont Lantus   7) Anemia: due to ESRD and low Vit B12/folate. S/p 2u RBCs. Cont vitamins      On 10/11/22 - says he feels fine - stomach sore from surgery. No CP or heart racing. Class 2 GORDILLO. On 22 - denies chest pain, SOB, palpitations, lightheadedness/dizziness. Had some lower extremity edema and discussed with nephrology to take lasix 40 mg BID. This has helped.        PAST MEDICAL HISTORY:     Past Medical History:   Diagnosis Date    Anemia     Atrial fibrillation (HCC)     Chronic kidney disease (CKD), stage IV (severe) (HCC)     ESRD (end stage renal disease) ([address]     Hypercholesteremia     Hypertension     Melanoma ([address]     skin    Thyroid activity decreased            Past Surgical History:   Procedure Laterality Date    HX ORTHOPAEDIC      IR INSERT NON TUNL CVC OVER 5 YRS 9/6/2022               FAMILY HISTORY:     History reviewed. No pertinent family history. SOCIAL HISTORY:     Social History     Tobacco Use    Smoking status: Never    Smokeless tobacco: Never   Substance Use Topics    Alcohol use: Not Currently    Drug use: Yes     Frequency: 6.0 times per week     Types: Marijuana               REVIEW OF SYMPTOMS:     Review of Symptoms:  Constitutional: Negative for fever, chills  HEENT: Negative for nosebleeds, tinnitus, and vision changes. Respiratory: Negative for cough, wheezing  Cardiovascular: Negative for orthopnea, claudication, syncope, and PND. Gastrointestinal: Negative for abdominal pain, diarrhea, melena. Genitourinary: Negative for dysuria  Musculoskeletal: Negative for myalgias. Skin: Negative for rash  Heme: No problems bleeding. Neurological: Negative for speech change and focal weakness. PHYSICAL EXAM:     Physical Exam:  Visit Vitals  BP (!) 140/78   Pulse 84   Ht 5' 6\" (1.676 m)   Wt 147 lb (66.7 kg)   BMI 23.73 kg/m²       Patient appears generally well, mood and affect are appropriate and pleasant. HEENT:  Hearing intact, non-icteric, normocephalic, atraumatic. Neck Exam: Supple  Lung Exam: Clear to auscultation, even breath sounds. Cardiac Exam: Regular rate and rhythm with no murmur or rub  Abdomen: Soft, non-tender  Extremities: Moves all ext well. No lower extremity edema.   MSKTL: Overall good ROM ext  Skin: No significant rashes  Psych: Appropriate affect  Neuro - Grossly intact      LABS / OTHER STUDIES reviewed:     Lab Results   Component Value Date/Time    Sodium 142 09/29/2022 01:55 PM    Potassium 3.1 (L) 09/29/2022 01:55 PM    Chloride 104 09/29/2022 01:55 PM    CO2 31 09/29/2022 01:55 PM    Anion gap 7 09/29/2022 01:55 PM    Glucose 102 (H) 09/29/2022 01:55 PM    BUN 32 (H) 09/29/2022 01:55 PM    Creatinine 6.30 (H) 09/29/2022 01:55 PM    BUN/Creatinine ratio 5 (L) 09/29/2022 01:55 PM    GFR est AA 11 (L) 09/29/2022 01:55 PM    GFR est non-AA 9 (L) 09/29/2022 01:55 PM    Calcium 8.8 09/29/2022 01:55 PM    Bilirubin, total 0.6 09/09/2022 03:39 PM    Alk. phosphatase 63 09/09/2022 03:39 PM    Protein, total 5.0 (L) 09/09/2022 03:39 PM    Albumin 2.0 (L) 09/09/2022 03:39 PM    Globulin 3.0 09/09/2022 03:39 PM    A-G Ratio 0.7 (L) 09/09/2022 03:39 PM    ALT (SGPT) 12 09/09/2022 03:39 PM    AST (SGOT) 27 09/09/2022 03:39 PM     Lab Results   Component Value Date/Time    WBC 6.3 09/29/2022 01:55 PM    HGB 8.6 (L) 09/29/2022 01:55 PM    HCT 26.1 (L) 09/29/2022 01:55 PM    PLATELET 518 22/46/0397 01:55 PM    MCV 95.6 09/29/2022 01:55 PM       Labs 10/31/22  - Na 144, K 4.7, BUN 11, Cr 1.79, Hgb 10.9, HCT 32      CARDIAC DIAGNOSTICS:     Cardiac Evaluation Includes:    EKG 9/6: normal sinus  EKG 9/9: afib w/ RVR  EKG 9/9: sinus tach w/ PACs  Tele 9/15/22 - Afib with RVR  EKG 10/11/22 - NSR      ECHO 9/9/22: EF of 65 - 70%  Event Monitor 10/19/22 - wore it 27 days - NSR, Avg HR 76 bpm  - No afib         ASSESSMENT AND PLAN:     Assessment and Plan:  56 yo hx of HTN, DM, CKD 3, melanoma, presented w/ resp failure, DKA, LAVINIA on 9/22. Hospital course significant for new dialysis on 09/07/22, afib RVR on 09/15/22, delirium      1) Afib with RVR during hospital stay 9/22in setting of acute illness   - Had transient Afib with RVR 9/9/22 (spont converted to NSR)   - On 9/15/22 - back into afib with RVR  - went back into NSR prior to DC 9/23/22  - He was not anticoagulated 9/22 due to anemia / bleeding risk   - is in sinus in office 10/11/22   - Event Monitor 10/19/22 - wore it 27 days - NSR, Avg HR 76 bpm - No Afib      2) Elevated troponin 9/22: Likely type 2 in s/o supply demand mismatch in setting of afib RVR -- can perform CAD testing later as needed      3) HTN  - On 12/13/22 - BP looks better at home - SBP at home ranging from 110-140 with many readings < 130.   - will continue regimen as above (increase Coreg to 12.5 BID). He takes clonidine PRN for SBP  > 130    ---> can increase Nifedipine later as needed. 4) Severe CKD and ESRD   - He was on dialysis briefly - PD catheter removed 10/11/22 (was kinked) and off of dialysis now - Says he makes good urine   - following labs with Nephrology    5) Anemia   - due to ESRD and low Vit B12/folate. S/p 2u RBCs during 9/22 admission   - per PCP     6) See Joaquín Snyder in 3 months. 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 Son lives with him. Kareen Lauren MD, [address], [address]  [place] [address]  [address]  [address]  Ph: [phone]   Ph [phone]

## 2023-02-21 NOTE — PROGRESS NOTES
[address] [facility] [address] [place] [address], [address] [facility] [phone]; Fax [phone]      Patient: Con Roman. : 1960    Primary Cardiologist: Mauricio Cruz MD. [facility] [place]  Last Office Visit: 22      Today's Date: 3/14/2023      HISTORY OF PRESENT ILLNESS:     History of Present Illness:    Doing well. He has been off of PD since December. He is having trouble with arthritis and being followed by rheumatology for this. He tells me he had multiple xrays recently. His BP log shows at home /70s. Denies chest pain, edema, syncope, shortness of breath at rest, dyspnea on exertion. Has no tachycardia, palpitations or sense of arrhythmia. From previous OV \"Recent DC summary noted :  58 yo hx of HTN, DM, CKD 3, melanoma, presented w/ resp failure, DKA, LAVINIA on . Hospital course significant for new dialysis on 22, afib RVR on 09/15/22, delirium     1) LAVINIA/CKD 3: likely has ESRD. HD was started on . PD cath placed on  by Vascular. Renal will transition to PD as an outpatient. CM working on logistics, home health   2) Afib RVR: hx of Pafib. Occurred on 09/15, now resolved. Echo with EF 65-70%. Cont coreg. No anticoagulation due to anemia. Cards was following  3) Acute resp failure/hypoxia: now resolved. Due to volume overload, DKA. Intubated on admission, extubated on   4) Acute met encephalopathy: delirium likely from hypoxia, acidosis. Now much improved. No need for seroquel on discharge  5) HTN: cont coreg  6) DM type 2 w/ ESRD: A1C 6.6%. stopped metformin. Cont Lantus   7) Anemia: due to ESRD and low Vit B12/folate. S/p 2u RBCs. Cont vitamins      On 10/11/22 - says he feels fine - stomach sore from surgery. No CP or heart racing. Class 2 GORDILLO. On 22 - denies chest pain, SOB, palpitations, lightheadedness/dizziness. Had some lower extremity edema and discussed with nephrology to take lasix 40 mg BID. This has helped. \"      PAST MEDICAL HISTORY:     Past Medical History:   Diagnosis Date    Anemia     Atrial fibrillation (HCC)     Chronic kidney disease (CKD), stage IV (severe) (HCC)     ESRD (end stage renal disease) ([facility]     Hypercholesteremia     Hypertension     Melanoma ([facility]     skin    Thyroid activity decreased            Past Surgical History:   Procedure Laterality Date    HX ORTHOPAEDIC      IR INSERT NON TUNL CVC OVER 5 YRS  2022               FAMILY HISTORY:     History reviewed. No pertinent family history. SOCIAL HISTORY:     Social History     Tobacco Use    Smoking status: Former     Types: Cigarettes     Quit date:      Years since quittin.2    Smokeless tobacco: Never   Substance Use Topics    Alcohol use: Not Currently    Drug use: Yes     Frequency: 6.0 times per week     Types: Marijuana               REVIEW OF SYMPTOMS:     Review of Symptoms:  Constitutional: Negative for fever, chills  HEENT: Negative for nosebleeds, tinnitus, and vision changes. Respiratory: Negative for cough, wheezing  Cardiovascular: Negative for orthopnea, claudication, syncope, and PND. Gastrointestinal: Negative for abdominal pain, diarrhea, melena. Genitourinary: Negative for dysuria  Musculoskeletal: Negative for myalgias. Skin: Negative for rash  Heme: No problems bleeding. Neurological: Negative for speech change and focal weakness. PHYSICAL EXAM:     Physical Exam:  Visit Vitals  /70 (BP 1 Location: Left upper arm, BP Patient Position: Sitting, BP Cuff Size: Large adult)   Pulse 78   Ht 5' 6\" (1.676 m)   Wt 158 lb (71.7 kg)   SpO2 96%   BMI 25.50 kg/m²         Patient appears generally well, mood and affect are appropriate and pleasant. HEENT:  Hearing intact, non-icteric, normocephalic, atraumatic. Neck Exam: Supple  Lung Exam: coarse to auscultation, even breath sounds.    Cardiac Exam: Regular rate and rhythm with no murmur or rub  Abdomen: Soft, non-tender  Extremities: Moves all ext well. No lower extremity edema. MSKTL: Overall good ROM ext  Skin: No significant rashes  Psych: Appropriate affect  Neuro - Grossly intact      LABS / OTHER STUDIES reviewed:     Lab Results   Component Value Date/Time    Sodium 142 09/29/2022 01:55 PM    Potassium 3.1 (L) 09/29/2022 01:55 PM    Chloride 104 09/29/2022 01:55 PM    CO2 31 09/29/2022 01:55 PM    Anion gap 7 09/29/2022 01:55 PM    Glucose 102 (H) 09/29/2022 01:55 PM    BUN 32 (H) 09/29/2022 01:55 PM    Creatinine 6.30 (H) 09/29/2022 01:55 PM    BUN/Creatinine ratio 5 (L) 09/29/2022 01:55 PM    GFR est AA 11 (L) 09/29/2022 01:55 PM    GFR est non-AA 9 (L) 09/29/2022 01:55 PM    Calcium 8.8 09/29/2022 01:55 PM    Bilirubin, total 0.6 09/09/2022 03:39 PM    Alk. phosphatase 63 09/09/2022 03:39 PM    Protein, total 5.0 (L) 09/09/2022 03:39 PM    Albumin 2.0 (L) 09/09/2022 03:39 PM    Globulin 3.0 09/09/2022 03:39 PM    A-G Ratio 0.7 (L) 09/09/2022 03:39 PM    ALT (SGPT) 12 09/09/2022 03:39 PM    AST (SGOT) 27 09/09/2022 03:39 PM     Lab Results   Component Value Date/Time    WBC 6.3 09/29/2022 01:55 PM    HGB 8.6 (L) 09/29/2022 01:55 PM    HCT 26.1 (L) 09/29/2022 01:55 PM    PLATELET 835 66/49/6814 01:55 PM    MCV 95.6 09/29/2022 01:55 PM       Labs 10/31/22  - Na 144, K 4.7, BUN 11, Cr 1.79, Hgb 10.9, HCT 32    Labs 1/6/23 - Na 138, K 3.8, BUN 24, Cr 1.68,     CARDIAC DIAGNOSTICS:     Cardiac Evaluation Includes:    EKG 9/6: normal sinus  EKG 9/9: afib w/ RVR  EKG 9/9: sinus tach w/ PACs  Tele 9/15/22 - Afib with RVR  EKG 10/11/22 - NSR      ECHO 9/9/22: EF of 65 - 70%  Event Monitor 10/19/22 - wore it 27 days - NSR, Avg HR 76 bpm  - No afib         ASSESSMENT AND PLAN:     Assessment and Plan:  58 yo hx of HTN, DM, CKD 3, melanoma, presented w/ resp failure, DKA, LAVINIA on 9/22.   Hospital course significant for new dialysis on 09/07/22, afib RVR on 09/15/22, delirium      1) Afib with RVR during hospital stay 9/22in setting of acute illness - Had transient Afib with RVR 9/9/22 (spont converted to NSR)   - On 9/15/22 - back into afib with RVR  - went back into NSR prior to DC 9/23/22  - He was not anticoagulated 9/22 due to anemia / bleeding risk   - is in sinus in office 10/11/22   - Event Monitor 10/19/22 - wore it 27 days - NSR, Avg HR 76 bpm - No Afib      2) Elevated troponin 9/22: Likely type 2 in s/o supply demand mismatch in setting of afib RVR -- can perform CAD testing later as needed      3) HTN  - On 12/13/22 - BP looks better at home - SBP at home ranging from 110-140 with many readings < 130.   - will continue regimen as above (increase Coreg to 12.5 BID). He takes clonidine PRN for SBP  > 130  -   - takes clonidine rarely, BP at home 120s/70s    4) Severe CKD and ESRD   - He was on dialysis briefly - PD catheter removed 10/11/22 (was kinked) and off of dialysis now - Says he makes good urine   - following labs with Nephrology    5) Anemia   - due to ESRD and low Vit B12/folate. S/p 2u RBCs during 9/22 admission   - per PCP     See Dr. 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 Son lives with him. Greta Christine, MORGAN    [address], [address]  [place] [address]  [address]  [place], [address] [place] [address]  Ph: [phone]   Ph [phone]

## 2023-03-14 ENCOUNTER — TELEPHONE (OUTPATIENT)
Dept: CARDIOLOGY CLINIC | Age: 63
End: 2023-03-14

## 2023-03-14 ENCOUNTER — OFFICE VISIT (OUTPATIENT)
Dept: CARDIOLOGY CLINIC | Age: 63
End: 2023-03-14
Payer: MEDICAID

## 2023-03-14 VITALS
DIASTOLIC BLOOD PRESSURE: 70 MMHG | HEART RATE: 78 BPM | WEIGHT: 158 LBS | BODY MASS INDEX: 25.39 KG/M2 | SYSTOLIC BLOOD PRESSURE: 128 MMHG | OXYGEN SATURATION: 96 % | HEIGHT: 66 IN

## 2023-03-14 DIAGNOSIS — D63.1 ANEMIA DUE TO STAGE 3B CHRONIC KIDNEY DISEASE (HCC): ICD-10-CM

## 2023-03-14 DIAGNOSIS — I48.0 PAF (PAROXYSMAL ATRIAL FIBRILLATION) (HCC): ICD-10-CM

## 2023-03-14 DIAGNOSIS — N18.32 ANEMIA DUE TO STAGE 3B CHRONIC KIDNEY DISEASE (HCC): ICD-10-CM

## 2023-03-14 DIAGNOSIS — I10 HTN (HYPERTENSION), BENIGN: Primary | ICD-10-CM

## 2023-03-14 PROCEDURE — 1123F ACP DISCUSS/DSCN MKR DOCD: CPT

## 2023-03-14 PROCEDURE — 3078F DIAST BP <80 MM HG: CPT

## 2023-03-14 PROCEDURE — 3074F SYST BP LT 130 MM HG: CPT

## 2023-03-14 PROCEDURE — 99214 OFFICE O/P EST MOD 30 MIN: CPT

## 2023-03-14 RX ORDER — PREDNISONE 10 MG/1
TABLET ORAL
COMMUNITY
Start: 2023-03-02

## 2023-03-14 RX ORDER — POTASSIUM CHLORIDE 20 MEQ/1
20 TABLET, EXTENDED RELEASE ORAL DAILY
COMMUNITY
Start: 2023-02-01

## 2023-03-14 RX ORDER — GABAPENTIN 300 MG/1
300 CAPSULE ORAL DAILY
COMMUNITY
Start: 2023-03-06

## 2023-03-14 NOTE — TELEPHONE ENCOUNTER
Pt's sister is calling to give the NP Nobie Pu add medication that needs to be on the patient's list.    Potassium ER mEq 20 mg once a day  Prednisone but not sure of what dosage because they have picked up from the pharmacy.     [phone]

## 2023-03-14 NOTE — LETTER
3/14/2023    Patient: Fonnie Gaucher. YOB: 1960   Date of Visit: 3/14/2023     David Rendon NP  [address] [facility] [address]  Via Fax: [phone]    Dear David Rendon NP,      Thank you for referring Mr. Aamir St to [address] [place] [address] for evaluation. My notes for this consultation are attached. If you have questions, please do not hesitate to call me. I look forward to following your patient along with you.       Sincerely,    FABIAN Coronado

## 2023-03-14 NOTE — PROGRESS NOTES
Chief Complaint   Patient presents with    Follow-up     3 months; Ragena Nails pt    Hypertension    Irregular Heart Beat     PAF     Vitals:    03/14/23 1058   BP: 128/70   BP 1 Location: Left upper arm   BP Patient Position: Sitting   BP Cuff Size: Large adult   Pulse: 78   Height: 5' 6\" (1.676 m)   Weight: 158 lb (71.7 kg)   SpO2: 96%     Chest pain denied   SOB denied   Palpitations denied   Swelling in hands/feet denied   Dizziness denied   Recent hospital stays denied   Refills denied     Pt stated that the Rheumatologist started him on the following:  Prednisone (finished Sunday?)  Luflunomide  Gabapentin for RA    Added MD's to care team:  Saundra Butler cancer surgery  Stiven lalfeur  [facility]  [facility]

## 2023-03-24 RX ORDER — CLONIDINE HYDROCHLORIDE 0.1 MG/1
0.1 TABLET ORAL
Qty: 90 TABLET | Refills: 2 | OUTPATIENT
Start: 2023-03-24

## 2023-03-24 RX ORDER — CLONIDINE HYDROCHLORIDE 0.1 MG/1
0.1 TABLET ORAL
Qty: 90 TABLET | Refills: 2 | Status: SHIPPED | OUTPATIENT
Start: 2023-03-24

## 2023-03-24 NOTE — TELEPHONE ENCOUNTER
Refill per VO of Dr. Piyush Guerra  Last appt: 3/14/23 Jose Sawyer   Future Appointments   Date Time Provider [place]   9/14/2023  9:00 AM Leslie Lal MD [facility] AMB       Requested Prescriptions     Pending Prescriptions Disp Refills    cloNIDine HCL (CATAPRES) 0.1 mg tablet [Pharmacy Med Name: cloNIDine HCl 0.1 MG Oral Tablet] 60 Tablet 0     Sig: Take 1 tablet by mouth twice daily

## 2023-03-24 NOTE — TELEPHONE ENCOUNTER
Pt's sister is calling because the patient needs a refill on his clonidine hcl 0.1 mg. Pharmacy is confirmed.     [phone] Mela Zabala (sister)

## 2023-09-14 ENCOUNTER — OFFICE VISIT (OUTPATIENT)
Age: 63
End: 2023-09-14
Payer: MEDICARE

## 2023-09-14 VITALS
SYSTOLIC BLOOD PRESSURE: 134 MMHG | HEART RATE: 88 BPM | BODY MASS INDEX: 24.75 KG/M2 | WEIGHT: 154 LBS | DIASTOLIC BLOOD PRESSURE: 80 MMHG | OXYGEN SATURATION: 94 % | HEIGHT: 66 IN

## 2023-09-14 DIAGNOSIS — E78.00 HYPERCHOLESTEREMIA: ICD-10-CM

## 2023-09-14 DIAGNOSIS — I10 HTN (HYPERTENSION), BENIGN: Primary | ICD-10-CM

## 2023-09-14 DIAGNOSIS — I48.0 PAROXYSMAL ATRIAL FIBRILLATION (HCC): ICD-10-CM

## 2023-09-14 PROCEDURE — 99214 OFFICE O/P EST MOD 30 MIN: CPT | Performed by: SPECIALIST

## 2023-09-14 PROCEDURE — 1123F ACP DISCUSS/DSCN MKR DOCD: CPT | Performed by: SPECIALIST

## 2023-09-14 PROCEDURE — G8420 CALC BMI NORM PARAMETERS: HCPCS | Performed by: SPECIALIST

## 2023-09-14 PROCEDURE — G8427 DOCREV CUR MEDS BY ELIG CLIN: HCPCS | Performed by: SPECIALIST

## 2023-09-14 PROCEDURE — 1036F TOBACCO NON-USER: CPT | Performed by: SPECIALIST

## 2023-09-14 PROCEDURE — 3075F SYST BP GE 130 - 139MM HG: CPT | Performed by: SPECIALIST

## 2023-09-14 PROCEDURE — 93010 ELECTROCARDIOGRAM REPORT: CPT | Performed by: SPECIALIST

## 2023-09-14 PROCEDURE — 3079F DIAST BP 80-89 MM HG: CPT | Performed by: SPECIALIST

## 2023-09-14 PROCEDURE — 93005 ELECTROCARDIOGRAM TRACING: CPT | Performed by: SPECIALIST

## 2023-09-14 PROCEDURE — 3017F COLORECTAL CA SCREEN DOC REV: CPT | Performed by: SPECIALIST

## 2023-09-14 RX ORDER — SEMAGLUTIDE 0.68 MG/ML
INJECTION, SOLUTION SUBCUTANEOUS WEEKLY
COMMUNITY
Start: 2023-07-06

## 2023-09-14 RX ORDER — NIFEDIPINE 90 MG/1
90 TABLET, EXTENDED RELEASE ORAL DAILY
Qty: 90 TABLET | Refills: 3 | Status: SHIPPED | OUTPATIENT
Start: 2023-09-14

## 2023-09-14 RX ORDER — ALBUTEROL SULFATE 90 UG/1
AEROSOL, METERED RESPIRATORY (INHALATION)
COMMUNITY
Start: 2023-08-21

## 2023-09-14 NOTE — PATIENT INSTRUCTIONS
talk to your doctor before you take decongestants or anti-inflammatory medicine, such as ibuprofen. Some of these medicines can raise blood pressure. Learn how to check your blood pressure at home. Lifestyle changes  Stay at a healthy weight. This is especially important if you put on weight around the waist. Losing even 10 pounds can help you lower your blood pressure. If your doctor recommends it, get more exercise. Walking is a good choice. Bit by bit, increase the amount you walk every day. Try for at least 30 minutes on most days of the week. You also may want to swim, bike, or do other activities. Avoid or limit alcohol. Talk to your doctor about whether you can drink any alcohol. Try to limit how much sodium you eat to less than 2,300 milligrams (mg) a day. Your doctor may ask you to try to eat less than 1,500 mg a day. Eat plenty of fruits (such as bananas and oranges), vegetables, legumes, whole grains, and low-fat dairy products. Lower the amount of saturated fat in your diet. Saturated fat is found in animal products such as milk, cheese, and meat. Limiting these foods may help you lose weight and also lower your risk for heart disease. Do not smoke. Smoking increases your risk for heart attack and stroke. If you need help quitting, talk to your doctor about stop-smoking programs and medicines. These can increase your chances of quitting for good. When should you call for help? Call 911  anytime you think you may need emergency care. This may mean having symptoms that suggest that your blood pressure is causing a serious heart or blood vessel problem. Your blood pressure may be over 180/120. For example, call 911 if:    You have symptoms of a heart attack. These may include:  Chest pain or pressure, or a strange feeling in the chest.  Sweating. Shortness of breath. Nausea or vomiting.   Pain, pressure, or a strange feeling in the back, neck, jaw, or upper belly or in one or both shoulders or

## 2023-10-16 ENCOUNTER — APPOINTMENT (OUTPATIENT)
Facility: HOSPITAL | Age: 63
End: 2023-10-16
Payer: MEDICARE

## 2023-10-16 ENCOUNTER — HOSPITAL ENCOUNTER (EMERGENCY)
Facility: HOSPITAL | Age: 63
Discharge: HOME | End: 2023-10-16
Attending: STUDENT IN AN ORGANIZED HEALTH CARE EDUCATION/TRAINING PROGRAM
Payer: MEDICARE

## 2023-10-16 VITALS
BODY MASS INDEX: 25.83 KG/M2 | HEIGHT: 65 IN | RESPIRATION RATE: 18 BRPM | DIASTOLIC BLOOD PRESSURE: 89 MMHG | OXYGEN SATURATION: 97 % | SYSTOLIC BLOOD PRESSURE: 141 MMHG | TEMPERATURE: 97.9 F | WEIGHT: 155 LBS | HEART RATE: 97 BPM
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
## 2023-10-16 DIAGNOSIS — M79.601 RIGHT ARM PAIN: Primary | ICD-10-CM

## 2023-10-16 PROCEDURE — 99283 EMERGENCY DEPT VISIT LOW MDM: CPT

## 2023-10-16 PROCEDURE — 73090 X-RAY EXAM OF FOREARM: CPT

## 2023-10-16 PROCEDURE — 73080 X-RAY EXAM OF ELBOW: CPT

## 2023-10-16 ASSESSMENT — PAIN - FUNCTIONAL ASSESSMENT: PAIN_FUNCTIONAL_ASSESSMENT: NONE - DENIES PAIN

## 2023-10-16 ASSESSMENT — ENCOUNTER SYMPTOMS
COUGH: 0
SORE THROAT: 0
VOMITING: 0
ABDOMINAL PAIN: 0
EYE PAIN: 0
SHORTNESS OF BREATH: 0
DIARRHEA: 0
NAUSEA: 0

## 2023-10-16 NOTE — ED TRIAGE NOTES
Pt arrives from home with chief c/o fall, resulting in right arm pain. Pt arrives with decreased ROM to right arm. Pt denies any pain at this time. Pt reports hitting his head during the fall with unknown LOC.

## 2023-10-16 NOTE — ED PROVIDER NOTES
[facility] EMERGENCY DEPT  EMERGENCY DEPARTMENT ENCOUNTER      Pt Name: Liz Pena. MRN: [id number]  [address] 1960  Date of evaluation: 10/16/2023  Provider: Durga Cavazos       Chief Complaint   Patient presents with    Fall    Arm Pain         HISTORY OF PRESENT ILLNESS   (Location/Symptom, Timing/Onset, Context/Setting, Quality, Duration, Modifying Factors, Severity)  Note limiting factors. 53-year-old male presents to ED with right arm pain status post fall. Patient reports on Saturday, 10/14 he was walking through the [place] when he tripped over some barbed wire, landing on his right arm. He reports he hit his head on his right arm but denies LOC. Is not on blood thinners. He reports since then he has had pain in his right forearm and elbow. He notes reduced range of motion secondary to this pain. Denies any numbness, tingling, headache, vision changes, nausea or vomiting. Review of External Medical Records:     Nursing Notes were reviewed. REVIEW OF SYSTEMS    (2-9 systems for level 4, 10 or more for level 5)     Review of Systems   Constitutional:  Negative for chills and fever. HENT:  Negative for congestion, ear pain and sore throat. Eyes:  Negative for pain. Respiratory:  Negative for cough and shortness of breath. Cardiovascular:  Negative for chest pain. Gastrointestinal:  Negative for abdominal pain, diarrhea, nausea and vomiting. Genitourinary:  Negative for dysuria and flank pain. Musculoskeletal:  Negative for myalgias. Skin:  Negative for rash. Neurological:  Negative for dizziness and headaches. Hematological:  Negative for adenopathy. Except as noted above the remainder of the review of systems was reviewed and negative.        PAST MEDICAL HISTORY     Past Medical History:   Diagnosis Date    Anemia     Atrial fibrillation ([address])     Chronic kidney disease (CKD), stage IV (severe) (HCC)     ESRD (end stage renal

## 2023-10-16 NOTE — ED NOTES
Pt was discharged and given instructions by MD. Pt verbalized good understanding of all discharge instructions, and F/U care. All questions answered. Pt in stable condition on discharge.        Cindy Santamaria RN  10/16/23 5286

## 2023-10-17 ENCOUNTER — TELEPHONE (OUTPATIENT)
Age: 63
End: 2023-10-17

## 2023-10-17 RX ORDER — CARVEDILOL 12.5 MG/1
12.5 TABLET ORAL 2 TIMES DAILY WITH MEALS
Qty: 180 TABLET | Refills: 3 | Status: SHIPPED | OUTPATIENT
Start: 2023-10-17

## 2023-10-17 NOTE — TELEPHONE ENCOUNTER
Pt has been on 12.5 mg BID. \" 3) HTN   - On 12/13/22 - BP looks better at home - SBP at home ranging from 110-140 with many readings < 130.    - will continue regimen as above (increase Coreg to 12.5 BID).    He takes clonidine PRN for SBP  > 130  -    - takes clonidine rarely, BP at home 120s/70s\"    Refill per VO of Dr. Morales Conception  Last appt: 9/14/2023    Future Appointments   Date Time Provider [address] [place] [address]   3/14/2024  9:00 AM SHEELA Barger - SAUL ARAUJO       Requested Prescriptions     Signed Prescriptions Disp Refills    carvedilol (COREG) 12.5 MG tablet 180 tablet 3     Sig: Take 1 tablet by mouth 2 times daily (with meals)     Authorizing Provider: Colletta Sailor     Ordering User: Cara Suarez

## 2023-10-19 RX ORDER — CARVEDILOL 6.25 MG/1
6.25 TABLET ORAL 2 TIMES DAILY WITH MEALS
Qty: 180 TABLET | Refills: 0 | OUTPATIENT
Start: 2023-10-19

## 2023-10-19 NOTE — TELEPHONE ENCOUNTER
Refill per VO of Dr. Joe Young  Last appt: 9/14/2023    Future Appointments   Date Time Provider [facility] [address]   3/14/2024  9:00 AM Alba Patel, SHEELA - SAUL LUTZ AMB       Requested Prescriptions     Refused Prescriptions Disp Refills    carvedilol (COREG) 6.25 MG tablet [Pharmacy Med Name: Carvedilol 6.25 MG Oral Tablet] 180 tablet 0     Sig: TAKE 1 TABLET BY MOUTH TWICE DAILY WITH MEALS     Refused By: Mehul Salmon     Reason for Refusal: Request already responded to by other means (e.g. phone or fax)

## 2023-11-28 RX ORDER — CLONIDINE HYDROCHLORIDE 0.1 MG/1
TABLET ORAL
Qty: 90 TABLET | Refills: 3 | Status: SHIPPED | OUTPATIENT
Start: 2023-11-28

## 2023-11-28 NOTE — TELEPHONE ENCOUNTER
Refill per VO of Dr. Joe Young  Last appt: 9/14/2023    Future Appointments   Date Time Provider [address]   3/14/2024  9:00 AM SHEELA Wright NP       Requested Prescriptions     Signed Prescriptions Disp Refills    cloNIDine (CATAPRES) 0.1 MG tablet 90 tablet 3     Sig: TAKE 1 TABLET BY MOUTH TWICE DAILY AS NEEDED FOR  BLOOD  PRESSURE  130     Authorizing Provider: Sin Parra     Ordering User: Mehul Salmon

## 2024-03-14 ENCOUNTER — OFFICE VISIT (OUTPATIENT)
Age: 64
End: 2024-03-14
Payer: MEDICARE

## 2024-03-14 VITALS
BODY MASS INDEX: 24.59 KG/M2 | DIASTOLIC BLOOD PRESSURE: 80 MMHG | HEART RATE: 82 BPM | RESPIRATION RATE: 16 BRPM | SYSTOLIC BLOOD PRESSURE: 124 MMHG | OXYGEN SATURATION: 97 % | HEIGHT: 66 IN | WEIGHT: 153 LBS

## 2024-03-14 DIAGNOSIS — N18.32 CHRONIC KIDNEY DISEASE, STAGE 3B (HCC): ICD-10-CM

## 2024-03-14 DIAGNOSIS — D63.1 ANEMIA IN CHRONIC KIDNEY DISEASE (CODE): ICD-10-CM

## 2024-03-14 DIAGNOSIS — I10 HTN (HYPERTENSION), BENIGN: Primary | ICD-10-CM

## 2024-03-14 DIAGNOSIS — I48.0 PAROXYSMAL ATRIAL FIBRILLATION (HCC): ICD-10-CM

## 2024-03-14 DIAGNOSIS — E78.00 HYPERCHOLESTEREMIA: ICD-10-CM

## 2024-03-14 PROCEDURE — G8427 DOCREV CUR MEDS BY ELIG CLIN: HCPCS

## 2024-03-14 PROCEDURE — G8484 FLU IMMUNIZE NO ADMIN: HCPCS

## 2024-03-14 PROCEDURE — 3017F COLORECTAL CA SCREEN DOC REV: CPT

## 2024-03-14 PROCEDURE — 99214 OFFICE O/P EST MOD 30 MIN: CPT

## 2024-03-14 PROCEDURE — 3074F SYST BP LT 130 MM HG: CPT

## 2024-03-14 PROCEDURE — 1036F TOBACCO NON-USER: CPT

## 2024-03-14 PROCEDURE — G8420 CALC BMI NORM PARAMETERS: HCPCS

## 2024-03-14 PROCEDURE — 1123F ACP DISCUSS/DSCN MKR DOCD: CPT

## 2024-03-14 PROCEDURE — 3079F DIAST BP 80-89 MM HG: CPT

## 2024-03-14 RX ORDER — ALLOPURINOL 100 MG/1
1 TABLET ORAL DAILY
COMMUNITY
Start: 2023-07-10

## 2024-03-14 RX ORDER — COLCHICINE 0.6 MG/1
1 TABLET ORAL EVERY OTHER DAY
COMMUNITY
Start: 2023-07-10

## 2024-03-14 NOTE — PROGRESS NOTES
had concerns including Atrial Fibrillation, Hypertension, and Cholesterol Problem.    Vitals:    03/14/24 0850   BP: 124/80   Site: Left Upper Arm   Position: Sitting   Pulse: 82   Resp: 16   SpO2: 97%   Weight: 69.4 kg (153 lb)   Height: 1.676 m (5' 6\")        Chest pain No    Refills No        1. Have you been to the ER, urgent care clinic since your last visit? No       Hospitalized since your last visit? No       Where?        Date?  
fishing.     Dennise Shelton NP    [facility]  [facility]  [address] [facility], [address]  [address] [address]  [address] [address]  Ph: [phone]   Ph [phone]

## 2024-05-13 ENCOUNTER — OFFICE VISIT (OUTPATIENT)
Age: 64
End: 2024-05-13
Payer: MEDICARE

## 2024-05-13 VITALS
DIASTOLIC BLOOD PRESSURE: 84 MMHG | RESPIRATION RATE: 18 BRPM | HEART RATE: 78 BPM | BODY MASS INDEX: 24.59 KG/M2 | WEIGHT: 153 LBS | SYSTOLIC BLOOD PRESSURE: 131 MMHG | HEIGHT: 66 IN | TEMPERATURE: 97.7 F | OXYGEN SATURATION: 92 %

## 2024-05-13 DIAGNOSIS — R76.12 POSITIVE QUANTIFERON-TB GOLD TEST: Primary | ICD-10-CM

## 2024-05-13 DIAGNOSIS — M06.9 RHEUMATOID ARTHRITIS, INVOLVING UNSPECIFIED SITE, UNSPECIFIED WHETHER RHEUMATOID FACTOR PRESENT (HCC): ICD-10-CM

## 2024-05-13 DIAGNOSIS — C43.9 MALIGNANT MELANOMA, UNSPECIFIED SITE (HCC): ICD-10-CM

## 2024-05-13 PROCEDURE — 3017F COLORECTAL CA SCREEN DOC REV: CPT | Performed by: INTERNAL MEDICINE

## 2024-05-13 PROCEDURE — G8427 DOCREV CUR MEDS BY ELIG CLIN: HCPCS | Performed by: INTERNAL MEDICINE

## 2024-05-13 PROCEDURE — 3075F SYST BP GE 130 - 139MM HG: CPT | Performed by: INTERNAL MEDICINE

## 2024-05-13 PROCEDURE — G8420 CALC BMI NORM PARAMETERS: HCPCS | Performed by: INTERNAL MEDICINE

## 2024-05-13 PROCEDURE — 3079F DIAST BP 80-89 MM HG: CPT | Performed by: INTERNAL MEDICINE

## 2024-05-13 PROCEDURE — 99203 OFFICE O/P NEW LOW 30 MIN: CPT | Performed by: INTERNAL MEDICINE

## 2024-05-13 PROCEDURE — 1036F TOBACCO NON-USER: CPT | Performed by: INTERNAL MEDICINE

## 2024-05-13 PROCEDURE — 1123F ACP DISCUSS/DSCN MKR DOCD: CPT | Performed by: INTERNAL MEDICINE

## 2024-05-13 RX ORDER — SULFASALAZINE 500 MG/1
500 TABLET ORAL 2 TIMES DAILY
COMMUNITY
Start: 2024-03-06

## 2024-05-13 RX ORDER — BLOOD SUGAR DIAGNOSTIC
STRIP MISCELLANEOUS
COMMUNITY
Start: 2023-01-20

## 2024-05-13 NOTE — PROGRESS NOTES
Chief Complaint   Patient presents with    New Patient    PPD Reading     BP (!) 153/92 (Site: Left Upper Arm, Position: Sitting, Cuff Size: Medium Adult)   Pulse 78   Temp 97.7 °F (36.5 °C) (Oral)   Resp 18   Ht 1.676 m (5' 6\")   Wt 69.4 kg (153 lb)   SpO2 92%   BMI 24.69 kg/m²     /84 (Site: Left Upper Arm, Position: Sitting, Cuff Size: Medium Adult)   Pulse 78   Temp 97.7 °F (36.5 °C) (Oral)   Resp 18   Ht 1.676 m (5' 6\")   Wt 69.4 kg (153 lb)   SpO2 92%   BMI 24.69 kg/m²

## 2024-05-13 NOTE — PROGRESS NOTES
Subjective    Manjit Conklin Jr. is a 64 y.o. male    HPI  Patient with seropositive Rheumatoid arthritis and Gout treated DMARD Cimzia and scheduled to start Symponi.  His Quantiferon TB Gold assay is positive.  [facility].  Patient does not recall every being exposed to TB or having been tested for TB.  He denies any fever, night sweats, weight loss and no chronic respiratory symptoms. Symponi has been put on hold for now.  Patient had CT Chest in March 2024 prompted by his history of malignant melanoma (unable to see actual images).  It showed multiple of pulmonary nodules previously identified but no new nodules or masses and no cavitary changes.  He is accompanied by his sister.    Review of Systems   Constitutional: Negative.    HENT: Negative.     Eyes: Negative.    Respiratory: Negative.     Cardiovascular: Negative.    Gastrointestinal: Negative.    Endocrine: Negative.    Genitourinary: Negative.    Musculoskeletal:  Positive for arthralgias.   Skin: Negative.    Allergic/Immunologic: Positive for immunocompromised state (Cimzia-simponi).   Hematological: Negative.    Psychiatric/Behavioral: Negative.           Past Medical History:   Diagnosis Date    Anemia     Atrial fibrillation (HCC)     Chronic kidney disease (CKD), stage IV (severe) (HCC)     ESRD (end stage renal disease) (HCC)     Hypercholesteremia     Hypertension     Melanoma (HCC)     skin    Thyroid activity decreased         Past Surgical History:   Procedure Laterality Date    IR NONTUNNELED VASCULAR CATHETER  9/6/2022    IR NONTUNNELED VASCULAR CATHETER 9/6/2022 [facility]    IR NONTUNNELED VASCULAR CATHETER  9/6/2022    ORTHOPEDIC SURGERY          Vitals:    05/13/24 0924 05/13/24 0933   BP: (!) 153/92 131/84   Site: Left Upper Arm Left Upper Arm   Position: Sitting Sitting   Cuff Size: Medium Adult Medium Adult   Pulse: 78    Resp: 18    Temp: 97.7 °F (36.5 °C)    TempSrc: Oral    SpO2:

## 2024-05-30 ENCOUNTER — TELEPHONE (OUTPATIENT)
Age: 64
End: 2024-05-30

## 2024-05-30 NOTE — TELEPHONE ENCOUNTER
Patients sister called in and said dr sanches advised her to call dr culver to have prescription for \"rosampen\" called in with a four month supply to reduce TB. Patient and sister is a bit unclear on the exact next steps would also like a call back at [phone]. Tanvi galaviz is the sisters name and patient has given permission to speak with her

## 2024-06-03 ENCOUNTER — TELEPHONE (OUTPATIENT)
Age: 64
End: 2024-06-03

## 2024-06-03 NOTE — TELEPHONE ENCOUNTER
patients sister called in and wanted the prescription called in that was recommended to them on the last visit. Patients sister would like a phone call back at [phone] on next steps

## 2024-06-04 DIAGNOSIS — M06.9 RHEUMATOID ARTHRITIS, INVOLVING UNSPECIFIED SITE, UNSPECIFIED WHETHER RHEUMATOID FACTOR PRESENT (HCC): ICD-10-CM

## 2024-06-04 DIAGNOSIS — Z22.7 LATENT TUBERCULOSIS INFECTION: ICD-10-CM

## 2024-06-04 DIAGNOSIS — T36.6X4A: Primary | ICD-10-CM

## 2024-06-04 DIAGNOSIS — R76.12 POSITIVE QUANTIFERON-TB GOLD TEST: Primary | ICD-10-CM

## 2024-06-04 RX ORDER — RIFAMPIN 300 MG/1
300 CAPSULE ORAL 2 TIMES DAILY
Qty: 60 CAPSULE | Refills: 3 | Status: SHIPPED | OUTPATIENT
Start: 2024-06-04 | End: 2024-10-02

## 2024-06-04 NOTE — TELEPHONE ENCOUNTER
Patient with severe rheumatoid arthritis with planned intervention using DMARDs (Symponi) but has latent TB infection with positive Quantiferon TB Gold assay.  Seen in clinic and he is prepared to start chemophylaxis with Rifampin 600 mg po Daily for 4 months.  It was recommended that Symponi could be started after a month on Rifampin.   Orders for CMP (LFTs) in 1 month to assess for hepatotoxicity.

## 2024-06-04 NOTE — PROGRESS NOTES
Patient with severe rheumatoid arthritis with planned intervention using DMARDs (Symponi) but has latent TB infection with positive Quantiferon TB Gold assay.  Seen in clinic and he is prepared to start chemophylaxis with Rifampin 600 mg po Daily for 4 months.  It was recommended that Symponi could be started after a month on Rifampin.

## 2024-06-27 ENCOUNTER — CLINICAL DOCUMENTATION (OUTPATIENT)
Age: 64
End: 2024-06-27

## 2024-06-27 NOTE — PROGRESS NOTES
[facility].    Labs 1/4/24    Na- 141  BUN- 16  Creatinine- 1.66

## 2024-07-04 DIAGNOSIS — T36.6X4A: ICD-10-CM

## 2024-09-09 ENCOUNTER — TELEPHONE (OUTPATIENT)
Age: 64
End: 2024-09-09

## 2024-09-09 RX ORDER — NIFEDIPINE 90 MG/1
90 TABLET, EXTENDED RELEASE ORAL DAILY
Qty: 90 TABLET | Refills: 3 | Status: SHIPPED | OUTPATIENT
Start: 2024-09-09

## 2024-09-25 RX ORDER — CARVEDILOL 12.5 MG/1
12.5 TABLET ORAL 2 TIMES DAILY WITH MEALS
Qty: 180 TABLET | Refills: 3 | Status: SHIPPED | OUTPATIENT
Start: 2024-09-25

## 2024-10-01 NOTE — PROGRESS NOTES
Patient: Manjit Conklin Jr.  : 1960    Primary Cardiologist: Trudy Melchor MD. [facility]    Today's Date: 10/3/2024      HISTORY OF PRESENT ILLNESS:     History of Present Illness:  Presents today for follow-up.   Denies chest pain. Continues with shortness of breath, not any worse recently. Denies dizziness, edema, palpitations. Denies HA, vision changes.   BP at home 130-200 systolic. He is on medication for latent TB and is concerned this is increasing BP. Last dose is today. Says in the morning prior to medication BP is normal (this is reflected on log) and will go up after he takes rifampin.   H takes clonidine 0.1 mg BID PRN - says he is taking this daily.   Nephrology recently increased clonidine and BP dropped to systolic of 90s.     hx of HTN, DM, CKD 3, melanoma, presented w/ resp failure, DKA, ARI on .  Hospital course significant for new dialysis on 22, afib RVR on 09/15/22,  delirium       1) ARI/CKD 3: likely has ESRD.  HD was started on .  PD cath placed on  by Vascular.  Renal will transition to PD as an outpatient.   CM working on logistics, home health    2) Afib RVR: hx of Pafib.  Occurred on 09/15, now resolved.  Echo with EF 65-70%.  Cont coreg.  No anticoagulation due to anemia.  Cards was following   3) Acute resp failure/hypoxia: now resolved.  Due to volume overload, DKA.  Intubated on admission, extubated on    4) Acute met encephalopathy: delirium likely from hypoxia, acidosis.  Now much improved.   No need for seroquel on discharge   5) HTN: cont coreg   6) DM type 2 w/ ESRD: A1C 6.6%.  stopped metformin.  Cont Lantus    7) Anemia: due to ESRD and low Vit B12/folate.  S/p 2u RBCs.  Cont vitamins    PAST MEDICAL HISTORY:     Past Medical History:   Diagnosis Date    Anemia     Atrial fibrillation (HCC)     Chronic kidney disease (CKD), stage IV (severe) (HCC)     ESRD (end stage renal disease) (HCC)     Hypercholesteremia     Hypertension     Melanoma

## 2024-10-03 ENCOUNTER — OFFICE VISIT (OUTPATIENT)
Age: 64
End: 2024-10-03
Payer: MEDICARE

## 2024-10-03 VITALS
SYSTOLIC BLOOD PRESSURE: 150 MMHG | HEIGHT: 66 IN | OXYGEN SATURATION: 96 % | DIASTOLIC BLOOD PRESSURE: 90 MMHG | WEIGHT: 166 LBS | HEART RATE: 73 BPM | BODY MASS INDEX: 26.68 KG/M2

## 2024-10-03 DIAGNOSIS — I48.0 PAROXYSMAL ATRIAL FIBRILLATION (HCC): ICD-10-CM

## 2024-10-03 DIAGNOSIS — I10 HTN (HYPERTENSION), BENIGN: Primary | ICD-10-CM

## 2024-10-03 DIAGNOSIS — N18.32 CHRONIC KIDNEY DISEASE, STAGE 3B (HCC): ICD-10-CM

## 2024-10-03 DIAGNOSIS — E78.00 HYPERCHOLESTEREMIA: ICD-10-CM

## 2024-10-03 PROCEDURE — 99214 OFFICE O/P EST MOD 30 MIN: CPT

## 2024-10-03 PROCEDURE — 3017F COLORECTAL CA SCREEN DOC REV: CPT

## 2024-10-03 PROCEDURE — G8484 FLU IMMUNIZE NO ADMIN: HCPCS

## 2024-10-03 PROCEDURE — 3080F DIAST BP >= 90 MM HG: CPT

## 2024-10-03 PROCEDURE — G8419 CALC BMI OUT NRM PARAM NOF/U: HCPCS

## 2024-10-03 PROCEDURE — 1036F TOBACCO NON-USER: CPT

## 2024-10-03 PROCEDURE — 1123F ACP DISCUSS/DSCN MKR DOCD: CPT

## 2024-10-03 PROCEDURE — 93010 ELECTROCARDIOGRAM REPORT: CPT

## 2024-10-03 PROCEDURE — 93005 ELECTROCARDIOGRAM TRACING: CPT

## 2024-10-03 PROCEDURE — 3077F SYST BP >= 140 MM HG: CPT

## 2024-10-03 PROCEDURE — G8427 DOCREV CUR MEDS BY ELIG CLIN: HCPCS

## 2024-10-03 NOTE — PROGRESS NOTES
Chief Complaint   Patient presents with    Hypertension     Vitals:    10/03/24 1121   BP: (!) 190/100   Site: Left Upper Arm   Position: Sitting   Pulse: 73   SpO2: 96%   Weight: 75.3 kg (166 lb)   Height: 1.676 m (5' 6\")     Chest pain: DENIED     Recent hospital stays: DENIED     Refills: DENIED

## 2024-11-07 ENCOUNTER — OFFICE VISIT (OUTPATIENT)
Age: 64
End: 2024-11-07
Payer: MEDICARE

## 2024-11-07 VITALS
BODY MASS INDEX: 26.84 KG/M2 | OXYGEN SATURATION: 96 % | WEIGHT: 167 LBS | HEART RATE: 82 BPM | HEIGHT: 66 IN | DIASTOLIC BLOOD PRESSURE: 88 MMHG | SYSTOLIC BLOOD PRESSURE: 150 MMHG

## 2024-11-07 DIAGNOSIS — N18.32 CHRONIC KIDNEY DISEASE, STAGE 3B (HCC): ICD-10-CM

## 2024-11-07 DIAGNOSIS — E78.00 HYPERCHOLESTEREMIA: ICD-10-CM

## 2024-11-07 DIAGNOSIS — I10 HTN (HYPERTENSION), BENIGN: Primary | ICD-10-CM

## 2024-11-07 DIAGNOSIS — I48.0 PAROXYSMAL ATRIAL FIBRILLATION (HCC): ICD-10-CM

## 2024-11-07 PROCEDURE — 99214 OFFICE O/P EST MOD 30 MIN: CPT

## 2024-11-07 RX ORDER — FLUTICASONE FUROATE, UMECLIDINIUM BROMIDE AND VILANTEROL TRIFENATATE 200; 62.5; 25 UG/1; UG/1; UG/1
1 POWDER RESPIRATORY (INHALATION) DAILY
COMMUNITY
Start: 2024-10-16

## 2024-11-07 RX ORDER — SEMAGLUTIDE 0.68 MG/ML
INJECTION, SOLUTION SUBCUTANEOUS WEEKLY
COMMUNITY
Start: 2024-10-16

## 2024-11-07 NOTE — PROGRESS NOTES
Chief Complaint   Patient presents with    Hypertension     PAF     Vitals:    11/07/24 1112   BP: (!) 150/88   Site: Left Upper Arm   Position: Sitting   Pulse: 82   SpO2: 96%   Weight: 75.8 kg (167 lb)   Height: 1.676 m (5' 6\")     Chest pain: DENIED     Recent hospital stays: DENIED     Refills: DENIED   
  Component Value Date    WBC 6.3 09/29/2022    HGB 8.6 (L) 09/29/2022    HCT 26.1 (L) 09/29/2022    MCV 95.6 09/29/2022     09/29/2022           CARDIAC DIAGNOSTICS:     Cardiac Evaluation Includes:  I reviewed the test results below.    EKG 9/6: normal sinus  EKG 9/9: afib w/ RVR  EKG 9/9: sinus tach w/ PACs  Tele 9/15/22 - Afib with RVR  EKG 10/11/22 - NSR  EKG 9/14/23 - NSR, normal         ECHO 9/9/22: EF of 65 - 70%  Event Monitor 10/19/22 - wore it 27 days - NSR, Avg HR 76 bpm  - No afib       ASSESSMENT AND PLAN:     Assessment and Plan:  hx of HTN, DM, CKD 3, melanoma, presented w/ resp failure, DKA, ARI on 9/22.  Hospital course significant for new dialysis on 09/07/22,  afib RVR on 09/15/22, delirium      1) Afib with RVR during hospital stay 9/22 in setting of acute illness    - Had transient Afib with RVR 9/9/22 (spont converted to NSR)    - On 9/15/22 - back into afib with RVR   - went back into NSR prior to DC 9/23/22  - He was not anticoagulated 9/22 due to anemia / bleeding risk    - is in sinus in office 10/11/22    - Event Monitor 10/19/22 - wore it 27 days - NSR, Avg HR 76 bpm - No Afib        2) Elevated troponin 9/22: Likely type 2 in s/o supply demand mismatch in setting of afib RVR -- can perform CAD testing later as needed        3) HTN  - BP at home usually fair with some high readings ---> Will increase Nifedipine to 90 mg daily and continue other meds   - He says he takes clonidine PRN for SBP  > 130  - says he takes clonidine rarely   - on 3/14/24 - BP well controlled   - on 10/3/24 - BP very elevated today, denies neurological complaints, takes clonidine PRN - SBP on log 130-200. Takes PRN clonidine daily. Recheck /90 - will FU in 1 month. He is concerned with rifampin increasing BP  - on 11/7/24 - BP fairly controlled on log - takes clonidine PRN - elevated in office - says this is usual for him. Would not make any med changes today      4) Severe CKD    - He was on

## 2025-02-21 RX ORDER — CLONIDINE HYDROCHLORIDE 0.1 MG/1
TABLET ORAL
Qty: 90 TABLET | Refills: 1 | Status: SHIPPED | OUTPATIENT
Start: 2025-02-21

## 2025-03-27 ENCOUNTER — TELEPHONE (OUTPATIENT)
Age: 65
End: 2025-03-27

## 2025-06-05 NOTE — PROGRESS NOTES
Patient: Manjit Conklin Jr.  : 1960    Primary Cardiologist: Trudy Melchor MD. [facility]    Today's Date: 2025      HISTORY OF PRESENT ILLNESS:     History of Present Illness:  Presents today for follow-up. Denies chest pain. Continues with shortness of breath, seeing pulmonary soon. Hears a \"rattle\".   says he takes clonidine if SBP is over 140 which has been most days recently. Denies chest pain, edema, palpitations.     hx of HTN, DM, CKD 3, melanoma, presented w/ resp failure, DKA, ARI on .  Hospital course significant for new dialysis on 22, afib RVR on 09/15/22,  delirium       1) ARI/CKD 3: likely has ESRD.  HD was started on .  PD cath placed on  by Vascular.  Renal will transition to PD as an outpatient.   CM working on logistics, home health    2) Afib RVR: hx of Pafib.  Occurred on 09/15, now resolved.  Echo with EF 65-70%.  Cont coreg.  No anticoagulation due to anemia.  Cards was following   3) Acute resp failure/hypoxia: now resolved.  Due to volume overload, DKA.  Intubated on admission, extubated on    4) Acute met encephalopathy: delirium likely from hypoxia, acidosis.  Now much improved.   No need for seroquel on discharge   5) HTN: cont coreg   6) DM type 2 w/ ESRD: A1C 6.6%.  stopped metformin.  Cont Lantus    7) Anemia: due to ESRD and low Vit B12/folate.  S/p 2u RBCs.  Cont vitamins    PAST MEDICAL HISTORY:     Past Medical History:   Diagnosis Date    Anemia     Atrial fibrillation (HCC)     Chronic kidney disease (CKD), stage IV (severe) (HCC)     ESRD (end stage renal disease) (HCC)     Hypercholesteremia     Hypertension     Melanoma (HCC)     skin    Thyroid activity decreased        Past Surgical History:   Procedure Laterality Date    IR NONTUNNELED VASCULAR CATHETER > 5 YEARS  2022    IR NONTUNNELED VASCULAR CATHETER 2022 [facility]    IR NONTUNNELED VASCULAR CATHETER > 5 YEARS  2022    ORTHOPEDIC SURGERY               CURRENT

## 2025-06-06 ENCOUNTER — OFFICE VISIT (OUTPATIENT)
Age: 65
End: 2025-06-06
Payer: MEDICARE

## 2025-06-06 VITALS
HEIGHT: 66 IN | DIASTOLIC BLOOD PRESSURE: 80 MMHG | BODY MASS INDEX: 26.2 KG/M2 | SYSTOLIC BLOOD PRESSURE: 132 MMHG | WEIGHT: 163 LBS | HEART RATE: 78 BPM | OXYGEN SATURATION: 93 %

## 2025-06-06 DIAGNOSIS — E78.00 HYPERCHOLESTEREMIA: ICD-10-CM

## 2025-06-06 DIAGNOSIS — I10 HTN (HYPERTENSION), BENIGN: Primary | ICD-10-CM

## 2025-06-06 DIAGNOSIS — N18.32 CHRONIC KIDNEY DISEASE, STAGE 3B (HCC): ICD-10-CM

## 2025-06-06 DIAGNOSIS — I48.0 PAROXYSMAL ATRIAL FIBRILLATION (HCC): ICD-10-CM

## 2025-06-06 PROCEDURE — 1159F MED LIST DOCD IN RCRD: CPT

## 2025-06-06 PROCEDURE — G8419 CALC BMI OUT NRM PARAM NOF/U: HCPCS

## 2025-06-06 PROCEDURE — 99214 OFFICE O/P EST MOD 30 MIN: CPT

## 2025-06-06 PROCEDURE — 3075F SYST BP GE 130 - 139MM HG: CPT

## 2025-06-06 PROCEDURE — 3017F COLORECTAL CA SCREEN DOC REV: CPT

## 2025-06-06 PROCEDURE — 1160F RVW MEDS BY RX/DR IN RCRD: CPT

## 2025-06-06 PROCEDURE — G8427 DOCREV CUR MEDS BY ELIG CLIN: HCPCS

## 2025-06-06 PROCEDURE — 1123F ACP DISCUSS/DSCN MKR DOCD: CPT

## 2025-06-06 PROCEDURE — 3079F DIAST BP 80-89 MM HG: CPT

## 2025-06-06 PROCEDURE — 1036F TOBACCO NON-USER: CPT

## 2025-06-06 RX ORDER — CLONIDINE HYDROCHLORIDE 0.1 MG/1
0.1 TABLET ORAL 2 TIMES DAILY
Qty: 180 TABLET | Refills: 1 | Status: SHIPPED | OUTPATIENT
Start: 2025-06-06

## 2025-06-06 RX ORDER — CALCITRIOL 0.25 UG/1
0.25 CAPSULE, LIQUID FILLED ORAL
COMMUNITY
Start: 2025-06-04 | End: 2025-09-02

## 2025-06-06 RX ORDER — GOLIMUMAB 50 MG/4ML
SOLUTION INTRAVENOUS
COMMUNITY

## 2025-06-06 NOTE — PROGRESS NOTES
Chief Complaint   Patient presents with    Hypertension    Cholesterol Problem     PAF     Vitals:    06/06/25 1050   BP: 132/80   BP Site: Left Upper Arm   Patient Position: Sitting   Pulse: 78   SpO2: 93%   Weight: 73.9 kg (163 lb)   Height: 1.676 m (5' 6\")     Chest pain: DENIED     Recent hospital stays: DENIED     Refills: DENIED

## 2025-09-03 RX ORDER — CARVEDILOL 12.5 MG/1
12.5 TABLET ORAL 2 TIMES DAILY WITH MEALS
Qty: 180 TABLET | Refills: 3 | Status: SHIPPED | OUTPATIENT
Start: 2025-09-03

## (undated) DEVICE — SUTURE PDS II SZ 2-0 L27IN ABSRB VLT SH L26MM 1/2 CIR Z317H

## (undated) DEVICE — SUTURE SZ 0 27IN 5/8 CIR UR-6  TAPER PT VIOLET ABSRB VICRYL J603H

## (undated) DEVICE — BLADE ASSEMB CLP HAIR FINE --

## (undated) DEVICE — SUTURE VCRL SZ 3-0 L27IN ABSRB UD L26MM SH 1/2 CIR J416H

## (undated) DEVICE — GLOVE ORANGE PI 7 1/2   MSG9075

## (undated) DEVICE — THIS ADAPTER IS A DOUBLE SEALING FEMALE LUER LOCK ADAPTER WITH A 2-PIECE, COMBINATION COMPRESSION FIT/BARBED CATHETER CONNECTOR. THE ADAPTER IS USED TO CONNECT THE PD CATHETER TO A SOLUTION TRANSFER SET WITH LOCKING CONNECTOR.: Brand: LOCKING TITANIUM ADAPTER FOR PERITONEAL DIALYSIS CATHETER

## (undated) DEVICE — SOL IRRIGATION INJ NACL 0.9% 500ML BTL

## (undated) DEVICE — CAP PROTCT ORIG SET ZONE SPEC

## (undated) DEVICE — CANISTER, RIGID, 3000CC: Brand: MEDLINE INDUSTRIES, INC.

## (undated) DEVICE — HYPODERMIC SAFETY NEEDLE: Brand: MAGELLAN

## (undated) DEVICE — BASIC GENERAL-SFMC: Brand: MEDLINE INDUSTRIES, INC.

## (undated) DEVICE — YANKAUER,BULB TIP,W/O VENT,RIGID,STERILE: Brand: MEDLINE

## (undated) DEVICE — TUBE KT ENDFLTN INSUFFLTN SVL --

## (undated) DEVICE — SUTURE MCRYL SZ 4-0 L27IN ABSRB UD L19MM PS-2 1/2 CIR PRIM Y426H

## (undated) DEVICE — SUTURE VCRL SZ 2-0 L27IN ABSRB UD L26MM SH 1/2 CIR J417H

## (undated) DEVICE — ROCKER SWITCH PENCIL BLADE ELECTRODE, HOLSTER: Brand: EDGE

## (undated) DEVICE — SYR 20ML LL STRL LF --

## (undated) DEVICE — DERMABOND SKIN ADH 0.7ML -- DERMABOND ADVANCED 12/BX

## (undated) DEVICE — 3M™ TEGADERM™ TRANSPARENT FILM DRESSING FRAME STYLE, 1626W, 4 IN X 4-3/4 IN (10 CM X 12 CM), 50/CT 4CT/CASE: Brand: 3M™ TEGADERM™

## (undated) DEVICE — SPONGE GZ W4XL4IN COT 12 PLY TYP VII WVN C FLD DSGN

## (undated) DEVICE — DECANTER BAG 9": Brand: MEDLINE INDUSTRIES, INC.

## (undated) DEVICE — SYR LR LCK 1ML GRAD NSAF 30ML --

## (undated) DEVICE — TOWEL SURG W17XL27IN STD BLU COT NONFENESTRATED PREWASHED

## (undated) DEVICE — Device

## (undated) DEVICE — TROCAR ENDOSCP L100MM DIA5MM BLDELSS STBL SL OBT RADLUC

## (undated) DEVICE — GENERAL LAPAROSCOPY-SFMC: Brand: MEDLINE INDUSTRIES, INC.

## (undated) DEVICE — TROCAR LAP L100MM DIA5MM BLDELSS W/ STBL SL ENDOPATH XCEL

## (undated) DEVICE — TUBING, SUCTION, 1/4" X 10', STRAIGHT: Brand: MEDLINE